# Patient Record
Sex: MALE | Race: WHITE | Employment: OTHER | URBAN - METROPOLITAN AREA
[De-identification: names, ages, dates, MRNs, and addresses within clinical notes are randomized per-mention and may not be internally consistent; named-entity substitution may affect disease eponyms.]

---

## 2017-01-31 ENCOUNTER — TRANSFERRED RECORDS (OUTPATIENT)
Dept: HEALTH INFORMATION MANAGEMENT | Facility: CLINIC | Age: 72
End: 2017-01-31

## 2017-02-17 ENCOUNTER — TRANSFERRED RECORDS (OUTPATIENT)
Dept: HEALTH INFORMATION MANAGEMENT | Facility: CLINIC | Age: 72
End: 2017-02-17

## 2017-03-02 ENCOUNTER — CARE COORDINATION (OUTPATIENT)
Dept: CARDIOLOGY | Facility: CLINIC | Age: 72
End: 2017-03-02

## 2017-03-02 ENCOUNTER — TRANSFERRED RECORDS (OUTPATIENT)
Dept: HEALTH INFORMATION MANAGEMENT | Facility: CLINIC | Age: 72
End: 2017-03-02

## 2017-03-02 NOTE — PROGRESS NOTES
Medical Records from [facility] called nurse triage line and stated that this pt is requesting his medical info sent here.  Instructed the [facility] to fax records (pertinent info from last couple years) here to our Cardiology department for pt's upcoming [place] appointment April 17th

## 2017-04-06 ASSESSMENT — ENCOUNTER SYMPTOMS
LIGHT-HEADEDNESS: 0
MYALGIAS: 1
DEPRESSION: 1
LOSS OF CONSCIOUSNESS: 0
TINGLING: 1
NERVOUS/ANXIOUS: 1
SEIZURES: 0
DISTURBANCES IN COORDINATION: 0
CLAUDICATION: 0
TACHYCARDIA: 0
NUMBNESS: 1
NECK PAIN: 0
SLEEP DISTURBANCES DUE TO BREATHING: 0
BACK PAIN: 1
HYPERTENSION: 0
TREMORS: 0
SYNCOPE: 0
STIFFNESS: 0
MUSCLE CRAMPS: 0
EXERCISE INTOLERANCE: 0
PARALYSIS: 0
DIZZINESS: 1
SPEECH CHANGE: 0
HYPOTENSION: 0
ARTHRALGIAS: 0
JOINT SWELLING: 0
MEMORY LOSS: 0
LEG PAIN: 1
INSOMNIA: 1
DECREASED CONCENTRATION: 0
PANIC: 0
ORTHOPNEA: 0
PALPITATIONS: 0
MUSCLE WEAKNESS: 1
LEG SWELLING: 0
WEAKNESS: 0

## 2017-04-17 ENCOUNTER — PRE VISIT (OUTPATIENT)
Dept: CARDIOLOGY | Facility: CLINIC | Age: 72
End: 2017-04-17

## 2017-04-17 ENCOUNTER — RADIANT APPOINTMENT (OUTPATIENT)
Dept: CARDIOLOGY | Facility: CLINIC | Age: 72
End: 2017-04-17

## 2017-04-17 ENCOUNTER — OFFICE VISIT (OUTPATIENT)
Dept: CARDIOLOGY | Facility: CLINIC | Age: 72
End: 2017-04-17
Attending: INTERNAL MEDICINE
Payer: COMMERCIAL

## 2017-04-17 VITALS
OXYGEN SATURATION: 100 % | DIASTOLIC BLOOD PRESSURE: 67 MMHG | SYSTOLIC BLOOD PRESSURE: 120 MMHG | HEIGHT: 65 IN | BODY MASS INDEX: 24.22 KG/M2 | HEART RATE: 63 BPM | WEIGHT: 145.4 LBS

## 2017-04-17 DIAGNOSIS — I50.22 CHRONIC SYSTOLIC HEART FAILURE (H): Primary | ICD-10-CM

## 2017-04-17 DIAGNOSIS — I50.22 CHRONIC SYSTOLIC HEART FAILURE (H): ICD-10-CM

## 2017-04-17 LAB
ALBUMIN SERPL-MCNC: 3.9 G/DL (ref 3.4–5)
ALP SERPL-CCNC: 95 U/L (ref 40–150)
ALT SERPL W P-5'-P-CCNC: 34 U/L (ref 0–70)
ANION GAP SERPL CALCULATED.3IONS-SCNC: 6 MMOL/L (ref 3–14)
AST SERPL W P-5'-P-CCNC: 48 U/L (ref 0–45)
BILIRUB SERPL-MCNC: 0.5 MG/DL (ref 0.2–1.3)
BUN SERPL-MCNC: 23 MG/DL (ref 7–30)
CALCIUM SERPL-MCNC: 9.3 MG/DL (ref 8.5–10.1)
CHLORIDE SERPL-SCNC: 104 MMOL/L (ref 94–109)
CO2 SERPL-SCNC: 30 MMOL/L (ref 20–32)
CREAT SERPL-MCNC: 0.93 MG/DL (ref 0.66–1.25)
ERYTHROCYTE [DISTWIDTH] IN BLOOD BY AUTOMATED COUNT: 15.5 % (ref 10–15)
GFR SERPL CREATININE-BSD FRML MDRD: 80 ML/MIN/1.7M2
GLUCOSE SERPL-MCNC: 327 MG/DL (ref 70–99)
HCT VFR BLD AUTO: 39.7 % (ref 40–53)
HGB BLD-MCNC: 12.7 G/DL (ref 13.3–17.7)
INR PPP: 2.26 (ref 0.86–1.14)
MCH RBC QN AUTO: 26.9 PG (ref 26.5–33)
MCHC RBC AUTO-ENTMCNC: 32 G/DL (ref 31.5–36.5)
MCV RBC AUTO: 84 FL (ref 78–100)
NT-PROBNP SERPL-MCNC: 984 PG/ML (ref 0–125)
PLATELET # BLD AUTO: 190 10E9/L (ref 150–450)
POTASSIUM SERPL-SCNC: 4.6 MMOL/L (ref 3.4–5.3)
PROT SERPL-MCNC: 7.6 G/DL (ref 6.8–8.8)
RBC # BLD AUTO: 4.72 10E12/L (ref 4.4–5.9)
SODIUM SERPL-SCNC: 140 MMOL/L (ref 133–144)
WBC # BLD AUTO: 5.4 10E9/L (ref 4–11)

## 2017-04-17 PROCEDURE — 99212 OFFICE O/P EST SF 10 MIN: CPT | Mod: ZF

## 2017-04-17 PROCEDURE — 36415 COLL VENOUS BLD VENIPUNCTURE: CPT | Performed by: INTERNAL MEDICINE

## 2017-04-17 PROCEDURE — 99205 OFFICE O/P NEW HI 60 MIN: CPT | Mod: 25 | Performed by: INTERNAL MEDICINE

## 2017-04-17 PROCEDURE — 83880 ASSAY OF NATRIURETIC PEPTIDE: CPT | Performed by: INTERNAL MEDICINE

## 2017-04-17 PROCEDURE — 85610 PROTHROMBIN TIME: CPT | Performed by: INTERNAL MEDICINE

## 2017-04-17 PROCEDURE — 80053 COMPREHEN METABOLIC PANEL: CPT | Performed by: INTERNAL MEDICINE

## 2017-04-17 PROCEDURE — 99212 OFFICE O/P EST SF 10 MIN: CPT

## 2017-04-17 PROCEDURE — 85027 COMPLETE CBC AUTOMATED: CPT | Performed by: INTERNAL MEDICINE

## 2017-04-17 RX ORDER — GABAPENTIN 300 MG/1
300 TABLET, FILM COATED ORAL
COMMUNITY
End: 2018-11-06

## 2017-04-17 RX ORDER — CLOPIDOGREL BISULFATE 75 MG/1
75 TABLET ORAL DAILY
COMMUNITY

## 2017-04-17 RX ORDER — PANTOPRAZOLE SODIUM 40 MG/1
TABLET, DELAYED RELEASE ORAL DAILY
COMMUNITY
End: 2018-05-07

## 2017-04-17 RX ORDER — ATORVASTATIN CALCIUM 80 MG/1
80 TABLET, FILM COATED ORAL DAILY
COMMUNITY

## 2017-04-17 RX ORDER — LOSARTAN POTASSIUM 25 MG/1
25 TABLET ORAL DAILY
COMMUNITY
End: 2018-05-07

## 2017-04-17 RX ORDER — NITROGLYCERIN 0.4 MG/1
0.4 TABLET SUBLINGUAL EVERY 5 MIN PRN
COMMUNITY

## 2017-04-17 RX ORDER — CARVEDILOL 6.25 MG/1
6.25 TABLET ORAL 2 TIMES DAILY WITH MEALS
Status: ON HOLD | COMMUNITY
End: 2018-11-07

## 2017-04-17 RX ORDER — SOTALOL HYDROCHLORIDE 120 MG/1
160 TABLET ORAL 2 TIMES DAILY
COMMUNITY
End: 2017-11-06

## 2017-04-17 RX ORDER — ASPIRIN 81 MG/1
81 TABLET, CHEWABLE ORAL DAILY
COMMUNITY
End: 2017-11-06 | Stop reason: ALTCHOICE

## 2017-04-17 RX ORDER — ISOSORBIDE MONONITRATE 60 MG/1
60 TABLET, EXTENDED RELEASE ORAL 2 TIMES DAILY
Status: ON HOLD | COMMUNITY
End: 2018-11-07

## 2017-04-17 RX ORDER — WARFARIN SODIUM 2.5 MG/1
TABLET ORAL
Status: ON HOLD | COMMUNITY
End: 2019-01-25

## 2017-04-17 ASSESSMENT — PAIN SCALES - GENERAL: PAINLEVEL: NO PAIN (0)

## 2017-04-17 NOTE — MR AVS SNAPSHOT
After Visit Summary   4/17/2017    Wesley Cooper    MRN: [id number]           Patient Information     Date Of Birth          1945        Visit Information        Provider Department      4/17/2017 1:00 PM Claudy Loco MD [facility]        Today's Diagnoses     Chronic systolic heart failure (H)    -  1      Care Instructions    You were seen at the [facility] clinic today.  You saw Dr. Loco  Here are your Instructions:    1.  We will get images of your cardiac testing from [place].  2.  Echo today at [facility].  3.  Labs today.  4.  F/U will be based on your test results, may do right heart cath & cardiopulmonary stress test next week.      Sil Vance RN  Nurse Care Coordinator  Office:  [phone] option #1, then #3 & ask for Sil (nurse line)  Fax:  [phone]  After Hours:  [phone]  Appointments:  [phone] option #1, then option #1                      Follow-ups after your visit        Your next 10 appointments already scheduled     Apr 17, 2017  2:45 PM CDT   Lab with  LAB    Health Lab ([facility])    [address] [place] [id number] [phone]            Apr 17, 2017  3:30 PM CDT   Ech Complete with [address] Echo ([facility])    [address] [place] [id number] [phone]           1.  Please bring or wear a comfortable two-piece outfit. 2.  You may eat, drink and take your normal medicines. 3.  For any questions that cannot be answered, please contact the ordering physician              Future tests that were ordered for you today     Open Future Orders        Priority Expected Expires Ordered    CBC with platelets Routine 4/17/2017 4/18/2017 4/17/2017    Comprehensive metabolic panel Routine 4/17/2017 4/18/2017 4/17/2017    N terminal pro BNP outpatient Routine 4/17/2017 4/18/2017 4/17/2017    INR  "Routine 4/17/2017 4/18/2017 4/17/2017    Echocardiogram Complete Routine  4/17/2018 4/17/2017            Who to contact     If you have questions or need follow up information about today's clinic visit or your schedule please contact [facility] directly at [phone].  Normal or non-critical lab and imaging results will be communicated to you by MyChart, letter or phone within 4 business days after the clinic has received the results. If you do not hear from us within 7 days, please contact the clinic through compropagohart or phone. If you have a critical or abnormal lab result, we will notify you by phone as soon as possible.  Submit refill requests through [employer] or call your pharmacy and they will forward the refill request to us. Please allow 3 business days for your refill to be completed.          Additional Information About Your Visit        MyChart Information     [employer] gives you secure access to your electronic health record. If you see a primary care provider, you can also send messages to your care team and make appointments. If you have questions, please call your primary care clinic.  If you do not have a primary care provider, please call [phone] and they will assist you.        Care EveryWhere ID     This is your Care EveryWhere ID. This could be used by other organizations to access your [facility] medical records  [id number]        Your Vitals Were     Pulse Height Pulse Oximetry BMI (Body Mass Index)          63 1.651 m (5' 5\") 100% 24.2 kg/m2         Blood Pressure from Last 3 Encounters:   04/17/17 120/67    Weight from Last 3 Encounters:   04/17/17 66 kg (145 lb 6.4 oz)               Primary Care Provider Office Phone # Fax #    Jarod De La Rosa [phone]       [address] [address]        Thank you!     Thank you for choosing [facility]  for your care. Our goal is always to provide you with excellent care. Hearing back from " our patients is one way we can continue to improve our services. Please take a few minutes to complete the written survey that you may receive in the mail after your visit with us. Thank you!             Your Updated Medication List - Protect others around you: Learn how to safely use, store and throw away your medicines at www.disposemymeds.org.          This list is accurate as of: 4/17/17  2:34 PM.  Always use your most recent med list.                   Brand Name Dispense Instructions for use    AMLODIPINE BESYLATE PO          aspirin 81 MG chewable tablet      Take 81 mg by mouth daily       atorvastatin 80 MG tablet    LIPITOR     Take 80 mg by mouth daily       carvedilol 6.25 MG tablet    COREG     Take 6.25 mg by mouth 2 times daily (with meals)       gabapentin 300 MG 24 hr tablet    GRALISE     Take 300 mg by mouth daily (with dinner)       isosorbide mononitrate 60 MG 24 hr tablet    IMDUR     Take 60 mg by mouth daily       losartan 25 MG tablet    COZAAR     Take 25 mg by mouth daily       nitroglycerin 0.4 MG sublingual tablet    NITROSTAT     Place 0.4 mg under the tongue every 5 minutes as needed for chest pain For chest pain place 1 tablet under the tongue every 5 minutes for 3 doses. If symptoms persist 5 minutes after 1st dose call 911.       pantoprazole 40 MG EC tablet    PROTONIX     Take by mouth daily       PLAVIX 75 MG tablet   Generic drug:  clopidogrel      Take 75 mg by mouth daily       sotalol HCl (AF) 120 MG Tabs      Take 160 mg by mouth 2 times daily       warfarin 2.5 MG tablet    COUMADIN     Take 2.5 mg by mouth daily

## 2017-04-17 NOTE — PATIENT INSTRUCTIONS
You were seen at the [facility] clinic today.  You saw Dr. Loco  Here are your Instructions:    1.  We will get images of your cardiac testing from [place].  2.  Echo today at 330.  3.  Labs today.  4.  F/U will be based on your test results, may do right heart cath & cardiopulmonary stress test next week.      Sil Vance RN  Nurse Care Coordinator  Office:  [phone] option #1, then #3 & ask for Sil (nurse line)  Fax:  [phone]  After Hours:  [phone]  Appointments:  [phone] option #1, then option #1

## 2017-04-17 NOTE — NURSING NOTE
POC discussed w/ pt & s.o., including getting images from [place].  Verbalizes understanding to plan.

## 2017-04-17 NOTE — LETTER
2017      RE: Wesley Cooper  [address] [place] [id number]       Dear Dr. De La Rosa,      We had the pleasure of seeing your patient Mr. Cooper in our Advanced Heart Failure Clinic. He is a 72-year-old gentleman with a complex past medical history which includes the followin.  CAD - status post CABG in  with a LIMA to LAD, a vein graft to RCA and a vein graft to circumflex. History of multiple interventions and PCI's post CABG. History of non-STEMI in 2016 where he received a balloon angioplasty to then in-stent restenosis of his mid-LAD graft.  The patient had a total occlusion of 2 of 3 grafts, with the only surviving graft the LIMA to LAD.     2. History of angina for which he was referred for EECP, external extracorporeal counterpulsation, of which he completed a 3-month course without real benefit. Didn't tolerate Ranolazine in the past.     3.  Ischemic cardiomyopathy due to above with an ejection fraction of 35% a couple of years ago. Status post CRT-D implantation due to above, the patient is biventricularly paced.     4. Type 2 diabetes mellitus.     5. Atrial flutter on chronic anticoagulation    He is currently being managed at [facility] and he self referred himself for a second opinion for or consideration of advanced therapy of his ischemic cardiomyopathy/heart failure. He was considered not a candidate for revascularization as he had no viability in his inferior and lateral wall. His LIMA graft was ok but he had distal LAD disease, which was reportedly to small for revascularization.     Mr. Cooper is relatively active.  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  He works quite a bit without any real significant angina that would make him stop.  However, he indicates that he develops some chest pain mostly while resting, the most recent of which was last night where he had to take 3 nitroglycerin sublingually, with  eventual subsiding of the symptoms.  He is not restricted by any dyspnea on exertion or anginal pain; however, he can shop and walk probably a couple of blocks.  However, he has got a left footdrop from a lower back problem which eventually he went on to have surgery on in [place], [place].  He denies orthopnea or paroxysmal nocturnal dyspnea.  His most recent electrocardiogram from [facility] was on 01/31, which showed biventricular and atrial pacemaker with a background of sinus rhythm.  There has been conflicting reports about the estimation of his left ventricular systolic function, and there was no real studies to be reviewed by us to determine what his current left ventricular ejection trend is about.  He takes his medications regularly.      He most recently had 2 appropriate ICD shocks in January of this year. He is interested, as I indicated, in advanced heart failure therapy.     Current Outpatient Prescriptions   Medication Sig     nitroglycerin (NITROSTAT) 0.4 MG sublingual tablet Place 0.4 mg under the tongue every 5 minutes as needed for chest pain For chest pain place 1 tablet under the tongue every 5 minutes for 3 doses. If symptoms persist 5 minutes after 1st dose call 911.     sotalol HCl, AF, 120 MG TABS Take 160 mg by mouth 2 times daily     clopidogrel (PLAVIX) 75 MG tablet Take 75 mg by mouth daily     isosorbide mononitrate (IMDUR) 60 MG 24 hr tablet Take 60 mg by mouth daily     carvedilol (COREG) 6.25 MG tablet Take 6.25 mg by mouth 2 times daily (with meals)     gabapentin (GRALISE) 300 MG 24 hr tablet Take 300 mg by mouth daily (with dinner)     losartan (COZAAR) 25 MG tablet Take 25 mg by mouth daily     warfarin (COUMADIN) 2.5 MG tablet Take 2.5 mg by mouth daily     aspirin 81 MG chewable tablet Take 81 mg by mouth daily     pantoprazole (PROTONIX) 40 MG EC tablet Take by mouth daily     atorvastatin (LIPITOR) 80 MG tablet Take 80 mg by mouth daily     AMLODIPINE BESYLATE PO      No  current facility-administered medications for this visit.      ALLERGIES:  None known.      OTHER/SURGICAL HISTORY:   1.  Status post CABG in .    2.  Low back surgery.   3.  Left footdrop.      SOCIAL HISTORY:  He does not smoke.  He lives with his wife.  He quit smoking in .  He used to smoke 1 pack per day for 40-45 years and occasional alcohol drinker.      FAMILY HISTORY:  Both parents  in their 90s.      REVIEW OF SYSTEMS:  An 11-point system was reviewed and if not mentioned in my HPI are negative.      PHYSICAL EXAMINATION:   GENERAL:  In no acute distress.   VITAL SIGNS:  Blood pressure 120/67, heart rate 63 beats per minute.  Height 1.65 meters, weight 66 kilograms.  BMI is 24.25.   HEENT:  Normocephalic, atraumatic.  No xanthelasma.  No oral lesions.   NECK:  No rigidity, thyromegaly or lymphadenopathy.   CARDIOVASCULAR:  Regular rate and rhythm.  No gallops, rubs or murmurs.  JVP is less than 7 cm water, no precordial heave or lift.   CHEST:  Clear to auscultation.   ABDOMEN:  Soft, nontender, nondistended, no organomegaly.   EXTREMITIES:  No lower extremity edema.   NEUROLOGIC:  Cranial nerves are grossly intact.  Some weakness with apparent left footdrop.   SKIN:  No rash, warm and dry.   PSYCHIATRIC:  Normal mood and affect.     CBC RESULTS:   Recent Labs   Lab Test  17   1512   WBC  5.4   RBC  4.72   HGB  12.7*   HCT  39.7*   MCV  84   MCH  26.9   MCHC  32.0   RDW  15.5*   PLT  190     Recent Labs   Lab Test  17   1512   NA  140   POTASSIUM  4.6   CHLORIDE  104   CO2  30   ANIONGAP  6   GLC  327*   BUN  23   CR  0.93   KEIRA  9.3       Liver Function Studies -   Recent Labs   Lab Test  17   1512   PROTTOTAL  7.6   ALBUMIN  3.9   BILITOTAL  0.5   ALKPHOS  95   AST  48*   ALT  34       NT-ProBNP  - 984    EKG: A sensed - Biv Paced     Echocardiogram (2017):     The LVEF is estimated at 45-50% (calculated, 49%.)  Inferior/posterior wall akinesis is present.  Global right  ventricular function is normal.  Mild to moderate mitral insufficiency is present.  Mild to moderate tricuspid insufficiency is present.  Mild pulmonary hypertension. Right ventricular systolic pressure is 31 mmHg above the right atrial pressure.  The inferior vena cava is dilated at 2.2 cm without respiratory variability, consistent with increased right atrial pressure. Estimated mean right atrial pressure is 3-8 mmHg.  No pericardial effusion is present.  Previous study not available for comparison.       IMPRESSION AND PLAN:      In summary, Mr. Wesley Cooper is a 72-year-old gentleman with a long history of coronary artery disease, status post CABG x3, multiple coronary interventions, ischemic cardiomyopathy, status post biventricular defibrillator, diabetes mellitus and atrial flutter on chronic warfarin therapy.  The patient has received most of his cardiac care at [facility] and is here self-referred for further management seeking advanced heart failure therapy if he is a candidate.      His echocardiogram shows only mild reduction in his LV systolic function.  Clinically, we would characterize him as New York Heart Association functional class II and AHA/ACC stage C. He is also compensated without any evidence of volume overload or end-organ dysfunction.  Thus, he does not have any indications for advanced heart failure therapy, which is mainly left ventricular assist devices as he is not a candidate for cardiac transplant because of his age.     He has had couple of hospitalizations over the last 6 months, 1 of which was due to appropriate 2 ICD shocks in January of this year.  From reviewing the documents that were sent from [place], it appeared that surgical revascularization was contemplated, and a PET scan was ordered, which we do not have the films of it, and it appeared that his targets are not suitable for surgical revascularization. His chest pain is not very typical for anginal and he does not appear  to be very limited.      As a first step, we need to review his angiogram films and PET scan to see whether he can have any revascularization, which does not appear very promising based on the report from [facility]. I am afraid that there are not many other options at this point except for maximal medical therapy, which he is already on. His blood sugars are elevated and this needs to be monitored and controlled.      Thank you very much for sharing the care of this patient with us at the [facility] Advanced [facility] Failure Clinic.  The patient was seen and discussed with Dr. Loco.      Dictated by Ankita Christensen DO    Heart Failure Fellow    (extension [phone])       I have personally seen and examined the patient, and then discussed with Dr. Montanez, and agree with the findings and plan in this note. I have reviewed today's vital signs, medications, labs, and imaging.     Sincerely,    Claudy Loco MD   [facility]  Section of Advanced Heart Failure   Cardiovascular Division  [facility]   [phone]          D: 2017 11:00   T: 2017 04:42   MT: [place]      Name:     FLOR STEVENSON   MRN:      [id number]        Account:      [id number]   :      1945           Service Date: 2017      Document: [id number]        Claudy Loco MD

## 2017-04-17 NOTE — LETTER
2017      RE: Wesley Cooper  [address] [facility] [address]       Dear Colleague,    Thank you for the opportunity to participate in the care of your patient, Wesley Cooper, at the [facility]. Please see a copy of my visit note below.    Dear Dr. De La Rosa,      We had the pleasure of seeing your patient Mr. Cooper in our Advanced Heart Failure Clinic. He is a 72-year-old gentleman with a complex past medical history which includes the followin.  CAD - status post CABG in  with a LIMA to LAD, a vein graft to RCA and a vein graft to circumflex. History of multiple interventions and PCI's post CABG. History of non-STEMI in 2016 where he received a balloon angioplasty to then in-stent restenosis of his mid-LAD graft.  The patient had a total occlusion of 2 of 3 grafts, with the only surviving graft the LIMA to LAD.     2. History of angina for which he was referred for EECP, external extracorporeal counterpulsation, of which he completed a 3-month course without real benefit. Didn't tolerate Ranolazine in the past.     3.  Ischemic cardiomyopathy due to above with an ejection fraction of 35% a couple of years ago. Status post CRT-D implantation due to above, the patient is biventricularly paced.     4. Type 2 diabetes mellitus.     5. Atrial flutter on chronic anticoagulation    He is currently being managed at [facility] and he self referred himself for a second opinion for or consideration of advanced therapy of his ischemic cardiomyopathy/heart failure. He was considered not a candidate for revascularization as he had no viability in his inferior and lateral wall. His LIMA graft was ok but he had distal LAD disease, which was reportedly to small for revascularization.     Mr. Cooper is relatively active.  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  He works quite a bit without any  real significant angina that would make him stop.  However, he indicates that he develops some chest pain mostly while resting, the most recent of which was last night where he had to take 3 nitroglycerin sublingually, with eventual subsiding of the symptoms.  He is not restricted by any dyspnea on exertion or anginal pain; however, he can shop and walk probably a couple of blocks.  However, he has got a left footdrop from a lower back problem which eventually he went on to have surgery on in [place], [place].  He denies orthopnea or paroxysmal nocturnal dyspnea.  His most recent electrocardiogram from [facility] was on 01/31, which showed biventricular and atrial pacemaker with a background of sinus rhythm.  There has been conflicting reports about the estimation of his left ventricular systolic function, and there was no real studies to be reviewed by us to determine what his current left ventricular ejection trend is about.  He takes his medications regularly.      He most recently had 2 appropriate ICD shocks in January of this year. He is interested, as I indicated, in advanced heart failure therapy.     Current Outpatient Prescriptions   Medication Sig     nitroglycerin (NITROSTAT) 0.4 MG sublingual tablet Place 0.4 mg under the tongue every 5 minutes as needed for chest pain For chest pain place 1 tablet under the tongue every 5 minutes for 3 doses. If symptoms persist 5 minutes after 1st dose call 911.     sotalol HCl, AF, 120 MG TABS Take 160 mg by mouth 2 times daily     clopidogrel (PLAVIX) 75 MG tablet Take 75 mg by mouth daily     isosorbide mononitrate (IMDUR) 60 MG 24 hr tablet Take 60 mg by mouth daily     carvedilol (COREG) 6.25 MG tablet Take 6.25 mg by mouth 2 times daily (with meals)     gabapentin (GRALISE) 300 MG 24 hr tablet Take 300 mg by mouth daily (with dinner)     losartan (COZAAR) 25 MG tablet Take 25 mg by mouth daily     warfarin (COUMADIN) 2.5 MG tablet Take 2.5 mg by mouth daily      aspirin 81 MG chewable tablet Take 81 mg by mouth daily     pantoprazole (PROTONIX) 40 MG EC tablet Take by mouth daily     atorvastatin (LIPITOR) 80 MG tablet Take 80 mg by mouth daily     AMLODIPINE BESYLATE PO      No current facility-administered medications for this visit.      ALLERGIES:  None known.      OTHER/SURGICAL HISTORY:   1.  Status post CABG in .    2.  Low back surgery.   3.  Left footdrop.      SOCIAL HISTORY:  He does not smoke.  He lives with his wife.  He quit smoking in .  He used to smoke 1 pack per day for 40-45 years and occasional alcohol drinker.      FAMILY HISTORY:  Both parents  in their 90s.      REVIEW OF SYSTEMS:  An 11-point system was reviewed and if not mentioned in my HPI are negative.      PHYSICAL EXAMINATION:   GENERAL:  In no acute distress.   VITAL SIGNS:  Blood pressure 120/67, heart rate 63 beats per minute.  Height 1.65 meters, weight 66 kilograms.  BMI is 24.25.   HEENT:  Normocephalic, atraumatic.  No xanthelasma.  No oral lesions.   NECK:  No rigidity, thyromegaly or lymphadenopathy.   CARDIOVASCULAR:  Regular rate and rhythm.  No gallops, rubs or murmurs.  JVP is less than 7 cm water, no precordial heave or lift.   CHEST:  Clear to auscultation.   ABDOMEN:  Soft, nontender, nondistended, no organomegaly.   EXTREMITIES:  No lower extremity edema.   NEUROLOGIC:  Cranial nerves are grossly intact.  Some weakness with apparent left footdrop.   SKIN:  No rash, warm and dry.   PSYCHIATRIC:  Normal mood and affect.     CBC RESULTS:   Recent Labs   Lab Test  17   1512   WBC  5.4   RBC  4.72   HGB  12.7*   HCT  39.7*   MCV  84   MCH  26.9   MCHC  32.0   RDW  15.5*   PLT  190     Recent Labs   Lab Test  17   1512   NA  140   POTASSIUM  4.6   CHLORIDE  104   CO2  30   ANIONGAP  6   GLC  327*   BUN  23   CR  0.93   KEIRA  9.3       Liver Function Studies -   Recent Labs   Lab Test  17   1512   PROTTOTAL  7.6   ALBUMIN  3.9   BILITOTAL  0.5    ALKPHOS  95   AST  48*   ALT  34       NT-ProBNP  - 984    EKG: A sensed - Biv Paced     Echocardiogram (04/2017):     The LVEF is estimated at 45-50% (calculated, 49%.)  Inferior/posterior wall akinesis is present.  Global right ventricular function is normal.  Mild to moderate mitral insufficiency is present.  Mild to moderate tricuspid insufficiency is present.  Mild pulmonary hypertension. Right ventricular systolic pressure is 31 mmHg above the right atrial pressure.  The inferior vena cava is dilated at 2.2 cm without respiratory variability, consistent with increased right atrial pressure. Estimated mean right atrial pressure is 3-8 mmHg.  No pericardial effusion is present.  Previous study not available for comparison.       IMPRESSION AND PLAN:      In summary, Mr. Wesley Cooper is a 72-year-old gentleman with a long history of coronary artery disease, status post CABG x3, multiple coronary interventions, ischemic cardiomyopathy, status post biventricular defibrillator, diabetes mellitus and atrial flutter on chronic warfarin therapy.  The patient has received most of his cardiac care at [facility] and is here self-referred for further management seeking advanced heart failure therapy if he is a candidate.      His echocardiogram shows only mild reduction in his LV systolic function.  Clinically, we would characterize him as New York Heart Association functional class II and AHA/ACC stage C. He is also compensated without any evidence of volume overload or end-organ dysfunction.  Thus, he does not have any indications for advanced heart failure therapy, which is mainly left ventricular assist devices as he is not a candidate for cardiac transplant because of his age.     He has had couple of hospitalizations over the last 6 months, 1 of which was due to appropriate 2 ICD shocks in January of this year.  From reviewing the documents that were sent from [place], it appeared that surgical revascularization was  contemplated, and a PET scan was ordered, which we do not have the films of it, and it appeared that his targets are not suitable for surgical revascularization. His chest pain is not very typical for anginal and he does not appear to be very limited.      As a first step, we need to review his angiogram films and PET scan to see whether he can have any revascularization, which does not appear very promising based on the report from [facility]. I am afraid that there are not many other options at this point except for maximal medical therapy, which he is already on. His blood sugars are elevated and this needs to be monitored and controlled.      Thank you very much for sharing the care of this patient with us at the [facility] Advanced Heart Failure Clinic.  The patient was seen and discussed with Dr. Loco.      Dictated by Ankita Christensen,     Heart Failure Fellow    (extension [phone])     I have personally seen and examined the patient, and then discussed with Dr. Montanez, and agree with the findings and plan in this note. I have reviewed today's vital signs, medications, labs, and imaging.     Sincerely,    Claudy Loco MD   Center for Pulmonary Hypertension  Section of Advanced Heart Failure   Cardiovascular Division  [facility]   [phone]    D: 2017 11:00   T: 2017 04:42   MT: [place]      Name:     FLOR STEVENSON   MRN:      [id number]        Account:      [id number]   :      1945           Service Date: 2017      Document: [id number]

## 2017-04-21 NOTE — PROGRESS NOTES
Dear Dr. De La Rosa,      We had the pleasure of seeing your patient Mr. Cooper in our Advanced Heart Failure Clinic. He is a 72-year-old gentleman with a complex past medical history which includes the followin.  CAD - status post CABG in  with a LIMA to LAD, a vein graft to RCA and a vein graft to circumflex. History of multiple interventions and PCI's post CABG. History of non-STEMI in 2016 where he received a balloon angioplasty to then in-stent restenosis of his mid-LAD graft.  The patient had a total occlusion of 2 of 3 grafts, with the only surviving graft the LIMA to LAD.     2. History of angina for which he was referred for EECP, external extracorporeal counterpulsation, of which he completed a 3-month course without real benefit. Didn't tolerate Ranolazine in the past.     3.  Ischemic cardiomyopathy due to above with an ejection fraction of 35% a couple of years ago. Status post CRT-D implantation due to above, the patient is biventricularly paced.     4. Type 2 diabetes mellitus.     5. Atrial flutter on chronic anticoagulation    He is currently being managed at [facility] and he self referred himself for a second opinion for or consideration of advanced therapy of his ischemic cardiomyopathy/heart failure. He was considered not a candidate for revascularization as he had no viability in his inferior and lateral wall. His LIMA graft was ok but he had distal LAD disease, which was reportedly to small for revascularization.     Mr. Cooper is relatively active.  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  He works quite a bit without any real significant angina that would make him stop.  However, he indicates that he develops some chest pain mostly while resting, the most recent of which was last night where he had to take 3 nitroglycerin sublingually, with eventual subsiding of the symptoms.  He is not restricted by any dyspnea on  exertion or anginal pain; however, he can shop and walk probably a couple of blocks.  However, he has got a left footdrop from a lower back problem which eventually he went on to have surgery on in [place], [place].  He denies orthopnea or paroxysmal nocturnal dyspnea.  His most recent electrocardiogram from [facility] was on 01/31, which showed biventricular and atrial pacemaker with a background of sinus rhythm.  There has been conflicting reports about the estimation of his left ventricular systolic function, and there was no real studies to be reviewed by us to determine what his current left ventricular ejection trend is about.  He takes his medications regularly.      He most recently had 2 appropriate ICD shocks in January of this year. He is interested, as I indicated, in advanced heart failure therapy.     Current Outpatient Prescriptions   Medication Sig     nitroglycerin (NITROSTAT) 0.4 MG sublingual tablet Place 0.4 mg under the tongue every 5 minutes as needed for chest pain For chest pain place 1 tablet under the tongue every 5 minutes for 3 doses. If symptoms persist 5 minutes after 1st dose call 911.     sotalol HCl, AF, 120 MG TABS Take 160 mg by mouth 2 times daily     clopidogrel (PLAVIX) 75 MG tablet Take 75 mg by mouth daily     isosorbide mononitrate (IMDUR) 60 MG 24 hr tablet Take 60 mg by mouth daily     carvedilol (COREG) 6.25 MG tablet Take 6.25 mg by mouth 2 times daily (with meals)     gabapentin (GRALISE) 300 MG 24 hr tablet Take 300 mg by mouth daily (with dinner)     losartan (COZAAR) 25 MG tablet Take 25 mg by mouth daily     warfarin (COUMADIN) 2.5 MG tablet Take 2.5 mg by mouth daily     aspirin 81 MG chewable tablet Take 81 mg by mouth daily     pantoprazole (PROTONIX) 40 MG EC tablet Take by mouth daily     atorvastatin (LIPITOR) 80 MG tablet Take 80 mg by mouth daily     AMLODIPINE BESYLATE PO      No current facility-administered medications for this visit.      ALLERGIES:   None known.      OTHER/SURGICAL HISTORY:   1.  Status post CABG in .    2.  Low back surgery.   3.  Left footdrop.      SOCIAL HISTORY:  He does not smoke.  He lives with his wife.  He quit smoking in .  He used to smoke 1 pack per day for 40-45 years and occasional alcohol drinker.      FAMILY HISTORY:  Both parents  in their 90s.      REVIEW OF SYSTEMS:  An 11-point system was reviewed and if not mentioned in my HPI are negative.      PHYSICAL EXAMINATION:   GENERAL:  In no acute distress.   VITAL SIGNS:  Blood pressure 120/67, heart rate 63 beats per minute.  Height 1.65 meters, weight 66 kilograms.  BMI is 24.25.   HEENT:  Normocephalic, atraumatic.  No xanthelasma.  No oral lesions.   NECK:  No rigidity, thyromegaly or lymphadenopathy.   CARDIOVASCULAR:  Regular rate and rhythm.  No gallops, rubs or murmurs.  JVP is less than 7 cm water, no precordial heave or lift.   CHEST:  Clear to auscultation.   ABDOMEN:  Soft, nontender, nondistended, no organomegaly.   EXTREMITIES:  No lower extremity edema.   NEUROLOGIC:  Cranial nerves are grossly intact.  Some weakness with apparent left footdrop.   SKIN:  No rash, warm and dry.   PSYCHIATRIC:  Normal mood and affect.     CBC RESULTS:   Recent Labs   Lab Test  17   1512   WBC  5.4   RBC  4.72   HGB  12.7*   HCT  39.7*   MCV  84   MCH  26.9   MCHC  32.0   RDW  15.5*   PLT  190     Recent Labs   Lab Test  17   1512   NA  140   POTASSIUM  4.6   CHLORIDE  104   CO2  30   ANIONGAP  6   GLC  327*   BUN  23   CR  0.93   KEIRA  9.3       Liver Function Studies -   Recent Labs   Lab Test  17   1512   PROTTOTAL  7.6   ALBUMIN  3.9   BILITOTAL  0.5   ALKPHOS  95   AST  48*   ALT  34       NT-ProBNP  - 984    EKG: A sensed - Biv Paced     Echocardiogram (2017):     The LVEF is estimated at 45-50% (calculated, 49%.)  Inferior/posterior wall akinesis is present.  Global right ventricular function is normal.  Mild to moderate mitral insufficiency is  present.  Mild to moderate tricuspid insufficiency is present.  Mild pulmonary hypertension. Right ventricular systolic pressure is 31 mmHg above the right atrial pressure.  The inferior vena cava is dilated at 2.2 cm without respiratory variability, consistent with increased right atrial pressure. Estimated mean right atrial pressure is 3-8 mmHg.  No pericardial effusion is present.  Previous study not available for comparison.       IMPRESSION AND PLAN:      In summary, Mr. Wesley Cooper is a 72-year-old gentleman with a long history of coronary artery disease, status post CABG x3, multiple coronary interventions, ischemic cardiomyopathy, status post biventricular defibrillator, diabetes mellitus and atrial flutter on chronic warfarin therapy.  The patient has received most of his cardiac care at [facility] and is here self-referred for further management seeking advanced heart failure therapy if he is a candidate.      His echocardiogram shows only mild reduction in his LV systolic function.  Clinically, we would characterize him as New York Heart Association functional class II and AHA/ACC stage C. He is also compensated without any evidence of volume overload or end-organ dysfunction.  Thus, he does not have any indications for advanced heart failure therapy, which is mainly left ventricular assist devices as he is not a candidate for cardiac transplant because of his age.     He has had couple of hospitalizations over the last 6 months, 1 of which was due to appropriate 2 ICD shocks in January of this year.  From reviewing the documents that were sent from [place], it appeared that surgical revascularization was contemplated, and a PET scan was ordered, which we do not have the films of it, and it appeared that his targets are not suitable for surgical revascularization. His chest pain is not very typical for anginal and he does not appear to be very limited.      As a first step, we need to review his angiogram  films and PET scan to see whether he can have any revascularization, which does not appear very promising based on the report from [facility]. I am afraid that there are not many other options at this point except for maximal medical therapy, which he is already on. His blood sugars are elevated and this needs to be monitored and controlled.      Thank you very much for sharing the care of this patient with us at the [facility] Advanced Heart Failure Clinic.  The patient was seen and discussed with Dr. Loco.      Dictated by Ankita Christensen DO    Heart Failure Fellow    (extension [phone])       I have personally seen and examined the patient, and then discussed with Dr. Montanez, and agree with the findings and plan in this note. I have reviewed today's vital signs, medications, labs, and imaging.     Sincerely,    Claudy Loco MD   Center for Pulmonary Hypertension  Section of Advanced Heart Failure   Cardiovascular Division  [facility]   [phone]          D: 2017 11:00   T: 2017 04:42   MT: [place]      Name:     FLOR STEVENSON   MRN:      [id number]        Account:      [id number]   :      1945           Service Date: 2017      Document: [id number]

## 2017-05-08 ENCOUNTER — CARE COORDINATION (OUTPATIENT)
Dept: CARDIOLOGY | Facility: CLINIC | Age: 72
End: 2017-05-08

## 2017-05-08 NOTE — PROGRESS NOTES
Patient saw Dr. Loco in clinic on 4/18/17.  At that time, per note, Dr. Loco was going to request and review images of PET scan and angiogram from [facility].  Written reports are in EPIC.  No images are noted.  I will have his clinic follow up and contact patient.  Patient requests a call back to discuss.

## 2017-05-08 NOTE — PROGRESS NOTES
Informed pt images received & entered into our system.  Dr. Loco is on hospital rotation last & this week & depending on the business of the hospital census it may be next week when I call him w/ the plan.  Pt verbalizes understanding to plan, msg forwarded to Dr. Loco.

## 2017-05-19 ENCOUNTER — TELEPHONE (OUTPATIENT)
Dept: CARDIOLOGY | Facility: CLINIC | Age: 72
End: 2017-05-19

## 2017-05-19 NOTE — TELEPHONE ENCOUNTER
LM that Dr. Loco is waiting to review w/ an interventionalist regarding if  could be intervened upon.  Will call pt next week after discussion.

## 2017-05-19 NOTE — TELEPHONE ENCOUNTER
Patient stated someone was suppose to call him this week to let him know what the doctor found in his medical records from [place].  He can be reached @ [phone]

## 2017-06-06 ENCOUNTER — CARE COORDINATION (OUTPATIENT)
Dept: CARDIOLOGY | Facility: CLINIC | Age: 72
End: 2017-06-06

## 2017-06-06 NOTE — PROGRESS NOTES
Dr. Loco discussed pt's angio films w/ Dr. Marquez to see if  could be opened.  Dr. Marquez agreed w/ Holly's findings, & doesn't think the  could safely be intervened upon.  Pt verbalizes understanding - states he is feeling ok & very active.  Instructed him to call if he starts feeling poorly, o/w will see him in November as scheduled.

## 2017-11-06 ENCOUNTER — PRE VISIT (OUTPATIENT)
Dept: CARDIOLOGY | Facility: CLINIC | Age: 72
End: 2017-11-06

## 2017-11-06 ENCOUNTER — OFFICE VISIT (OUTPATIENT)
Dept: CARDIOLOGY | Facility: CLINIC | Age: 72
End: 2017-11-06
Attending: INTERNAL MEDICINE
Payer: COMMERCIAL

## 2017-11-06 VITALS
WEIGHT: 143.7 LBS | SYSTOLIC BLOOD PRESSURE: 125 MMHG | HEART RATE: 74 BPM | BODY MASS INDEX: 23.94 KG/M2 | OXYGEN SATURATION: 96 % | HEIGHT: 65 IN | DIASTOLIC BLOOD PRESSURE: 79 MMHG

## 2017-11-06 DIAGNOSIS — I50.22 CHRONIC SYSTOLIC HEART FAILURE (H): ICD-10-CM

## 2017-11-06 DIAGNOSIS — I50.22 CHRONIC SYSTOLIC HEART FAILURE (H): Primary | ICD-10-CM

## 2017-11-06 LAB
ANION GAP SERPL CALCULATED.3IONS-SCNC: 7 MMOL/L (ref 3–14)
BUN SERPL-MCNC: 15 MG/DL (ref 7–30)
CALCIUM SERPL-MCNC: 8.7 MG/DL (ref 8.5–10.1)
CHLORIDE SERPL-SCNC: 102 MMOL/L (ref 94–109)
CO2 SERPL-SCNC: 26 MMOL/L (ref 20–32)
CREAT SERPL-MCNC: 0.79 MG/DL (ref 0.66–1.25)
ERYTHROCYTE [DISTWIDTH] IN BLOOD BY AUTOMATED COUNT: 14.5 % (ref 10–15)
GFR SERPL CREATININE-BSD FRML MDRD: >90 ML/MIN/1.7M2
GLUCOSE SERPL-MCNC: 322 MG/DL (ref 70–99)
HCT VFR BLD AUTO: 38.9 % (ref 40–53)
HGB BLD-MCNC: 12.7 G/DL (ref 13.3–17.7)
MCH RBC QN AUTO: 28.5 PG (ref 26.5–33)
MCHC RBC AUTO-ENTMCNC: 32.6 G/DL (ref 31.5–36.5)
MCV RBC AUTO: 87 FL (ref 78–100)
NT-PROBNP SERPL-MCNC: 468 PG/ML (ref 0–125)
PLATELET # BLD AUTO: 190 10E9/L (ref 150–450)
POTASSIUM SERPL-SCNC: 3.9 MMOL/L (ref 3.4–5.3)
RBC # BLD AUTO: 4.46 10E12/L (ref 4.4–5.9)
SODIUM SERPL-SCNC: 135 MMOL/L (ref 133–144)
WBC # BLD AUTO: 5.3 10E9/L (ref 4–11)

## 2017-11-06 PROCEDURE — 99214 OFFICE O/P EST MOD 30 MIN: CPT | Mod: GC | Performed by: INTERNAL MEDICINE

## 2017-11-06 PROCEDURE — 85027 COMPLETE CBC AUTOMATED: CPT | Performed by: INTERNAL MEDICINE

## 2017-11-06 PROCEDURE — 80048 BASIC METABOLIC PNL TOTAL CA: CPT | Performed by: INTERNAL MEDICINE

## 2017-11-06 PROCEDURE — 83880 ASSAY OF NATRIURETIC PEPTIDE: CPT | Performed by: INTERNAL MEDICINE

## 2017-11-06 PROCEDURE — 36415 COLL VENOUS BLD VENIPUNCTURE: CPT | Performed by: INTERNAL MEDICINE

## 2017-11-06 PROCEDURE — 99212 OFFICE O/P EST SF 10 MIN: CPT | Mod: ZF

## 2017-11-06 RX ORDER — SOTALOL HYDROCHLORIDE 160 MG/1
160 TABLET ORAL 2 TIMES DAILY
Qty: 180 TABLET | Refills: 3 | Status: SHIPPED | OUTPATIENT
Start: 2017-11-06 | End: 2020-06-15

## 2017-11-06 ASSESSMENT — PAIN SCALES - GENERAL: PAINLEVEL: NO PAIN (0)

## 2017-11-06 NOTE — MR AVS SNAPSHOT
After Visit Summary   11/6/2017    Flor Stevenson    MRN: [id number]           Patient Information     Date Of Birth          1945        Visit Information        Provider Department      11/6/2017 3:00 PM Claudy Loco MD [facility]        Today's Diagnoses     Chronic systolic heart failure (H)          Care Instructions    You were seen at the [facility] clinic today.  You saw Dr. Loco  Here are your Instructions:    1.  Stop the Aspirin.  2.  Call me or MyChart me tomorrow with the meds you are taking, with the exact dosages.  3.  See us back in 6 months.      Sil Vance RN  Nurse Care Coordinator  Office:  [phone] option #1, then #3 & ask for Sil (nurse line)  Fax:  [phone]  After Hours:  [phone]  Appointments:  [phone] option #1, then option #1    Results for FLOR STEVENSON (MRN [id number]) as of 11/6/2017 15:38   Ref. Range 11/6/2017 13:04   Sodium Latest Ref Range: 133 - 144 mmol/L 135   Potassium Latest Ref Range: 3.4 - 5.3 mmol/L 3.9   Chloride Latest Ref Range: 94 - 109 mmol/L 102   Carbon Dioxide Latest Ref Range: 20 - 32 mmol/L 26   Urea Nitrogen Latest Ref Range: 7 - 30 mg/dL 15   Creatinine Latest Ref Range: 0.66 - 1.25 mg/dL 0.79   GFR Estimate Latest Ref Range: >60 mL/min/1.7m2 >90   GFR Estimate If Black Latest Ref Range: >60 mL/min/1.7m2 >90   Calcium Latest Ref Range: 8.5 - 10.1 mg/dL 8.7   Anion Gap Latest Ref Range: 3 - 14 mmol/L 7   N-Terminal Pro Bnp Latest Ref Range: 0 - 125 pg/mL 468 (H)   Glucose Latest Ref Range: 70 - 99 mg/dL 322 (H)   WBC Latest Ref Range: 4.0 - 11.0 10e9/L 5.3   Hemoglobin Latest Ref Range: 13.3 - 17.7 g/dL 12.7 (L)   Hematocrit Latest Ref Range: 40.0 - 53.0 % 38.9 (L)   Platelet Count Latest Ref Range: 150 - 450 10e9/L 190   RBC Count Latest Ref Range: 4.4 - 5.9 10e12/L 4.46   MCV Latest Ref Range: 78 - 100 fl 87   MCH Latest Ref Range: 26.5 - 33.0 pg 28.5   MCHC  Latest Ref Range: 31.5 - 36.5 g/dL 32.6   RDW Latest Ref Range: 10.0 - 15.0 % 14.5     Results for FLOR STEVENSON (MRN [id number]) as of 11/6/2017 15:44   Ref. Range 11/6/2017 13:04   Sodium Latest Ref Range: 133 - 144 mmol/L 135   Potassium Latest Ref Range: 3.4 - 5.3 mmol/L 3.9   Chloride Latest Ref Range: 94 - 109 mmol/L 102   Carbon Dioxide Latest Ref Range: 20 - 32 mmol/L 26   Urea Nitrogen Latest Ref Range: 7 - 30 mg/dL 15   Creatinine Latest Ref Range: 0.66 - 1.25 mg/dL 0.79   GFR Estimate Latest Ref Range: >60 mL/min/1.7m2 >90   GFR Estimate If Black Latest Ref Range: >60 mL/min/1.7m2 >90   Calcium Latest Ref Range: 8.5 - 10.1 mg/dL 8.7   Anion Gap Latest Ref Range: 3 - 14 mmol/L 7   N-Terminal Pro Bnp Latest Ref Range: 0 - 125 pg/mL 468 (H)   Glucose Latest Ref Range: 70 - 99 mg/dL 322 (H)   WBC Latest Ref Range: 4.0 - 11.0 10e9/L 5.3   Hemoglobin Latest Ref Range: 13.3 - 17.7 g/dL 12.7 (L)   Hematocrit Latest Ref Range: 40.0 - 53.0 % 38.9 (L)   Platelet Count Latest Ref Range: 150 - 450 10e9/L 190   RBC Count Latest Ref Range: 4.4 - 5.9 10e12/L 4.46   MCV Latest Ref Range: 78 - 100 fl 87   MCH Latest Ref Range: 26.5 - 33.0 pg 28.5   MCHC Latest Ref Range: 31.5 - 36.5 g/dL 32.6   RDW Latest Ref Range: 10.0 - 15.0 % 14.5                     Follow-ups after your visit        Follow-up notes from your care team     Return in about 6 months (around 5/6/2018) for Dr. Loco, With labs before same day.      Your next 10 appointments already scheduled     May 07, 2018  2:30 PM CDT   Lab with  LAB   [facility] ([facility])    [address] [address] [phone]            May 07, 2018  3:00 PM CDT   (Arrive by 2:45 PM)   RETURN HEART FAILURE with Claudy Loco MD   [facility])    [address] [place] [id number] [phone]              Who to contact     If you  "have questions or need follow up information about today's clinic visit or your schedule please contact [facility] directly at [phone].  Normal or non-critical lab and imaging results will be communicated to you by [employer], letter or phone within 4 business days after the clinic has received the results. If you do not hear from us within 7 days, please contact the clinic through [employer] or phone. If you have a critical or abnormal lab result, we will notify you by phone as soon as possible.  Submit refill requests through [employer] or call your pharmacy and they will forward the refill request to us. Please allow 3 business days for your refill to be completed.          Additional Information About Your Visit        [employer] Information     [employer] gives you secure access to your electronic health record. If you see a primary care provider, you can also send messages to your care team and make appointments. If you have questions, please call your primary care clinic.  If you do not have a primary care provider, please call [phone] and they will assist you.        Care EveryWhere ID     This is your Care EveryWhere ID. This could be used by other organizations to access your [place] medical records  [id number]        Your Vitals Were     Pulse Height Pulse Oximetry BMI (Body Mass Index)          74 1.651 m (5' 5\") 96% 23.91 kg/m2         Blood Pressure from Last 3 Encounters:   11/06/17 125/79   04/17/17 120/67    Weight from Last 3 Encounters:   11/06/17 65.2 kg (143 lb 11.2 oz)   04/17/17 66 kg (145 lb 6.4 oz)              Today, you had the following     No orders found for display         Today's Medication Changes          These changes are accurate as of: 11/6/17  3:49 PM.  If you have any questions, ask your nurse or doctor.               These medicines have changed or have updated prescriptions.        Dose/Directions    sotalol HCl (AF) 160 MG Tabs   This may have changed:  medication " strength   Used for:  Chronic systolic heart failure (H)   Changed by:  Claudy Loco MD        Dose:  160 mg   Take 160 mg by mouth 2 times daily   Quantity:  180 tablet   Refills:  3         Stop taking these medicines if you haven't already. Please contact your care team if you have questions.     aspirin 81 MG chewable tablet   Stopped by:  Claudy Loco MD                Where to get your medicines      These medications were sent to [facility] - [address]  [address] [facility] [address]     Phone:  [phone]     sotalol HCl (AF) 160 MG Tabs                Primary Care Provider Office Phone # Fax #    Jarod De La Rosa [phone]       [facility] [address] [facility] [address]        Equal Access to Services     NEIL SCHWARTZ : Naun michaels Soedyta, waaxda luqadaha, qaybta kaalmada adekennedyyaessence, maryjane velasquez . So [facility] [phone].    ATENCIÓN: Si habla español, tiene a valdes disposición servicios gratuitos de asistencia lingüística. Llame al [phone].    We comply with applicable federal civil rights laws and [place] laws. We do not discriminate on the basis of race, color, national origin, age, disability, sex, sexual orientation, or gender identity.            Thank you!     Thank you for choosing [facility]  for your care. Our goal is always to provide you with excellent care. Hearing back from our patients is one way we can continue to improve our services. Please take a few minutes to complete the written survey that you may receive in the mail after your visit with us. Thank you!             Your Updated Medication List - Protect others around you: Learn how to safely use, store and throw away your medicines at www.disposemymeds.org.          This list is accurate as of: 11/6/17  3:49 PM.  Always use your most recent med list.                   Brand Name Dispense  Instructions for use Diagnosis    AMLODIPINE BESYLATE PO       Chronic systolic heart failure (H)       atorvastatin 80 MG tablet    LIPITOR     Take 80 mg by mouth daily    Chronic systolic heart failure (H)       carvedilol 6.25 MG tablet    COREG     Take 6.25 mg by mouth 2 times daily (with meals)    Chronic systolic heart failure (H)       gabapentin 300 MG 24 hr tablet    GRALISE     Take 300 mg by mouth daily (with dinner)    Chronic systolic heart failure (H)       isosorbide mononitrate 60 MG 24 hr tablet    IMDUR     Take 60 mg by mouth daily    Chronic systolic heart failure (H)       losartan 25 MG tablet    COZAAR     Take 25 mg by mouth daily    Chronic systolic heart failure (H)       nitroGLYcerin 0.4 MG sublingual tablet    NITROSTAT     Place 0.4 mg under the tongue every 5 minutes as needed for chest pain For chest pain place 1 tablet under the tongue every 5 minutes for 3 doses. If symptoms persist 5 minutes after 1st dose call 911.    Chronic systolic heart failure (H)       pantoprazole 40 MG EC tablet    PROTONIX     Take by mouth daily    Chronic systolic heart failure (H)       PLAVIX 75 MG tablet   Generic drug:  clopidogrel      Take 75 mg by mouth daily    Chronic systolic heart failure (H)       sotalol HCl (AF) 160 MG Tabs     180 tablet    Take 160 mg by mouth 2 times daily    Chronic systolic heart failure (H)       warfarin 2.5 MG tablet    COUMADIN     Take 2.5 mg by mouth daily    Chronic systolic heart failure (H)

## 2017-11-06 NOTE — LETTER
2017      RE: Wesley Cooper  [address]  [place] [address]       2017     Dear Dr. De La Rosa,    We had the pleasure of seeing your patient Mr. Cooper in our Advanced Heart Failure Clinic. He is a 72-year-old gentleman with a complex past medical history which includes the followin.  CAD - status post CABG in  with a LIMA to LAD, a vein graft to RCA and a vein graft to circumflex. History of multiple interventions and PCI's post CABG. History of non-STEMI in 2016 where he received a balloon angioplasty to then in-stent restenosis of his mid-LAD graft.  The patient had a total occlusion of 2 of 3 grafts, with the only surviving graft the LIMA to LAD.   2. History of angina for which he was referred for EECP, external extracorporeal counterpulsation, of which he completed a 3-month course without real benefit.   3.  Ischemic cardiomyopathy due to above with an ejection fraction of 35% a couple of years ago. Status post CRT-D implantation due to above, the patient is biventricularly paced.   4. Type 2 diabetes mellitus.   5. Atrial flutter on chronic anticoagulation    He comes today with his wife. He reports that since his last visit in April that he has been doing well. He states that he is more active than previous. He has been taking care of things around the house and is active for 10+ hours a day. His wife agrees that he has been fairly active. He denies any persistent episodes of chest pain or chest pressure. Additionally, he has not had the need for any hospitalization or emergency room visits. He does endorse the need to use his sublingual nitroglycerin tablets five to six times since his last visit in 2017. These episodes did not occur with exertion and were present at rest on two different occasions. He does not endorse PND, orthopnea, LE edema, angina. No lightheadedness, syncope, palpitations. No ICD shocks reported.    In the past, he was not considered a candidate  "for revascularization as he had no viability in his inferior and lateral wall at the [facility]. His area of ischemia in the anteroseptal region of scan was deemed to be a poor place for intervention surgically or percutaneously due to the small size vessel of the distal LAD lesion. After further medical therapy, he reports that he is continuing to do well at this time.        Current Outpatient Prescriptions   Medication Sig     sotalol HCl, AF, 160 MG TABS Take 160 mg by mouth 2 times daily     nitroglycerin (NITROSTAT) 0.4 MG sublingual tablet Place 0.4 mg under the tongue every 5 minutes as needed for chest pain For chest pain place 1 tablet under the tongue every 5 minutes for 3 doses. If symptoms persist 5 minutes after 1st dose call 911.     clopidogrel (PLAVIX) 75 MG tablet Take 75 mg by mouth daily     isosorbide mononitrate (IMDUR) 60 MG 24 hr tablet Take 60 mg by mouth daily     carvedilol (COREG) 6.25 MG tablet Take 6.25 mg by mouth 2 times daily (with meals)     gabapentin (GRALISE) 300 MG 24 hr tablet Take 300 mg by mouth daily (with dinner)     warfarin (COUMADIN) 2.5 MG tablet Take 2.5 mg by mouth daily     atorvastatin (LIPITOR) 80 MG tablet Take 80 mg by mouth daily     amLODIPine (NORVASC) 2.5 MG tablet Take 1 tablet (2.5 mg) by mouth daily     losartan (COZAAR) 25 MG tablet Take 25 mg by mouth daily     pantoprazole (PROTONIX) 40 MG EC tablet Take by mouth daily     AMLODIPINE BESYLATE PO      No current facility-administered medications for this visit.      REVIEW OF SYSTEMS:  An 11-point system was reviewed and if not mentioned in my HPI are negative.      PHYSICAL EXAMINATION:   /79 (BP Location: Left arm, Patient Position: Chair, Cuff Size: Adult Regular)  Pulse 74  Ht 1.651 m (5' 5\")  Wt 65.2 kg (143 lb 11.2 oz)  SpO2 96%  BMI 23.91 kg/m2     GENERAL: No acute distress.   HEENT:  Normocephalic, atraumatic.  No xanthelasma.  No oral lesions.   NECK:  No rigidity, thyromegaly or " lymphadenopathy.   CARDIOVASCULAR:  Regular rate and rhythm.  No gallops, rubs or murmurs.  JVP is less than 10 cm H2O, no precordial heave or lift.   CHEST:  Clear to auscultation. No wheezes.  ABDOMEN:  Soft, nontender, nondistended, no organomegaly.   EXTREMITIES:  No lower extremity edema.   NEUROLOGIC:  Cranial nerves are grossly intact.  Some weakness with apparent left footdrop.   SKIN:  No rash, warm and dry.   PSYCHIATRIC:  Normal mood and affect.     Lab Results   Component Value Date    WBC 5.3 11/06/2017    HGB 12.7 (L) 11/06/2017    HCT 38.9 (L) 11/06/2017     11/06/2017     11/06/2017    POTASSIUM 3.9 11/06/2017    CHLORIDE 102 11/06/2017    CO2 26 11/06/2017    BUN 15 11/06/2017    CR 0.79 11/06/2017     (H) 11/06/2017    NTBNP 468 (H) 11/06/2017    AST 48 (H) 04/17/2017    ALT 34 04/17/2017    ALKPHOS 95 04/17/2017    BILITOTAL 0.5 04/17/2017    INR 2.26 (H) 04/17/2017       EKG: A sensed - Biv Paced     Echocardiogram (04/2017):   The LVEF is estimated at 45-50% (calculated, 49%.)  Inferior/posterior wall akinesis is present.  Global right ventricular function is normal.  Mild to moderate mitral insufficiency is present.  Mild to moderate tricuspid insufficiency is present.  Mild pulmonary hypertension. Right ventricular systolic pressure is 31 mmHg above the right atrial pressure.  The inferior vena cava is dilated at 2.2 cm without respiratory variability, consistent with increased right atrial pressure. Estimated mean right atrial pressure is 3-8 mmHg.  No pericardial effusion is present.  Previous study not available for comparison.       IMPRESSION AND PLAN:    In summary, Mr. Wesley Cooper is a 72-year-old gentleman with a long history of coronary artery disease, status post CABG x3, multiple coronary interventions, ischemic cardiomyopathy, status post biventricular defibrillator, diabetes mellitus and atrial flutter on chronic warfarin therapy.  The patient has received  most of his cardiac care at [facility] and is returning for follow-up.    Mr. Cooper continues to do well. He is without angina or heart failure symptoms and is New York Heart Association functional class II and AHA/ACC stage C. He is euvolemic and compensated. He has no end-organ damage as well. At this time, there is no indication for advanced heart failure therapies.    He does not have an indication for revascularization as well. He is able to tolerate physical activity without symptoms and appears to [place] [employer] Society class I at best. He is doing well on his antianginals and these will be continued. The images were again reviewed today and with Mr. Cooper and his wife. He should stop his aspirin and continue on with his clopidogrel and warfarin.      Thank you very much for sharing the care of this patient with us at the [facility] Failure [facility].  The patient was seen and discussed with Dr. Loco.      Pranay Kirkland MD  Advanced Heart Failure/Heart Transplant Fellow  [facility] Division of Cardiology   [phone]    I have personally seen and examined the patient, and then discussed with Dr. Kirkland, and agree with the findings and plan in this note. I have reviewed today's vital signs, medications, labs, and imaging.     Sincerely,    Claudy Loco MD   Center for Pulmonary Hypertension  Section of Advanced Heart Failure   Cardiovascular Division  [facility]   [phone]      Claudy Loco MD

## 2017-11-06 NOTE — LETTER
2017      RE: Wesley Cooper  [address]  [facility] [address]       Dear Colleague,    Thank you for the opportunity to participate in the care of your patient, Wesley Cooper, at the [facility]. Please see a copy of my visit note below.    2017     Dear Dr. De La Rosa,    We had the pleasure of seeing your patient Mr. Cooper in our Advanced Heart Failure Clinic. He is a 72-year-old gentleman with a complex past medical history which includes the followin.  CAD - status post CABG in  with a LIMA to LAD, a vein graft to RCA and a vein graft to circumflex. History of multiple interventions and PCI's post CABG. History of non-STEMI in 2016 where he received a balloon angioplasty to then in-stent restenosis of his mid-LAD graft.  The patient had a total occlusion of 2 of 3 grafts, with the only surviving graft the LIMA to LAD.   2. History of angina for which he was referred for EECP, external extracorporeal counterpulsation, of which he completed a 3-month course without real benefit.   3.  Ischemic cardiomyopathy due to above with an ejection fraction of 35% a couple of years ago. Status post CRT-D implantation due to above, the patient is biventricularly paced.   4. Type 2 diabetes mellitus.   5. Atrial flutter on chronic anticoagulation    He comes today with his wife. He reports that since his last visit in April that he has been doing well. He states that he is more active than previous. He has been taking care of things around the house and is active for 10+ hours a day. His wife agrees that he has been fairly active. He denies any persistent episodes of chest pain or chest pressure. Additionally, he has not had the need for any hospitalization or emergency room visits. He does endorse the need to use his sublingual nitroglycerin tablets five to six times since his last visit in 2017. These episodes did not occur with  exertion and were present at rest on two different occasions. He does not endorse PND, orthopnea, LE edema, angina. No lightheadedness, syncope, palpitations. No ICD shocks reported.    In the past, he was not considered a candidate for revascularization as he had no viability in his inferior and lateral wall at the [facility]. His area of ischemia in the anteroseptal region of scan was deemed to be a poor place for intervention surgically or percutaneously due to the small size vessel of the distal LAD lesion. After further medical therapy, he reports that he is continuing to do well at this time.        Current Outpatient Prescriptions   Medication Sig     sotalol HCl, AF, 160 MG TABS Take 160 mg by mouth 2 times daily     nitroglycerin (NITROSTAT) 0.4 MG sublingual tablet Place 0.4 mg under the tongue every 5 minutes as needed for chest pain For chest pain place 1 tablet under the tongue every 5 minutes for 3 doses. If symptoms persist 5 minutes after 1st dose call 911.     clopidogrel (PLAVIX) 75 MG tablet Take 75 mg by mouth daily     isosorbide mononitrate (IMDUR) 60 MG 24 hr tablet Take 60 mg by mouth daily     carvedilol (COREG) 6.25 MG tablet Take 6.25 mg by mouth 2 times daily (with meals)     gabapentin (GRALISE) 300 MG 24 hr tablet Take 300 mg by mouth daily (with dinner)     warfarin (COUMADIN) 2.5 MG tablet Take 2.5 mg by mouth daily     atorvastatin (LIPITOR) 80 MG tablet Take 80 mg by mouth daily     amLODIPine (NORVASC) 2.5 MG tablet Take 1 tablet (2.5 mg) by mouth daily     losartan (COZAAR) 25 MG tablet Take 25 mg by mouth daily     pantoprazole (PROTONIX) 40 MG EC tablet Take by mouth daily     AMLODIPINE BESYLATE PO      No current facility-administered medications for this visit.      REVIEW OF SYSTEMS:  An 11-point system was reviewed and if not mentioned in my HPI are negative.      PHYSICAL EXAMINATION:   /79 (BP Location: Left arm, Patient Position: Chair, Cuff Size: Adult Regular)   "Pulse 74  Ht 1.651 m (5' 5\")  Wt 65.2 kg (143 lb 11.2 oz)  SpO2 96%  BMI 23.91 kg/m2     GENERAL: No acute distress.   HEENT:  Normocephalic, atraumatic.  No xanthelasma.  No oral lesions.   NECK:  No rigidity, thyromegaly or lymphadenopathy.   CARDIOVASCULAR:  Regular rate and rhythm.  No gallops, rubs or murmurs.  JVP is less than 10 cm H2O, no precordial heave or lift.   CHEST:  Clear to auscultation. No wheezes.  ABDOMEN:  Soft, nontender, nondistended, no organomegaly.   EXTREMITIES:  No lower extremity edema.   NEUROLOGIC:  Cranial nerves are grossly intact.  Some weakness with apparent left footdrop.   SKIN:  No rash, warm and dry.   PSYCHIATRIC:  Normal mood and affect.     Lab Results   Component Value Date    WBC 5.3 11/06/2017    HGB 12.7 (L) 11/06/2017    HCT 38.9 (L) 11/06/2017     11/06/2017     11/06/2017    POTASSIUM 3.9 11/06/2017    CHLORIDE 102 11/06/2017    CO2 26 11/06/2017    BUN 15 11/06/2017    CR 0.79 11/06/2017     (H) 11/06/2017    NTBNP 468 (H) 11/06/2017    AST 48 (H) 04/17/2017    ALT 34 04/17/2017    ALKPHOS 95 04/17/2017    BILITOTAL 0.5 04/17/2017    INR 2.26 (H) 04/17/2017       EKG: A sensed - Biv Paced     Echocardiogram (04/2017):   The LVEF is estimated at 45-50% (calculated, 49%.)  Inferior/posterior wall akinesis is present.  Global right ventricular function is normal.  Mild to moderate mitral insufficiency is present.  Mild to moderate tricuspid insufficiency is present.  Mild pulmonary hypertension. Right ventricular systolic pressure is 31 mmHg above the right atrial pressure.  The inferior vena cava is dilated at 2.2 cm without respiratory variability, consistent with increased right atrial pressure. Estimated mean right atrial pressure is 3-8 mmHg.  No pericardial effusion is present.  Previous study not available for comparison.       IMPRESSION AND PLAN:    In summary, Mr. Wesley Cooper is a 72-year-old gentleman with a long history of coronary " artery disease, status post CABG x3, multiple coronary interventions, ischemic cardiomyopathy, status post biventricular defibrillator, diabetes mellitus and atrial flutter on chronic warfarin therapy.  The patient has received most of his cardiac care at [facility] and is returning for follow-up.    Mr. Cooper continues to do well. He is without angina or heart failure symptoms and is New York Heart Association functional class II and AHA/ACC stage C. He is euvolemic and compensated. He has no end-organ damage as well. At this time, there is no indication for advanced heart failure therapies.    He does not have an indication for revascularization as well. He is able to tolerate physical activity without symptoms and appears to [place] Cardiovascular Society class I at best. He is doing well on his antianginals and these will be continued. The images were again reviewed today and with Mr. Cooper and his wife. He should stop his aspirin and continue on with his clopidogrel and warfarin.      Thank you very much for sharing the care of this patient with us at the [facility] Advanced Heart Failure Clinic.  The patient was seen and discussed with Dr. Loco.      Pranay Kirkland MD  Advanced Heart Failure/Heart Transplant Fellow  [facility] Division of Cardiology   [phone]    I have personally seen and examined the patient, and then discussed with Dr. Kirkland, and agree with the findings and plan in this note. I have reviewed today's vital signs, medications, labs, and imaging.     Sincerely,    Claudy Loco MD   Center for Pulmonary Hypertension  Section of Advanced Heart Failure   Cardiovascular Division  [facility]   [phone]

## 2017-11-06 NOTE — PATIENT INSTRUCTIONS
You were seen at the [facility] clinic today.  You saw Dr. Loco  Here are your Instructions:    1.  Stop the Aspirin.  2.  Call me or MyChart me tomorrow with the meds you are taking, with the exact dosages.  3.  See us back in 6 months.      Sil Vance RN  Nurse Care Coordinator  Office:  [phone] option #1, then #3 & ask for Sil (nurse line)  Fax:  [phone]  After Hours:  [phone]  Appointments:  [phone] option #1, then option #1    Results for FLOR STEVENSON (MRN [id number]) as of 11/6/2017 15:38   Ref. Range 11/6/2017 13:04   Sodium Latest Ref Range: 133 - 144 mmol/L 135   Potassium Latest Ref Range: 3.4 - 5.3 mmol/L 3.9   Chloride Latest Ref Range: 94 - 109 mmol/L 102   Carbon Dioxide Latest Ref Range: 20 - 32 mmol/L 26   Urea Nitrogen Latest Ref Range: 7 - 30 mg/dL 15   Creatinine Latest Ref Range: 0.66 - 1.25 mg/dL 0.79   GFR Estimate Latest Ref Range: >60 mL/min/1.7m2 >90   GFR Estimate If Black Latest Ref Range: >60 mL/min/1.7m2 >90   Calcium Latest Ref Range: 8.5 - 10.1 mg/dL 8.7   Anion Gap Latest Ref Range: 3 - 14 mmol/L 7   N-Terminal Pro Bnp Latest Ref Range: 0 - 125 pg/mL 468 (H)   Glucose Latest Ref Range: 70 - 99 mg/dL 322 (H)   WBC Latest Ref Range: 4.0 - 11.0 10e9/L 5.3   Hemoglobin Latest Ref Range: 13.3 - 17.7 g/dL 12.7 (L)   Hematocrit Latest Ref Range: 40.0 - 53.0 % 38.9 (L)   Platelet Count Latest Ref Range: 150 - 450 10e9/L 190   RBC Count Latest Ref Range: 4.4 - 5.9 10e12/L 4.46   MCV Latest Ref Range: 78 - 100 fl 87   MCH Latest Ref Range: 26.5 - 33.0 pg 28.5   MCHC Latest Ref Range: 31.5 - 36.5 g/dL 32.6   RDW Latest Ref Range: 10.0 - 15.0 % 14.5     Results for FLOR STEVENSON (MRN [id number]) as of 11/6/2017 15:44   Ref. Range 11/6/2017 13:04   Sodium Latest Ref Range: 133 - 144 mmol/L 135   Potassium Latest Ref Range: 3.4 - 5.3 mmol/L 3.9   Chloride Latest Ref Range: 94 - 109 mmol/L 102   Carbon Dioxide Latest Ref Range:  20 - 32 mmol/L 26   Urea Nitrogen Latest Ref Range: 7 - 30 mg/dL 15   Creatinine Latest Ref Range: 0.66 - 1.25 mg/dL 0.79   GFR Estimate Latest Ref Range: >60 mL/min/1.7m2 >90   GFR Estimate If Black Latest Ref Range: >60 mL/min/1.7m2 >90   Calcium Latest Ref Range: 8.5 - 10.1 mg/dL 8.7   Anion Gap Latest Ref Range: 3 - 14 mmol/L 7   N-Terminal Pro Bnp Latest Ref Range: 0 - 125 pg/mL 468 (H)   Glucose Latest Ref Range: 70 - 99 mg/dL 322 (H)   WBC Latest Ref Range: 4.0 - 11.0 10e9/L 5.3   Hemoglobin Latest Ref Range: 13.3 - 17.7 g/dL 12.7 (L)   Hematocrit Latest Ref Range: 40.0 - 53.0 % 38.9 (L)   Platelet Count Latest Ref Range: 150 - 450 10e9/L 190   RBC Count Latest Ref Range: 4.4 - 5.9 10e12/L 4.46   MCV Latest Ref Range: 78 - 100 fl 87   MCH Latest Ref Range: 26.5 - 33.0 pg 28.5   MCHC Latest Ref Range: 31.5 - 36.5 g/dL 32.6   RDW Latest Ref Range: 10.0 - 15.0 % 14.5

## 2017-11-06 NOTE — NURSING NOTE
Chief Complaint   Patient presents with     Follow Up For     6 month hf f/u     Vitals were taken and medications were reconciled.     Melissa Coburn MA    2:47 PM

## 2017-11-07 ENCOUNTER — TELEPHONE (OUTPATIENT)
Dept: LAB | Facility: CLINIC | Age: 72
End: 2017-11-07

## 2017-11-07 DIAGNOSIS — I50.20 HFREF (HEART FAILURE WITH REDUCED EJECTION FRACTION) (H): Primary | ICD-10-CM

## 2017-11-07 RX ORDER — AMLODIPINE BESYLATE 2.5 MG/1
5 TABLET ORAL DAILY
Qty: 30 TABLET | COMMUNITY
Start: 2017-11-07 | End: 2018-11-06

## 2017-11-07 NOTE — TELEPHONE ENCOUNTER
Patient's wife calling for Sil.  She reported patient's medication list was correct.  The only med she had to update was Amlodipine 5 mg  once daily.  Please call Yanira with any questions @ [phone].  01-

## 2017-11-08 NOTE — PROGRESS NOTES
2017     Dear Dr. De La Rosa,    We had the pleasure of seeing your patient Mr. Cooper in our Advanced Heart Failure Clinic. He is a 72-year-old gentleman with a complex past medical history which includes the followin.  CAD - status post CABG in  with a LIMA to LAD, a vein graft to RCA and a vein graft to circumflex. History of multiple interventions and PCI's post CABG. History of non-STEMI in 2016 where he received a balloon angioplasty to then in-stent restenosis of his mid-LAD graft.  The patient had a total occlusion of 2 of 3 grafts, with the only surviving graft the LIMA to LAD.   2. History of angina for which he was referred for EECP, external extracorporeal counterpulsation, of which he completed a 3-month course without real benefit.   3.  Ischemic cardiomyopathy due to above with an ejection fraction of 35% a couple of years ago. Status post CRT-D implantation due to above, the patient is biventricularly paced.   4. Type 2 diabetes mellitus.   5. Atrial flutter on chronic anticoagulation    He comes today with his wife. He reports that since his last visit in April that he has been doing well. He states that he is more active than previous. He has been taking care of things around the house and is active for 10+ hours a day. His wife agrees that he has been fairly active. He denies any persistent episodes of chest pain or chest pressure. Additionally, he has not had the need for any hospitalization or emergency room visits. He does endorse the need to use his sublingual nitroglycerin tablets five to six times since his last visit in 2017. These episodes did not occur with exertion and were present at rest on two different occasions. He does not endorse PND, orthopnea, LE edema, angina. No lightheadedness, syncope, palpitations. No ICD shocks reported.    In the past, he was not considered a candidate for revascularization as he had no viability in his inferior and lateral  "elvi at the [facility]. His area of ischemia in the anteroseptal region of scan was deemed to be a poor place for intervention surgically or percutaneously due to the small size vessel of the distal LAD lesion. After further medical therapy, he reports that he is continuing to do well at this time.        Current Outpatient Prescriptions   Medication Sig     sotalol HCl, AF, 160 MG TABS Take 160 mg by mouth 2 times daily     nitroglycerin (NITROSTAT) 0.4 MG sublingual tablet Place 0.4 mg under the tongue every 5 minutes as needed for chest pain For chest pain place 1 tablet under the tongue every 5 minutes for 3 doses. If symptoms persist 5 minutes after 1st dose call 911.     clopidogrel (PLAVIX) 75 MG tablet Take 75 mg by mouth daily     isosorbide mononitrate (IMDUR) 60 MG 24 hr tablet Take 60 mg by mouth daily     carvedilol (COREG) 6.25 MG tablet Take 6.25 mg by mouth 2 times daily (with meals)     gabapentin (GRALISE) 300 MG 24 hr tablet Take 300 mg by mouth daily (with dinner)     warfarin (COUMADIN) 2.5 MG tablet Take 2.5 mg by mouth daily     atorvastatin (LIPITOR) 80 MG tablet Take 80 mg by mouth daily     amLODIPine (NORVASC) 2.5 MG tablet Take 1 tablet (2.5 mg) by mouth daily     losartan (COZAAR) 25 MG tablet Take 25 mg by mouth daily     pantoprazole (PROTONIX) 40 MG EC tablet Take by mouth daily     AMLODIPINE BESYLATE PO      No current facility-administered medications for this visit.      REVIEW OF SYSTEMS:  An 11-point system was reviewed and if not mentioned in my HPI are negative.      PHYSICAL EXAMINATION:   /79 (BP Location: Left arm, Patient Position: Chair, Cuff Size: Adult Regular)  Pulse 74  Ht 1.651 m (5' 5\")  Wt 65.2 kg (143 lb 11.2 oz)  SpO2 96%  BMI 23.91 kg/m2     GENERAL: No acute distress.   HEENT:  Normocephalic, atraumatic.  No xanthelasma.  No oral lesions.   NECK:  No rigidity, thyromegaly or lymphadenopathy.   CARDIOVASCULAR:  Regular rate and rhythm.  No gallops, " rubs or murmurs.  JVP is less than 10 cm H2O, no precordial heave or lift.   CHEST:  Clear to auscultation. No wheezes.  ABDOMEN:  Soft, nontender, nondistended, no organomegaly.   EXTREMITIES:  No lower extremity edema.   NEUROLOGIC:  Cranial nerves are grossly intact.  Some weakness with apparent left footdrop.   SKIN:  No rash, warm and dry.   PSYCHIATRIC:  Normal mood and affect.     Lab Results   Component Value Date    WBC 5.3 11/06/2017    HGB 12.7 (L) 11/06/2017    HCT 38.9 (L) 11/06/2017     11/06/2017     11/06/2017    POTASSIUM 3.9 11/06/2017    CHLORIDE 102 11/06/2017    CO2 26 11/06/2017    BUN 15 11/06/2017    CR 0.79 11/06/2017     (H) 11/06/2017    NTBNP 468 (H) 11/06/2017    AST 48 (H) 04/17/2017    ALT 34 04/17/2017    ALKPHOS 95 04/17/2017    BILITOTAL 0.5 04/17/2017    INR 2.26 (H) 04/17/2017       EKG: A sensed - Biv Paced     Echocardiogram (04/2017):   The LVEF is estimated at 45-50% (calculated, 49%.)  Inferior/posterior wall akinesis is present.  Global right ventricular function is normal.  Mild to moderate mitral insufficiency is present.  Mild to moderate tricuspid insufficiency is present.  Mild pulmonary hypertension. Right ventricular systolic pressure is 31 mmHg above the right atrial pressure.  The inferior vena cava is dilated at 2.2 cm without respiratory variability, consistent with increased right atrial pressure. Estimated mean right atrial pressure is 3-8 mmHg.  No pericardial effusion is present.  Previous study not available for comparison.       IMPRESSION AND PLAN:    In summary, Mr. Wesley Cooper is a 72-year-old gentleman with a long history of coronary artery disease, status post CABG x3, multiple coronary interventions, ischemic cardiomyopathy, status post biventricular defibrillator, diabetes mellitus and atrial flutter on chronic warfarin therapy.  The patient has received most of his cardiac care at [facility] and is returning for  follow-up.    Mr. Cooper continues to do well. He is without angina or heart failure symptoms and is New York Heart Association functional class II and AHA/ACC stage C. He is euvolemic and compensated. He has no end-organ damage as well. At this time, there is no indication for advanced heart failure therapies.    He does not have an indication for revascularization as well. He is able to tolerate physical activity without symptoms and appears to [place] Cardiovascular Society class I at best. He is doing well on his antianginals and these will be continued. The images were again reviewed today and with Mr. Cooper and his wife. He should stop his aspirin and continue on with his clopidogrel and warfarin.      Thank you very much for sharing the care of this patient with us at the [facility] Advanced Heart Failure Clinic.  The patient was seen and discussed with Dr. Loco.      Pranay Kirkland MD  Advanced Heart Failure/Heart Transplant Fellow  [facility] Division of Cardiology   [phone]    I have personally seen and examined the patient, and then discussed with Dr. Kirkland, and agree with the findings and plan in this note. I have reviewed today's vital signs, medications, labs, and imaging.     Sincerely,    Claudy Loco MD   Center for Pulmonary Hypertension  Section of Advanced Heart Failure   Cardiovascular Division  [facility]   [phone]

## 2018-05-04 ENCOUNTER — PRE VISIT (OUTPATIENT)
Dept: CARDIOLOGY | Facility: CLINIC | Age: 73
End: 2018-05-04

## 2018-05-04 DIAGNOSIS — I25.5 ISCHEMIC CARDIOMYOPATHY: Primary | ICD-10-CM

## 2018-05-07 ENCOUNTER — OFFICE VISIT (OUTPATIENT)
Dept: CARDIOLOGY | Facility: CLINIC | Age: 73
End: 2018-05-07
Attending: INTERNAL MEDICINE
Payer: COMMERCIAL

## 2018-05-07 VITALS
HEIGHT: 63 IN | DIASTOLIC BLOOD PRESSURE: 85 MMHG | SYSTOLIC BLOOD PRESSURE: 129 MMHG | OXYGEN SATURATION: 100 % | HEART RATE: 69 BPM | WEIGHT: 147.4 LBS | BODY MASS INDEX: 26.12 KG/M2

## 2018-05-07 DIAGNOSIS — I25.5 ISCHEMIC CARDIOMYOPATHY: ICD-10-CM

## 2018-05-07 DIAGNOSIS — I50.22 CHRONIC SYSTOLIC HEART FAILURE (H): Primary | ICD-10-CM

## 2018-05-07 DIAGNOSIS — Z93.2 ILEOSTOMY STATUS (H): ICD-10-CM

## 2018-05-07 LAB
ANION GAP SERPL CALCULATED.3IONS-SCNC: 10 MMOL/L (ref 3–14)
BUN SERPL-MCNC: 23 MG/DL (ref 7–30)
CALCIUM SERPL-MCNC: 9.5 MG/DL (ref 8.5–10.1)
CHLORIDE SERPL-SCNC: 103 MMOL/L (ref 94–109)
CO2 SERPL-SCNC: 21 MMOL/L (ref 20–32)
CREAT SERPL-MCNC: 1.18 MG/DL (ref 0.66–1.25)
ERYTHROCYTE [DISTWIDTH] IN BLOOD BY AUTOMATED COUNT: 13 % (ref 10–15)
GFR SERPL CREATININE-BSD FRML MDRD: 60 ML/MIN/1.7M2
GLUCOSE SERPL-MCNC: 444 MG/DL (ref 70–99)
HCT VFR BLD AUTO: 45.1 % (ref 40–53)
HGB BLD-MCNC: 14.6 G/DL (ref 13.3–17.7)
MCH RBC QN AUTO: 28 PG (ref 26.5–33)
MCHC RBC AUTO-ENTMCNC: 32.4 G/DL (ref 31.5–36.5)
MCV RBC AUTO: 87 FL (ref 78–100)
NT-PROBNP SERPL-MCNC: 1051 PG/ML (ref 0–450)
PLATELET # BLD AUTO: 244 10E9/L (ref 150–450)
POTASSIUM SERPL-SCNC: 4.8 MMOL/L (ref 3.4–5.3)
RBC # BLD AUTO: 5.21 10E12/L (ref 4.4–5.9)
SODIUM SERPL-SCNC: 134 MMOL/L (ref 133–144)
WBC # BLD AUTO: 8.5 10E9/L (ref 4–11)

## 2018-05-07 PROCEDURE — 83880 ASSAY OF NATRIURETIC PEPTIDE: CPT | Performed by: INTERNAL MEDICINE

## 2018-05-07 PROCEDURE — 99215 OFFICE O/P EST HI 40 MIN: CPT | Mod: ZP | Performed by: INTERNAL MEDICINE

## 2018-05-07 PROCEDURE — G0463 HOSPITAL OUTPT CLINIC VISIT: HCPCS | Mod: ZF

## 2018-05-07 PROCEDURE — 85027 COMPLETE CBC AUTOMATED: CPT | Performed by: INTERNAL MEDICINE

## 2018-05-07 PROCEDURE — 36415 COLL VENOUS BLD VENIPUNCTURE: CPT | Performed by: INTERNAL MEDICINE

## 2018-05-07 PROCEDURE — 80048 BASIC METABOLIC PNL TOTAL CA: CPT | Performed by: INTERNAL MEDICINE

## 2018-05-07 RX ORDER — LOSARTAN POTASSIUM 25 MG/1
25 TABLET ORAL DAILY
Qty: 30 TABLET | Refills: 3 | Status: SHIPPED | OUTPATIENT
Start: 2018-05-07 | End: 2020-06-15

## 2018-05-07 RX ORDER — GLIPIZIDE 10 MG/1
10 TABLET ORAL
COMMUNITY

## 2018-05-07 ASSESSMENT — PAIN SCALES - GENERAL: PAINLEVEL: NO PAIN (0)

## 2018-05-07 NOTE — LETTER
2018      RE: Wesley Cooper  [address]   [place] [address]       Service Date: 2018      Jarod De La Rosa MD   [address] [address]      RE: Wesley Cooper   MRN: [id number]   : 1945      Dr. De La Rosa:      We had the pleasure of seeing Mr. Cooper in our Advanced Heart Failure Clinic.  He is a 72-year-old male whose problem list includes:     1.  Coronary artery disease, status post coronary artery bypass grafting surgery.   2.  Refractory angina with a patent LIMA to LAD, occlusion of the vein graft to RCA and left circumflex and distal LAD native vessel disease, in-stent restenosis of the mid-LAD.   3.  Ischemic cardiomyopathy with an estimated ejection fraction of 35%-40%, status post CRT-D.   4.  Type 2 diabetes.   5.  Atrial flutter.   6.  Chronic colostomy.      He returns for a 6-month visit.  He is doing overall well.  He has not had any worsening exertional chest pain or shortness of breath.  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  He is able to keep up with his work.  He has not had any lower extremity swelling or abdominal distention.  No lightheadedness, dizziness or syncope.  No palpitations.  No ICD shocks.  No hospitalizations or ER visits.      REVIEW OF SYSTEMS:  A detailed 10-point review of systems was obtained as described in the History of Present Illness.  All other systems reviewed and are negative.      MEDICATIONS:  Current Outpatient Prescriptions   Medication Sig     amLODIPine (NORVASC) 2.5 MG tablet Take 5 mg by mouth daily      atorvastatin (LIPITOR) 80 MG tablet Take 80 mg by mouth daily     carvedilol (COREG) 6.25 MG tablet Take 6.25 mg by mouth 2 times daily (with meals)     clopidogrel (PLAVIX) 75 MG tablet Take 75 mg by mouth daily     gabapentin (GRALISE) 300 MG 24 hr tablet Take 300 mg by mouth daily (with dinner)     glipiZIDE (GLUCOTROL) 5 MG tablet Take 5 mg by  mouth 2 times daily (before meals)     isosorbide mononitrate (IMDUR) 60 MG 24 hr tablet Take 60 mg by mouth daily     losartan (COZAAR) 25 MG tablet Take 1 tablet (25 mg) by mouth daily     metFORMIN (GLUCOPHAGE) 500 MG tablet Take 500 mg by mouth 2 times daily (with meals)     nitroglycerin (NITROSTAT) 0.4 MG sublingual tablet Place 0.4 mg under the tongue every 5 minutes as needed for chest pain For chest pain place 1 tablet under the tongue every 5 minutes for 3 doses. If symptoms persist 5 minutes after 1st dose call 911.     sotalol HCl, AF, 160 MG TABS Take 160 mg by mouth 2 times daily     warfarin (COUMADIN) 2.5 MG tablet Take 2.5 mg by mouth daily     No current facility-administered medications for this visit.        PHYSICAL EXAMINATION:  He was comfortable.  He was in no apparent distress.  His blood pressure was 129/85, pulse rate was 69, respiratory rate was 16, oxygen saturation 100%.  His weight was 147.  He was 5 feet 3 inches tall.  His BMI was 26.2.  He had no pallor, cyanosis or jaundice.  No JVD.  Cardiac auscultation revealed normal S1, S2 with no murmur, rub or gallop.  Auscultation of the lungs revealed equal air entry on both sides with no added sounds.  His abdomen was soft with normal bowel sounds, no tenderness, no rigidity, no guarding and no focal neurological deficit.      His NT-proBNP was elevated at 1051.  His CBC was unremarkable.  His chemistry was within normal limits.  His blood sugar was significantly elevated at 444.      ASSESSMENT AND PLAN:      Mr. Cooper is a very pleasant 72-year-old gentleman with a history of coronary artery disease, refractory angina, ischemic cardiomyopathy, diabetes and atrial flutter, who returns today for followup.     1.  Ischemic coronary artery disease with refractory angina.  Mr. Cooper is doing reasonably okay.  He has not had any worsening exertional angina.  He remains fairly asymptomatic.  I did not change any of his medication.  He will  continue on Plavix 75 mg daily, Coumadin, Imdur, Coreg and Norvasc.     2.  Ischemic cardiomyopathy, EF of 35%-40%, functional class II, AHA stage C.  No evidence of volume overload.  No evidence of end organ dysfunction.  He is on a low dose of Coreg.  He is not on an ACE and an ARB.  His ejection fraction is 35%-40%.  I recommended him to start on Cozaar 25 mg daily.  He does not require any diuretics at this time and point.     3.  Atrial flutter.  He is on sotalol.  His heart rate is in sinus rhythm.  He is on long-term anticoagulation with Coumadin.      His blood sugars are extremely elevated.  We will call him to have this addressed with his primary care physician.  He is only on an oral hypoglycemic agent.  He is not on insulin.  He was on insulin before.  This needs to be readdressed.      Mr. Stevenson would like his colostomy to be reversed.  He has been having this for the last 10 years.  He understands that this would be an intermediate risk surgery as he has a significant epicardial coronary artery disease, although he is asymptomatic at rest.  However, if they were to have a complication and becomes tachycardia, he is at risk of non-STEMI, given his diffuse LAD.  He understands the risk and still would like to meet with a surgeon.  We will refer him to Colorectal Surgery.      I recommended him to return in 6 months.  He will call us in the interim if he has any further worsening symptoms.      Sincerely,   Claudy Loco MD   Center for Pulmonary Hypertension  Heart Failure, Transplant, and Mechanical Circulatory Support Cardiology   Cardiovascular Division  [facility]   [phone]               D: 2018   T: 2018   MT: al      Name:     FLOR STEVENSON   MRN:      [id number]        Account:      [id number]   :      1945           Service Date: 2018      Document: [id number]        Claudy Loco MD

## 2018-05-07 NOTE — PROGRESS NOTES
Service Date: 2018      Jarod De La Rosa MD   [facility]   [address] [place] [address]      RE: Wesley Cooper   MRN: [id number]   : 1945      Dr. De La Rosa:      We had the pleasure of seeing Mr. Cooper in our Advanced Heart Failure Clinic.  He is a 72-year-old male whose problem list includes:     1.  Coronary artery disease, status post coronary artery bypass grafting surgery.   2.  Refractory angina with a patent LIMA to LAD, occlusion of the vein graft to RCA and left circumflex and distal LAD native vessel disease, in-stent restenosis of the mid-LAD.   3.  Ischemic cardiomyopathy with an estimated ejection fraction of 35%-40%, status post CRT-D.   4.  Type 2 diabetes.   5.  Atrial flutter.   6.  Chronic colostomy.      He returns for a 6-month visit.  He is doing overall well.  He has not had any worsening exertional chest pain or shortness of breath.  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  He is able to keep up with his work.  He has not had any lower extremity swelling or abdominal distention.  No lightheadedness, dizziness or syncope.  No palpitations.  No ICD shocks.  No hospitalizations or ER visits.      REVIEW OF SYSTEMS:  A detailed 10-point review of systems was obtained as described in the History of Present Illness.  All other systems reviewed and are negative.      MEDICATIONS:  Current Outpatient Prescriptions   Medication Sig     amLODIPine (NORVASC) 2.5 MG tablet Take 5 mg by mouth daily      atorvastatin (LIPITOR) 80 MG tablet Take 80 mg by mouth daily     carvedilol (COREG) 6.25 MG tablet Take 6.25 mg by mouth 2 times daily (with meals)     clopidogrel (PLAVIX) 75 MG tablet Take 75 mg by mouth daily     gabapentin (GRALISE) 300 MG 24 hr tablet Take 300 mg by mouth daily (with dinner)     glipiZIDE (GLUCOTROL) 5 MG tablet Take 5 mg by mouth 2 times daily (before meals)     isosorbide mononitrate (IMDUR) 60 MG  24 hr tablet Take 60 mg by mouth daily     losartan (COZAAR) 25 MG tablet Take 1 tablet (25 mg) by mouth daily     metFORMIN (GLUCOPHAGE) 500 MG tablet Take 500 mg by mouth 2 times daily (with meals)     nitroglycerin (NITROSTAT) 0.4 MG sublingual tablet Place 0.4 mg under the tongue every 5 minutes as needed for chest pain For chest pain place 1 tablet under the tongue every 5 minutes for 3 doses. If symptoms persist 5 minutes after 1st dose call 911.     sotalol HCl, AF, 160 MG TABS Take 160 mg by mouth 2 times daily     warfarin (COUMADIN) 2.5 MG tablet Take 2.5 mg by mouth daily     No current facility-administered medications for this visit.        PHYSICAL EXAMINATION:  He was comfortable.  He was in no apparent distress.  His blood pressure was 129/85, pulse rate was 69, respiratory rate was 16, oxygen saturation 100%.  His weight was 147.  He was 5 feet 3 inches tall.  His BMI was 26.2.  He had no pallor, cyanosis or jaundice.  No JVD.  Cardiac auscultation revealed normal S1, S2 with no murmur, rub or gallop.  Auscultation of the lungs revealed equal air entry on both sides with no added sounds.  His abdomen was soft with normal bowel sounds, no tenderness, no rigidity, no guarding and no focal neurological deficit.      His NT-proBNP was elevated at 1051.  His CBC was unremarkable.  His chemistry was within normal limits.  His blood sugar was significantly elevated at 444.      ASSESSMENT AND PLAN:      Mr. Cooper is a very pleasant 72-year-old gentleman with a history of coronary artery disease, refractory angina, ischemic cardiomyopathy, diabetes and atrial flutter, who returns today for followup.     1.  Ischemic coronary artery disease with refractory angina.  Mr. Cooper is doing reasonably okay.  He has not had any worsening exertional angina.  He remains fairly asymptomatic.  I did not change any of his medication.  He will continue on Plavix 75 mg daily, Coumadin, Imdur, Coreg and Norvasc.     2.   Ischemic cardiomyopathy, EF of 35%-40%, functional class II, AHA stage C.  No evidence of volume overload.  No evidence of end organ dysfunction.  He is on a low dose of Coreg.  He is not on an ACE and an ARB.  His ejection fraction is 35%-40%.  I recommended him to start on Cozaar 25 mg daily.  He does not require any diuretics at this time and point.     3.  Atrial flutter.  He is on sotalol.  His heart rate is in sinus rhythm.  He is on long-term anticoagulation with Coumadin.      His blood sugars are extremely elevated.  We will call him to have this addressed with his primary care physician.  He is only on an oral hypoglycemic agent.  He is not on insulin.  He was on insulin before.  This needs to be readdressed.      Mr. Stevenson would like his colostomy to be reversed.  He has been having this for the last 10 years.  He understands that this would be an intermediate risk surgery as he has a significant epicardial coronary artery disease, although he is asymptomatic at rest.  However, if they were to have a complication and becomes tachycardia, he is at risk of non-STEMI, given his diffuse LAD.  He understands the risk and still would like to meet with a surgeon.  We will refer him to Colorectal Surgery.      I recommended him to return in 6 months.  He will call us in the interim if he has any further worsening symptoms.      Sincerely,   Claudy Loco MD   [facility]  Heart Failure, Transplant, and Mechanical Circulatory Support Cardiology   Cardiovascular Division  [facility]   [phone]               D: 2018   T: 2018   MT: al      Name:     FLOR STEVENSON   MRN:      [id number]        Account:      [id number]   :      1945           Service Date: 2018      Document: [id number]

## 2018-05-07 NOTE — PATIENT INSTRUCTIONS
You were seen today in the Cardiovascular Clinic at the [facility].      Cardiology Providers you saw during your visit: Dr. Loco       Medication Changes:   Cozaar 25 mg daily       Patient Instructions:  -Labs to be done in 1 month at your local clinic. Please have them faxed to [phone] attention Dr. Loco.   -We will make a referral to a Colorectal surgeon          Follow up Appointment Information:  -6 month follow up with Echo and labs prior.      Results:    Results for FLOR STEVENSON (MRN [id number]) as of 5/7/2018 14:01   Ref. Range 5/7/2018 13:21   Sodium Latest Ref Range: 133 - 144 mmol/L 134   Potassium Latest Ref Range: 3.4 - 5.3 mmol/L 4.8   Chloride Latest Ref Range: 94 - 109 mmol/L 103   Carbon Dioxide Latest Ref Range: 20 - 32 mmol/L 21   Urea Nitrogen Latest Ref Range: 7 - 30 mg/dL 23   Creatinine Latest Ref Range: 0.66 - 1.25 mg/dL 1.18   GFR Estimate Latest Ref Range: >60 mL/min/1.7m2 60 (L)   GFR Estimate If Black Latest Ref Range: >60 mL/min/1.7m2 73   Calcium Latest Ref Range: 8.5 - 10.1 mg/dL 9.5   Anion Gap Latest Ref Range: 3 - 14 mmol/L 10   N-Terminal Pro Bnp Latest Ref Range: 0 - 450 pg/mL 1051 (H)   Glucose Latest Ref Range: 70 - 99 mg/dL 444 (H)   WBC Latest Ref Range: 4.0 - 11.0 10e9/L 8.5   Hemoglobin Latest Ref Range: 13.3 - 17.7 g/dL 14.6   Hematocrit Latest Ref Range: 40.0 - 53.0 % 45.1   Platelet Count Latest Ref Range: 150 - 450 10e9/L 244   RBC Count Latest Ref Range: 4.4 - 5.9 10e12/L 5.21   MCV Latest Ref Range: 78 - 100 fl 87   MCH Latest Ref Range: 26.5 - 33.0 pg 28.0   MCHC Latest Ref Range: 31.5 - 36.5 g/dL 32.4   RDW Latest Ref Range: 10.0 - 15.0 % 13.0          [address] [facility] on [address] [place] [address]       Thank you for allowing us to be a part of your care here at the [facility]     If you have questions or concerns please contact us at:     Lizzie Rodney RN BSN   Cardiology Care Coordinator -  "[facility]  Questions and [phone]  First press #1 for the University and then press #3 for \"Medical Advise\" to reach a Cardiology Nurse.                                                                                ** Please note that you will NOT receive a reminder call regarding your scheduled testing, reminder calls are for provider appointments only.  If you are scheduled for testing within the [facility] system you may receive a call regarding pre-registration for billing purposes only.**     "

## 2018-05-07 NOTE — LETTER
2018      RE: Wesley Cooper  [address]   [place] [address]       Service Date: 2018      Jarod De La Rosa MD   [address] [address]      RE: Wesley Cooper   MRN: [id number]   : 1945      Dr. De La Rosa:      We had the pleasure of seeing Mr. Cooper in our Advanced Heart Failure Clinic.  He is a 72-year-old male whose problem list includes:     1.  Coronary artery disease, status post coronary artery bypass grafting surgery.   2.  Refractory angina with a patent LIMA to LAD, occlusion of the vein graft to RCA and left circumflex and distal LAD native vessel disease, in-stent restenosis of the mid-LAD.   3.  Ischemic cardiomyopathy with an estimated ejection fraction of 35%-40%, status post CRT-D.   4.  Type 2 diabetes.   5.  Atrial flutter.   6.  Chronic colostomy.      He returns for a 6-month visit.  He is doing overall well.  He has not had any worsening exertional chest pain or shortness of breath.  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  He is able to keep up with his work.  He has not had any lower extremity swelling or abdominal distention.  No lightheadedness, dizziness or syncope.  No palpitations.  No ICD shocks.  No hospitalizations or ER visits.      REVIEW OF SYSTEMS:  A detailed 10-point review of systems was obtained as described in the History of Present Illness.  All other systems reviewed and are negative.      MEDICATIONS:  Current Outpatient Prescriptions   Medication Sig     amLODIPine (NORVASC) 2.5 MG tablet Take 5 mg by mouth daily      atorvastatin (LIPITOR) 80 MG tablet Take 80 mg by mouth daily     carvedilol (COREG) 6.25 MG tablet Take 6.25 mg by mouth 2 times daily (with meals)     clopidogrel (PLAVIX) 75 MG tablet Take 75 mg by mouth daily     gabapentin (GRALISE) 300 MG 24 hr tablet Take 300 mg by mouth daily (with dinner)     glipiZIDE (GLUCOTROL) 5 MG tablet Take 5 mg by  mouth 2 times daily (before meals)     isosorbide mononitrate (IMDUR) 60 MG 24 hr tablet Take 60 mg by mouth daily     losartan (COZAAR) 25 MG tablet Take 1 tablet (25 mg) by mouth daily     metFORMIN (GLUCOPHAGE) 500 MG tablet Take 500 mg by mouth 2 times daily (with meals)     nitroglycerin (NITROSTAT) 0.4 MG sublingual tablet Place 0.4 mg under the tongue every 5 minutes as needed for chest pain For chest pain place 1 tablet under the tongue every 5 minutes for 3 doses. If symptoms persist 5 minutes after 1st dose call 911.     sotalol HCl, AF, 160 MG TABS Take 160 mg by mouth 2 times daily     warfarin (COUMADIN) 2.5 MG tablet Take 2.5 mg by mouth daily     No current facility-administered medications for this visit.        PHYSICAL EXAMINATION:  He was comfortable.  He was in no apparent distress.  His blood pressure was 129/85, pulse rate was 69, respiratory rate was 16, oxygen saturation 100%.  His weight was 147.  He was 5 feet 3 inches tall.  His BMI was 26.2.  He had no pallor, cyanosis or jaundice.  No JVD.  Cardiac auscultation revealed normal S1, S2 with no murmur, rub or gallop.  Auscultation of the lungs revealed equal air entry on both sides with no added sounds.  His abdomen was soft with normal bowel sounds, no tenderness, no rigidity, no guarding and no focal neurological deficit.      His NT-proBNP was elevated at 1051.  His CBC was unremarkable.  His chemistry was within normal limits.  His blood sugar was significantly elevated at 444.      ASSESSMENT AND PLAN:      Mr. Cooper is a very pleasant 72-year-old gentleman with a history of coronary artery disease, refractory angina, ischemic cardiomyopathy, diabetes and atrial flutter, who returns today for followup.     1.  Ischemic coronary artery disease with refractory angina.  Mr. Cooper is doing reasonably okay.  He has not had any worsening exertional angina.  He remains fairly asymptomatic.  I did not change any of his medication.  He will  continue on Plavix 75 mg daily, Coumadin, Imdur, Coreg and Norvasc.     2.  Ischemic cardiomyopathy, EF of 35%-40%, functional class II, AHA stage C.  No evidence of volume overload.  No evidence of end organ dysfunction.  He is on a low dose of Coreg.  He is not on an ACE and an ARB.  His ejection fraction is 35%-40%.  I recommended him to start on Cozaar 25 mg daily.  He does not require any diuretics at this time and point.     3.  Atrial flutter.  He is on sotalol.  His heart rate is in sinus rhythm.  He is on long-term anticoagulation with Coumadin.      His blood sugars are extremely elevated.  We will call him to have this addressed with his primary care physician.  He is only on an oral hypoglycemic agent.  He is not on insulin.  He was on insulin before.  This needs to be readdressed.      Mr. Stevenson would like his colostomy to be reversed.  He has been having this for the last 10 years.  He understands that this would be an intermediate risk surgery as he has a significant epicardial coronary artery disease, although he is asymptomatic at rest.  However, if they were to have a complication and becomes tachycardia, he is at risk of non-STEMI, given his diffuse LAD.  He understands the risk and still would like to meet with a surgeon.  We will refer him to Colorectal Surgery.      I recommended him to return in 6 months.  He will call us in the interim if he has any further worsening symptoms.      Sincerely,   Claudy Loco MD   [facility]  Heart Failure, Transplant, and Mechanical Circulatory Support Cardiology   Cardiovascular Division  [facility]   [phone]               D: 2018   T: 2018   MT: al      Name:     FLOR STEVENSON   MRN:      [id number]        Account:      [id number]   :      1945           Service Date: 2018      Document: [id number]        Claudy Loco MD

## 2018-05-07 NOTE — LETTER
2018      RE: Wesley Cooper  [address]  [facility] [address]       Dear Colleague,    Thank you for the opportunity to participate in the care of your patient, Wesley Cooper, at the [facility]. Please see a copy of my visit note below.    Service Date: 2018      Jar[facility]   [facility]   [address] [place] [address]      RE: Wesley Cooper   MRN: [id number]   : 1945      Dr. De La Rosa:      We had the pleasure of seeing Mr. Cooper in our Advanced Heart Failure Clinic.  He is a 72-year-old male whose problem list includes:     1.  Coronary artery disease, status post coronary artery bypass grafting surgery.   2.  Refractory angina with a patent LIMA to LAD, occlusion of the vein graft to RCA and left circumflex and distal LAD native vessel disease, in-stent restenosis of the mid-LAD.   3.  Ischemic cardiomyopathy with an estimated ejection fraction of 35%-40%, status post CRT-D.   4.  Type 2 diabetes.   5.  Atrial flutter.   6.  Chronic colostomy.      He returns for a 6-month visit.  He is doing overall well.  He has not had any worsening exertional chest pain or shortness of breath.  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  He is able to keep up with his work.  He has not had any lower extremity swelling or abdominal distention.  No lightheadedness, dizziness or syncope.  No palpitations.  No ICD shocks.  No hospitalizations or ER visits.      REVIEW OF SYSTEMS:  A detailed 10-point review of systems was obtained as described in the History of Present Illness.  All other systems reviewed and are negative.      MEDICATIONS:  Current Outpatient Prescriptions   Medication Sig     amLODIPine (NORVASC) 2.5 MG tablet Take 5 mg by mouth daily      atorvastatin (LIPITOR) 80 MG tablet Take 80 mg by mouth daily     carvedilol (COREG) 6.25 MG tablet Take 6.25 mg by mouth 2  times daily (with meals)     clopidogrel (PLAVIX) 75 MG tablet Take 75 mg by mouth daily     gabapentin (GRALISE) 300 MG 24 hr tablet Take 300 mg by mouth daily (with dinner)     glipiZIDE (GLUCOTROL) 5 MG tablet Take 5 mg by mouth 2 times daily (before meals)     isosorbide mononitrate (IMDUR) 60 MG 24 hr tablet Take 60 mg by mouth daily     losartan (COZAAR) 25 MG tablet Take 1 tablet (25 mg) by mouth daily     metFORMIN (GLUCOPHAGE) 500 MG tablet Take 500 mg by mouth 2 times daily (with meals)     nitroglycerin (NITROSTAT) 0.4 MG sublingual tablet Place 0.4 mg under the tongue every 5 minutes as needed for chest pain For chest pain place 1 tablet under the tongue every 5 minutes for 3 doses. If symptoms persist 5 minutes after 1st dose call 911.     sotalol HCl, AF, 160 MG TABS Take 160 mg by mouth 2 times daily     warfarin (COUMADIN) 2.5 MG tablet Take 2.5 mg by mouth daily     No current facility-administered medications for this visit.      PHYSICAL EXAMINATION:  He was comfortable.  He was in no apparent distress.  His blood pressure was 129/85, pulse rate was 69, respiratory rate was 16, oxygen saturation 100%.  His weight was 147.  He was 5 feet 3 inches tall.  His BMI was 26.2.  He had no pallor, cyanosis or jaundice.  No JVD.  Cardiac auscultation revealed normal S1, S2 with no murmur, rub or gallop.  Auscultation of the lungs revealed equal air entry on both sides with no added sounds.  His abdomen was soft with normal bowel sounds, no tenderness, no rigidity, no guarding and no focal neurological deficit.      His NT-proBNP was elevated at 1051.  His CBC was unremarkable.  His chemistry was within normal limits.  His blood sugar was significantly elevated at 444.      ASSESSMENT AND PLAN:    Mr. Cooper is a very pleasant 72-year-old gentleman with a history of coronary artery disease, refractory angina, ischemic cardiomyopathy, diabetes and atrial flutter, who returns today for followup.     1.   Ischemic coronary artery disease with refractory angina.  Mr. Cooper is doing reasonably okay.  He has not had any worsening exertional angina.  He remains fairly asymptomatic.  I did not change any of his medication.  He will continue on Plavix 75 mg daily, Coumadin, Imdur, Coreg and Norvasc.     2.  Ischemic cardiomyopathy, EF of 35%-40%, functional class II, AHA stage C.  No evidence of volume overload.  No evidence of end organ dysfunction.  He is on a low dose of Coreg.  He is not on an ACE and an ARB.  His ejection fraction is 35%-40%.  I recommended him to start on Cozaar 25 mg daily.  He does not require any diuretics at this time and point.     3.  Atrial flutter.  He is on sotalol.  His heart rate is in sinus rhythm.  He is on long-term anticoagulation with Coumadin.      His blood sugars are extremely elevated.  We will call him to have this addressed with his primary care physician.  He is only on an oral hypoglycemic agent.  He is not on insulin.  He was on insulin before.  This needs to be readdressed.      Mr. Cooper would like his colostomy to be reversed.  He has been having this for the last 10 years.  He understands that this would be an intermediate risk surgery as he has a significant epicardial coronary artery disease, although he is asymptomatic at rest.  However, if they were to have a complication and becomes tachycardia, he is at risk of non-STEMI, given his diffuse LAD.  He understands the risk and still would like to meet with a surgeon.  We will refer him to Colorectal Surgery.      I recommended him to return in 6 months.  He will call us in the interim if he has any further worsening symptoms.      Sincerely,   Claudy Loco MD   [facility]  Heart Failure, Transplant, and Mechanical Circulatory Support Cardiology   Cardiovascular Division  [facility]   [phone]     D: 05/07/2018   T: 05/07/2018   MT: al      Name:      FLOR STEVENSON   MRN:      [id number]        Account:      [id number]   :      1945           Service Date: 2018      Document: [id number]

## 2018-05-07 NOTE — MR AVS SNAPSHOT
After Visit Summary   5/7/2018    Flor Stevenson    MRN: [id number]           Patient Information     Date Of Birth          1945        Visit Information        Provider Department      5/7/2018 3:00 PM Claudy Loco MD [facility]        Today's Diagnoses     Chronic systolic heart failure (H)    -  1    Ileostomy status (H)          Care Instructions    You were seen today in the Cardiovascular Clinic at the [facility].      Cardiology Providers you saw during your visit: Dr. Loco       Medication Changes:   Cozaar 25 mg daily       Patient Instructions:  -Labs to be done in 1 month at your local clinic. Please have them faxed to [phone] attention Dr. Loco.   -We will make a referral to a Colorectal surgeon          Follow up Appointment Information:  -6 month follow up with Echo and labs prior.      Results:    Results for FLOR STEVENSON (MRN [id number]) as of 5/7/2018 14:01   Ref. Range 5/7/2018 13:21   Sodium Latest Ref Range: 133 - 144 mmol/L 134   Potassium Latest Ref Range: 3.4 - 5.3 mmol/L 4.8   Chloride Latest Ref Range: 94 - 109 mmol/L 103   Carbon Dioxide Latest Ref Range: 20 - 32 mmol/L 21   Urea Nitrogen Latest Ref Range: 7 - 30 mg/dL 23   Creatinine Latest Ref Range: 0.66 - 1.25 mg/dL 1.18   GFR Estimate Latest Ref Range: >60 mL/min/1.7m2 60 (L)   GFR Estimate If Black Latest Ref Range: >60 mL/min/1.7m2 73   Calcium Latest Ref Range: 8.5 - 10.1 mg/dL 9.5   Anion Gap Latest Ref Range: 3 - 14 mmol/L 10   N-Terminal Pro Bnp Latest Ref Range: 0 - 450 pg/mL 1051 (H)   Glucose Latest Ref Range: 70 - 99 mg/dL 444 (H)   WBC Latest Ref Range: 4.0 - 11.0 10e9/L 8.5   Hemoglobin Latest Ref Range: 13.3 - 17.7 g/dL 14.6   Hematocrit Latest Ref Range: 40.0 - 53.0 % 45.1   Platelet Count Latest Ref Range: 150 - 450 10e9/L 244   RBC Count Latest Ref Range: 4.4 - 5.9 10e12/L 5.21   MCV Latest Ref Range: 78 - 100 fl 87   MCH Latest Ref Range: 26.5 - 33.0 pg  "28.0   [facility] Latest Ref Range: 31.5 - 36.5 g/dL 32.4   RDW Latest Ref Range: 10.0 - 15.0 % 13.0          [address] [facility] on [address]rd [place] [address]       Thank you for allowing us to be a part of your care here at the [facility]     If you have questions or concerns please contact us at:     Lizzie Rodney RN BSN   Cardiology Care Coordinator - C.O.RJakeEJake [facility]  Questions and [phone]  First press #1 for the [place] and then press #3 for \"Medical Advise\" to reach a Cardiology Nurse.                                                                                ** Please note that you will NOT receive a reminder call regarding your scheduled testing, reminder calls are for provider appointments only.  If you are scheduled for testing within the [employer] system you may receive a call regarding pre-registration for billing purposes only.**             Follow-ups after your visit        Additional Services     COLORECTAL SURGERY REFERRAL       Your provider has referred you to: [facility]: Colon and Rectal Surgery Clinic [facility] [phone]   [email or web address]    Referral Reason(s): Consult  Special Concerns: Coumadin and Diabetes  This referral is: Elective (week +)  It is OK to leave a message on patient's voicemail.    Please be aware that coverage of these services is subject to the terms and limitations of your health insurance plan.  Call member services at your health plan with any benefit or coverage questions.      Please bring the following with you to your appointment:    (1) Any X-Rays, CTs or MRIs which have been performed.  Contact the facility where they were done to arrange for  prior to your scheduled appointment.    (2) List of current medications  (3) This referral request   (4) Any documents/labs given to you for this referral            Follow-Up " with Advanced Heart Failure Cardiologist       6 month f/u with Claudy with echo and labs prior.                  Your next 10 appointments already scheduled     May 07, 2018  2:30 PM CDT   Lab with UC LAB   [facility] ([facility])    [address] [place] [address]  1st Floor  [facility] [address]   [phone]            May 07, 2018  3:00 PM CDT   (Arrive by 2:45 PM)   RETURN HEART FAILURE with Claudy Loco MD   [facility] ([facility])    [address] [place] [address]  [address]  [facility] [address]   [phone]            Nov 05, 2018  2:00 PM CST   Lab with UC LAB   [facility] ([facility])    [address] [place] [address]  1st Floor  [facility] [address]   [phone]            Nov 05, 2018  2:30 PM CST   Ech Complete with UCECHCR1   [facility] ([facility])    [address] [place] [address]  3rd Floor  [facility] [address]   [phone]           1. Please bring or wear a comfortable two-piece outfit. 2. You may eat, drink and take your normal medicines. 3. For any questions that cannot be answered, please contact the ordering physician            Nov 05, 2018  3:30 PM CST   (Arrive by 3:15 PM)   RETURN HEART FAILURE with Claudy Loco MD   [facility] ([facility])    [address] [place] [address]  [address]  [facility] [address]   [phone]              Future tests that were ordered for you today     Open Future Orders        Priority Expected Expires Ordered    Follow-Up with Advanced Heart Failure Cardiologist Routine 11/7/2018 8/12/2019 5/7/2018    Basic metabolic panel Routine 5/28/2018 5/31/2018 5/7/2018    Echocardiogram Complete Routine 11/7/2018 5/7/2019 5/7/2018            Who to contact     If you have questions or need follow up information about today's clinic visit or your schedule please contact [facility] directly at  "[phone].  Normal or non-critical lab and imaging results will be communicated to you by [employer], letter or phone within 4 business days after the clinic has received the results. If you do not hear from us within 7 days, please contact the clinic through [email or web address] or phone. If you have a critical or abnormal lab result, we will notify you by phone as soon as possible.  Submit refill requests through [email or web address] or call your pharmacy and they will forward the refill request to us. Please allow 3 business days for your refill to be completed.          Additional Information About Your Visit        [employer] Information     [email or web address] gives you secure access to your electronic health record. If you see a primary care provider, you can also send messages to your care team and make appointments. If you have questions, please call your primary care clinic.  If you do not have a primary care provider, please call [phone] and they will assist you.        Care EveryWhere ID     This is your Care EveryWhere ID. This could be used by other organizations to access your [facility] medical records  [id number]        Your Vitals Were     Pulse Height Pulse Oximetry BMI (Body Mass Index)          69 1.6 m (5' 3\") 100% 26.11 kg/m2         Blood Pressure from Last 3 Encounters:   05/07/18 129/85   11/06/17 125/79   04/17/17 120/67    Weight from Last 3 Encounters:   05/07/18 66.9 kg (147 lb 6.4 oz)   11/06/17 65.2 kg (143 lb 11.2 oz)   04/17/17 66 kg (145 lb 6.4 oz)              We Performed the Following     COLORECTAL SURGERY REFERRAL          Today's Medication Changes          These changes are accurate as of 5/7/18  2:22 PM.  If you have any questions, ask your nurse or doctor.               These medicines have changed or have updated prescriptions.        Dose/Directions    amLODIPine 2.5 MG tablet   Commonly known as:  NORVASC   This may have changed:  Another medication with the same name was removed. Continue taking this " medication, and follow the directions you see here.   Used for:  HFrEF (heart failure with reduced ejection fraction) (H)   Changed by:  Claudy Loco MD        Dose:  5 mg   Take 5 mg by mouth daily   Quantity:  30 tablet   Refills:  0         Stop taking these medicines if you haven't already. Please contact your care team if you have questions.     pantoprazole 40 MG EC tablet   Commonly known as:  PROTONIX   Stopped by:  Claudy Loco MD                Where to get your medicines      These medications were sent to [facility] [address]  [address] [facility] [address]     Phone:  [phone]     losartan 25 MG tablet                Primary Care Provider Office Phone # Fax #    Jarod De La Rosa [phone]       [facility] [address] [facility] [place] [address]        Equal Access to Services     NEIL SCHWARTZ : Hadii oliver michaels Soedyta, waaxda luqgardenia, qaybta kaalmada memo, maryjane velasquez . So [facility] [phone].    ATENCIÓN: Si habla español, tiene a valdes disposición servicios gratuitos de asistencia lingüística. Llame al [phone].    We comply with applicable federal civil rights laws and [place] laws. We do not discriminate on the basis of race, color, national origin, age, disability, sex, sexual orientation, or gender identity.            Thank you!     Thank you for choosing [facility]  for your care. Our goal is always to provide you with excellent care. Hearing back from our patients is one way we can continue to improve our services. Please take a few minutes to complete the written survey that you may receive in the mail after your visit with us. Thank you!             Your Updated Medication List - Protect others around you: Learn how to safely use, store and throw away your medicines at www.disposemymeds.org.          This list is accurate as of 5/7/18  2:22 PM.   Always use your most recent med list.                   Brand Name Dispense Instructions for use Diagnosis    amLODIPine 2.5 MG tablet    NORVASC    30 tablet    Take 5 mg by mouth daily    HFrEF (heart failure with reduced ejection fraction) (H)       atorvastatin 80 MG tablet    LIPITOR     Take 80 mg by mouth daily    Chronic systolic heart failure (H)       carvedilol 6.25 MG tablet    COREG     Take 6.25 mg by mouth 2 times daily (with meals)    Chronic systolic heart failure (H)       gabapentin 300 MG 24 hr tablet    GRALISE     Take 300 mg by mouth daily (with dinner)    Chronic systolic heart failure (H)       glipiZIDE 5 MG tablet    GLUCOTROL     Take 5 mg by mouth 2 times daily (before meals)        isosorbide mononitrate 60 MG 24 hr tablet    IMDUR     Take 60 mg by mouth daily    Chronic systolic heart failure (H)       losartan 25 MG tablet    COZAAR    30 tablet    Take 1 tablet (25 mg) by mouth daily    Chronic systolic heart failure (H)       metFORMIN 500 MG tablet    GLUCOPHAGE     Take 500 mg by mouth 2 times daily (with meals)        nitroGLYcerin 0.4 MG sublingual tablet    NITROSTAT     Place 0.4 mg under the tongue every 5 minutes as needed for chest pain For chest pain place 1 tablet under the tongue every 5 minutes for 3 doses. If symptoms persist 5 minutes after 1st dose call 911.    Chronic systolic heart failure (H)       PLAVIX 75 MG tablet   Generic drug:  clopidogrel      Take 75 mg by mouth daily    Chronic systolic heart failure (H)       sotalol HCl (AF) 160 MG Tabs     180 tablet    Take 160 mg by mouth 2 times daily    Chronic systolic heart failure (H)       warfarin 2.5 MG tablet    COUMADIN     Take 2.5 mg by mouth daily    Chronic systolic heart failure (H)

## 2018-05-07 NOTE — NURSING NOTE
Chief Complaint   Patient presents with     Follow Up For     Return HFpEF. 6 mon f/u. Max medical therapy as pt is not a revas candidate.      Vitals were taken and medications were reconciled.    Hannah Jerome,MARTÍNEZ  1:48 PM

## 2018-05-08 ENCOUNTER — CARE COORDINATION (OUTPATIENT)
Dept: CARDIOLOGY | Facility: CLINIC | Age: 73
End: 2018-05-08

## 2018-05-08 NOTE — PROGRESS NOTES
During clinic visit Mr. Cooper's blood sugar was 444. Dr. Loco recommends following up with PCP. Pt states he was out of oral metformin and glipizide for a few days. He since has refilled his prescriptions, but will continue to monitor and follow up with PCP as necessary.

## 2018-07-30 ENCOUNTER — OFFICE VISIT (OUTPATIENT)
Dept: SURGERY | Facility: CLINIC | Age: 73
End: 2018-07-30
Payer: COMMERCIAL

## 2018-07-30 VITALS
DIASTOLIC BLOOD PRESSURE: 67 MMHG | OXYGEN SATURATION: 98 % | BODY MASS INDEX: 25.72 KG/M2 | SYSTOLIC BLOOD PRESSURE: 116 MMHG | TEMPERATURE: 98.2 F | HEART RATE: 61 BPM | HEIGHT: 65 IN | WEIGHT: 154.4 LBS

## 2018-07-30 DIAGNOSIS — Z93.3 COLOSTOMY STATUS (H): Primary | ICD-10-CM

## 2018-07-30 ASSESSMENT — PAIN SCALES - GENERAL: PAINLEVEL: NO PAIN (0)

## 2018-07-30 NOTE — MR AVS SNAPSHOT
After Visit Summary   7/30/2018    Wesley Cooper    MRN: [id number]           Patient Information     Date Of Birth          1945        Visit Information        Provider Department      7/30/2018 4:00 PM Hieu Diamond MD [facility] Colon and Rectal Surgery        Today's Diagnoses     Colostomy status (H)    -  1       Follow-ups after your visit        Your next 10 appointments already scheduled     Nov 05, 2018  2:00 PM CST   Lab with  LAB   [facility] ([facility])    [address] [place] [address] [phone]            Nov 05, 2018  2:30 PM CST   Ech Complete with UCECHCR1   [facility] ([facility])    [address] [place] [address] [phone]           1.  Please bring or wear a comfortable two-piece outfit. 2.  You may eat, drink and take your normal medicines. 3.  For any questions that cannot be answered, please contact the ordering physician 4.  Please do not wear perfumes or scented lotions on the day of your exam.            Nov 05, 2018  3:30 PM CST   (Arrive by 3:15 PM)   RETURN HEART FAILURE with Claudy Loco MD   [facility] ([facility])    [address] [place] [address]  [address] [place] [address] [phone]              Who to contact     Please call your clinic at [phone] to:    Ask questions about your health    Make or cancel appointments    Discuss your medicines    Learn about your test results    Speak to your doctor            Additional Information About Your Visit        MyChart Information     [employer] gives you secure access to your electronic health record. If you see a primary care provider, you can also send messages to your care team and make appointments. If you have questions, please call your primary care clinic.  If you do not have a primary care provider, please call [phone] and  "they will assist you.      [email or web address] is an electronic gateway that provides easy, online access to your medical records. With [email or web address], you can request a clinic appointment, read your test results, renew a prescription or communicate with your care team.     To access your existing account, please contact your [facility] or call [phone] for assistance.        Care EveryWhere ID     This is your Care EveryWhere ID. This could be used by other organizations to access your [facility] medical records  [id number]        Your Vitals Were     Pulse Temperature Height Pulse Oximetry BMI (Body Mass Index)       61 98.2  F (36.8  C) (Oral) 5' 5\" 98% 25.69 kg/m2        Blood Pressure from Last 3 Encounters:   07/30/18 116/67   05/07/18 129/85   11/06/17 125/79    Weight from Last 3 Encounters:   07/30/18 154 lb 6.4 oz   05/07/18 147 lb 6.4 oz   11/06/17 143 lb 11.2 oz              Today, you had the following     No orders found for display       Primary Care Provider Office Phone # Fax #    Jarod De La Rosa [phone]       [facility] [address] [address]        Equal Access to Services     NEIL SCHWARTZ AH: Hadii oliver rivaso Soedyta, waaxda luqadaha, qaybta kaalmada adeegyada, maryjane olsen. So [facility] [phone].    ATENCIÓN: Si habla español, tiene a valdes disposición servicios gratuitos de asistencia lingüística. Llame al [phone].    We comply with applicable federal civil rights laws and [place] laws. We do not discriminate on the basis of race, color, national origin, age, disability, sex, sexual orientation, or gender identity.            Thank you!     Thank you for choosing [facility]  for your care. Our goal is always to provide you with excellent care. Hearing back from our patients is one way we can continue to improve our services. Please take a few minutes to complete the written survey " that you may receive in the mail after your visit with us. Thank you!             Your Updated Medication List - Protect others around you: Learn how to safely use, store and throw away your medicines at www.disposemymeds.org.          This list is accurate as of 7/30/18  4:50 PM.  Always use your most recent med list.                   Brand Name Dispense Instructions for use Diagnosis    amLODIPine 2.5 MG tablet    NORVASC    30 tablet    Take 5 mg by mouth daily    HFrEF (heart failure with reduced ejection fraction) (H)       atorvastatin 80 MG tablet    LIPITOR     Take 80 mg by mouth daily    Chronic systolic heart failure (H)       carvedilol 6.25 MG tablet    COREG     Take 6.25 mg by mouth 2 times daily (with meals)    Chronic systolic heart failure (H)       diphenhydrAMINE-acetaminophen  MG tablet    TYLENOL PM     Take 1 tablet by mouth nightly as needed for sleep        gabapentin 300 MG 24 hr tablet    GRALISE     Take 300 mg by mouth daily (with dinner)    Chronic systolic heart failure (H)       glipiZIDE 5 MG tablet    GLUCOTROL     Take 5 mg by mouth 2 times daily (before meals)        isosorbide mononitrate 60 MG 24 hr tablet    IMDUR     Take 60 mg by mouth daily    Chronic systolic heart failure (H)       losartan 25 MG tablet    COZAAR    30 tablet    Take 1 tablet (25 mg) by mouth daily    Chronic systolic heart failure (H)       metFORMIN 500 MG tablet    GLUCOPHAGE     Take 500 mg by mouth 2 times daily (with meals)        nitroGLYcerin 0.4 MG sublingual tablet    NITROSTAT     Place 0.4 mg under the tongue every 5 minutes as needed for chest pain For chest pain place 1 tablet under the tongue every 5 minutes for 3 doses. If symptoms persist 5 minutes after 1st dose call 911.    Chronic systolic heart failure (H)       PLAVIX 75 MG tablet   Generic drug:  clopidogrel      Take 75 mg by mouth daily    Chronic systolic heart failure (H)       sotalol HCl (AF) 160 MG Tabs     180 tablet     Take 160 mg by mouth 2 times daily    Chronic systolic heart failure (H)       warfarin 2.5 MG tablet    COUMADIN     Take 2.5 mg by mouth daily    Chronic systolic heart failure (H)

## 2018-07-30 NOTE — PROGRESS NOTES
Colon and Rectal Surgery Clinic Note    RE: Wesley Cooper.  : 1945.  GIA: 2018.    Reason for visit: Colostomy status.     HPI: 72 y/o M with h/o alpha-1-antitrypsin, hepatitis B and C, hemochromatosis, ischemic cardiomyopathy (EF 35%, s/p CABG and multiple stents), atrial flutter (on Plavix/Warfarin), DM2, and perforated sigmoid diverticulitis s/p Hartmanns procedure in  who comes in today to discuss colostomy reversal. Denies abdominal pain, high output, unintentional weight loss, or pouching issues. No previous colonoscopies. Denies FH of CRC. Son has CD.     Medical history:  No past medical history on file.    Surgical history:  No past surgical history on file.    Family history:  No family history on file.    Medications:  Current Outpatient Prescriptions   Medication Sig Dispense Refill     amLODIPine (NORVASC) 2.5 MG tablet Take 5 mg by mouth daily  30 tablet      atorvastatin (LIPITOR) 80 MG tablet Take 80 mg by mouth daily       carvedilol (COREG) 6.25 MG tablet Take 6.25 mg by mouth 2 times daily (with meals)       clopidogrel (PLAVIX) 75 MG tablet Take 75 mg by mouth daily       diphenhydrAMINE-acetaminophen (TYLENOL PM)  MG tablet Take 1 tablet by mouth nightly as needed for sleep       gabapentin (GRALISE) 300 MG 24 hr tablet Take 300 mg by mouth daily (with dinner)       glipiZIDE (GLUCOTROL) 5 MG tablet Take 5 mg by mouth 2 times daily (before meals)       isosorbide mononitrate (IMDUR) 60 MG 24 hr tablet Take 60 mg by mouth daily       losartan (COZAAR) 25 MG tablet Take 1 tablet (25 mg) by mouth daily 30 tablet 3     metFORMIN (GLUCOPHAGE) 500 MG tablet Take 500 mg by mouth 2 times daily (with meals)       nitroglycerin (NITROSTAT) 0.4 MG sublingual tablet Place 0.4 mg under the tongue every 5 minutes as needed for chest pain For chest pain place 1 tablet under the tongue every 5 minutes for 3 doses. If symptoms persist 5 minutes after 1st dose call 911.       sotalol HCl,  "AF, 160 MG TABS Take 160 mg by mouth 2 times daily 180 tablet 3     warfarin (COUMADIN) 2.5 MG tablet Take 2.5 mg by mouth daily         Allergies:  No Known Allergies    Social history:   Social History   Substance Use Topics     Smoking status: Former Smoker     Smokeless tobacco: Never Used      Comment: quit 2010     Alcohol use No     Marital status: .    Physical Examination:  /67 (BP Location: Left arm, Patient Position: Chair, Cuff Size: Adult Regular)  Pulse 61  Temp 98.2  F (36.8  C) (Oral)  Ht 5' 5\"  Wt 154 lb 6.4 oz  SpO2 98%  BMI 25.69 kg/m2  General: Well hydrated. No acute distress.    Investigations:  None.    Procedures:  None.    ASSESSMENT  74 y/o M with complex PM/SH and end colostomy after Marv's procedure for perforated diverticulitis in 2005. Discussed risks and benefits of stoma reversal with his current medical status. Would not recommend stoma reversal at this time given very high cardiac risk and time interval from stoma formation. He understands this well. All questions were answered to his satisfaction.     PLAN  1. Would recommend full colonoscopy.  2. RTC PRN.    Time spent: 60 minutes.  >50% spent in discussing, counseling and coordinating care.    Hieu Diamond M.D., M.Sc.     Division of Colon and Rectal Surgery  [facility]    Referring Provider:  Claudy Loco MD  [address] [place] [address] [address]  [place] [address]     Primary Care Provider:  Jarod De La Rosa    "

## 2018-07-30 NOTE — NURSING NOTE
"Chief Complaint   Patient presents with     Clinic Care Coordination - Initial     New patient consult, discuss illeostomy.       Vitals:    07/30/18 1632   BP: 116/67   BP Location: Left arm   Patient Position: Chair   Cuff Size: Adult Regular   Pulse: 61   Temp: 98.2  F (36.8  C)   TempSrc: Oral   SpO2: 98%   Weight: 154 lb 6.4 oz   Height: 5' 5\"       Body mass index is 25.69 kg/(m^2).    Rodolfo LEE, CHUCHON               "

## 2018-07-30 NOTE — LETTER
2018       RE: Wesley Cooper  [address]  [facility] [address]     Dear Colleague,    Thank you for referring your patient, Wesley Cooper, to the [facility] COLON AND RECTAL SURGERY at [facility]. Please see a copy of my visit note below.    Colon and Rectal Surgery Clinic Note    RE: eWsley Cooper.  : 1945.  GIA: 2018.    Reason for visit: Colostomy status.     HPI: 72 y/o M with h/o alpha-1-antitrypsin, hepatitis B and C, hemochromatosis, ischemic cardiomyopathy (EF 35%, s/p CABG and multiple stents), atrial flutter (on Plavix/Warfarin), DM2, and perforated sigmoid diverticulitis s/p Hartmanns procedure in  who comes in today to discuss colostomy reversal. Denies abdominal pain, high output, unintentional weight loss, or pouching issues. No previous colonoscopies. Denies FH of CRC. Son has CD.     Medical history:  No past medical history on file.    Surgical history:  No past surgical history on file.    Family history:  No family history on file.    Medications:  Current Outpatient Prescriptions   Medication Sig Dispense Refill     amLODIPine (NORVASC) 2.5 MG tablet Take 5 mg by mouth daily  30 tablet      atorvastatin (LIPITOR) 80 MG tablet Take 80 mg by mouth daily       carvedilol (COREG) 6.25 MG tablet Take 6.25 mg by mouth 2 times daily (with meals)       clopidogrel (PLAVIX) 75 MG tablet Take 75 mg by mouth daily       diphenhydrAMINE-acetaminophen (TYLENOL PM)  MG tablet Take 1 tablet by mouth nightly as needed for sleep       gabapentin (GRALISE) 300 MG 24 hr tablet Take 300 mg by mouth daily (with dinner)       glipiZIDE (GLUCOTROL) 5 MG tablet Take 5 mg by mouth 2 times daily (before meals)       isosorbide mononitrate (IMDUR) 60 MG 24 hr tablet Take 60 mg by mouth daily       losartan (COZAAR) 25 MG tablet Take 1 tablet (25 mg) by mouth daily 30 tablet 3     metFORMIN (GLUCOPHAGE) 500 MG tablet Take 500 mg by mouth 2 times daily (with  "meals)       nitroglycerin (NITROSTAT) 0.4 MG sublingual tablet Place 0.4 mg under the tongue every 5 minutes as needed for chest pain For chest pain place 1 tablet under the tongue every 5 minutes for 3 doses. If symptoms persist 5 minutes after 1st dose call 911.       sotalol HCl, AF, 160 MG TABS Take 160 mg by mouth 2 times daily 180 tablet 3     warfarin (COUMADIN) 2.5 MG tablet Take 2.5 mg by mouth daily         Allergies:  No Known Allergies    Social history:   Social History   Substance Use Topics     Smoking status: Former Smoker     Smokeless tobacco: Never Used      Comment: quit 2010     Alcohol use No     Marital status: .    Physical Examination:  /67 (BP Location: Left arm, Patient Position: Chair, Cuff Size: Adult Regular)  Pulse 61  Temp 98.2  F (36.8  C) (Oral)  Ht 5' 5\"  Wt 154 lb 6.4 oz  SpO2 98%  BMI 25.69 kg/m2  General: Well hydrated. No acute distress.    Investigations:  None.    Procedures:  None.    ASSESSMENT  74 y/o M with complex PM/SH and end colostomy after Marv's procedure for perforated diverticulitis in 2005. Discussed risks and benefits of stoma reversal with his current medical status. Would not recommend stoma reversal at this time given very high cardiac risk and time interval from stoma formation. He understands this well. All questions were answered to his satisfaction.     PLAN  1. Would recommend full colonoscopy.  2. RTC PRN.    Time spent: 60 minutes.  >50% spent in discussing, counseling and coordinating care.    Hieu Diamond M.D., M.Sc.     Division of Colon and Rectal Surgery  [facility]    Referring Provider:  Claudy Loco MD  [address] [place] [address] [address]  [place] [address]     Primary Care Provider:  Jarod De La Rosa    "

## 2018-11-01 DIAGNOSIS — I25.5 ISCHEMIC CARDIOMYOPATHY: Primary | ICD-10-CM

## 2018-11-01 DIAGNOSIS — I25.10 CAD (CORONARY ARTERY DISEASE): ICD-10-CM

## 2018-11-01 DIAGNOSIS — I50.22 CHRONIC SYSTOLIC HEART FAILURE (H): ICD-10-CM

## 2018-11-05 ENCOUNTER — OFFICE VISIT (OUTPATIENT)
Dept: CARDIOLOGY | Facility: CLINIC | Age: 73
DRG: 287 | End: 2018-11-05
Attending: INTERNAL MEDICINE
Payer: COMMERCIAL

## 2018-11-05 ENCOUNTER — PATIENT OUTREACH (OUTPATIENT)
Dept: CARE COORDINATION | Facility: CLINIC | Age: 73
End: 2018-11-05

## 2018-11-05 ENCOUNTER — RADIANT APPOINTMENT (OUTPATIENT)
Dept: CARDIOLOGY | Facility: CLINIC | Age: 73
End: 2018-11-05
Payer: COMMERCIAL

## 2018-11-05 VITALS
BODY MASS INDEX: 25.66 KG/M2 | HEIGHT: 65 IN | OXYGEN SATURATION: 98 % | SYSTOLIC BLOOD PRESSURE: 150 MMHG | WEIGHT: 154 LBS | DIASTOLIC BLOOD PRESSURE: 84 MMHG | HEART RATE: 72 BPM

## 2018-11-05 DIAGNOSIS — I50.22 CHRONIC SYSTOLIC HEART FAILURE (H): ICD-10-CM

## 2018-11-05 DIAGNOSIS — R07.9 ACUTE CHEST PAIN: Primary | ICD-10-CM

## 2018-11-05 DIAGNOSIS — I25.5 ISCHEMIC CARDIOMYOPATHY: ICD-10-CM

## 2018-11-05 DIAGNOSIS — I25.720 CORONARY ARTERY DISEASE INVOLVING AUTOLOGOUS ARTERY CORONARY BYPASS GRAFT WITH UNSTABLE ANGINA PECTORIS (H): ICD-10-CM

## 2018-11-05 LAB
ANION GAP SERPL CALCULATED.3IONS-SCNC: 8 MMOL/L (ref 3–14)
BUN SERPL-MCNC: 28 MG/DL (ref 7–30)
CALCIUM SERPL-MCNC: 8.9 MG/DL (ref 8.5–10.1)
CHLORIDE SERPL-SCNC: 108 MMOL/L (ref 94–109)
CO2 SERPL-SCNC: 22 MMOL/L (ref 20–32)
CREAT SERPL-MCNC: 1.19 MG/DL (ref 0.66–1.25)
ERYTHROCYTE [DISTWIDTH] IN BLOOD BY AUTOMATED COUNT: 14.7 % (ref 10–15)
GFR SERPL CREATININE-BSD FRML MDRD: 59 ML/MIN/1.7M2
GLUCOSE SERPL-MCNC: 245 MG/DL (ref 70–99)
HCT VFR BLD AUTO: 40.1 % (ref 40–53)
HGB BLD-MCNC: 12.5 G/DL (ref 13.3–17.7)
MCH RBC QN AUTO: 25 PG (ref 26.5–33)
MCHC RBC AUTO-ENTMCNC: 31.2 G/DL (ref 31.5–36.5)
MCV RBC AUTO: 80 FL (ref 78–100)
NT-PROBNP SERPL-MCNC: 801 PG/ML (ref 0–450)
PLATELET # BLD AUTO: 249 10E9/L (ref 150–450)
POTASSIUM SERPL-SCNC: 4.9 MMOL/L (ref 3.4–5.3)
RBC # BLD AUTO: 5.01 10E12/L (ref 4.4–5.9)
SODIUM SERPL-SCNC: 139 MMOL/L (ref 133–144)
TROPONIN I SERPL-MCNC: <0.015 UG/L (ref 0–0.04)
WBC # BLD AUTO: 6.7 10E9/L (ref 4–11)

## 2018-11-05 PROCEDURE — G0463 HOSPITAL OUTPT CLINIC VISIT: HCPCS

## 2018-11-05 PROCEDURE — 85027 COMPLETE CBC AUTOMATED: CPT | Performed by: INTERNAL MEDICINE

## 2018-11-05 PROCEDURE — 83880 ASSAY OF NATRIURETIC PEPTIDE: CPT | Performed by: INTERNAL MEDICINE

## 2018-11-05 PROCEDURE — 36415 COLL VENOUS BLD VENIPUNCTURE: CPT | Performed by: INTERNAL MEDICINE

## 2018-11-05 PROCEDURE — 84484 ASSAY OF TROPONIN QUANT: CPT | Performed by: INTERNAL MEDICINE

## 2018-11-05 PROCEDURE — 80048 BASIC METABOLIC PNL TOTAL CA: CPT | Performed by: INTERNAL MEDICINE

## 2018-11-05 PROCEDURE — 93010 ELECTROCARDIOGRAM REPORT: CPT | Mod: ZP | Performed by: INTERNAL MEDICINE

## 2018-11-05 PROCEDURE — 99214 OFFICE O/P EST MOD 30 MIN: CPT | Mod: GC | Performed by: INTERNAL MEDICINE

## 2018-11-05 RX ORDER — SOTALOL HYDROCHLORIDE 160 MG/1
TABLET ORAL
COMMUNITY
Start: 2018-10-17 | End: 2018-11-06

## 2018-11-05 RX ORDER — SODIUM CHLORIDE 9 MG/ML
INJECTION, SOLUTION INTRAVENOUS CONTINUOUS
Status: CANCELLED | OUTPATIENT
Start: 2018-11-05

## 2018-11-05 RX ORDER — GABAPENTIN 300 MG/1
300 CAPSULE ORAL 3 TIMES DAILY
COMMUNITY
Start: 2018-10-17

## 2018-11-05 RX ORDER — BLOOD-GLUCOSE METER
EACH MISCELLANEOUS
COMMUNITY
Start: 2018-10-19 | End: 2019-05-20

## 2018-11-05 RX ORDER — AMLODIPINE BESYLATE 5 MG/1
5 TABLET ORAL DAILY
Status: ON HOLD | COMMUNITY
Start: 2018-10-17 | End: 2018-11-07

## 2018-11-05 ASSESSMENT — PAIN SCALES - GENERAL: PAINLEVEL: MILD PAIN (3)

## 2018-11-05 NOTE — LETTER
11/5/2018      RE: Wesley Cooper  [address]  [place] [address]           HPI:   Wesley Cooper is 73 year old male who presents for follow up at the [facility]. Past medical history includes:    1.  Coronary artery disease, status post coronary artery bypass grafting surgery.   2.  Refractory angina with a patent LIMA to LAD, occlusion of the vein graft to RCA and left circumflex and distal LAD native vessel disease, in-stent restenosis of the mid-LAD.   3.  Ischemic cardiomyopathy with an estimated ejection fraction of 35%-40%, status post CRT-D.   4.  Type 2 diabetes.   5.  Atrial flutter.   6.  Chronic colostomy.       Patient notes that over the last 2 weeks he has had worsening dyspnea on exertion.  Now has dyspnea on exertion after walking half a block.  Prior to 2 weeks ago, he noted not having any dyspnea on exertion.  Two days ago, he had chest pain and chest pressure that lasted for 1 hour that occurred at rest.  He took one sublingual nitroglycerin and the pain went away after 10-15 minutes.  In total, he noted that his chest pressure lasted for an hour.  Also endorsed having chest pressure this morning.  He notes that his chest discomfort always occurs at rest.  The chest discomfort does not worsen with activity.  Notes that these episodes of chest discomfort are similar to the discomfort that he had prior to his CABG and multiple PCI's.  Prior to 2 days ago patient was not having chest discomfort.  He denies weight gain.  However, upon chart review he appears to have gained 10 pounds over the last year.  Denies orthopnea (chronically sleeps on 2 pillows), PND, and lower extremity edema.  He has not had any ICD shocks for over a year.  No recent emergency department visits or hospitalizations.          Social History   Substance Use Topics     Smoking status: Former Smoker     Smokeless tobacco: Never Used      Comment: quit 2010     Alcohol use No  "        Current Outpatient Prescriptions   Medication Sig     amLODIPine (NORVASC) 2.5 MG tablet Take 5 mg by mouth daily      atorvastatin (LIPITOR) 80 MG tablet Take 80 mg by mouth daily     carvedilol (COREG) 6.25 MG tablet Take 6.25 mg by mouth 2 times daily (with meals)     clopidogrel (PLAVIX) 75 MG tablet Take 75 mg by mouth daily     diphenhydrAMINE-acetaminophen (TYLENOL PM)  MG tablet Take 1 tablet by mouth nightly as needed for sleep     gabapentin (GRALISE) 300 MG 24 hr tablet Take 300 mg by mouth daily (with dinner)     glipiZIDE (GLUCOTROL) 5 MG tablet Take 5 mg by mouth 2 times daily (before meals)     isosorbide mononitrate (IMDUR) 60 MG 24 hr tablet Take 60 mg by mouth daily     losartan (COZAAR) 25 MG tablet Take 1 tablet (25 mg) by mouth daily     metFORMIN (GLUCOPHAGE) 500 MG tablet Take 500 mg by mouth 2 times daily (with meals)     nitroglycerin (NITROSTAT) 0.4 MG sublingual tablet Place 0.4 mg under the tongue every 5 minutes as needed for chest pain For chest pain place 1 tablet under the tongue every 5 minutes for 3 doses. If symptoms persist 5 minutes after 1st dose call 911.     sotalol HCl, AF, 160 MG TABS Take 160 mg by mouth 2 times daily     warfarin (COUMADIN) 2.5 MG tablet Take 2.5 mg by mouth daily     amLODIPine (NORVASC) 5 MG tablet      blood glucose monitoring (ACCU-CHEK TAIWO PLUS) meter device kit      gabapentin (NEURONTIN) 300 MG capsule      sotalol (BETAPACE) 160 MG tablet      No current facility-administered medications for this visit.          ROS:   10 point ROS negative other than what is discussed in above HPI.    EXAM:   /84 (BP Location: Right arm, Patient Position: Chair, Cuff Size: Adult Regular)  Pulse 72  Ht 1.651 m (5' 5\")  Wt 69.9 kg (154 lb)  SpO2 98%  BMI 25.63 kg/m2   GENERAL: Alert, oriented, NAD  HEENT:  NC/AT. PERRLA.  EOMI.  Sclerae white.  MMM, no oral lesions  NECK: No JVD   HEART: RRR, normal S1 and S2, no murmurs   LUNGS:  Clear to " auscultation bilaterally, no wheezes, rales, or rhonchi  ABDOMEN: Soft, nontender, nondistended, bowel sounds present, no hepatojugular reflux, no ascites. Colostomy present.  EXTREMITIES: No lower extremity edema.    Labs:    Recent Results (from the past 24 hour(s))   Basic metabolic panel    Collection Time: 11/05/18 12:44 PM   Result Value Ref Range    Sodium 139 133 - 144 mmol/L    Potassium 4.9 3.4 - 5.3 mmol/L    Chloride 108 94 - 109 mmol/L    Carbon Dioxide 22 20 - 32 mmol/L    Anion Gap 8 3 - 14 mmol/L    Glucose 245 (H) 70 - 99 mg/dL    Urea Nitrogen 28 7 - 30 mg/dL    Creatinine 1.19 0.66 - 1.25 mg/dL    GFR Estimate 59 (L) >60 mL/min/1.7m2    GFR Estimate If Black 72 >60 mL/min/1.7m2    Calcium 8.9 8.5 - 10.1 mg/dL   CBC with platelets    Collection Time: 11/05/18 12:44 PM   Result Value Ref Range    WBC 6.7 4.0 - 11.0 10e9/L    RBC Count 5.01 4.4 - 5.9 10e12/L    Hemoglobin 12.5 (L) 13.3 - 17.7 g/dL    Hematocrit 40.1 40.0 - 53.0 %    MCV 80 78 - 100 fl    MCH 25.0 (L) 26.5 - 33.0 pg    MCHC 31.2 (L) 31.5 - 36.5 g/dL    RDW 14.7 10.0 - 15.0 %    Platelet Count 249 150 - 450 10e9/L   N terminal pro BNP outpatient    Collection Time: 11/05/18 12:44 PM   Result Value Ref Range    N-Terminal Pro Bnp 801 (H) 0 - 450 pg/mL   Troponin I    Collection Time: 11/05/18 12:44 PM   Result Value Ref Range    Troponin I ES <0.015 0.000 - 0.045 ug/L   EKG 12-lead, tracing only (Same Day)    Collection Time: 11/05/18  4:41 PM   Result Value Ref Range    Interpretation ECG Click View Image link to view waveform and result          Electrocardiogram:  Paced rhythm      Echocardiogram 11/5/18:  Mild mitral stenosis is present.  LVEF 45% based on biplane 2D tracing.  End systolic volume is 61 ml.  Right ventricular function, chamber size, wall motion, and thickness are  normal.  The inferior vena cava is normal.  No pericardial effusion is present.  There has been no change.        Assessment and Plan:   Wesley Cooper is a  73 year old male with a history of coronary artery disease, refractory angina, ischemic cardiomyopathy, diabetes, and atrial flutter who presents for follow-up.    1.  Ischemic coronary artery disease with with refractory angina.  Patient presents today with new onset chest discomfort.  His chest discomfort appears atypical since it occurs at rest and does not worsen with activity.  However given his extensive coronary artery disease history, advised patient to proceed with a coronary angiogram.  EKG in the clinic showed a paced rhythm which can obscure ischemic changes and troponin was negative.  Echocardiogram today showed an EF of 45% and no significant changes compared to prior studies.  Since patient is currently on warfarin, advised patient to hold warfarin tonight and tomorrow in anticipation for a coronary angiogram in 2 days.  Also advised patient to hold his metformin.  He had a NSTEMI in 12/2016 where he received a balloon angioplasty to an in-stent restenosis of his mid LAD graft and was found to have total occlusion of 2 of 3 grafts, with only a patent RODRIGUEZ to to LAD.  We will continue Plavix 75 mg daily, Imdur 60 mg daily, carvedilol 6.25 mg twice daily, losartan 25 mg daily, atorvastatin 80 mg daily, and amlodipine 5 mg daily.  Will obtain coronary angiogram to see if there are any lesions amenable to PCI.  If there are no new lesions or there are other lesions are not amenable to PCI, will plan to medically optimize patient by discontinuing amlodipine and increasing his beta-blocker and Imdur doses.    2.  Ischemic cardiomyopathy, EF 45%.  NYHA functional class II, stage C.  We will continue carvedilol 6.25 mg twice daily and losartan 25 mg daily at this time.  He does not require diuretics at this time.  Will consider making adjustments to his beta-blocker and are pending his coronary angiogram results.    3.  Atrial flutter.  Will continue sotalol.  He is currently on Coumadin will hold Coumadin  tonight and tomorrow in anticipation for an coronary angiogram on 11/7.    4.  Chronic colostomy.  Patient has seen colorectal surgery who did not recommend reversal of the colostomy at this time due to his increased cardiac risk.    Patient seen and discussed with Dr. Loco.      Livia Arroyo MD, PhD  Cardiology Fellow  [phone]    I have personally seen and examined the patient, and then discussed with Dr. Arroyo, and agree with the findings and plan in this note. I have reviewed today's vital signs, medications, labs, and imaging.     Sincerely,    Claudy Loco MD   [facility]  Section of Advanced Heart Failure   Cardiovascular Division  [facility]   [phone]      CC  Patient Care Team:  Jarod De La Rosa as PCP - General (Family Practice)  Claudy Loco MD as MD (Cardiology)  Calli Isabel RN as Nurse Coordinator (Cardiology)  SELF, REFERRED      Claudy Loco MD

## 2018-11-05 NOTE — NURSING NOTE
Chief Complaint   Patient presents with     Follow Up For     73 year old male presents with chronic systolic heart failure, refractory angina, ICM for 6 month follow up with labs and echo prior     Medications reviewed and vitals performed.  Laina Chappell CMA

## 2018-11-05 NOTE — MR AVS SNAPSHOT
After Visit Summary   11/5/2018    Wesley Cooper    MRN: [id number]           Patient Information     Date Of Birth          1945        Visit Information        Provider Department      11/5/2018 3:30 PM Claudy Loco MD [facility]        Today's Diagnoses     Acute chest pain    -  1    Chronic systolic heart failure (H)        CAD (coronary artery disease)          Care Instructions    You were seen today in the Cardiovascular Clinic at the [facility].       Cardiology Providers you saw during your visit: Dr. Loco         Medication Changes:   1. No medication changes at this time.      Patient Instructions:  1. You are scheduled for an angiogram on Wednesday, November 7th.   Coronary Angiogram  Angiography is a special type of X-ray that lets your doctor view your coronary arteries to see if the blood vessels to your heart are narrowed or blocked.  The catheter can be placed into the groin, arm, or wrist.     Follow these instructions:     You are scheduled for a Coronary Angiogram on Wednesday, November 7th at the [facility], [address] [place] [address]. You are to check in at the [facility] Waiting Area. This will likely be at 1pm but I will call you tomorrow, Tuesday 11/6 to confirm.     When you arrive you will be escorted back to the pre procedure area called 2A. Here they will insert an IV, draw labs, and obtain a short medical history. Please bring an updated list of your current medications.    A physician will come and talk with you about the procedure and obtain consent.    A nurse from the Cardiac Catheterization Lab will then escort you to the procedure area. You will be receiving sedation during the procedure so you will need someone to drive you to and from the hospital.    After the procedure you will recover for a short period (2 - 6 hours) most likely on unit 6D. You will be discharged with  instructions for post procedure care. However, depending on what the angiogram shows you may have to have stents placed and this might require an overnight stay. We ask that you bring a small bag of necessities for your comfort if you would need to stay overnight. DO NOT BRING ANY VALUABLES!    Pre procedure instructions:    1. You will need a preop physical (H&P) done by your PCP within 30 days prior to the procedure. Please bring a hard copy of this with you to your procedure OR have your PCP fax to: [phone]. ((This has been completed at your visit today.))  2. Make arrangements to have a  as you will not be allowed to drive following the procedure. Someone should stay with you the night after your procedure.  3.  Do not eat any solid food or milk products for 8 hours prior to the exam. You may drink clear liquids until 2 hours prior to the exam. Clear liquids include the following: water, Jell-O, clear broth, apple juice or any non-carbonated drink that you can see through (no pop!).   4. Do not drink alcohol or smoke 24 hours prior to test.  5. Hold these meds:  Do not take your oral diabetic medication or short acting insulin the day of the procedure. Do not take metformin the day of and for 2 days following, contact your PCP regarding glucose control during this time.  Hold your warfarin (2 days prior: Monday 11/5 and Tuesday 11/6), pradaxa (2 days prior), Eliquis/Xarelto 1 day prior.   6. You may take your other morning medications as prescribed with a sip of water.   If you are not taking any aspirin currently, take a 325 mg aspirin the day before and the day of your procedure.    Follow up Appointment Information:  1. We will determine your follow up after your angiogram.       Results:    Results for FLOR STEVENSON (MRN [id number]) as of 11/5/2018 14:26   Ref. Range 11/5/2018 12:44   Sodium Latest Ref Range: 133 - 144 mmol/L 139   Potassium Latest Ref Range: 3.4 - 5.3 mmol/L 4.9   Chloride Latest  "Ref Range: 94 - 109 mmol/L 108   Carbon Dioxide Latest Ref Range: 20 - 32 mmol/L 22   Urea Nitrogen Latest Ref Range: 7 - 30 mg/dL 28   Creatinine Latest Ref Range: 0.66 - 1.25 mg/dL 1.19   GFR Estimate Latest Ref Range: >60 mL/min/1.7m2 59 (L)   GFR Estimate If Black Latest Ref Range: >60 mL/min/1.7m2 72   Calcium Latest Ref Range: 8.5 - 10.1 mg/dL 8.9   Anion Gap Latest Ref Range: 3 - 14 mmol/L 8   N-Terminal Pro Bnp Latest Ref Range: 0 - 450 pg/mL 801 (H)   Glucose Latest Ref Range: 70 - 99 mg/dL 245 (H)   WBC Latest Ref Range: 4.0 - 11.0 10e9/L 6.7   Hemoglobin Latest Ref Range: 13.3 - 17.7 g/dL 12.5 (L)   Hematocrit Latest Ref Range: 40.0 - 53.0 % 40.1   Platelet Count Latest Ref Range: 150 - 450 10e9/L 249   RBC Count Latest Ref Range: 4.4 - 5.9 10e12/L 5.01   MCV Latest Ref Range: 78 - 100 fl 80   MCH Latest Ref Range: 26.5 - 33.0 pg 25.0 (L)   MCHC Latest Ref Range: 31.5 - 36.5 g/dL 31.2 (L)   RDW Latest Ref Range: 10.0 - 15.0 % 14.7         [address] [facility] [address] on [address] [place] [address]        Thank you for allowing us to be a part of your care here at the [facility]      If you have questions or concerns please contact us at:      Calli Isabel RN BSN   Cardiology Care Coordinator  [facility]   Questions and [phone]   First press #1 for the [place] and then press #3 for \"Medical Advice\" to reach us Cardiology Nurses.                Follow-ups after your visit        Future tests that were ordered for you today     Open Future Orders        Priority Expected Expires Ordered    Outpatient Coronary Angiography Adult Order Routine 2018            Who to contact     If you have questions or need follow up information about today's clinic visit or your schedule please contact [facility] directly at [phone].  Normal or non-critical lab and imaging results will be communicated to you by MyChart, " "letter or phone within 4 business days after the clinic has received the results. If you do not hear from us within 7 days, please contact the clinic through [employer] or phone. If you have a critical or abnormal lab result, we will notify you by phone as soon as possible.  Submit refill requests through [employer] or call your pharmacy and they will forward the refill request to us. Please allow 3 business days for your refill to be completed.          Additional Information About Your Visit        [employer] Information     [employer] gives you secure access to your electronic health record. If you see a primary care provider, you can also send messages to your care team and make appointments. If you have questions, please call your primary care clinic.  If you do not have a primary care provider, please call [phone] and they will assist you.        Care EveryWhere ID     This is your Care EveryWhere ID. This could be used by other organizations to access your [place] medical records  [id number]        Your Vitals Were     Pulse Height Pulse Oximetry BMI (Body Mass Index)          72 1.651 m (5' 5\") 98% 25.63 kg/m2         Blood Pressure from Last 3 Encounters:   11/05/18 150/84   07/30/18 116/67   05/07/18 129/85    Weight from Last 3 Encounters:   11/05/18 69.9 kg (154 lb)   07/30/18 70 kg (154 lb 6.4 oz)   05/07/18 66.9 kg (147 lb 6.4 oz)              We Performed the Following     EKG 12-lead, tracing only (Same Day)     Follow-Up with Advanced Heart Failure Cardiologist        Primary Care Provider Office Phone # Fax #    Jarod De La Rosa [phone]       [facility] [address] [facility] [address]        Equal Access to Services     BRODY SCHWARTZ : Hadii aad emma michaels Soedyta, waaxda luqadaha, qaybta kaalmada adekennedyyada, maryjane olsen. So [facility] [phone].    ATENCIÓN: Si habla español, tiene a valdes disposición servicios gratuitos de asistencia lingüística. " Maria Del Carmen adams [phone].    We comply with applicable federal civil rights laws and [place] laws. We do not discriminate on the basis of race, color, national origin, age, disability, sex, sexual orientation, or gender identity.            Thank you!     Thank you for choosing [facility]  for your care. Our goal is always to provide you with excellent care. Hearing back from our patients is one way we can continue to improve our services. Please take a few minutes to complete the written survey that you may receive in the mail after your visit with us. Thank you!             Your Updated Medication List - Protect others around you: Learn how to safely use, store and throw away your medicines at www.disposemymeds.org.          This list is accurate as of 11/5/18  4:46 PM.  Always use your most recent med list.                   Brand Name Dispense Instructions for use Diagnosis    * amLODIPine 2.5 MG tablet    NORVASC    30 tablet    Take 5 mg by mouth daily    HFrEF (heart failure with reduced ejection fraction) (H)       * amLODIPine 5 MG tablet    NORVASC          atorvastatin 80 MG tablet    LIPITOR     Take 80 mg by mouth daily    Chronic systolic heart failure (H)       blood glucose monitoring meter device kit           carvedilol 6.25 MG tablet    COREG     Take 6.25 mg by mouth 2 times daily (with meals)    Chronic systolic heart failure (H)       diphenhydrAMINE-acetaminophen  MG tablet    TYLENOL PM     Take 1 tablet by mouth nightly as needed for sleep        gabapentin 300 MG 24 hr tablet    GRALISE     Take 300 mg by mouth daily (with dinner)    Chronic systolic heart failure (H)       gabapentin 300 MG capsule    NEURONTIN          glipiZIDE 5 MG tablet    GLUCOTROL     Take 5 mg by mouth 2 times daily (before meals)        isosorbide mononitrate 60 MG 24 hr tablet    IMDUR     Take 60 mg by mouth daily    Chronic systolic heart failure (H)       losartan 25 MG tablet    COZAAR    30  tablet    Take 1 tablet (25 mg) by mouth daily    Chronic systolic heart failure (H)       metFORMIN 500 MG tablet    GLUCOPHAGE     Take 500 mg by mouth 2 times daily (with meals)        nitroGLYcerin 0.4 MG sublingual tablet    NITROSTAT     Place 0.4 mg under the tongue every 5 minutes as needed for chest pain For chest pain place 1 tablet under the tongue every 5 minutes for 3 doses. If symptoms persist 5 minutes after 1st dose call 911.    Chronic systolic heart failure (H)       PLAVIX 75 MG tablet   Generic drug:  clopidogrel      Take 75 mg by mouth daily    Chronic systolic heart failure (H)       sotalol 160 MG tablet    BETAPACE          sotalol HCl (AF) 160 MG Tabs     180 tablet    Take 160 mg by mouth 2 times daily    Chronic systolic heart failure (H)       warfarin 2.5 MG tablet    COUMADIN     Take 2.5 mg by mouth daily    Chronic systolic heart failure (H)       * Notice:  This list has 2 medication(s) that are the same as other medications prescribed for you. Read the directions carefully, and ask your doctor or other care provider to review them with you.

## 2018-11-05 NOTE — LETTER
11/5/2018      RE: Wesley Cooper  [address]  [facility] [address]       Dear Colleague,    Thank you for the opportunity to participate in the care of your patient, Wesley Cooper, at the [facility]. Please see a copy of my visit note below.        HPI:   Wesley Cooper is 73 year old male who presents for follow up at the [facility]. Past medical history includes:    1.  Coronary artery disease, status post coronary artery bypass grafting surgery.   2.  Refractory angina with a patent LIMA to LAD, occlusion of the vein graft to RCA and left circumflex and distal LAD native vessel disease, in-stent restenosis of the mid-LAD.   3.  Ischemic cardiomyopathy with an estimated ejection fraction of 35%-40%, status post CRT-D.   4.  Type 2 diabetes.   5.  Atrial flutter.   6.  Chronic colostomy.       Patient notes that over the last 2 weeks he has had worsening dyspnea on exertion.  Now has dyspnea on exertion after walking half a block.  Prior to 2 weeks ago, he noted not having any dyspnea on exertion.  Two days ago, he had chest pain and chest pressure that lasted for 1 hour that occurred at rest.  He took one sublingual nitroglycerin and the pain went away after 10-15 minutes.  In total, he noted that his chest pressure lasted for an hour.  Also endorsed having chest pressure this morning.  He notes that his chest discomfort always occurs at rest.  The chest discomfort does not worsen with activity.  Notes that these episodes of chest discomfort are similar to the discomfort that he had prior to his CABG and multiple PCI's.  Prior to 2 days ago patient was not having chest discomfort.  He denies weight gain.  However, upon chart review he appears to have gained 10 pounds over the last year.  Denies orthopnea (chronically sleeps on 2 pillows), PND, and lower extremity edema.  He has not had any ICD shocks for over a  "year.  No recent emergency department visits or hospitalizations.          Social History   Substance Use Topics     Smoking status: Former Smoker     Smokeless tobacco: Never Used      Comment: quit 2010     Alcohol use No         Current Outpatient Prescriptions   Medication Sig     amLODIPine (NORVASC) 2.5 MG tablet Take 5 mg by mouth daily      atorvastatin (LIPITOR) 80 MG tablet Take 80 mg by mouth daily     carvedilol (COREG) 6.25 MG tablet Take 6.25 mg by mouth 2 times daily (with meals)     clopidogrel (PLAVIX) 75 MG tablet Take 75 mg by mouth daily     diphenhydrAMINE-acetaminophen (TYLENOL PM)  MG tablet Take 1 tablet by mouth nightly as needed for sleep     gabapentin (GRALISE) 300 MG 24 hr tablet Take 300 mg by mouth daily (with dinner)     glipiZIDE (GLUCOTROL) 5 MG tablet Take 5 mg by mouth 2 times daily (before meals)     isosorbide mononitrate (IMDUR) 60 MG 24 hr tablet Take 60 mg by mouth daily     losartan (COZAAR) 25 MG tablet Take 1 tablet (25 mg) by mouth daily     metFORMIN (GLUCOPHAGE) 500 MG tablet Take 500 mg by mouth 2 times daily (with meals)     nitroglycerin (NITROSTAT) 0.4 MG sublingual tablet Place 0.4 mg under the tongue every 5 minutes as needed for chest pain For chest pain place 1 tablet under the tongue every 5 minutes for 3 doses. If symptoms persist 5 minutes after 1st dose call 911.     sotalol HCl, AF, 160 MG TABS Take 160 mg by mouth 2 times daily     warfarin (COUMADIN) 2.5 MG tablet Take 2.5 mg by mouth daily     amLODIPine (NORVASC) 5 MG tablet      blood glucose monitoring (ACCU-CHEK TAIWO PLUS) meter device kit      gabapentin (NEURONTIN) 300 MG capsule      sotalol (BETAPACE) 160 MG tablet      No current facility-administered medications for this visit.          ROS:   10 point ROS negative other than what is discussed in above HPI.    EXAM:   /84 (BP Location: Right arm, Patient Position: Chair, Cuff Size: Adult Regular)  Pulse 72  Ht 1.651 m (5' 5\")  " Wt 69.9 kg (154 lb)  SpO2 98%  BMI 25.63 kg/m2   GENERAL: Alert, oriented, NAD  HEENT:  NC/AT. PERRLA.  EOMI.  Sclerae white.  MMM, no oral lesions  NECK: No JVD   HEART: RRR, normal S1 and S2, no murmurs   LUNGS:  Clear to auscultation bilaterally, no wheezes, rales, or rhonchi  ABDOMEN: Soft, nontender, nondistended, bowel sounds present, no hepatojugular reflux, no ascites. Colostomy present.  EXTREMITIES: No lower extremity edema.    Labs:    Recent Results (from the past 24 hour(s))   Basic metabolic panel    Collection Time: 11/05/18 12:44 PM   Result Value Ref Range    Sodium 139 133 - 144 mmol/L    Potassium 4.9 3.4 - 5.3 mmol/L    Chloride 108 94 - 109 mmol/L    Carbon Dioxide 22 20 - 32 mmol/L    Anion Gap 8 3 - 14 mmol/L    Glucose 245 (H) 70 - 99 mg/dL    Urea Nitrogen 28 7 - 30 mg/dL    Creatinine 1.19 0.66 - 1.25 mg/dL    GFR Estimate 59 (L) >60 mL/min/1.7m2    GFR Estimate If Black 72 >60 mL/min/1.7m2    Calcium 8.9 8.5 - 10.1 mg/dL   CBC with platelets    Collection Time: 11/05/18 12:44 PM   Result Value Ref Range    WBC 6.7 4.0 - 11.0 10e9/L    RBC Count 5.01 4.4 - 5.9 10e12/L    Hemoglobin 12.5 (L) 13.3 - 17.7 g/dL    Hematocrit 40.1 40.0 - 53.0 %    MCV 80 78 - 100 fl    MCH 25.0 (L) 26.5 - 33.0 pg    MCHC 31.2 (L) 31.5 - 36.5 g/dL    RDW 14.7 10.0 - 15.0 %    Platelet Count 249 150 - 450 10e9/L   N terminal pro BNP outpatient    Collection Time: 11/05/18 12:44 PM   Result Value Ref Range    N-Terminal Pro Bnp 801 (H) 0 - 450 pg/mL   Troponin I    Collection Time: 11/05/18 12:44 PM   Result Value Ref Range    Troponin I ES <0.015 0.000 - 0.045 ug/L   EKG 12-lead, tracing only (Same Day)    Collection Time: 11/05/18  4:41 PM   Result Value Ref Range    Interpretation ECG Click View Image link to view waveform and result          Electrocardiogram:  Paced rhythm      Echocardiogram 11/5/18:  Mild mitral stenosis is present.  LVEF 45% based on biplane 2D tracing.  End systolic volume is 61  ml.  Right ventricular function, chamber size, wall motion, and thickness are  normal.  The inferior vena cava is normal.  No pericardial effusion is present.  There has been no change.        Assessment and Plan:   Wesley Cooper is a 73 year old male with a history of coronary artery disease, refractory angina, ischemic cardiomyopathy, diabetes, and atrial flutter who presents for follow-up.    1.  Ischemic coronary artery disease with with refractory angina.  Patient presents today with new onset chest discomfort.  His chest discomfort appears atypical since it occurs at rest and does not worsen with activity.  However given his extensive coronary artery disease history, advised patient to proceed with a coronary angiogram.  EKG in the clinic showed a paced rhythm which can obscure ischemic changes and troponin was negative.  Echocardiogram today showed an EF of 45% and no significant changes compared to prior studies.  Since patient is currently on warfarin, advised patient to hold warfarin tonight and tomorrow in anticipation for a coronary angiogram in 2 days.  Also advised patient to hold his metformin.  He had a NSTEMI in 12/2016 where he received a balloon angioplasty to an in-stent restenosis of his mid LAD graft and was found to have total occlusion of 2 of 3 grafts, with only a patent RODRIGUEZ to to LAD.  We will continue Plavix 75 mg daily, Imdur 60 mg daily, carvedilol 6.25 mg twice daily, losartan 25 mg daily, atorvastatin 80 mg daily, and amlodipine 5 mg daily.  Will obtain coronary angiogram to see if there are any lesions amenable to PCI.  If there are no new lesions or there are other lesions are not amenable to PCI, will plan to medically optimize patient by discontinuing amlodipine and increasing his beta-blocker and Imdur doses.    2.  Ischemic cardiomyopathy, EF 45%.  NYHA functional class II, stage C.  We will continue carvedilol 6.25 mg twice daily and losartan 25 mg daily at this time.  He does  not require diuretics at this time.  Will consider making adjustments to his beta-blocker and are pending his coronary angiogram results.    3.  Atrial flutter.  Will continue sotalol.  He is currently on Coumadin will hold Coumadin tonight and tomorrow in anticipation for an coronary angiogram on 11/7.    4.  Chronic colostomy.  Patient has seen colorectal surgery who did not recommend reversal of the colostomy at this time due to his increased cardiac risk.    Patient seen and discussed with Dr. Loco.    Livia Arroyo MD, PhD  Cardiology Fellow  [phone]    I have personally seen and examined the patient, and then discussed with Dr. Arroyo, and agree with the findings and plan in this note. I have reviewed today's vital signs, medications, labs, and imaging.     Claudy Loco MD       Patient Care Team:  Jarod De La Rosa as PCP - General (Family Practice)  Claudy Loco MD as MD (Cardiology)  Calli Isabel RN as Nurse Coordinator (Cardiology)

## 2018-11-05 NOTE — PROGRESS NOTES
HPI:   Wesley Cooper is 73 year old male who presents for follow up at the [facility]. Past medical history includes:    1.  Coronary artery disease, status post coronary artery bypass grafting surgery.   2.  Refractory angina with a patent LIMA to LAD, occlusion of the vein graft to RCA and left circumflex and distal LAD native vessel disease, in-stent restenosis of the mid-LAD.   3.  Ischemic cardiomyopathy with an estimated ejection fraction of 35%-40%, status post CRT-D.   4.  Type 2 diabetes.   5.  Atrial flutter.   6.  Chronic colostomy.       Patient notes that over the last 2 weeks he has had worsening dyspnea on exertion.  Now has dyspnea on exertion after walking half a block.  Prior to 2 weeks ago, he noted not having any dyspnea on exertion.  Two days ago, he had chest pain and chest pressure that lasted for 1 hour that occurred at rest.  He took one sublingual nitroglycerin and the pain went away after 10-15 minutes.  In total, he noted that his chest pressure lasted for an hour.  Also endorsed having chest pressure this morning.  He notes that his chest discomfort always occurs at rest.  The chest discomfort does not worsen with activity.  Notes that these episodes of chest discomfort are similar to the discomfort that he had prior to his CABG and multiple PCI's.  Prior to 2 days ago patient was not having chest discomfort.  He denies weight gain.  However, upon chart review he appears to have gained 10 pounds over the last year.  Denies orthopnea (chronically sleeps on 2 pillows), PND, and lower extremity edema.  He has not had any ICD shocks for over a year.  No recent emergency department visits or hospitalizations.          Social History   Substance Use Topics     Smoking status: Former Smoker     Smokeless tobacco: Never Used      Comment: quit 2010     Alcohol use No         Current Outpatient Prescriptions   Medication Sig     amLODIPine (NORVASC)  "2.5 MG tablet Take 5 mg by mouth daily      atorvastatin (LIPITOR) 80 MG tablet Take 80 mg by mouth daily     carvedilol (COREG) 6.25 MG tablet Take 6.25 mg by mouth 2 times daily (with meals)     clopidogrel (PLAVIX) 75 MG tablet Take 75 mg by mouth daily     diphenhydrAMINE-acetaminophen (TYLENOL PM)  MG tablet Take 1 tablet by mouth nightly as needed for sleep     gabapentin (GRALISE) 300 MG 24 hr tablet Take 300 mg by mouth daily (with dinner)     glipiZIDE (GLUCOTROL) 5 MG tablet Take 5 mg by mouth 2 times daily (before meals)     isosorbide mononitrate (IMDUR) 60 MG 24 hr tablet Take 60 mg by mouth daily     losartan (COZAAR) 25 MG tablet Take 1 tablet (25 mg) by mouth daily     metFORMIN (GLUCOPHAGE) 500 MG tablet Take 500 mg by mouth 2 times daily (with meals)     nitroglycerin (NITROSTAT) 0.4 MG sublingual tablet Place 0.4 mg under the tongue every 5 minutes as needed for chest pain For chest pain place 1 tablet under the tongue every 5 minutes for 3 doses. If symptoms persist 5 minutes after 1st dose call 911.     sotalol HCl, AF, 160 MG TABS Take 160 mg by mouth 2 times daily     warfarin (COUMADIN) 2.5 MG tablet Take 2.5 mg by mouth daily     amLODIPine (NORVASC) 5 MG tablet      blood glucose monitoring (ACCU-CHEK TAIWO PLUS) meter device kit      gabapentin (NEURONTIN) 300 MG capsule      sotalol (BETAPACE) 160 MG tablet      No current facility-administered medications for this visit.          ROS:   10 point ROS negative other than what is discussed in above HPI.    EXAM:   /84 (BP Location: Right arm, Patient Position: Chair, Cuff Size: Adult Regular)  Pulse 72  Ht 1.651 m (5' 5\")  Wt 69.9 kg (154 lb)  SpO2 98%  BMI 25.63 kg/m2   GENERAL: Alert, oriented, NAD  HEENT:  NC/AT. PERRLA.  EOMI.  Sclerae white.  MMM, no oral lesions  NECK: No JVD   HEART: RRR, normal S1 and S2, no murmurs   LUNGS:  Clear to auscultation bilaterally, no wheezes, rales, or rhonchi  ABDOMEN: Soft, nontender, " nondistended, bowel sounds present, no hepatojugular reflux, no ascites. Colostomy present.  EXTREMITIES: No lower extremity edema.    Labs:    Recent Results (from the past 24 hour(s))   Basic metabolic panel    Collection Time: 11/05/18 12:44 PM   Result Value Ref Range    Sodium 139 133 - 144 mmol/L    Potassium 4.9 3.4 - 5.3 mmol/L    Chloride 108 94 - 109 mmol/L    Carbon Dioxide 22 20 - 32 mmol/L    Anion Gap 8 3 - 14 mmol/L    Glucose 245 (H) 70 - 99 mg/dL    Urea Nitrogen 28 7 - 30 mg/dL    Creatinine 1.19 0.66 - 1.25 mg/dL    GFR Estimate 59 (L) >60 mL/min/1.7m2    GFR Estimate If Black 72 >60 mL/min/1.7m2    Calcium 8.9 8.5 - 10.1 mg/dL   CBC with platelets    Collection Time: 11/05/18 12:44 PM   Result Value Ref Range    WBC 6.7 4.0 - 11.0 10e9/L    RBC Count 5.01 4.4 - 5.9 10e12/L    Hemoglobin 12.5 (L) 13.3 - 17.7 g/dL    Hematocrit 40.1 40.0 - 53.0 %    MCV 80 78 - 100 fl    MCH 25.0 (L) 26.5 - 33.0 pg    MCHC 31.2 (L) 31.5 - 36.5 g/dL    RDW 14.7 10.0 - 15.0 %    Platelet Count 249 150 - 450 10e9/L   N terminal pro BNP outpatient    Collection Time: 11/05/18 12:44 PM   Result Value Ref Range    N-Terminal Pro Bnp 801 (H) 0 - 450 pg/mL   Troponin I    Collection Time: 11/05/18 12:44 PM   Result Value Ref Range    Troponin I ES <0.015 0.000 - 0.045 ug/L   EKG 12-lead, tracing only (Same Day)    Collection Time: 11/05/18  4:41 PM   Result Value Ref Range    Interpretation ECG Click View Image link to view waveform and result          Electrocardiogram:  Paced rhythm      Echocardiogram 11/5/18:  Mild mitral stenosis is present.  LVEF 45% based on biplane 2D tracing.  End systolic volume is 61 ml.  Right ventricular function, chamber size, wall motion, and thickness are  normal.  The inferior vena cava is normal.  No pericardial effusion is present.  There has been no change.        Assessment and Plan:   Wesley Cooper is a 73 year old male with a history of coronary artery disease, refractory angina,  ischemic cardiomyopathy, diabetes, and atrial flutter who presents for follow-up.    1.  Ischemic coronary artery disease with with refractory angina.  Patient presents today with new onset chest discomfort.  His chest discomfort appears atypical since it occurs at rest and does not worsen with activity.  However given his extensive coronary artery disease history, advised patient to proceed with a coronary angiogram.  EKG in the clinic showed a paced rhythm which can obscure ischemic changes and troponin was negative.  Echocardiogram today showed an EF of 45% and no significant changes compared to prior studies.  Since patient is currently on warfarin, advised patient to hold warfarin tonight and tomorrow in anticipation for a coronary angiogram in 2 days.  Also advised patient to hold his metformin.  He had a NSTEMI in 12/2016 where he received a balloon angioplasty to an in-stent restenosis of his mid LAD graft and was found to have total occlusion of 2 of 3 grafts, with only a patent RODRIGUEZ to to LAD.  We will continue Plavix 75 mg daily, Imdur 60 mg daily, carvedilol 6.25 mg twice daily, losartan 25 mg daily, atorvastatin 80 mg daily, and amlodipine 5 mg daily.  Will obtain coronary angiogram to see if there are any lesions amenable to PCI.  If there are no new lesions or there are other lesions are not amenable to PCI, will plan to medically optimize patient by discontinuing amlodipine and increasing his beta-blocker and Imdur doses.    2.  Ischemic cardiomyopathy, EF 45%.  NYHA functional class II, stage C.  We will continue carvedilol 6.25 mg twice daily and losartan 25 mg daily at this time.  He does not require diuretics at this time.  Will consider making adjustments to his beta-blocker and are pending his coronary angiogram results.    3.  Atrial flutter.  Will continue sotalol.  He is currently on Coumadin will hold Coumadin tonight and tomorrow in anticipation for an coronary angiogram on 11/7.    4.   Chronic colostomy.  Patient has seen colorectal surgery who did not recommend reversal of the colostomy at this time due to his increased cardiac risk.    Patient seen and discussed with Dr. Loco.      Livia Arroyo MD, PhD  Cardiology Fellow  [phone]    I have personally seen and examined the patient, and then discussed with Dr. Arroyo, and agree with the findings and plan in this note. I have reviewed today's vital signs, medications, labs, and imaging.     Sincerely,    Claudy Loco MD   [facility]  Section of Advanced Heart Failure   Cardiovascular Division  [facility]   [phone]      CC  Patient Care Team:  Jarod De La Rosa as PCP - General (Family Practice)  Claudy Lcoo MD as MD (Cardiology)  Calli Isabel RN as Nurse Coordinator (Cardiology)  SELF, REFERRED

## 2018-11-05 NOTE — LETTER
11/5/2018      RE: Wesley Cooper  [address]  [place] [address]           HPI:   Wesley Cooper is 73 year old male who presents for follow up at the [facility]. Past medical history includes:    1.  Coronary artery disease, status post coronary artery bypass grafting surgery.   2.  Refractory angina with a patent LIMA to LAD, occlusion of the vein graft to RCA and left circumflex and distal LAD native vessel disease, in-stent restenosis of the mid-LAD.   3.  Ischemic cardiomyopathy with an estimated ejection fraction of 35%-40%, status post CRT-D.   4.  Type 2 diabetes.   5.  Atrial flutter.   6.  Chronic colostomy.       Patient notes that over the last 2 weeks he has had worsening dyspnea on exertion.  Now has dyspnea on exertion after walking half a block.  Prior to 2 weeks ago, he noted not having any dyspnea on exertion.  Two days ago, he had chest pain and chest pressure that lasted for 1 hour that occurred at rest.  He took one sublingual nitroglycerin and the pain went away after 10-15 minutes.  In total, he noted that his chest pressure lasted for an hour.  Also endorsed having chest pressure this morning.  He notes that his chest discomfort always occurs at rest.  The chest discomfort does not worsen with activity.  Notes that these episodes of chest discomfort are similar to the discomfort that he had prior to his CABG and multiple PCI's.  Prior to 2 days ago patient was not having chest discomfort.  He denies weight gain.  However, upon chart review he appears to have gained 10 pounds over the last year.  Denies orthopnea (chronically sleeps on 2 pillows), PND, and lower extremity edema.  He has not had any ICD shocks for over a year.  No recent emergency department visits or hospitalizations.          Social History   Substance Use Topics     Smoking status: Former Smoker     Smokeless tobacco: Never Used      Comment: quit 2010     Alcohol use No  "        Current Outpatient Prescriptions   Medication Sig     amLODIPine (NORVASC) 2.5 MG tablet Take 5 mg by mouth daily      atorvastatin (LIPITOR) 80 MG tablet Take 80 mg by mouth daily     carvedilol (COREG) 6.25 MG tablet Take 6.25 mg by mouth 2 times daily (with meals)     clopidogrel (PLAVIX) 75 MG tablet Take 75 mg by mouth daily     diphenhydrAMINE-acetaminophen (TYLENOL PM)  MG tablet Take 1 tablet by mouth nightly as needed for sleep     gabapentin (GRALISE) 300 MG 24 hr tablet Take 300 mg by mouth daily (with dinner)     glipiZIDE (GLUCOTROL) 5 MG tablet Take 5 mg by mouth 2 times daily (before meals)     isosorbide mononitrate (IMDUR) 60 MG 24 hr tablet Take 60 mg by mouth daily     losartan (COZAAR) 25 MG tablet Take 1 tablet (25 mg) by mouth daily     metFORMIN (GLUCOPHAGE) 500 MG tablet Take 500 mg by mouth 2 times daily (with meals)     nitroglycerin (NITROSTAT) 0.4 MG sublingual tablet Place 0.4 mg under the tongue every 5 minutes as needed for chest pain For chest pain place 1 tablet under the tongue every 5 minutes for 3 doses. If symptoms persist 5 minutes after 1st dose call 911.     sotalol HCl, AF, 160 MG TABS Take 160 mg by mouth 2 times daily     warfarin (COUMADIN) 2.5 MG tablet Take 2.5 mg by mouth daily     amLODIPine (NORVASC) 5 MG tablet      blood glucose monitoring (ACCU-CHEK TAIWO PLUS) meter device kit      gabapentin (NEURONTIN) 300 MG capsule      sotalol (BETAPACE) 160 MG tablet      No current facility-administered medications for this visit.          ROS:   10 point ROS negative other than what is discussed in above HPI.    EXAM:   /84 (BP Location: Right arm, Patient Position: Chair, Cuff Size: Adult Regular)  Pulse 72  Ht 1.651 m (5' 5\")  Wt 69.9 kg (154 lb)  SpO2 98%  BMI 25.63 kg/m2   GENERAL: Alert, oriented, NAD  HEENT:  NC/AT. PERRLA.  EOMI.  Sclerae white.  MMM, no oral lesions  NECK: No JVD   HEART: RRR, normal S1 and S2, no murmurs   LUNGS:  Clear to " auscultation bilaterally, no wheezes, rales, or rhonchi  ABDOMEN: Soft, nontender, nondistended, bowel sounds present, no hepatojugular reflux, no ascites. Colostomy present.  EXTREMITIES: No lower extremity edema.    Labs:    Recent Results (from the past 24 hour(s))   Basic metabolic panel    Collection Time: 11/05/18 12:44 PM   Result Value Ref Range    Sodium 139 133 - 144 mmol/L    Potassium 4.9 3.4 - 5.3 mmol/L    Chloride 108 94 - 109 mmol/L    Carbon Dioxide 22 20 - 32 mmol/L    Anion Gap 8 3 - 14 mmol/L    Glucose 245 (H) 70 - 99 mg/dL    Urea Nitrogen 28 7 - 30 mg/dL    Creatinine 1.19 0.66 - 1.25 mg/dL    GFR Estimate 59 (L) >60 mL/min/1.7m2    GFR Estimate If Black 72 >60 mL/min/1.7m2    Calcium 8.9 8.5 - 10.1 mg/dL   CBC with platelets    Collection Time: 11/05/18 12:44 PM   Result Value Ref Range    WBC 6.7 4.0 - 11.0 10e9/L    RBC Count 5.01 4.4 - 5.9 10e12/L    Hemoglobin 12.5 (L) 13.3 - 17.7 g/dL    Hematocrit 40.1 40.0 - 53.0 %    MCV 80 78 - 100 fl    MCH 25.0 (L) 26.5 - 33.0 pg    MCHC 31.2 (L) 31.5 - 36.5 g/dL    RDW 14.7 10.0 - 15.0 %    Platelet Count 249 150 - 450 10e9/L   N terminal pro BNP outpatient    Collection Time: 11/05/18 12:44 PM   Result Value Ref Range    N-Terminal Pro Bnp 801 (H) 0 - 450 pg/mL   Troponin I    Collection Time: 11/05/18 12:44 PM   Result Value Ref Range    Troponin I ES <0.015 0.000 - 0.045 ug/L   EKG 12-lead, tracing only (Same Day)    Collection Time: 11/05/18  4:41 PM   Result Value Ref Range    Interpretation ECG Click View Image link to view waveform and result          Electrocardiogram:  Paced rhythm      Echocardiogram 11/5/18:  Mild mitral stenosis is present.  LVEF 45% based on biplane 2D tracing.  End systolic volume is 61 ml.  Right ventricular function, chamber size, wall motion, and thickness are  normal.  The inferior vena cava is normal.  No pericardial effusion is present.  There has been no change.        Assessment and Plan:   Wesley Cooper is a  73 year old male with a history of coronary artery disease, refractory angina, ischemic cardiomyopathy, diabetes, and atrial flutter who presents for follow-up.    1.  Ischemic coronary artery disease with with refractory angina.  Patient presents today with new onset chest discomfort.  His chest discomfort appears atypical since it occurs at rest and does not worsen with activity.  However given his extensive coronary artery disease history, advised patient to proceed with a coronary angiogram.  EKG in the clinic showed a paced rhythm which can obscure ischemic changes and troponin was negative.  Echocardiogram today showed an EF of 45% and no significant changes compared to prior studies.  Since patient is currently on warfarin, advised patient to hold warfarin tonight and tomorrow in anticipation for a coronary angiogram in 2 days.  Also advised patient to hold his metformin.  He had a NSTEMI in 12/2016 where he received a balloon angioplasty to an in-stent restenosis of his mid LAD graft and was found to have total occlusion of 2 of 3 grafts, with only a patent RODRIGUEZ to to LAD.  We will continue Plavix 75 mg daily, Imdur 60 mg daily, carvedilol 6.25 mg twice daily, losartan 25 mg daily, atorvastatin 80 mg daily, and amlodipine 5 mg daily.  Will obtain coronary angiogram to see if there are any lesions amenable to PCI.  If there are no new lesions or there are other lesions are not amenable to PCI, will plan to medically optimize patient by discontinuing amlodipine and increasing his beta-blocker and Imdur doses.    2.  Ischemic cardiomyopathy, EF 45%.  NYHA functional class II, stage C.  We will continue carvedilol 6.25 mg twice daily and losartan 25 mg daily at this time.  He does not require diuretics at this time.  Will consider making adjustments to his beta-blocker and are pending his coronary angiogram results.    3.  Atrial flutter.  Will continue sotalol.  He is currently on Coumadin will hold Coumadin  tonight and tomorrow in anticipation for an coronary angiogram on 11/7.    4.  Chronic colostomy.  Patient has seen colorectal surgery who did not recommend reversal of the colostomy at this time due to his increased cardiac risk.    Patient seen and discussed with Dr. Loco.      Livia Arroyo MD, PhD  Cardiology Fellow  [phone]    I have personally seen and examined the patient, and then discussed with Dr. Arroyo, and agree with the findings and plan in this note. I have reviewed today's vital signs, medications, labs, and imaging.     Sincerely,    Claudy Loco MD   [facility]  Section of Advanced Heart Failure   Cardiovascular Division  [facility]   [phone]      CC  Patient Care Team:  Jarod De La Rosa as PCP - General (Family Practice)  Claudy Loco MD as MD (Cardiology)  Calli Isabel RN as Nurse Coordinator (Cardiology)  SELF, REFERRED      Claudy Loco MD

## 2018-11-05 NOTE — PATIENT INSTRUCTIONS
You were seen today in the Cardiovascular Clinic at the [facility].       Cardiology Providers you saw during your visit: Dr. Loco         Medication Changes:   1. No medication changes at this time.      Patient Instructions:  1. You are scheduled for an angiogram on Wednesday, November 7th.   Coronary Angiogram  Angiography is a special type of X-ray that lets your doctor view your coronary arteries to see if the blood vessels to your heart are narrowed or blocked.  The catheter can be placed into the groin, arm, or wrist.     Follow these instructions:     You are scheduled for a Coronary Angiogram on Wednesday, November 7th at the [facility], [address] [place] [address]. You are to check in at the Gold Waiting Area. This will likely be at 1pm but I will call you tomorrow, Tuesday 11/6 to confirm.     When you arrive you will be escorted back to the pre procedure area called 2A. Here they will insert an IV, draw labs, and obtain a short medical history. Please bring an updated list of your current medications.    A physician will come and talk with you about the procedure and obtain consent.    A nurse from the Cardiac Catheterization Lab will then escort you to the procedure area. You will be receiving sedation during the procedure so you will need someone to drive you to and from the hospital.    After the procedure you will recover for a short period (2 - 6 hours) most likely on unit 6D. You will be discharged with instructions for post procedure care. However, depending on what the angiogram shows you may have to have stents placed and this might require an overnight stay. We ask that you bring a small bag of necessities for your comfort if you would need to stay overnight. DO NOT BRING ANY VALUABLES!    Pre procedure instructions:    1. You will need a preop physical (H&P) done by your PCP within 30 days prior to the procedure. Please bring  a hard copy of this with you to your procedure OR have your PCP fax to: [phone]. ((This has been completed at your visit today.))  2. Make arrangements to have a  as you will not be allowed to drive following the procedure. Someone should stay with you the night after your procedure.  3.  Do not eat any solid food or milk products for 8 hours prior to the exam. You may drink clear liquids until 2 hours prior to the exam. Clear liquids include the following: water, Jell-O, clear broth, apple juice or any non-carbonated drink that you can see through (no pop!).   4. Do not drink alcohol or smoke 24 hours prior to test.  5. Hold these meds:  Do not take your oral diabetic medication or short acting insulin the day of the procedure. Do not take metformin the day of and for 2 days following, contact your PCP regarding glucose control during this time.  Hold your warfarin (2 days prior: Monday 11/5 and Tuesday 11/6), pradaxa (2 days prior), Eliquis/Xarelto 1 day prior.   6. You may take your other morning medications as prescribed with a sip of water.   If you are not taking any aspirin currently, take a 325 mg aspirin the day before and the day of your procedure.    Follow up Appointment Information:  1. We will determine your follow up after your angiogram.       Results:    Results for FLOR STEVENSON (MRN [id number]) as of 11/5/2018 14:26   Ref. Range 11/5/2018 12:44   Sodium Latest Ref Range: 133 - 144 mmol/L 139   Potassium Latest Ref Range: 3.4 - 5.3 mmol/L 4.9   Chloride Latest Ref Range: 94 - 109 mmol/L 108   Carbon Dioxide Latest Ref Range: 20 - 32 mmol/L 22   Urea Nitrogen Latest Ref Range: 7 - 30 mg/dL 28   Creatinine Latest Ref Range: 0.66 - 1.25 mg/dL 1.19   GFR Estimate Latest Ref Range: >60 mL/min/1.7m2 59 (L)   GFR Estimate If Black Latest Ref Range: >60 mL/min/1.7m2 72   Calcium Latest Ref Range: 8.5 - 10.1 mg/dL 8.9   Anion Gap Latest Ref Range: 3 - 14 mmol/L 8   N-Terminal Pro Bnp Latest Ref  "Range: 0 - 450 pg/mL 801 (H)   Glucose Latest Ref Range: 70 - 99 mg/dL 245 (H)   WBC Latest Ref Range: 4.0 - 11.0 10e9/L 6.7   Hemoglobin Latest Ref Range: 13.3 - 17.7 g/dL 12.5 (L)   Hematocrit Latest Ref Range: 40.0 - 53.0 % 40.1   Platelet Count Latest Ref Range: 150 - 450 10e9/L 249   RBC Count Latest Ref Range: 4.4 - 5.9 10e12/L 5.01   MCV Latest Ref Range: 78 - 100 fl 80   MCH Latest Ref Range: 26.5 - 33.0 pg 25.0 (L)   MCHC Latest Ref Range: 31.5 - 36.5 g/dL 31.2 (L)   RDW Latest Ref Range: 10.0 - 15.0 % 14.7         [address] [facility] on [address]rd [place] [address]        Thank you for allowing us to be a part of your care here at the [facility]      If you have questions or concerns please contact us at:      Calli Isabel RN BSN   Cardiology Care Coordinator  [facility]   Questions and [phone]   First press #1 for the [place] and then press #3 for \"Medical Advice\" to reach us Cardiology Nurses.        "

## 2018-11-06 ENCOUNTER — APPOINTMENT (OUTPATIENT)
Dept: CARDIOLOGY | Facility: CLINIC | Age: 73
DRG: 287 | End: 2018-11-06
Payer: COMMERCIAL

## 2018-11-06 ENCOUNTER — HOSPITAL ENCOUNTER (OUTPATIENT)
Facility: CLINIC | Age: 73
DRG: 287 | End: 2018-11-06
Attending: INTERNAL MEDICINE | Admitting: INTERNAL MEDICINE
Payer: COMMERCIAL

## 2018-11-06 ENCOUNTER — APPOINTMENT (OUTPATIENT)
Dept: GENERAL RADIOLOGY | Facility: CLINIC | Age: 73
DRG: 287 | End: 2018-11-06
Attending: EMERGENCY MEDICINE
Payer: COMMERCIAL

## 2018-11-06 ENCOUNTER — HOSPITAL ENCOUNTER (INPATIENT)
Facility: CLINIC | Age: 73
LOS: 1 days | Discharge: HOME | DRG: 287 | End: 2018-11-07
Attending: EMERGENCY MEDICINE | Admitting: INTERNAL MEDICINE
Payer: COMMERCIAL

## 2018-11-06 DIAGNOSIS — I20.0 UNSTABLE ANGINA PECTORIS (H): ICD-10-CM

## 2018-11-06 DIAGNOSIS — I50.22 CHRONIC SYSTOLIC HEART FAILURE (H): ICD-10-CM

## 2018-11-06 DIAGNOSIS — R07.9 ACUTE CHEST PAIN: ICD-10-CM

## 2018-11-06 DIAGNOSIS — I25.5 ISCHEMIC CARDIOMYOPATHY: Primary | ICD-10-CM

## 2018-11-06 DIAGNOSIS — I25.2 OLD MYOCARDIAL INFARCTION: ICD-10-CM

## 2018-11-06 DIAGNOSIS — Z95.1 S/P CABG (CORONARY ARTERY BYPASS GRAFT): ICD-10-CM

## 2018-11-06 DIAGNOSIS — I25.10 CAD (CORONARY ARTERY DISEASE): ICD-10-CM

## 2018-11-06 DIAGNOSIS — R06.02 SHORTNESS OF BREATH: ICD-10-CM

## 2018-11-06 LAB
ALBUMIN SERPL-MCNC: 3.8 G/DL (ref 3.4–5)
ALP SERPL-CCNC: 80 U/L (ref 40–150)
ALT SERPL W P-5'-P-CCNC: 19 U/L (ref 0–70)
ANION GAP SERPL CALCULATED.3IONS-SCNC: 12 MMOL/L (ref 3–14)
ANION GAP SERPL CALCULATED.3IONS-SCNC: 9 MMOL/L (ref 3–14)
APTT PPP: 33 SEC (ref 22–37)
AST SERPL W P-5'-P-CCNC: 19 U/L (ref 0–45)
BASOPHILS # BLD AUTO: 0 10E9/L (ref 0–0.2)
BASOPHILS NFR BLD AUTO: 0 %
BILIRUB SERPL-MCNC: 0.8 MG/DL (ref 0.2–1.3)
BUN SERPL-MCNC: 16 MG/DL (ref 7–30)
BUN SERPL-MCNC: 21 MG/DL (ref 7–30)
CALCIUM SERPL-MCNC: 8.6 MG/DL (ref 8.5–10.1)
CALCIUM SERPL-MCNC: 9 MG/DL (ref 8.5–10.1)
CHLORIDE SERPL-SCNC: 105 MMOL/L (ref 94–109)
CHLORIDE SERPL-SCNC: 106 MMOL/L (ref 94–109)
CO2 SERPL-SCNC: 21 MMOL/L (ref 20–32)
CO2 SERPL-SCNC: 22 MMOL/L (ref 20–32)
CREAT SERPL-MCNC: 0.83 MG/DL (ref 0.66–1.25)
CREAT SERPL-MCNC: 1.01 MG/DL (ref 0.66–1.25)
DIFFERENTIAL METHOD BLD: ABNORMAL
EOSINOPHIL # BLD AUTO: 0.1 10E9/L (ref 0–0.7)
EOSINOPHIL NFR BLD AUTO: 0.9 %
ERYTHROCYTE [DISTWIDTH] IN BLOOD BY AUTOMATED COUNT: 14.7 % (ref 10–15)
ERYTHROCYTE [DISTWIDTH] IN BLOOD BY AUTOMATED COUNT: 14.8 % (ref 10–15)
GFR SERPL CREATININE-BSD FRML MDRD: 72 ML/MIN/1.7M2
GFR SERPL CREATININE-BSD FRML MDRD: 90 ML/MIN/1.7M2
GLUCOSE BLDC GLUCOMTR-MCNC: 186 MG/DL (ref 70–99)
GLUCOSE BLDC GLUCOMTR-MCNC: 261 MG/DL (ref 70–99)
GLUCOSE SERPL-MCNC: 178 MG/DL (ref 70–99)
GLUCOSE SERPL-MCNC: 246 MG/DL (ref 70–99)
HCT VFR BLD AUTO: 42 % (ref 40–53)
HCT VFR BLD AUTO: 42.6 % (ref 40–53)
HGB BLD-MCNC: 13 G/DL (ref 13.3–17.7)
HGB BLD-MCNC: 13.2 G/DL (ref 13.3–17.7)
INR PPP: 1.64 (ref 0.86–1.14)
INR PPP: 1.68 (ref 0.86–1.14)
INTERPRETATION ECG - MUSE: NORMAL
LYMPHOCYTES # BLD AUTO: 1.1 10E9/L (ref 0.8–5.3)
LYMPHOCYTES NFR BLD AUTO: 16.7 %
MCH RBC QN AUTO: 24.8 PG (ref 26.5–33)
MCH RBC QN AUTO: 25 PG (ref 26.5–33)
MCHC RBC AUTO-ENTMCNC: 31 G/DL (ref 31.5–36.5)
MCHC RBC AUTO-ENTMCNC: 31 G/DL (ref 31.5–36.5)
MCV RBC AUTO: 80 FL (ref 78–100)
MCV RBC AUTO: 81 FL (ref 78–100)
MONOCYTES # BLD AUTO: 0.4 10E9/L (ref 0–1.3)
MONOCYTES NFR BLD AUTO: 6.1 %
NEUTROPHILS # BLD AUTO: 5 10E9/L (ref 1.6–8.3)
NEUTROPHILS NFR BLD AUTO: 76.3 %
NT-PROBNP SERPL-MCNC: 585 PG/ML (ref 0–1800)
PLATELET # BLD AUTO: 211 10E9/L (ref 150–450)
PLATELET # BLD AUTO: 236 10E9/L (ref 150–450)
PLATELET # BLD EST: ABNORMAL 10*3/UL
POTASSIUM SERPL-SCNC: 3.8 MMOL/L (ref 3.4–5.3)
POTASSIUM SERPL-SCNC: 4.2 MMOL/L (ref 3.4–5.3)
PROT SERPL-MCNC: 8.2 G/DL (ref 6.8–8.8)
RBC # BLD AUTO: 5.2 10E12/L (ref 4.4–5.9)
RBC # BLD AUTO: 5.32 10E12/L (ref 4.4–5.9)
RBC MORPH BLD: NORMAL
SODIUM SERPL-SCNC: 137 MMOL/L (ref 133–144)
SODIUM SERPL-SCNC: 138 MMOL/L (ref 133–144)
TROPONIN I SERPL-MCNC: <0.015 UG/L (ref 0–0.04)
WBC # BLD AUTO: 6.6 10E9/L (ref 4–11)
WBC # BLD AUTO: 7.8 10E9/L (ref 4–11)

## 2018-11-06 PROCEDURE — 93010 ELECTROCARDIOGRAM REPORT: CPT | Mod: 59 | Performed by: EMERGENCY MEDICINE

## 2018-11-06 PROCEDURE — 93455 CORONARY ART/GRFT ANGIO S&I: CPT

## 2018-11-06 PROCEDURE — 27210787 ZZH MANIFOLD CR2

## 2018-11-06 PROCEDURE — 71046 X-RAY EXAM CHEST 2 VIEWS: CPT

## 2018-11-06 PROCEDURE — 25000128 H RX IP 250 OP 636: Performed by: STUDENT IN AN ORGANIZED HEALTH CARE EDUCATION/TRAINING PROGRAM

## 2018-11-06 PROCEDURE — 27210946 ZZH KIT HC TOTES DISP CR8

## 2018-11-06 PROCEDURE — 99285 EMERGENCY DEPT VISIT HI MDM: CPT | Mod: 25 | Performed by: EMERGENCY MEDICINE

## 2018-11-06 PROCEDURE — 85610 PROTHROMBIN TIME: CPT | Performed by: INTERNAL MEDICINE

## 2018-11-06 PROCEDURE — 80053 COMPREHEN METABOLIC PANEL: CPT | Performed by: EMERGENCY MEDICINE

## 2018-11-06 PROCEDURE — 85610 PROTHROMBIN TIME: CPT | Performed by: EMERGENCY MEDICINE

## 2018-11-06 PROCEDURE — 84484 ASSAY OF TROPONIN QUANT: CPT | Performed by: EMERGENCY MEDICINE

## 2018-11-06 PROCEDURE — C1894 INTRO/SHEATH, NON-LASER: HCPCS

## 2018-11-06 PROCEDURE — B2181ZZ FLUOROSCOPY OF LEFT INTERNAL MAMMARY BYPASS GRAFT USING LOW OSMOLAR CONTRAST: ICD-10-PCS | Performed by: INTERNAL MEDICINE

## 2018-11-06 PROCEDURE — B2131ZZ FLUOROSCOPY OF MULTIPLE CORONARY ARTERY BYPASS GRAFTS USING LOW OSMOLAR CONTRAST: ICD-10-PCS | Performed by: INTERNAL MEDICINE

## 2018-11-06 PROCEDURE — 99221 1ST HOSP IP/OBS SF/LOW 40: CPT | Mod: 25 | Performed by: INTERNAL MEDICINE

## 2018-11-06 PROCEDURE — 25000125 ZZHC RX 250: Performed by: STUDENT IN AN ORGANIZED HEALTH CARE EDUCATION/TRAINING PROGRAM

## 2018-11-06 PROCEDURE — 21400006 ZZH R&B CCU INTERMEDIATE UMMC

## 2018-11-06 PROCEDURE — 25000132 ZZH RX MED GY IP 250 OP 250 PS 637: Performed by: STUDENT IN AN ORGANIZED HEALTH CARE EDUCATION/TRAINING PROGRAM

## 2018-11-06 PROCEDURE — 93005 ELECTROCARDIOGRAM TRACING: CPT | Performed by: EMERGENCY MEDICINE

## 2018-11-06 PROCEDURE — 83880 ASSAY OF NATRIURETIC PEPTIDE: CPT | Performed by: EMERGENCY MEDICINE

## 2018-11-06 PROCEDURE — 85730 THROMBOPLASTIN TIME PARTIAL: CPT | Performed by: EMERGENCY MEDICINE

## 2018-11-06 PROCEDURE — 85025 COMPLETE CBC W/AUTO DIFF WBC: CPT | Performed by: EMERGENCY MEDICINE

## 2018-11-06 PROCEDURE — 80048 BASIC METABOLIC PNL TOTAL CA: CPT | Performed by: INTERNAL MEDICINE

## 2018-11-06 PROCEDURE — 36415 COLL VENOUS BLD VENIPUNCTURE: CPT | Performed by: INTERNAL MEDICINE

## 2018-11-06 PROCEDURE — 27211089 ZZH KIT ACIST INJECTOR CR3

## 2018-11-06 PROCEDURE — C1769 GUIDE WIRE: HCPCS

## 2018-11-06 PROCEDURE — 82565 ASSAY OF CREATININE: CPT | Performed by: INTERNAL MEDICINE

## 2018-11-06 PROCEDURE — 99153 MOD SED SAME PHYS/QHP EA: CPT

## 2018-11-06 PROCEDURE — 25000131 ZZH RX MED GY IP 250 OP 636 PS 637: Performed by: STUDENT IN AN ORGANIZED HEALTH CARE EDUCATION/TRAINING PROGRAM

## 2018-11-06 PROCEDURE — 27210843 ZZH DEVICE COMPRESSION CR12

## 2018-11-06 PROCEDURE — 25000132 ZZH RX MED GY IP 250 OP 250 PS 637

## 2018-11-06 PROCEDURE — 00000146 ZZHCL STATISTIC GLUCOSE BY METER IP

## 2018-11-06 PROCEDURE — 25000132 ZZH RX MED GY IP 250 OP 250 PS 637: Performed by: INTERNAL MEDICINE

## 2018-11-06 PROCEDURE — 25000132 ZZH RX MED GY IP 250 OP 250 PS 637: Performed by: NURSE PRACTITIONER

## 2018-11-06 PROCEDURE — B2111ZZ FLUOROSCOPY OF MULTIPLE CORONARY ARTERIES USING LOW OSMOLAR CONTRAST: ICD-10-PCS | Performed by: INTERNAL MEDICINE

## 2018-11-06 PROCEDURE — 93454 CORONARY ARTERY ANGIO S&I: CPT

## 2018-11-06 PROCEDURE — 99152 MOD SED SAME PHYS/QHP 5/>YRS: CPT

## 2018-11-06 PROCEDURE — 85027 COMPLETE CBC AUTOMATED: CPT | Performed by: INTERNAL MEDICINE

## 2018-11-06 PROCEDURE — 93455 CORONARY ART/GRFT ANGIO S&I: CPT | Mod: 26 | Performed by: INTERNAL MEDICINE

## 2018-11-06 RX ORDER — SODIUM NITROPRUSSIDE 25 MG/ML
100-200 INJECTION INTRAVENOUS
Status: DISCONTINUED | OUTPATIENT
Start: 2018-11-06 | End: 2018-11-06 | Stop reason: HOSPADM

## 2018-11-06 RX ORDER — PROTAMINE SULFATE 10 MG/ML
1-5 INJECTION, SOLUTION INTRAVENOUS
Status: DISCONTINUED | OUTPATIENT
Start: 2018-11-06 | End: 2018-11-06 | Stop reason: HOSPADM

## 2018-11-06 RX ORDER — ARGATROBAN 1 MG/ML
150 INJECTION, SOLUTION INTRAVENOUS
Status: DISCONTINUED | OUTPATIENT
Start: 2018-11-06 | End: 2018-11-06 | Stop reason: HOSPADM

## 2018-11-06 RX ORDER — LORAZEPAM 2 MG/ML
.5-2 INJECTION INTRAMUSCULAR EVERY 4 HOURS PRN
Status: DISCONTINUED | OUTPATIENT
Start: 2018-11-06 | End: 2018-11-06 | Stop reason: HOSPADM

## 2018-11-06 RX ORDER — PHENYLEPHRINE HCL IN 0.9% NACL 1 MG/10 ML
20-100 SYRINGE (ML) INTRAVENOUS
Status: DISCONTINUED | OUTPATIENT
Start: 2018-11-06 | End: 2018-11-06 | Stop reason: HOSPADM

## 2018-11-06 RX ORDER — DEXTROSE MONOHYDRATE 25 G/50ML
12.5-5 INJECTION, SOLUTION INTRAVENOUS EVERY 30 MIN PRN
Status: DISCONTINUED | OUTPATIENT
Start: 2018-11-06 | End: 2018-11-06 | Stop reason: HOSPADM

## 2018-11-06 RX ORDER — ACETAMINOPHEN 325 MG/1
650 TABLET ORAL EVERY 4 HOURS PRN
Status: DISCONTINUED | OUTPATIENT
Start: 2018-11-06 | End: 2018-11-07 | Stop reason: HOSPADM

## 2018-11-06 RX ORDER — ATROPINE SULFATE 0.1 MG/ML
.5-1 INJECTION INTRAVENOUS
Status: DISCONTINUED | OUTPATIENT
Start: 2018-11-06 | End: 2018-11-06 | Stop reason: HOSPADM

## 2018-11-06 RX ORDER — NALOXONE HYDROCHLORIDE 0.4 MG/ML
.1-.4 INJECTION, SOLUTION INTRAMUSCULAR; INTRAVENOUS; SUBCUTANEOUS
Status: DISCONTINUED | OUTPATIENT
Start: 2018-11-06 | End: 2018-11-07 | Stop reason: HOSPADM

## 2018-11-06 RX ORDER — NITROGLYCERIN 0.4 MG/1
0.4 TABLET SUBLINGUAL EVERY 5 MIN PRN
Status: DISCONTINUED | OUTPATIENT
Start: 2018-11-06 | End: 2018-11-07 | Stop reason: HOSPADM

## 2018-11-06 RX ORDER — ARGATROBAN 1 MG/ML
350 INJECTION, SOLUTION INTRAVENOUS
Status: DISCONTINUED | OUTPATIENT
Start: 2018-11-06 | End: 2018-11-06 | Stop reason: HOSPADM

## 2018-11-06 RX ORDER — FUROSEMIDE 10 MG/ML
20-100 INJECTION INTRAMUSCULAR; INTRAVENOUS
Status: DISCONTINUED | OUTPATIENT
Start: 2018-11-06 | End: 2018-11-06 | Stop reason: HOSPADM

## 2018-11-06 RX ORDER — FENTANYL CITRATE 50 UG/ML
25-50 INJECTION, SOLUTION INTRAMUSCULAR; INTRAVENOUS
Status: DISCONTINUED | OUTPATIENT
Start: 2018-11-06 | End: 2018-11-06 | Stop reason: HOSPADM

## 2018-11-06 RX ORDER — CARVEDILOL 6.25 MG/1
6.25 TABLET ORAL 2 TIMES DAILY WITH MEALS
Status: DISCONTINUED | OUTPATIENT
Start: 2018-11-06 | End: 2018-11-06

## 2018-11-06 RX ORDER — IOPAMIDOL 755 MG/ML
100 INJECTION, SOLUTION INTRAVASCULAR ONCE
Status: COMPLETED | OUTPATIENT
Start: 2018-11-06 | End: 2018-11-06

## 2018-11-06 RX ORDER — WARFARIN SODIUM 5 MG/1
5 TABLET ORAL
Status: COMPLETED | OUTPATIENT
Start: 2018-11-06 | End: 2018-11-06

## 2018-11-06 RX ORDER — PRASUGREL 10 MG/1
10-60 TABLET, FILM COATED ORAL
Status: DISCONTINUED | OUTPATIENT
Start: 2018-11-06 | End: 2018-11-06 | Stop reason: HOSPADM

## 2018-11-06 RX ORDER — ASPIRIN 325 MG
325 TABLET ORAL
Status: DISCONTINUED | OUTPATIENT
Start: 2018-11-06 | End: 2018-11-06 | Stop reason: HOSPADM

## 2018-11-06 RX ORDER — ONDANSETRON 2 MG/ML
4 INJECTION INTRAMUSCULAR; INTRAVENOUS EVERY 4 HOURS PRN
Status: DISCONTINUED | OUTPATIENT
Start: 2018-11-06 | End: 2018-11-06 | Stop reason: HOSPADM

## 2018-11-06 RX ORDER — ATORVASTATIN CALCIUM 80 MG/1
80 TABLET, FILM COATED ORAL DAILY
Status: DISCONTINUED | OUTPATIENT
Start: 2018-11-06 | End: 2018-11-07 | Stop reason: HOSPADM

## 2018-11-06 RX ORDER — VERAPAMIL HYDROCHLORIDE 2.5 MG/ML
1-5 INJECTION, SOLUTION INTRAVENOUS
Status: DISCONTINUED | OUTPATIENT
Start: 2018-11-06 | End: 2018-11-06 | Stop reason: HOSPADM

## 2018-11-06 RX ORDER — EPTIFIBATIDE 2 MG/ML
180 INJECTION, SOLUTION INTRAVENOUS EVERY 10 MIN PRN
Status: DISCONTINUED | OUTPATIENT
Start: 2018-11-06 | End: 2018-11-06 | Stop reason: HOSPADM

## 2018-11-06 RX ORDER — NITROGLYCERIN 0.4 MG/1
0.4 TABLET SUBLINGUAL EVERY 5 MIN PRN
Status: DISCONTINUED | OUTPATIENT
Start: 2018-11-06 | End: 2018-11-06 | Stop reason: HOSPADM

## 2018-11-06 RX ORDER — NICARDIPINE HYDROCHLORIDE 2.5 MG/ML
100 INJECTION INTRAVENOUS
Status: DISCONTINUED | OUTPATIENT
Start: 2018-11-06 | End: 2018-11-06 | Stop reason: HOSPADM

## 2018-11-06 RX ORDER — ATROPINE SULFATE 0.1 MG/ML
0.5 INJECTION INTRAVENOUS EVERY 5 MIN PRN
Status: ACTIVE | OUTPATIENT
Start: 2018-11-06 | End: 2018-11-07

## 2018-11-06 RX ORDER — FENTANYL CITRATE 50 UG/ML
25-50 INJECTION, SOLUTION INTRAMUSCULAR; INTRAVENOUS
Status: ACTIVE | OUTPATIENT
Start: 2018-11-06 | End: 2018-11-07

## 2018-11-06 RX ORDER — PROTAMINE SULFATE 10 MG/ML
25-100 INJECTION, SOLUTION INTRAVENOUS EVERY 5 MIN PRN
Status: DISCONTINUED | OUTPATIENT
Start: 2018-11-06 | End: 2018-11-06 | Stop reason: HOSPADM

## 2018-11-06 RX ORDER — ASPIRIN 81 MG/1
324 TABLET, CHEWABLE ORAL ONCE
Status: DISCONTINUED | OUTPATIENT
Start: 2018-11-06 | End: 2018-11-07 | Stop reason: CLARIF

## 2018-11-06 RX ORDER — CARVEDILOL 12.5 MG/1
12.5 TABLET ORAL 2 TIMES DAILY WITH MEALS
Status: DISCONTINUED | OUTPATIENT
Start: 2018-11-06 | End: 2018-11-07 | Stop reason: HOSPADM

## 2018-11-06 RX ORDER — POTASSIUM CHLORIDE 7.45 MG/ML
10 INJECTION INTRAVENOUS
Status: DISCONTINUED | OUTPATIENT
Start: 2018-11-06 | End: 2018-11-06 | Stop reason: HOSPADM

## 2018-11-06 RX ORDER — CLOPIDOGREL BISULFATE 75 MG/1
75 TABLET ORAL
Status: DISCONTINUED | OUTPATIENT
Start: 2018-11-06 | End: 2018-11-06 | Stop reason: HOSPADM

## 2018-11-06 RX ORDER — NITROGLYCERIN 20 MG/100ML
.07-2 INJECTION INTRAVENOUS CONTINUOUS PRN
Status: DISCONTINUED | OUTPATIENT
Start: 2018-11-06 | End: 2018-11-06 | Stop reason: HOSPADM

## 2018-11-06 RX ORDER — POTASSIUM CHLORIDE 29.8 MG/ML
20 INJECTION INTRAVENOUS
Status: DISCONTINUED | OUTPATIENT
Start: 2018-11-06 | End: 2018-11-06 | Stop reason: HOSPADM

## 2018-11-06 RX ORDER — NITROGLYCERIN 5 MG/ML
100-500 VIAL (ML) INTRAVENOUS
Status: DISCONTINUED | OUTPATIENT
Start: 2018-11-06 | End: 2018-11-06 | Stop reason: HOSPADM

## 2018-11-06 RX ORDER — SOTALOL HYDROCHLORIDE 160 MG/1
160 TABLET ORAL 2 TIMES DAILY
Status: DISCONTINUED | OUTPATIENT
Start: 2018-11-06 | End: 2018-11-07 | Stop reason: HOSPADM

## 2018-11-06 RX ORDER — SODIUM CHLORIDE 9 MG/ML
INJECTION, SOLUTION INTRAVENOUS CONTINUOUS
Status: DISCONTINUED | OUTPATIENT
Start: 2018-11-06 | End: 2018-11-06 | Stop reason: CLARIF

## 2018-11-06 RX ORDER — NALOXONE HYDROCHLORIDE 0.4 MG/ML
.2-.4 INJECTION, SOLUTION INTRAMUSCULAR; INTRAVENOUS; SUBCUTANEOUS
Status: DISCONTINUED | OUTPATIENT
Start: 2018-11-06 | End: 2018-11-06

## 2018-11-06 RX ORDER — EPTIFIBATIDE 2 MG/ML
2 INJECTION, SOLUTION INTRAVENOUS CONTINUOUS PRN
Status: DISCONTINUED | OUTPATIENT
Start: 2018-11-06 | End: 2018-11-06 | Stop reason: HOSPADM

## 2018-11-06 RX ORDER — ALUMINA, MAGNESIA, AND SIMETHICONE 2400; 2400; 240 MG/30ML; MG/30ML; MG/30ML
30 SUSPENSION ORAL EVERY 4 HOURS PRN
Status: DISCONTINUED | OUTPATIENT
Start: 2018-11-06 | End: 2018-11-07 | Stop reason: HOSPADM

## 2018-11-06 RX ORDER — DIPHENHYDRAMINE HCL 25 MG
25 CAPSULE ORAL
Status: DISCONTINUED | OUTPATIENT
Start: 2018-11-06 | End: 2018-11-07 | Stop reason: HOSPADM

## 2018-11-06 RX ORDER — NITROGLYCERIN 5 MG/ML
100-200 VIAL (ML) INTRAVENOUS
Status: DISCONTINUED | OUTPATIENT
Start: 2018-11-06 | End: 2018-11-06 | Stop reason: HOSPADM

## 2018-11-06 RX ORDER — FLUMAZENIL 0.1 MG/ML
0.2 INJECTION, SOLUTION INTRAVENOUS
Status: ACTIVE | OUTPATIENT
Start: 2018-11-06 | End: 2018-11-07

## 2018-11-06 RX ORDER — ASPIRIN 81 MG/1
81-324 TABLET, CHEWABLE ORAL
Status: DISCONTINUED | OUTPATIENT
Start: 2018-11-06 | End: 2018-11-06 | Stop reason: HOSPADM

## 2018-11-06 RX ORDER — METHYLPREDNISOLONE SODIUM SUCCINATE 125 MG/2ML
125 INJECTION, POWDER, LYOPHILIZED, FOR SOLUTION INTRAMUSCULAR; INTRAVENOUS
Status: DISCONTINUED | OUTPATIENT
Start: 2018-11-06 | End: 2018-11-06 | Stop reason: HOSPADM

## 2018-11-06 RX ORDER — ISOSORBIDE MONONITRATE 60 MG/1
60 TABLET, EXTENDED RELEASE ORAL 2 TIMES DAILY
Status: DISCONTINUED | OUTPATIENT
Start: 2018-11-06 | End: 2018-11-07

## 2018-11-06 RX ORDER — ADENOSINE 3 MG/ML
12-12000 INJECTION, SOLUTION INTRAVENOUS
Status: DISCONTINUED | OUTPATIENT
Start: 2018-11-06 | End: 2018-11-06 | Stop reason: HOSPADM

## 2018-11-06 RX ORDER — ACETAMINOPHEN 650 MG/1
650 SUPPOSITORY RECTAL EVERY 4 HOURS PRN
Status: DISCONTINUED | OUTPATIENT
Start: 2018-11-06 | End: 2018-11-07 | Stop reason: HOSPADM

## 2018-11-06 RX ORDER — NICOTINE POLACRILEX 4 MG
15-30 LOZENGE BUCCAL
Status: DISCONTINUED | OUTPATIENT
Start: 2018-11-06 | End: 2018-11-07 | Stop reason: HOSPADM

## 2018-11-06 RX ORDER — FLUMAZENIL 0.1 MG/ML
0.2 INJECTION, SOLUTION INTRAVENOUS
Status: DISCONTINUED | OUTPATIENT
Start: 2018-11-06 | End: 2018-11-06 | Stop reason: HOSPADM

## 2018-11-06 RX ORDER — LIDOCAINE HYDROCHLORIDE 10 MG/ML
30 INJECTION, SOLUTION EPIDURAL; INFILTRATION; INTRACAUDAL; PERINEURAL
Status: DISCONTINUED | OUTPATIENT
Start: 2018-11-06 | End: 2018-11-06 | Stop reason: HOSPADM

## 2018-11-06 RX ORDER — CLOPIDOGREL BISULFATE 75 MG/1
300-600 TABLET ORAL
Status: DISCONTINUED | OUTPATIENT
Start: 2018-11-06 | End: 2018-11-06 | Stop reason: HOSPADM

## 2018-11-06 RX ORDER — NIFEDIPINE 10 MG/1
10 CAPSULE ORAL
Status: DISCONTINUED | OUTPATIENT
Start: 2018-11-06 | End: 2018-11-06 | Stop reason: HOSPADM

## 2018-11-06 RX ORDER — DIPHENHYDRAMINE HYDROCHLORIDE 50 MG/ML
25-50 INJECTION INTRAMUSCULAR; INTRAVENOUS
Status: DISCONTINUED | OUTPATIENT
Start: 2018-11-06 | End: 2018-11-06 | Stop reason: HOSPADM

## 2018-11-06 RX ORDER — DEXTROSE MONOHYDRATE 25 G/50ML
25-50 INJECTION, SOLUTION INTRAVENOUS
Status: DISCONTINUED | OUTPATIENT
Start: 2018-11-06 | End: 2018-11-07 | Stop reason: HOSPADM

## 2018-11-06 RX ORDER — EPINEPHRINE 1 MG/ML
0.3 INJECTION, SOLUTION, CONCENTRATE INTRAVENOUS
Status: DISCONTINUED | OUTPATIENT
Start: 2018-11-06 | End: 2018-11-06 | Stop reason: HOSPADM

## 2018-11-06 RX ORDER — LIDOCAINE 40 MG/G
CREAM TOPICAL
Status: DISCONTINUED | OUTPATIENT
Start: 2018-11-06 | End: 2018-11-07 | Stop reason: HOSPADM

## 2018-11-06 RX ORDER — NALOXONE HYDROCHLORIDE 0.4 MG/ML
0.4 INJECTION, SOLUTION INTRAMUSCULAR; INTRAVENOUS; SUBCUTANEOUS EVERY 5 MIN PRN
Status: DISCONTINUED | OUTPATIENT
Start: 2018-11-06 | End: 2018-11-06 | Stop reason: HOSPADM

## 2018-11-06 RX ORDER — ENALAPRILAT 1.25 MG/ML
1.25-2.5 INJECTION INTRAVENOUS
Status: DISCONTINUED | OUTPATIENT
Start: 2018-11-06 | End: 2018-11-06 | Stop reason: HOSPADM

## 2018-11-06 RX ORDER — HYDRALAZINE HYDROCHLORIDE 20 MG/ML
10-20 INJECTION INTRAMUSCULAR; INTRAVENOUS
Status: DISCONTINUED | OUTPATIENT
Start: 2018-11-06 | End: 2018-11-06 | Stop reason: HOSPADM

## 2018-11-06 RX ORDER — MAGNESIUM HYDROXIDE 1200 MG/15ML
1000 LIQUID ORAL CONTINUOUS PRN
Status: DISCONTINUED | OUTPATIENT
Start: 2018-11-06 | End: 2018-11-06 | Stop reason: HOSPADM

## 2018-11-06 RX ORDER — LIDOCAINE 40 MG/G
CREAM TOPICAL
Status: DISCONTINUED | OUTPATIENT
Start: 2018-11-06 | End: 2018-11-06

## 2018-11-06 RX ORDER — HEPARIN SODIUM 1000 [USP'U]/ML
1000-10000 INJECTION, SOLUTION INTRAVENOUS; SUBCUTANEOUS EVERY 5 MIN PRN
Status: DISCONTINUED | OUTPATIENT
Start: 2018-11-06 | End: 2018-11-06 | Stop reason: HOSPADM

## 2018-11-06 RX ORDER — METOPROLOL TARTRATE 1 MG/ML
5 INJECTION, SOLUTION INTRAVENOUS EVERY 5 MIN PRN
Status: DISCONTINUED | OUTPATIENT
Start: 2018-11-06 | End: 2018-11-06 | Stop reason: HOSPADM

## 2018-11-06 RX ORDER — DOPAMINE HYDROCHLORIDE 160 MG/100ML
2-20 INJECTION, SOLUTION INTRAVENOUS CONTINUOUS PRN
Status: DISCONTINUED | OUTPATIENT
Start: 2018-11-06 | End: 2018-11-06 | Stop reason: HOSPADM

## 2018-11-06 RX ORDER — LOSARTAN POTASSIUM 25 MG/1
25 TABLET ORAL DAILY
Status: DISCONTINUED | OUTPATIENT
Start: 2018-11-07 | End: 2018-11-07 | Stop reason: HOSPADM

## 2018-11-06 RX ORDER — CLOPIDOGREL BISULFATE 75 MG/1
75 TABLET ORAL DAILY
Status: DISCONTINUED | OUTPATIENT
Start: 2018-11-06 | End: 2018-11-07 | Stop reason: HOSPADM

## 2018-11-06 RX ORDER — DOBUTAMINE HYDROCHLORIDE 200 MG/100ML
2-20 INJECTION INTRAVENOUS CONTINUOUS PRN
Status: DISCONTINUED | OUTPATIENT
Start: 2018-11-06 | End: 2018-11-06 | Stop reason: HOSPADM

## 2018-11-06 RX ADMIN — MIDAZOLAM 1 MG: 1 INJECTION INTRAMUSCULAR; INTRAVENOUS at 14:39

## 2018-11-06 RX ADMIN — MIDAZOLAM 1 MG: 1 INJECTION INTRAMUSCULAR; INTRAVENOUS at 15:25

## 2018-11-06 RX ADMIN — FENTANYL CITRATE 50 MCG: 50 INJECTION, SOLUTION INTRAMUSCULAR; INTRAVENOUS at 15:32

## 2018-11-06 RX ADMIN — CARVEDILOL 12.5 MG: 12.5 TABLET, FILM COATED ORAL at 17:51

## 2018-11-06 RX ADMIN — IOPAMIDOL 100 ML: 755 INJECTION, SOLUTION INTRAVASCULAR at 15:45

## 2018-11-06 RX ADMIN — HEPARIN SODIUM 1500 UNITS: 1000 INJECTION, SOLUTION INTRAVENOUS; SUBCUTANEOUS at 14:39

## 2018-11-06 RX ADMIN — ASPIRIN 325 MG: 325 TABLET, DELAYED RELEASE ORAL at 12:13

## 2018-11-06 RX ADMIN — HEPARIN SODIUM 500 UNITS: 1000 INJECTION, SOLUTION INTRAVENOUS; SUBCUTANEOUS at 14:39

## 2018-11-06 RX ADMIN — CLOPIDOGREL BISULFATE 75 MG: 75 TABLET ORAL at 17:51

## 2018-11-06 RX ADMIN — ACETAMINOPHEN 650 MG: 325 TABLET, FILM COATED ORAL at 19:55

## 2018-11-06 RX ADMIN — SOTALOL HYDROCHLORIDE 160 MG: 160 TABLET ORAL at 20:54

## 2018-11-06 RX ADMIN — WARFARIN SODIUM 5 MG: 5 TABLET ORAL at 17:51

## 2018-11-06 RX ADMIN — ISOSORBIDE MONONITRATE 60 MG: 60 TABLET, EXTENDED RELEASE ORAL at 17:50

## 2018-11-06 RX ADMIN — DIPHENHYDRAMINE HYDROCHLORIDE 25 MG: 25 CAPSULE ORAL at 20:54

## 2018-11-06 RX ADMIN — HEPARIN SODIUM 5000 UNITS: 1000 INJECTION, SOLUTION INTRAVENOUS; SUBCUTANEOUS at 14:45

## 2018-11-06 RX ADMIN — INSULIN ASPART 1 UNITS: 100 INJECTION, SOLUTION INTRAVENOUS; SUBCUTANEOUS at 17:53

## 2018-11-06 RX ADMIN — FENTANYL CITRATE 50 MCG: 50 INJECTION, SOLUTION INTRAMUSCULAR; INTRAVENOUS at 15:00

## 2018-11-06 RX ADMIN — MIDAZOLAM 1 MG: 1 INJECTION INTRAMUSCULAR; INTRAVENOUS at 14:50

## 2018-11-06 RX ADMIN — ATORVASTATIN CALCIUM 80 MG: 80 TABLET, FILM COATED ORAL at 17:50

## 2018-11-06 RX ADMIN — FENTANYL CITRATE 50 MCG: 50 INJECTION, SOLUTION INTRAMUSCULAR; INTRAVENOUS at 14:39

## 2018-11-06 ASSESSMENT — ENCOUNTER SYMPTOMS
ARTHRALGIAS: 0
ABDOMINAL PAIN: 0
DIFFICULTY URINATING: 0
NECK STIFFNESS: 0
FEVER: 0
EYE REDNESS: 0
COLOR CHANGE: 0
SHORTNESS OF BREATH: 1
CHEST TIGHTNESS: 1
HEADACHES: 0
CONFUSION: 0

## 2018-11-06 ASSESSMENT — PAIN DESCRIPTION - DESCRIPTORS
DESCRIPTORS: ACHING
DESCRIPTORS: SORE

## 2018-11-06 ASSESSMENT — ACTIVITIES OF DAILY LIVING (ADL): ADLS_ACUITY_SCORE: 9

## 2018-11-06 NOTE — ED TRIAGE NOTES
Pt presents to ED for a complaint of chest pain for the last few days. PT is scheduled to have an angiogram tomorrow but his primary told him to come to the ED if symptoms persisted. Pt has been taking his nitroglycerin with some relief.

## 2018-11-06 NOTE — IP AVS SNAPSHOT
[address] [address]    Phone:  [phone]                                       After Visit Summary   11/6/2018    Wesley Cooper    MRN: [id number]           After Visit Summary Signature Page     I have received my discharge instructions, and my questions have been answered. I have discussed any challenges I see with this plan with the nurse or doctor.    ..........................................................................................................................................  Patient/Patient Representative Signature      ..........................................................................................................................................  Patient Representative Print Name and Relationship to Patient    ..................................................               ................................................  Date                                   Time    ..........................................................................................................................................  Reviewed by Signature/Title    ...................................................              ..............................................  Date                                               Time          22EPIC Rev 08/18

## 2018-11-06 NOTE — PHARMACY-ADMISSION MEDICATION HISTORY
Admission medication history interview status for the 11/6/2018 admission is complete. See Epic admission navigator for allergy information, pharmacy, prior to admission medications and immunization status.     Medication history interview sources:  patient, insurance dispense history, [facility]    Changes made to PTA medication list (reason)  Added: n/a  Deleted: duplicate sotalol, amlodipine, gabapentin  Changed: warfarin (see below)    Additional medication history information (including reliability of information, actions taken by pharmacist):  -patient did not know medications. He fills a pill box once weekly. Wife was going to bring in medicines tomorrow.  -meds confirmed by conversation with pharmacy and insurance dispense history  -patient states he hasn't used medicines since Sunday    Warfarin Information:  -Indication: Afib  -INR Goal: 2-3  -Managed by: [facility]  -Tablet size: 2.5 mg  -Schedule: 5 mg Monday, 2.5 mg 6x/wk  -Last dose: 11/4/2018  -Time of day: PM        Prior to Admission medications    Medication Sig Last Dose Taking? Auth Provider   amLODIPine (NORVASC) 5 MG tablet Take 5 mg by mouth daily  11/4/2018 Yes Reported, Patient   atorvastatin (LIPITOR) 80 MG tablet Take 80 mg by mouth daily 11/4/2018 Yes Reported, Patient   carvedilol (COREG) 6.25 MG tablet Take 6.25 mg by mouth 2 times daily (with meals) 11/4/2018 Yes Reported, Patient   clopidogrel (PLAVIX) 75 MG tablet Take 75 mg by mouth daily 11/4/2018 Yes Reported, Patient   gabapentin (NEURONTIN) 300 MG capsule Take 300 mg by mouth 3 times daily  11/4/2018 Yes Reported, Patient   glipiZIDE (GLUCOTROL) 5 MG tablet Take 5 mg by mouth 2 times daily (before meals) 11/4/2018 Yes Reported, Patient   isosorbide mononitrate (IMDUR) 60 MG 24 hr tablet Take 60 mg by mouth 2 times daily  11/4/2018 Yes Reported, Patient   losartan (COZAAR) 25 MG tablet Take 1 tablet (25 mg) by mouth daily 11/4/2018 Yes Claudy Loco MD   nitroglycerin  (NITROSTAT) 0.4 MG sublingual tablet Place 0.4 mg under the tongue every 5 minutes as needed for chest pain For chest pain place 1 tablet under the tongue every 5 minutes for 3 doses. If symptoms persist 5 minutes after 1st dose call 911. 11/6/2018 Yes Reported, Patient   sotalol HCl, AF, 160 MG TABS Take 160 mg by mouth 2 times daily 11/4/2018 Yes Claudy Loco MD   warfarin (COUMADIN) 2.5 MG tablet Take 5 mg by mouth on Mondays and 2.5 mg 6x/week or as directed based on INR 11/4/2018 Yes Reported, Patient   blood glucose monitoring (ACCU-CHEK TAIWO PLUS) meter device kit    Reported, Patient   diphenhydrAMINE-acetaminophen (TYLENOL PM)  MG tablet Take 1 tablet by mouth nightly as needed for sleep   Reported, Patient   metFORMIN (GLUCOPHAGE) 500 MG tablet Take 500 mg by mouth 2 times daily (with meals) 11/4/2018  Reported, Patient         Medication history completed by: Justin Matthews RPH on 11/6/2018 at 11:28 AM

## 2018-11-06 NOTE — PROCEDURES
PRELIMINARY CARDIAC CATH REPORT:     PROCEDURES PERFORMED:   1. Selective left and right coronary angiography.  2. Coronary bypass graft angiography    PHYSICIANS:  1. Attending Interventional Cardiology Staff: Eliseo Field MD  2. Interventional Cardiology Fellow: Jose Vanegas MD     INDICATION:  Wesley Cooper is a 73 year old male with known coronary artery disease status post CABG with known occlusion of all vein grafts and patent RODRIGUEZ-LAD who presents on an  inpatient basis for coronary angiography to evaluate stable angina.      DESCRIPTION:  1. Consent obtained with discussion of risks.  All questions were answered.  2. Sterile prep and procedure.  3. Location: left radial artery  4. Access: Local anesthetic with lidocaine.  A micropuncture (21 g) needle with ultrasound guidance was used to establish vascular access using a modified Seldinger technique.  5. Sheath: 6Fr slender  6. Catheters: 4Fr AMARJIT, 6Fr JL3.5, 6Fr AR mod.  7. Fluoroscopy time of 20.0 min.  8. Estimated blood loss of <5 mL.  9. See below for procedure details.    MEDICATIONS:  1. Contrast 100 mL IV.  2. Conscious sedation with fentanyl 150 mcg and midazolam 3 mg.  3. Heparin, nicardipine, and nitroglycerin intra-arterial for radial artery spasm prophylaxis.    SEDATION START TIME: 1438   SEDATION END TIME: 1534  TOTAL SEDATION TIME: 56 min   Heart rate, BP, respiration, oxygen saturation and patient responses were monitored throughout the procedure with the assistance of the RN under my supervision.    CORONARY ANGIOGRAM:   1. Both coronary arteries arise from their respective cusps.  2. Right dominant.     3. LM is a large caliber vessel and trifurcates into an LAD and LCx. Distal LM has an 80% stenosis.  4. LAD is a medium caliber vessel, supplies the entire apex (type 3), and gives rise to septal perforators, small caliber D1. Proximal LAD has 70-80% stenosis, mid LAD is 100% occluded after first septal followed by a long stent  and then a 70% stenosis after touchdown of the LIMA, distal LAD has sequential 80% and 50% stenoses, and D1 has 40% disease.  5. LCX is a medium caliber vessel and gives rise to small caliber OM1 and small caliber OM2 before continuing as a small AV-groove LCx.  OM1 appears to be 100% occluded at the ostium. Proximal LCx has an 80% stenosis.  6. Ramus intermedius is a small caliber vessel. Ostial ramus has a 70% stenosis.  7. RCA is a large caliber vessel and supplies a PDA and gives rise to PL branches . Proximal RCA is 100% occluded. Distal RCA into the PL has a patent stent. There are left-to-right collaterals from LAD septals, the LAD, and LCx supplying the PDA and PL.    CORONARY BYPASS GRAFT ANGIOGRAPHY:  1. LIMA graft is patent.  2. All vein grafts known to be occluded.    COMPLICATIONS:  1. None    SUMMARY:   1. Stable angina due to obstructive coronary artery disease.  2. Three vessel coronary artery disease with left main involvement status-post CABG.  3. Patent LIMA to LAD with known occlusion of all vein grafts.      -Severe disease of the LAD after the touchdown of the LIMA. This is a small caliber vessel and not easily amenable to PCI.      -Severe complex disease of the left main and proximal circumflex which is not bypassed. This is a small caliber vessel and appears unchanged from reports of prior angiograms in 2015 and 2016.      -Totally occluded proximal RCA with left-to-right collaterals supplying the PDA and PL.  4. Radial artery sheath removed and hemostasis achieved with a TR band.    PLAN:   1. Medical management of complex coronary artery disease with escalation of anti-anginals.  2. Aspirin 81 mg po daily lifelong.  3. Bedrest per protocol.  4. Return to the primary inpatient team for further evaluation and management.  5. Continued medical management and lifestyle modification for cardiovascular risk factor optimization.     The attending interventional cardiologist was present for the  entire procedure.    See CVIS report for final draft.      Jose Vanegas MD  Interventional Cardiology Fellow

## 2018-11-06 NOTE — IP AVS SNAPSHOT
MRN:[id number]                      After Visit Summary   11/6/2018    Wesley Cooper    MRN: [id number]           Thank you!     Thank you for choosing [facility] for your care. Our goal is always to provide you with excellent care. Hearing back from our patients is one way we can continue to improve our services. Please take a few minutes to complete the written survey that you may receive in the mail after you visit with us. Thank you!        Patient Information     Date Of Birth          1945        Designated Caregiver       Most Recent Value    Caregiver    Will someone help with your care after discharge? yes    Name of designated caregiver Travis Cooper    Phone number of caregiver [phone]    Caregiver address [facility]      About your hospital stay     You were admitted on:  November 6, 2018 You last received care in the:  [address] [facility]    You were discharged on:  November 7, 2018        Reason for your hospital stay       You were seen in the hospital for chest pain.  We did a coronary angiogram and found stable disease, no intervention was needed.  To help manage symptoms of chest pain we are going to increase your Coreg and change your dosing of Imdur.                  Who to Call     For medical emergencies, please call 911.  For non-urgent questions about your medical care, please call your primary care provider or clinic, [phone]          Attending Provider     Provider Specialty    William Lynch, DO Emergency Medicine    Lito Mcqueen MD Cardiology       Primary Care Provider Office Phone # Fax #    Jarod De La Rosa [phone]      After Care Instructions     Activity       Your activity upon discharge: activity as tolerated            Diet       Follow this diet upon discharge: Orders Placed This Encounter      Low Saturated Fat Na <2400 mg                  Follow-up Appointments     [facility] Follow-up and recommended labs and tests        Follow up with primary care provider, RAFAELA PHILLIPS, within 1-2 weeks, to evaluate medication change and for hospital follow- up. No follow up labs or test are needed.     Follow up with Cardiology Clinic,  within 1 month  to evaluate medication change and to evaluate treatment change. No follow up labs or test are needed.    Appointments on [place] and/or [facility] [place]with [facility] or [facility] provider or service). Call [phone] if you haven't heard regarding these appointments within 7 days of discharge.                  Further instructions from your care team       Anticoagulation Instructions:     Today, 11/7/18 your INR was 1.56.  Please take 5 mg today (11/7), take 2.5 mg tomorrow (Thursday 11/8), and get your INR rechecked Friday, 11/9.     Pending Results     No orders found for last 3 day(s).            Statement of Approval     Ordered          11/07/18 1046  I have reviewed and agree with all the recommendations and orders detailed in this document.  EFFECTIVE NOW     Approved and electronically signed by:  Mily Hoskins CNP             Admission Information     Date & Time Provider Department Dept. Phone    11/6/2018 Lito Mcqueen MD [address] [facility] [phone]      Your Vitals Were     Blood Pressure Temperature Respirations Weight Pulse Oximetry BMI (Body Mass Index)    130/72 (BP Location: Left arm) 97.7  F (36.5  C) (Oral) 18 66.9 kg (147 lb 6.4 oz) 97% 24.53 kg/m2      MyChart Information     [employer] gives you secure access to your electronic health record. If you see a primary care provider, you can also send messages to your care team and make appointments. If you have questions, please call your primary care clinic.  If you do not have a primary care provider, please call [phone] and they will assist you.        Care EveryWhere ID     This is your Care EveryWhere ID. This could be used by other organizations to access your [facility]  records  [id number]        Equal Access to Services     NEIL SCHWARTZ : Naun Lord, waphilippda marino, qaian hampton, maryjane olsen. So [facility] [phone].    ATENCIÓN: Si habla español, tiene a valdes disposición servicios gratuitos de asistencia lingüística. Llame al [phone].    We comply with applicable federal civil rights laws and [place] laws. We do not discriminate on the basis of race, color, national origin, age, disability, sex, sexual orientation, or gender identity.               Review of your medicines      CONTINUE these medicines which may have CHANGED, or have new prescriptions. If we are uncertain of the size of tablets/capsules you have at home, strength may be listed as something that might have changed.        Dose / Directions    carvedilol 12.5 MG tablet   Commonly known as:  COREG   This may have changed:    - medication strength  - how much to take   Used for:  Chronic systolic heart failure (H)        Dose:  12.5 mg   Take 1 tablet (12.5 mg) by mouth 2 times daily (with meals)   Quantity:  60 tablet   Refills:  1       isosorbide mononitrate 30 MG 24 hr tablet   Commonly known as:  IMDUR   This may have changed:    - medication strength  - how much to take  - when to take this   Used for:  Unstable angina pectoris (H), Ischemic cardiomyopathy        Dose:  90 mg   Start taking on:  11/8/2018   Take 3 tablets (90 mg) by mouth daily   Quantity:  180 tablet   Refills:  1         CONTINUE these medicines which have NOT CHANGED        Dose / Directions    atorvastatin 80 MG tablet   Commonly known as:  LIPITOR   Used for:  Chronic systolic heart failure (H)        Dose:  80 mg   Take 80 mg by mouth daily   Refills:  0       blood glucose monitoring meter device kit        Refills:  0       diphenhydrAMINE-acetaminophen  MG tablet   Commonly known as:  TYLENOL PM        Dose:  1 tablet   Take 1 tablet by mouth nightly as needed for  sleep   Refills:  0       gabapentin 300 MG capsule   Commonly known as:  NEURONTIN        Dose:  300 mg   Take 300 mg by mouth 3 times daily   Refills:  0       glipiZIDE 5 MG tablet   Commonly known as:  GLUCOTROL        Dose:  5 mg   Take 5 mg by mouth 2 times daily (before meals)   Refills:  0       losartan 25 MG tablet   Commonly known as:  COZAAR   Used for:  Chronic systolic heart failure (H)        Dose:  25 mg   Take 1 tablet (25 mg) by mouth daily   Quantity:  30 tablet   Refills:  3       metFORMIN 500 MG tablet   Commonly known as:  GLUCOPHAGE        Dose:  500 mg   Take 500 mg by mouth 2 times daily (with meals)   Refills:  0       nitroGLYcerin 0.4 MG sublingual tablet   Commonly known as:  NITROSTAT   Used for:  Chronic systolic heart failure (H)        Dose:  0.4 mg   Place 0.4 mg under the tongue every 5 minutes as needed for chest pain For chest pain place 1 tablet under the tongue every 5 minutes for 3 doses. If symptoms persist 5 minutes after 1st dose call 911.   Refills:  0       PLAVIX 75 MG tablet   Used for:  Chronic systolic heart failure (H)   Generic drug:  clopidogrel        Dose:  75 mg   Take 75 mg by mouth daily   Refills:  0       sotalol HCl (AF) 160 MG Tabs   Used for:  Chronic systolic heart failure (H)        Dose:  160 mg   Take 160 mg by mouth 2 times daily   Quantity:  180 tablet   Refills:  3       warfarin 2.5 MG tablet   Commonly known as:  COUMADIN   Used for:  Chronic systolic heart failure (H)        Take 5 mg by mouth on Mondays and 2.5 mg 6x/week or as directed based on INR   Refills:  0         STOP taking     amLODIPine 5 MG tablet   Commonly known as:  NORVASC                Where to get your medicines      These medications were sent to [facility] [address] [address]     Phone:  [phone]     carvedilol 12.5 MG tablet    isosorbide mononitrate 30 MG 24 hr tablet                Protect  others around you: Learn how to safely use, store and throw away your medicines at www.disposemymeds.org.             Medication List: This is a list of all your medications and when to take them. Check marks below indicate your daily home schedule. Keep this list as a reference.      Medications           Morning Afternoon Evening Bedtime As Needed    atorvastatin 80 MG tablet   Commonly known as:  LIPITOR   Take 80 mg by mouth daily   Last time this was given:  80 mg on 11/7/2018  8:01 AM                                blood glucose monitoring meter device kit                                carvedilol 12.5 MG tablet   Commonly known as:  COREG   Take 1 tablet (12.5 mg) by mouth 2 times daily (with meals)   Last time this was given:  12.5 mg on 11/7/2018  8:01 AM                                diphenhydrAMINE-acetaminophen  MG tablet   Commonly known as:  TYLENOL PM   Take 1 tablet by mouth nightly as needed for sleep                                gabapentin 300 MG capsule   Commonly known as:  NEURONTIN   Take 300 mg by mouth 3 times daily                                glipiZIDE 5 MG tablet   Commonly known as:  GLUCOTROL   Take 5 mg by mouth 2 times daily (before meals)                                isosorbide mononitrate 30 MG 24 hr tablet   Commonly known as:  IMDUR   Take 3 tablets (90 mg) by mouth daily   Start taking on:  11/8/2018   Last time this was given:  90 mg on 11/7/2018  8:01 AM                                losartan 25 MG tablet   Commonly known as:  COZAAR   Take 1 tablet (25 mg) by mouth daily   Last time this was given:  25 mg on 11/7/2018  8:01 AM                                metFORMIN 500 MG tablet   Commonly known as:  GLUCOPHAGE   Take 500 mg by mouth 2 times daily (with meals)                                nitroGLYcerin 0.4 MG sublingual tablet   Commonly known as:  NITROSTAT   Place 0.4 mg under the tongue every 5 minutes as needed for chest pain For chest pain place 1 tablet  under the tongue every 5 minutes for 3 doses. If symptoms persist 5 minutes after 1st dose call 911.                                PLAVIX 75 MG tablet   Take 75 mg by mouth daily   Last time this was given:  75 mg on 11/7/2018  8:01 AM   Generic drug:  clopidogrel                                sotalol HCl (AF) 160 MG Tabs   Take 160 mg by mouth 2 times daily   Last time this was given:  160 mg on 11/7/2018  8:02 AM                                warfarin 2.5 MG tablet   Commonly known as:  COUMADIN   Take 5 mg by mouth on Mondays and 2.5 mg 6x/week or as directed based on INR   Last time this was given:  5 mg on 11/6/2018  5:51 PM                                          More Information        Warfarin tablets  Brand Names: Coumadin, Jantoven  What is this medicine?  WARFARIN (WAR far in) is an anticoagulant. It is used to treat or prevent clots in the veins, arteries, lungs, or heart.  How should I use this medicine?  Take this medicine by mouth with a glass of water. Follow the directions on the prescription label. You can take this medicine with or without food. Take your medicine at the same time each day. Do not take it more often than directed. Do not stop taking except on your doctor's advice. Stopping this medicine may increase your risk of a blood clot. Be sure to refill your prescription before you run out of medicine.  If your doctor or healthcare professional calls to change your dose, write down the dose and any other instructions. Always read the dose and instructions back to him or her to make sure you understand them. Tell your doctor or healthcare professional what strength of tablets you have on hand. Ask how many tablets you should take to equal your new dose. Write the date on the new instructions and keep them near your medicine. If you are told to stop taking your medicine until your next blood test, call your doctor or healthcare professional if you do not hear anything within 24 hours of  the test to find out your new dose or when to restart your prior dose.  A special MedGuide will be given to you by the pharmacist with each prescription and refill. Be sure to read this information carefully each time.  Talk to your pediatrician regarding the use of this medicine in children. Special care may be needed.  What side effects may I notice from receiving this medicine?  Side effects that you should report to your doctor or health care professional as soon as possible:    allergic reactions like skin rash, itching or hives, swelling of the face, lips, or tongue    breathing problems    chest pain    dizziness    headache    heavy menstrual bleeding or vaginal bleeding    pain in the lower back or side    painful, blue or purple toes    painful skin ulcers that do not go away    signs and symptoms of bleeding such as bloody or black, tarry stools; red or dark-brown urine; spitting up blood or brown material that looks like coffee grounds; red spots on the skin; unusual bruising or bleeding from the eye, gums, or nose    stomach pain    unusually weak or tired  Side effects that usually do not require medical attention (report to your doctor or health care professional if they continue or are bothersome):    diarrhea    hair loss  What may interact with this medicine?  Do not take this medicine with any of the following medications:    agents that prevent or dissolve blood clots    aspirin or other salicylates    danshen    dextrothyroxine    mifepristone    Marilyn's Wort    red yeast rice  This medicine may also interact with the following medications:    acetaminophen    agents that lower cholesterol    alcohol    allopurinol    amiodarone    antibiotics or medicines for treating bacterial, fungal or viral infections    azathioprine    barbiturate medicines for inducing sleep or treating seizures    certain medicines for diabetes    certain medicines for heart rhythm problems    certain medicines for  hepatitis C virus infections like daclatasvir, dasabuvir; ombitasvir; paritaprevir; ritonavir, elbasvir; grazoprevir, ledipasvir; sofosbuvir, simeprevir, sofosbuvir, sofosbuvir; velpatasvir, sofosbuvir; velpatasvir; voxilaprevir    certain medicines for high blood pressure    chloral hydrate    cisapride    conivaptan    disulfiram    female hormones, including contraceptive or birth control pills    general anesthetics    herbal or dietary products like garlic, ginkgo, ginseng, green tea, or kava kava    influenza virus vaccine    male hormones    medicines for mental depression or psychosis    medicines for some types of cancer    medicines for stomach problems    methylphenidate    NSAIDs, medicines for pain and inflammation, like ibuprofen or naproxen    propoxyphene    quinidine, quinine    raloxifene    seizure or epilepsy medicine like carbamazepine, phenytoin, and valproic acid    steroids like cortisone and prednisone    tamoxifen    thyroid medicine    tramadol    vitamin c, vitamin e, and vitamin K    zafirlukast    zileuton  What if I miss a dose?  It is important not to miss a dose. If you miss a dose, call your healthcare provider. Take the dose as soon as possible on the same day. If it is almost time for your next dose, take only that dose. Do not take double or extra doses to make up for a missed dose.  Where should I keep my medicine?  Keep out of the reach of children.  Store at room temperature between 15 and 30 degrees C (59 and 86 degrees F). Protect from light. Throw away any unused medicine after the expiration date. Do not flush down the toilet.  What should I tell my health care provider before I take this medicine?  They need to know if you have any of these conditions:    alcoholism    anemia    bleeding disorders    cancer    diabetes    heart disease    high blood pressure    history of bleeding in the gastrointestinal tract    history of stroke or other brain injury or  disease    kidney or liver disease    protein C deficiency    protein S deficiency    psychosis or dementia    recent injury, recent or planned surgery or procedure    an unusual or allergic reaction to warfarin, other medicines, foods, dyes, or preservatives    pregnant or trying to get pregnant    breast-feeding  What should I watch for while using this medicine?  Visit your doctor or health care professional for regular checks on your progress. You will need to have a blood test called a PT/INR regularly. The PT/INR blood test is done to make sure you are getting the right dose of this medicine. It is important to not miss your appointment for the blood tests. When you first start taking this medicine, these tests are done often. Once the correct dose is determined and you take your medicine properly, these tests can be done less often.  Notify your doctor or health care professional and seek emergency treatment if you develop breathing problems; changes in vision; chest pain; severe, sudden headache; pain, swelling, warmth in the leg; trouble speaking; sudden numbness or weakness of the face, arm or leg. These can be signs that your condition has gotten worse.  While you are taking this medicine, carry an identification card with your name, the name and dose of medicine(s) being used, and the name and phone number of your doctor or health care professional or person to contact in an emergency.  Do not start taking or stop taking any medicines or over-the-counter medicines except on the advice of your doctor or health care professional.  You should discuss your diet with your doctor or health care professional. Do not make major changes in your diet. Vitamin K can affect how well this medicine works. Many foods contain vitamin K. It is important to eat a consistent amount of foods with vitamin K. Other foods with vitamin K that you should eat in consistent amounts are asparagus, basil, black eyed peas, broccoli,  brussel sprouts, cabbage, green onions, green tea, parsley, green leafy vegetables like beet greens, matilde greens, kale, spinach, turnip greens, or certain lettuces like green leaf or pb.  This medicine can cause birth defects or bleeding in an unborn child. Women of childbearing age should use effective birth control while taking this medicine. If a woman becomes pregnant while taking this medicine, she should discuss the potential risks and her options with her health care professional.  Avoid sports and activities that might cause injury while you are using this medicine. Severe falls or injuries can cause unseen bleeding. Be careful when using sharp tools or knives. Consider using an electric razor. Take special care brushing or flossing your teeth. Report any injuries, bruising, or red spots on the skin to your doctor or health care professional.  If you have an illness that causes vomiting, diarrhea, or fever for more than a few days, contact your doctor. Also check with your doctor if you are unable to eat for several days. These problems can change the effect of this medicine.  Even after you stop taking this medicine, it takes several days before your body recovers its normal ability to clot blood. Ask your doctor or health care professional how long you need to be careful. If you are going to have surgery or dental work, tell your doctor or health care professional that you have been taking this medicine.  NOTE:This sheet is a summary. It may not cover all possible information. If you have questions about this medicine, talk to your doctor, pharmacist, or health care provider. Copyright  2018 [employer]

## 2018-11-06 NOTE — ED NOTES
[facility], [place]   ED Nurse to Floor Handoff     Wesley Cooper is a 73 year old male who speaks English and lives with family members,  in a home  They arrived in the ED by car from home    ED Chief Complaint: Chest Pain and Shortness of Breath    ED Dx;   Final diagnoses:   Unstable angina pectoris (H)   Shortness of breath         Needed?: No    Allergies: No Known Allergies.  Past Medical Hx: History reviewed. No pertinent past medical history.   Baseline Mental status: WDL  Current Mental Status changes: at basesline    Infection present or suspected this encounter: no  Sepsis suspected: No  Isolation type: No active isolations     Activity level - Baseline/Home:  Independent  Activity Level - Current:   Independent    Bariatric equipment needed?: No    In the ED these meds were given:   Medications   aspirin EC tablet 325 mg (325 mg Oral Given 11/6/18 1213)       Drips running?  No    Home pump  No    Current LDAs       Labs results:   Labs Ordered and Resulted from Time of ED Arrival Up to the Time of Departure from the ED   CBC WITH PLATELETS DIFFERENTIAL - Abnormal; Notable for the following:        Result Value    Hemoglobin 13.2 (*)     MCH 24.8 (*)     MCHC 31.0 (*)     All other components within normal limits   INR - Abnormal; Notable for the following:     INR 1.68 (*)     All other components within normal limits   COMPREHENSIVE METABOLIC PANEL - Abnormal; Notable for the following:     Glucose 246 (*)     All other components within normal limits   PARTIAL THROMBOPLASTIN TIME   TROPONIN I   NT PROBNP INPATIENT   PERIPHERAL IV CATHETER   CARDIAC CONTINUOUS MONITORING       Imaging Studies:   Recent Results (from the past 24 hour(s))   XR Chest 2 Views    Narrative    PRELIMINARY REPORT - The following report is a preliminary  interpretation.      Impression    IMPRESSION:  No evidence of acute cardiopulmonary process.    Case discussed with senior radiology  resident.       Recent vital signs:   /76  Temp 97.6  F (36.4  C) (Oral)  Resp 19  SpO2 98%    Cardiac Rhythm: Other Atrial paced   Pt needs tele? Yes  Skin/wound Issues: None    Code Status: DNR / DNI, Full code during cath lab     Pain control: pt had none    Nausea control: pt had none    Abnormal labs/tests/findings requiring intervention:      Family present during ED course? Yes   Family Comments/Social Situation comments: Son at bedside     Tasks needing completion: None    Fernando Torres RN    [phone] [facility]

## 2018-11-06 NOTE — PHARMACY-ANTICOAGULATION SERVICE
Clinical Pharmacy - Warfarin Dosing Consult     Pharmacy has been consulted to manage this patient s warfarin therapy.  Indication: Atrial Fibrillation  Therapy Goal: INR 2-3  Provider/Team: [facility]  OP Anticoag Clinic: [facility], [place]  Warfarin Prior to Admission: Yes  Warfarin PTA Regimen: 5 mg on Mon and 2.5 mg ROW  Significant drug interactions: aspirin, clopidogrel    INR   Date Value Ref Range Status   11/06/2018 1.68 (H) 0.86 - 1.14 Final   04/17/2017 2.26 (H) 0.86 - 1.14 Final       Recommend warfarin 5 mg today.  Pharmacy will monitor Wesley Cooper daily and order warfarin doses to achieve specified goal.      Please contact pharmacy as soon as possible if the warfarin needs to be held for a procedure or if the warfarin goals change.

## 2018-11-06 NOTE — LETTER
Transition Communication Hand-off for Care Transitions to Next Level of Care Provider    Name: Wesley Cooper  : 1945  MRN #: [id number]  Primary Care Provider: RAFAELA PHILLIPS     Primary Clinic: [address] [address]     Reason for Hospitalization:  Shortness of breath [R06.02]  Unstable angina pectoris (H) [I20.0]  Admit Date/Time: 2018  8:06 AM  Discharge Date: 2018  Payor Source: Payor: [employer] / Plan: [employer] MEDICARE ADVANTAGE / Product Type: Medicare /     Readmission Assessment Measure (TALIA) Risk Score/category: Average    Reason for Communication Hand-off Referral: Continuity of care  Discharge Plan: Discharge to home with clinic follow up as recommended.     Discharge Needs Assessment:  Needs       Most Recent Value    Anticipated Changes Related to Illness none        Follow-up specialty is recommended: Yes    Follow-up plan:  No future appointments.    Key Recommendations:  Resumption of outpt anticoagulation monitoring, next INR due on 2018  Shama Hurt RN BSN  6C Unit Care Coordinator  Phone number: [phone]  Pager: [phone]

## 2018-11-06 NOTE — H&P
Cardiology History and Physical  Wesley Cooper MRN: [id number]  Age: 73 year old, : 1945  Primary care provider: Jarod De La Rosa            Assessment and Plan:     72 yo with past medical history significant for CAD s/p CABG x4 (known occlusion of SVG-RCA, previously patent LIMA-LAD), Ischemic cardiomyopathy with LVEF 45% s/p CRT-D, DMT2, Atrial flutter on warfarin, chronic colostomy who presents with unstable angina. He gives an unclear history, including accelerating intermittent chest pain at rest with worsening [place], however the pain improves with exertion. Biomarkers are normal today and ECG does not suggest ischemia. On exam he does not appear volume overloaded. Given his known CAD and risk of graft occlusion, favor angiogram today. Will likely require improvement on anti-anginal medication, including increasing beta blocker and Imdur.    # Unstable Angina, #CAD with hx of CABG x4, only LIMA-LAD has been patent, # ICM with LVEF 45%, # HTN  - Troponin negative, , ECG not suggestive of ischemia  - Will get coronary angiogram today  -- Last meal was last night  -- Discussed with patient, plan for Full code during cath  -- INR acceptable at 1.7  -  now  - Holding plavix currently, anticipate restart tomorrow  - Cont Carvedilol 6.25mg BID  -- likely increase to 12.5mg tonight  - Hold amlodipine, likely favor increase of BB or Imdur  - Hold Imdur today (hasn't taken since )  -- Likely increase to 180mg Daily or 90mg BID  - Cont losartan 25mg  - Cont Atorvastatin 80mg    # Aflutter  - Holding warfarin, restart tonight  - Cont home sotalol 160mg BID    DMT2  - Holding metformin, glipizide  - ISS    FEN: DM diet  PPX: LMWH after 24 hours post-cath    Code Status: DNR/DNI after procedure     Patient discussed with staff attending, Dr. Mcqueen.    Johnson Acharya MD  Cardiology Fellow  Pager: [phone]            Chief Complaint:     Chest Pain          History of Present Illness:      History is obtained from the patient    Mr. Wesley Cooper is a 72 yo male with a history of  CAD s/p CABG x4 (known occlusion of SVG-RCA, previously patent LIMA-LAD), Ischemic cardiomyopathy with LVEF 45% s/p CRT-D, DMT2, Atrial flutter on warfarin, chronic colostomy who presents with accelerating intermittent chest pressure. Mr. Cooper states for the past several days he has been having intermittent chest pressure. Pressure is substernal and on the left side of the chest, with radiation down L arm but not to back or jaw. Pressure occurs while at rest. He states he has become winded with activity, but feels the pressure actually improves with activity. Denies association with food. Relieved with nitroglycerin. He denies palpitations, LH/dizziness, N/V, orthopnea, PND. Denies cough or congestion. Was seen in clinic yesterday, however chest pain worsened this morning and he decided to come into the ED.    A 13 point ROS is negative except as mentioned above.          Past Medical History:     History reviewed. No pertinent past medical history.           Past Surgical History:      History reviewed. No pertinent surgical history.           Social History:     Social History     Social History     Marital status:      Spouse name: N/A     Number of children: N/A     Years of education: N/A     Occupational History     Not on file.     Social History Main Topics     Smoking status: Former Smoker     Smokeless tobacco: Never Used      Comment: quit 2010     Alcohol use No     Drug use: No     Sexual activity: Not on file     Other Topics Concern     Not on file     Social History Narrative              Family History:     No family history on file.  Family history reviewed and updated in EPIC          Allergies:     No Known Allergies           Medications:       (Not in a hospital admission)                 Physical Exam:     B/P: 143/76, T: 97.6, P: Data Unavailable, R: 19    Wt Readings from Last 4  Encounters:   11/05/18 69.9 kg (154 lb)   07/30/18 70 kg (154 lb 6.4 oz)   05/07/18 66.9 kg (147 lb 6.4 oz)   11/06/17 65.2 kg (143 lb 11.2 oz)       No intake or output data in the 24 hours ending 11/06/18 1232    Gen: No acute distress  HEENT: NC/AT, PERRL, EOM intact, MMM, OP without exudates  PULM/THORAX: Clear to auscultation bilaterally, no rales/rhonchi/wheezes  CV: RRR, normal S1 and S2, no murmurs or rubs. No JVD  ABD: Soft, NTND, bowel sounds present, no masses  EXT: WWP. No LE edema.  NEURO: CN II-XII grossly intact. A&Ox3          Data:     Labs Reviewed on Admission  Pertinent for:  Lab Results   Component Value Date    TROPI <0.015 11/06/2018    TROPI <0.015 11/05/2018               Most Recent Imaging:     ECG: Regular rhythm. BiV paced rhythm.    Echo: 11/5/18  Interpretation Summary  Mild mitral stenosis is present.  LVEF 45% based on biplane 2D tracing.  End systolic volume is 61 ml.  Right ventricular function, chamber size, wall motion, and thickness are  normal.  The inferior vena cava is normal.  No pericardial effusion is present.  There has been no change.

## 2018-11-06 NOTE — ED PROVIDER NOTES
History     Chief Complaint   Patient presents with     Chest Pain     Shortness of Breath     HPI  Wesley Cooper is a 73 year old male with a history of couple MIs with CABG in 1998 presents with worsening chest pain and shortness of breath.  Symptoms have been worsening for the past several days.  He notes shortness of breath and chest pain with exertion that is relieved by nitro.  This is similar to previous anginal chest pain that has had in the past.  Patient is down here from [place] to see his cardiologist and had an echo yesterday.  Patient is scheduled for an angiogram tomorrow.  He came to the emergency department because his symptoms have been worsening.  No other symptoms noted.    I have reviewed the Medications, Allergies, Past Medical and Surgical History, and Social History in the Epic system.    Review of Systems   Constitutional: Negative for fever.   HENT: Negative for congestion.    Eyes: Negative for redness.   Respiratory: Positive for chest tightness and shortness of breath.    Cardiovascular: Positive for chest pain. Negative for leg swelling.   Gastrointestinal: Negative for abdominal pain.   Genitourinary: Negative for difficulty urinating.   Musculoskeletal: Negative for arthralgias and neck stiffness.   Skin: Negative for color change.   Neurological: Negative for headaches.   Psychiatric/Behavioral: Negative for confusion.   All other systems reviewed and are negative.      Physical Exam   BP: (!) 164/99  Heart Rate: 80  Temp: 97.6  F (36.4  C)  Resp: 16  SpO2: 99 %      Physical Exam   Constitutional: He is oriented to person, place, and time. He appears well-developed and well-nourished. No distress.   HENT:   Head: Normocephalic and atraumatic.   Mouth/Throat: No oropharyngeal exudate.   Eyes: Pupils are equal, round, and reactive to light. Right eye exhibits no discharge. Left eye exhibits no discharge. No scleral icterus.   Neck: Normal range of motion. Neck supple.   Cardiovascular:  Normal rate, regular rhythm, normal heart sounds and intact distal pulses.  Exam reveals no gallop and no friction rub.    No murmur heard.  Pulmonary/Chest: Effort normal and breath sounds normal. No respiratory distress. He has no wheezes. He exhibits no tenderness.   Sternotomy scar.  Pacemaker in place.   Abdominal: Soft. Bowel sounds are normal. He exhibits no distension. There is no tenderness.   Ostomy in place with no issues.   Musculoskeletal: Normal range of motion. He exhibits no edema, tenderness or deformity.   Neurological: He is alert and oriented to person, place, and time. No cranial nerve deficit.   Skin: Skin is warm and dry. No rash noted. He is not diaphoretic. No erythema. No pallor.   Psychiatric: He has a normal mood and affect.   Nursing note and vitals reviewed.      ED Course     ED Course     Results for orders placed or performed during the hospital encounter of 11/06/18   XR Chest 2 Views    Narrative    PRELIMINARY REPORT - The following report is a preliminary  interpretation.      Impression    IMPRESSION:  No evidence of acute cardiopulmonary process.    Case discussed with senior radiology resident.   CBC with platelets differential   Result Value Ref Range    WBC 6.6 4.0 - 11.0 10e9/L    RBC Count 5.32 4.4 - 5.9 10e12/L    Hemoglobin 13.2 (L) 13.3 - 17.7 g/dL    Hematocrit 42.6 40.0 - 53.0 %    MCV 80 78 - 100 fl    MCH 24.8 (L) 26.5 - 33.0 pg    MCHC 31.0 (L) 31.5 - 36.5 g/dL    RDW 14.7 10.0 - 15.0 %    Platelet Count 236 150 - 450 10e9/L    Diff Method Manual Differential     % Neutrophils 76.3 %    % Lymphocytes 16.7 %    % Monocytes 6.1 %    % Eosinophils 0.9 %    % Basophils 0.0 %    Absolute Neutrophil 5.0 1.6 - 8.3 10e9/L    Absolute Lymphocytes 1.1 0.8 - 5.3 10e9/L    Absolute Monocytes 0.4 0.0 - 1.3 10e9/L    Absolute Eosinophils 0.1 0.0 - 0.7 10e9/L    Absolute Basophils 0.0 0.0 - 0.2 10e9/L    RBC Morphology Normal     Platelet Estimate Confirming automated cell count     INR   Result Value Ref Range    INR 1.68 (H) 0.86 - 1.14   Partial thromboplastin time   Result Value Ref Range    PTT 33 22 - 37 sec   Comprehensive metabolic panel   Result Value Ref Range    Sodium 137 133 - 144 mmol/L    Potassium 4.2 3.4 - 5.3 mmol/L    Chloride 106 94 - 109 mmol/L    Carbon Dioxide 22 20 - 32 mmol/L    Anion Gap 9 3 - 14 mmol/L    Glucose 246 (H) 70 - 99 mg/dL    Urea Nitrogen 21 7 - 30 mg/dL    Creatinine 1.01 0.66 - 1.25 mg/dL    GFR Estimate 72 >60 mL/min/1.7m2    GFR Estimate If Black 87 >60 mL/min/1.7m2    Calcium 9.0 8.5 - 10.1 mg/dL    Bilirubin Total 0.8 0.2 - 1.3 mg/dL    Albumin 3.8 3.4 - 5.0 g/dL    Protein Total 8.2 6.8 - 8.8 g/dL    Alkaline Phosphatase 80 40 - 150 U/L    ALT 19 0 - 70 U/L    AST 19 0 - 45 U/L   Troponin I   Result Value Ref Range    Troponin I ES <0.015 0.000 - 0.045 ug/L   Nt probnp inpatient (BNP)   Result Value Ref Range    N-Terminal Pro BNP Inpatient 585 0 - 1800 pg/mL   EKG 12-lead, tracing only   Result Value Ref Range    Interpretation ECG Click View Image link to view waveform and result      Procedures             EKG Interpretation:      Interpreted by William Lynch  Time reviewed: 0822  Symptoms at time of EKG: chest pain   Rhythm: paced  Rate: 77  Axis: Right Axis Deviation  Ectopy: none  Conduction: Biventricular paced rhythm.  ST Segments/ T Waves: No ST-T wave changes  Q Waves: nonspecific  Comparison to prior: Unchanged from 11/5/2018    Clinical Impression: no acute changes                Critical Care time:  none             Labs Ordered and Resulted from Time of ED Arrival Up to the Time of Departure from the ED   CBC WITH PLATELETS DIFFERENTIAL - Abnormal; Notable for the following:        Result Value    Hemoglobin 13.2 (*)     MCH 24.8 (*)     MCHC 31.0 (*)     All other components within normal limits   INR - Abnormal; Notable for the following:     INR 1.68 (*)     All other components within normal limits   COMPREHENSIVE METABOLIC  PANEL - Abnormal; Notable for the following:     Glucose 246 (*)     All other components within normal limits   PARTIAL THROMBOPLASTIN TIME   TROPONIN I   NT PROBNP INPATIENT   PERIPHERAL IV CATHETER   CARDIAC CONTINUOUS MONITORING       Consults  Cardiology: Called (CSI) (11/06/18 1004)    Assessments & Plan (with Medical Decision Making)   This is a 73-year-old male with an extensive cardiac history with worsening anginal symptoms.  Patient is scheduled for an angiogram tomorrow and had an echo done by his cardiologist yesterday.  Patient came in because his symptoms have been worsening.  Symptoms are relieved with nitro.  CBC CMP showed no acute abnormalities.  Troponin was not elevated proBNP was not elevated chest x-ray showed no acute abnormalities.  EKG was similar to one done yesterday.  Patient's Coumadin is currently being held in anticipation of his angiogram.  I discussed the case with cardiology who agrees to admit the patient.    I have reviewed the nursing notes.    I have reviewed the findings, diagnosis, plan and need for follow up with the patient.    New Prescriptions    No medications on file       Final diagnoses:   Unstable angina pectoris (H)   Shortness of breath       11/6/2018   [facility], [place], EMERGENCY DEPARTMENT     William Lynch,   11/06/18 5204

## 2018-11-07 VITALS
TEMPERATURE: 97.7 F | OXYGEN SATURATION: 97 % | DIASTOLIC BLOOD PRESSURE: 72 MMHG | WEIGHT: 147.4 LBS | RESPIRATION RATE: 18 BRPM | BODY MASS INDEX: 24.53 KG/M2 | SYSTOLIC BLOOD PRESSURE: 130 MMHG

## 2018-11-07 LAB
ANION GAP SERPL CALCULATED.3IONS-SCNC: 12 MMOL/L (ref 3–14)
BUN SERPL-MCNC: 25 MG/DL (ref 7–30)
CALCIUM SERPL-MCNC: 8.6 MG/DL (ref 8.5–10.1)
CHLORIDE SERPL-SCNC: 105 MMOL/L (ref 94–109)
CO2 SERPL-SCNC: 20 MMOL/L (ref 20–32)
CREAT SERPL-MCNC: 1.1 MG/DL (ref 0.66–1.25)
ERYTHROCYTE [DISTWIDTH] IN BLOOD BY AUTOMATED COUNT: 14.8 % (ref 10–15)
GFR SERPL CREATININE-BSD FRML MDRD: 65 ML/MIN/1.7M2
GLUCOSE BLDC GLUCOMTR-MCNC: 280 MG/DL (ref 70–99)
GLUCOSE SERPL-MCNC: 256 MG/DL (ref 70–99)
HCT VFR BLD AUTO: 39 % (ref 40–53)
HGB BLD-MCNC: 11.9 G/DL (ref 13.3–17.7)
INR PPP: 1.56 (ref 0.86–1.14)
INTERPRETATION ECG - MUSE: NORMAL
MCH RBC QN AUTO: 24.4 PG (ref 26.5–33)
MCHC RBC AUTO-ENTMCNC: 30.5 G/DL (ref 31.5–36.5)
MCV RBC AUTO: 80 FL (ref 78–100)
PLATELET # BLD AUTO: 227 10E9/L (ref 150–450)
POTASSIUM SERPL-SCNC: 4.1 MMOL/L (ref 3.4–5.3)
RBC # BLD AUTO: 4.87 10E12/L (ref 4.4–5.9)
SODIUM SERPL-SCNC: 137 MMOL/L (ref 133–144)
WBC # BLD AUTO: 6.1 10E9/L (ref 4–11)

## 2018-11-07 PROCEDURE — 00000146 ZZHCL STATISTIC GLUCOSE BY METER IP

## 2018-11-07 PROCEDURE — 90662 IIV NO PRSV INCREASED AG IM: CPT | Performed by: INTERNAL MEDICINE

## 2018-11-07 PROCEDURE — 25000125 ZZHC RX 250: Performed by: STUDENT IN AN ORGANIZED HEALTH CARE EDUCATION/TRAINING PROGRAM

## 2018-11-07 PROCEDURE — 36415 COLL VENOUS BLD VENIPUNCTURE: CPT | Performed by: STUDENT IN AN ORGANIZED HEALTH CARE EDUCATION/TRAINING PROGRAM

## 2018-11-07 PROCEDURE — 25000132 ZZH RX MED GY IP 250 OP 250 PS 637: Performed by: NURSE PRACTITIONER

## 2018-11-07 PROCEDURE — 80048 BASIC METABOLIC PNL TOTAL CA: CPT | Performed by: STUDENT IN AN ORGANIZED HEALTH CARE EDUCATION/TRAINING PROGRAM

## 2018-11-07 PROCEDURE — 99238 HOSP IP/OBS DSCHRG MGMT 30/<: CPT | Performed by: NURSE PRACTITIONER

## 2018-11-07 PROCEDURE — 85027 COMPLETE CBC AUTOMATED: CPT | Performed by: STUDENT IN AN ORGANIZED HEALTH CARE EDUCATION/TRAINING PROGRAM

## 2018-11-07 PROCEDURE — 25000128 H RX IP 250 OP 636: Performed by: INTERNAL MEDICINE

## 2018-11-07 PROCEDURE — 25000132 ZZH RX MED GY IP 250 OP 250 PS 637: Performed by: STUDENT IN AN ORGANIZED HEALTH CARE EDUCATION/TRAINING PROGRAM

## 2018-11-07 PROCEDURE — 85610 PROTHROMBIN TIME: CPT | Performed by: STUDENT IN AN ORGANIZED HEALTH CARE EDUCATION/TRAINING PROGRAM

## 2018-11-07 RX ORDER — CARVEDILOL 12.5 MG/1
12.5 TABLET ORAL 2 TIMES DAILY WITH MEALS
Qty: 60 TABLET | Refills: 1 | Status: SHIPPED | OUTPATIENT
Start: 2018-11-07 | End: 2020-04-20

## 2018-11-07 RX ORDER — ISOSORBIDE MONONITRATE 30 MG/1
90 TABLET, EXTENDED RELEASE ORAL DAILY
Qty: 180 TABLET | Refills: 1 | Status: SHIPPED | OUTPATIENT
Start: 2018-11-08 | End: 2018-12-17

## 2018-11-07 RX ADMIN — INFLUENZA A VIRUS A/MICHIGAN/45/2015 X-275 (H1N1) ANTIGEN (FORMALDEHYDE INACTIVATED), INFLUENZA A VIRUS A/SINGAPORE/INFIMH-16-0019/2016 IVR-186 (H3N2) ANTIGEN (FORMALDEHYDE INACTIVATED), AND INFLUENZA B VIRUS B/MARYLAND/15/2016 BX-69A (A B/COLORADO/6/2017-LIKE VIRUS) ANTIGEN (FORMALDEHYDE INACTIVATED) 0.5 ML: 60; 60; 60 INJECTION, SUSPENSION INTRAMUSCULAR at 10:46

## 2018-11-07 RX ADMIN — CARVEDILOL 12.5 MG: 12.5 TABLET, FILM COATED ORAL at 08:01

## 2018-11-07 RX ADMIN — INSULIN ASPART 3 UNITS: 100 INJECTION, SOLUTION INTRAVENOUS; SUBCUTANEOUS at 08:05

## 2018-11-07 RX ADMIN — LOSARTAN POTASSIUM 25 MG: 25 TABLET ORAL at 08:01

## 2018-11-07 RX ADMIN — CLOPIDOGREL BISULFATE 75 MG: 75 TABLET ORAL at 08:01

## 2018-11-07 RX ADMIN — ISOSORBIDE MONONITRATE 90 MG: 60 TABLET, EXTENDED RELEASE ORAL at 08:01

## 2018-11-07 RX ADMIN — SOTALOL HYDROCHLORIDE 160 MG: 160 TABLET ORAL at 08:02

## 2018-11-07 RX ADMIN — ATORVASTATIN CALCIUM 80 MG: 80 TABLET, FILM COATED ORAL at 08:01

## 2018-11-07 ASSESSMENT — ACTIVITIES OF DAILY LIVING (ADL)
ADLS_ACUITY_SCORE: 9

## 2018-11-07 NOTE — PLAN OF CARE
Problem: Patient Care Overview  Goal: Plan of Care/Patient Progress Review  RN  1. Pt will be hemodynamically stable.   Outcome: No Change  Pt s/p angiogram 11/6. No intervention. TR band off at 2330. Left wrist C/D/I, Radial pulse +2, CMS intact. VSS. Pt A&Ox4. Pt up with SBA and tolerating activity well. Pt plans for possible discharge today.

## 2018-11-07 NOTE — DISCHARGE INSTRUCTIONS
Anticoagulation Instructions:     Today, 11/7/18 your INR was 1.56.  Please take 5 mg today (11/7), take 2.5 mg tomorrow (Thursday 11/8), and get your INR rechecked Friday, 11/9.

## 2018-11-07 NOTE — PLAN OF CARE
Problem: Patient Care Overview  Goal: Plan of Care/Patient Progress Review  RN  1. Pt will be hemodynamically stable.  Admitted from cath lab, s/p angiogram for angina/sob. Angiogram was planned for tomorrow, but pt came to the ER with cp and sob. No intervention. Plan for medical management. Troponin -. INR 1.68. Hypertensive. Discussed with CSI fellow. Instructed to give evening imdur dose early.  After 2 ml of air removed from TR band, site started oozing. 2 ml air replaced in TR band and CSI fellow notified. Instructed to leave TR band on for 30 minutes before trying to remove air. At 1900, no oozing from site, but possible hematoma noted above and below the TR band. CSI fellow here to assess. Instructed to leave 15 ml in TR band until 1930 and then attempt to remove air again. Presently there is 10cc left in TR band. No further oozing noted. Son at bedside, updated by CSI fellow.   Pt oriented to bedside controls and unit routines. Instructed to call for assistance to get OOB. C/o L wrist pain. Medicated with tylenol. Partially effective.  Continue with current poc. Anticipate discharge to home tomorrow.

## 2018-11-07 NOTE — PROVIDER NOTIFICATION
Pt hypertensive. Oozing from radial puncture site, as well as questionable hematoma. CSI fellow, Johnson, here to assess pt. Imdur dose given early per MD instruction. TR band reinflated. Will attempt to remove air from TR band at 1930 per MD instruction.

## 2018-11-07 NOTE — DISCHARGE SUMMARY
[address] [address]  p: [phone]    Discharge Summary: Cardiology Service    Wesley Cooper MRN# [id number]   YOB: 1945 Age: 73 year old       Admission Date: 11/6/2018  Discharge Date: 11/07/18      Discharge Diagnoses:  1. Unstable Angina  2. CAD s/p 4V CABG (only LIMA-LAD graft patent)  3. Ischemic Cardiomyopathy (EF 45%) s/p CRT-D  4. Chronic Atrial Flutter  5. Type 2 Diabetes    Pertinent Procedures:  1. Coronary Angiogram    Consults:  n/a    Imaging with results:  Echocardiogram 11/5/18:  Interpretation Summary  Mild mitral stenosis is present.  LVEF 45% based on biplane 2D tracing.  End systolic volume is 61 ml.  Right ventricular function, chamber size, wall motion, and thickness are  normal.  The inferior vena cava is normal.  No pericardial effusion is present.  There has been no change.    Coronary Angiogram 11/7/18:      Other imaging studies:  EKG 12Lead 11/6/18: AV paced rhythm HR 77      Brief HPI:  74 yo with past medical history significant for CAD s/p CABG x4 (known occlusion of SVG-RCA, previously patent LIMA-LAD), Ischemic cardiomyopathy with LVEF 45% s/p CRT-D, HTN, DMT2, Atrial flutter on warfarin, chronic colostomy who presents with unstable angina. Coronary angiogram with stable CAD, patent LIMA-LAD.     Hospital Course by Diagnosis:  # Unstable Angina  #CAD s/p CABG x4, only LIMA-LAD has been patent,   # ICM with LVEF 45% s/p CRT-D  - Troponin negative, , ECG not suggestive of ischemia  - Coronary angiogram with stable disease, no intervention necessary  - Continue PTA Plavix  - Increase Coreg to 12.5 mg BID  - Hold amlodipine, likely favor increase of BB or Imdur  - Change Imdur from 60 mg BID to 90 mg daily; SBP  during hospital stay, can up-titrate Imdur as needed on outpatient basis  - Continue PTA Losartan 25mg  - Continue Atorvastatin 80mg     #Hypertension  Blood pressure currently  stable, after increase Coreg and resuming patient's PTA medications, SBP 's.     - Stop Amlodipine  - Continue Coreg, Imdur, Losartan as listed above  - Continue to follow up as outpatient for HTN management    # Aflutter  INR 1.56 day of discharge, patient had been holding Coumadin since Saturday 11/3 in preparation for coronary angiogram  - Continue Warfarin; per pharmacy recs give 5 mg tonight, 2.5mg tomorrow, recheck INR Friday for dosing  - Cont home sotalol 160mg BID     #Type 2 Diabetes  Patient currently taking Metformin and Glipizide at home  - Continue to hold Metformin and Glipizide for 48 hours after coronary angiogram  - Continue to follow up as outpatient for DM managment     Condition on discharge  Temp:  [97.7  F (36.5  C)-98.5  F (36.9  C)] 97.7  F (36.5  C)  Heart Rate:  [66-88] 70  Resp:  [12-20] 18  BP: ()/(43-98) 130/72  SpO2:  [94 %-99 %] 97 %  General: Alert, interactive, NAD  Eyes: sclera anicteric, EOMI  Neck: JVP not appreciated, carotid 2+ bilaterally  Cardiovascular: regular rate and irregular rhythm, normal S1 and S2, no murmurs, gallops, or rubs  Resp: clear to auscultation bilaterally, no rales, wheezes, or rhonchi  GI: Soft, nontender, nondistended. +BS.  No HSM or masses, no rebound or guarding.  Extremities: no edema, no cyanosis or clubbing, dorsalis pedis and posterior tibialis pulses 2+ bilaterally  Skin: Warm and dry, no jaundice or rash, radial access site CDI, soft, non-tender, no bruising, no signs hematoma  Neuro: CN 2-12 intact, moves all extremities equally  Psych: Alert & oriented x 3      Medication Changes:  - Increase Coreg 12.5 mg BID  - Discontinue Amlodipine  - Change Imdur from 60 mg BID to 90 mg daily  - Coumadin: Patient to take 5 mg today (11/7), 2.5 mg tomorrow (11/8), and get INR rechecked Friday 11/9    Discharge medications:   Current Discharge Medication List      CONTINUE these medications which have CHANGED    Details   carvedilol (COREG) 12.5  MG tablet Take 1 tablet (12.5 mg) by mouth 2 times daily (with meals)  Qty: 60 tablet, Refills: 1    Associated Diagnoses: Chronic systolic heart failure (H)      isosorbide mononitrate (IMDUR) 30 MG 24 hr tablet Take 3 tablets (90 mg) by mouth daily  Qty: 180 tablet, Refills: 1    Associated Diagnoses: Unstable angina pectoris (H); Ischemic cardiomyopathy         CONTINUE these medications which have NOT CHANGED    Details   atorvastatin (LIPITOR) 80 MG tablet Take 80 mg by mouth daily    Associated Diagnoses: Chronic systolic heart failure (H)      clopidogrel (PLAVIX) 75 MG tablet Take 75 mg by mouth daily    Associated Diagnoses: Chronic systolic heart failure (H)      gabapentin (NEURONTIN) 300 MG capsule Take 300 mg by mouth 3 times daily       glipiZIDE (GLUCOTROL) 5 MG tablet Take 5 mg by mouth 2 times daily (before meals)      losartan (COZAAR) 25 MG tablet Take 1 tablet (25 mg) by mouth daily  Qty: 30 tablet, Refills: 3    Associated Diagnoses: Chronic systolic heart failure (H)      nitroglycerin (NITROSTAT) 0.4 MG sublingual tablet Place 0.4 mg under the tongue every 5 minutes as needed for chest pain For chest pain place 1 tablet under the tongue every 5 minutes for 3 doses. If symptoms persist 5 minutes after 1st dose call 911.    Associated Diagnoses: Chronic systolic heart failure (H)      sotalol HCl, AF, 160 MG TABS Take 160 mg by mouth 2 times daily  Qty: 180 tablet, Refills: 3    Associated Diagnoses: Chronic systolic heart failure (H)      warfarin (COUMADIN) 2.5 MG tablet Take 5 mg by mouth on Mondays and 2.5 mg 6x/week or as directed based on INR    Associated Diagnoses: Chronic systolic heart failure (H)      blood glucose monitoring (ACCU-CHEK TAIWO PLUS) meter device kit       diphenhydrAMINE-acetaminophen (TYLENOL PM)  MG tablet Take 1 tablet by mouth nightly as needed for sleep      metFORMIN (GLUCOPHAGE) 500 MG tablet Take 500 mg by mouth 2 times daily (with meals)         STOP  taking these medications       amLODIPine (NORVASC) 5 MG tablet Comments:   Reason for Stopping:               Labs or imaging requiring follow-up after discharge:  N/A      Follow-up:  - Follow up with Cardiology JOSE in 1 month for follow up  - Follow up with PCP in 1-2 weeks for medication change evaluation and hospitalization follow up     Code status:  Full    Doretha MAYA Hoskins, CNP  [facility] Cardiology  [phone]    I have seen and examined the patient and agree with the finding and plan.       Bill Marquez MD  Cardiology-[facility]  [phone]

## 2018-11-07 NOTE — PROGRESS NOTES
DISCHARGE   Discharged to: Home  Via: Automobile  Accompanied by: Wife  Discharge Instructions: diet, activity, medications, follow up appointments, when to call the MD, and what to watchout for (i.e. s/s of infection, increasing SOB, palpitations, chest pain,)  Prescriptions: To be filled by [facility] pharmacy per pt's request; medication list reviewed & sent with pt  Follow Up Appointments: arranged; information given  Belongings: All sent with pt  IV: out x1  Telemetry: off  Pt exhibits understanding of above discharge instructions; all questions answered.  Discharge Paperwork: faxed  Patient received flu shot in left deltoid. No other questions.    Patient s/p cath lab angiogram 11/6. No interventions. L wrist CDI, CMS intact, pulses +2. Denies pain. Vitals stable, Paced rhythm on tele. Blood glucose checks, 256 this am, adequate appetite. Colostomy bag, patient manages own. Obtained weight. Voiding in toilet, reminded patient to collect in urinal to measure. Continue to monitor and notify CSI with changes/concerns.

## 2018-11-13 ENCOUNTER — TELEPHONE (OUTPATIENT)
Dept: CARDIOLOGY | Facility: CLINIC | Age: 73
End: 2018-11-13

## 2018-12-11 DIAGNOSIS — I50.22 CHRONIC SYSTOLIC CONGESTIVE HEART FAILURE (H): ICD-10-CM

## 2018-12-11 DIAGNOSIS — I25.5 ISCHEMIC CARDIOMYOPATHY: Primary | ICD-10-CM

## 2018-12-17 ENCOUNTER — OFFICE VISIT (OUTPATIENT)
Dept: CARDIOLOGY | Facility: CLINIC | Age: 73
End: 2018-12-17
Attending: INTERNAL MEDICINE
Payer: COMMERCIAL

## 2018-12-17 VITALS
BODY MASS INDEX: 26.63 KG/M2 | DIASTOLIC BLOOD PRESSURE: 80 MMHG | HEART RATE: 66 BPM | OXYGEN SATURATION: 98 % | SYSTOLIC BLOOD PRESSURE: 139 MMHG | HEIGHT: 64 IN | WEIGHT: 156 LBS

## 2018-12-17 DIAGNOSIS — I20.0 UNSTABLE ANGINA PECTORIS (H): ICD-10-CM

## 2018-12-17 DIAGNOSIS — I25.5 ISCHEMIC CARDIOMYOPATHY: ICD-10-CM

## 2018-12-17 DIAGNOSIS — I50.22 CHRONIC SYSTOLIC CONGESTIVE HEART FAILURE (H): ICD-10-CM

## 2018-12-17 DIAGNOSIS — I25.720 CORONARY ARTERY DISEASE INVOLVING AUTOLOGOUS ARTERY CORONARY BYPASS GRAFT WITH UNSTABLE ANGINA PECTORIS (H): Primary | ICD-10-CM

## 2018-12-17 LAB
ANION GAP SERPL CALCULATED.3IONS-SCNC: 8 MMOL/L (ref 3–14)
BUN SERPL-MCNC: 18 MG/DL (ref 7–30)
CALCIUM SERPL-MCNC: 8.7 MG/DL (ref 8.5–10.1)
CHLORIDE SERPL-SCNC: 102 MMOL/L (ref 94–109)
CO2 SERPL-SCNC: 24 MMOL/L (ref 20–32)
CREAT SERPL-MCNC: 1.07 MG/DL (ref 0.66–1.25)
ERYTHROCYTE [DISTWIDTH] IN BLOOD BY AUTOMATED COUNT: 15.3 % (ref 10–15)
GFR SERPL CREATININE-BSD FRML MDRD: 67 ML/MIN/1.7M2
GLUCOSE SERPL-MCNC: 395 MG/DL (ref 70–99)
HCT VFR BLD AUTO: 43.8 % (ref 40–53)
HGB BLD-MCNC: 13.2 G/DL (ref 13.3–17.7)
MCH RBC QN AUTO: 23.8 PG (ref 26.5–33)
MCHC RBC AUTO-ENTMCNC: 30.1 G/DL (ref 31.5–36.5)
MCV RBC AUTO: 79 FL (ref 78–100)
NT-PROBNP SERPL-MCNC: 717 PG/ML (ref 0–450)
PLATELET # BLD AUTO: 218 10E9/L (ref 150–450)
POTASSIUM SERPL-SCNC: 5.1 MMOL/L (ref 3.4–5.3)
RBC # BLD AUTO: 5.55 10E12/L (ref 4.4–5.9)
SODIUM SERPL-SCNC: 134 MMOL/L (ref 133–144)
WBC # BLD AUTO: 8.4 10E9/L (ref 4–11)

## 2018-12-17 PROCEDURE — G0463 HOSPITAL OUTPT CLINIC VISIT: HCPCS | Mod: ZF

## 2018-12-17 PROCEDURE — 99215 OFFICE O/P EST HI 40 MIN: CPT | Mod: ZP | Performed by: INTERNAL MEDICINE

## 2018-12-17 PROCEDURE — 36415 COLL VENOUS BLD VENIPUNCTURE: CPT | Performed by: INTERNAL MEDICINE

## 2018-12-17 PROCEDURE — 80048 BASIC METABOLIC PNL TOTAL CA: CPT | Performed by: INTERNAL MEDICINE

## 2018-12-17 PROCEDURE — 83880 ASSAY OF NATRIURETIC PEPTIDE: CPT | Performed by: INTERNAL MEDICINE

## 2018-12-17 PROCEDURE — 85027 COMPLETE CBC AUTOMATED: CPT | Performed by: INTERNAL MEDICINE

## 2018-12-17 RX ORDER — ISOSORBIDE MONONITRATE 30 MG/1
TABLET, EXTENDED RELEASE ORAL
Qty: 180 TABLET | Refills: 1 | Status: SHIPPED | OUTPATIENT
Start: 2018-12-17 | End: 2019-05-20

## 2018-12-17 RX ORDER — SODIUM CHLORIDE 9 MG/ML
INJECTION, SOLUTION INTRAVENOUS CONTINUOUS
Status: CANCELLED | OUTPATIENT
Start: 2018-12-17

## 2018-12-17 RX ORDER — PIOGLITAZONEHYDROCHLORIDE 30 MG/1
30 TABLET ORAL DAILY
COMMUNITY
End: 2020-09-14

## 2018-12-17 ASSESSMENT — MIFFLIN-ST. JEOR: SCORE: 1353.61

## 2018-12-17 ASSESSMENT — PAIN SCALES - GENERAL: PAINLEVEL: NO PAIN (0)

## 2018-12-17 NOTE — PATIENT INSTRUCTIONS
You were seen today in the Cardiovascular Clinic at the [facility].       Cardiology Providers you saw during your visit: Dr. Loco      Medication Changes:   1. Increase your imdur to a total of 120mg daily. You will take 1 30mg tablet in the morning, 1 30mg tablet in the afternoon and 2 30mg tablets in the evening.  2. Please call me after you get home to review your medications with you.      Patient Instructions:  1. I will contact you with the date and time of your angiogram. I will look to schedule this in 2-3 weeks. This will be completed with Dr. Marquez.     Coronary Angiogram  Angiography is a special type of X-ray that lets your doctor view your coronary arteries to see if the blood vessels to your heart are narrowed or blocked.  The catheter can be placed into the groin, arm, or wrist.       Follow these instructions:     You are scheduled for a Coronary Angiogram at the [facility], [address] [place] [address]. You are to check in at the Gold Waiting Area.    When you arrive you will be escorted back to the pre procedure area called 2A. Here they will insert an IV, draw labs, and obtain a short medical history. Please bring an updated list of your current medications.    A physician will come and talk with you about the procedure and obtain consent.    A nurse from the Cardiac Catheterization Lab will then escort you to the procedure area. You will be receiving sedation during the procedure so you will need someone to drive you to and from the hospital.    After the procedure you will recover for a short period (2 - 6 hours) most likely on unit 6D. You will be discharged with instructions for post procedure care. However, depending on what the angiogram shows you may have to have stents placed and this might require an overnight stay. We ask that you bring a small bag of necessities for your comfort if you would need to stay  overnight. DO NOT BRING ANY VALUABLES!    Pre procedure instructions:  1. You will need a preop physical (H&P) done by your PCP within 30 days prior to the procedure. Please bring a hard copy of this with you to your procedure OR have your PCP fax to: [phone].  2. Make arrangements to have a  as you will not be allowed to drive following the procedure. Someone should stay with you the night after your procedure.  3.  Do not eat any solid food or milk products for 8 hours prior to the exam. You may drink clear liquids until 2 hours prior to the exam. Clear liquids include the following: water, Jell-O, clear broth, apple juice or any non-carbonated drink that you can see through (no pop!).   4. Do not drink alcohol or smoke 24 hours prior to test.  5. Hold these meds:  Do not take your oral diabetic medication or short acting insulin the day of the procedure. Do not take metformin the day of and for 2 days following, contact your PCP regarding glucose control during this time.  Hold your warfarin (3 days prior).  6. You may take your other morning medications as prescribed with a sip of water.   If you currently take a baby aspirin (81mg), take a full aspirin (325mg) the day before and the day of your procedre.  Follow up Appointment Information:  1. Return to see Dr. De Guzman in 3 months.      Results:    Results for NICOLE STEVENSONIN (MRN [id number]) as of 12/17/2018 12:17   Ref. Range 12/17/2018 11:45   Sodium Latest Ref Range: 133 - 144 mmol/L 134   Potassium Latest Ref Range: 3.4 - 5.3 mmol/L 5.1   Chloride Latest Ref Range: 94 - 109 mmol/L 102   Carbon Dioxide Latest Ref Range: 20 - 32 mmol/L 24   Urea Nitrogen Latest Ref Range: 7 - 30 mg/dL 18   Creatinine Latest Ref Range: 0.66 - 1.25 mg/dL 1.07   GFR Estimate Latest Ref Range: >60 mL/min/1.7m2 67   GFR Estimate If Black Latest Ref Range: >60 mL/min/1.7m2 81   Calcium Latest Ref Range: 8.5 - 10.1 mg/dL 8.7   Anion Gap Latest Ref Range: 3 - 14 mmol/L 8  "  N-Terminal Pro Bnp Latest Ref Range: 0 - 450 pg/mL 717 (H)   Glucose Latest Ref Range: 70 - 99 mg/dL 395 (H)   WBC Latest Ref Range: 4.0 - 11.0 10e9/L 8.4   Hemoglobin Latest Ref Range: 13.3 - 17.7 g/dL 13.2 (L)   Hematocrit Latest Ref Range: 40.0 - 53.0 % 43.8   Platelet Count Latest Ref Range: 150 - 450 10e9/L 218   RBC Count Latest Ref Range: 4.4 - 5.9 10e12/L 5.55   MCV Latest Ref Range: 78 - 100 fl 79   MCH Latest Ref Range: 26.5 - 33.0 pg 23.8 (L)   MCHC Latest Ref Range: 31.5 - 36.5 g/dL 30.1 (L)   RDW Latest Ref Range: 10.0 - 15.0 % 15.3 (H)         [address] [facility] on [address]   [place] [id number]        Thank you for allowing us to be a part of your care here at the [facility]      If you have questions or concerns please contact us at:      Calli Isabel RN BSN   Cardiology Care Coordinator  [facility]   Questions and [phone]   First press #1 for the University and then press #3 for \"Medical Advice\" to reach us Cardiology Nurses.        "

## 2018-12-17 NOTE — NURSING NOTE
Chief Complaint   Patient presents with     Follow Up     Reason for visit: 73 year old male presents with chronic systolic heart failure, refractory angina, ICM for hospital follow up with labs prior

## 2018-12-17 NOTE — LETTER
12/17/2018      RE: Wesley Cooper  [address]  [facility] [address]       Dear Colleague,    Thank you for the opportunity to participate in the care of your patient, Wesley Cooper, at the [facility]. Please see a copy of my visit note below.        Dear Dr. De La Rosa,    We had the pleasure of seeing Mr. Wesley Cooper in our advance heart failure clinic at [facility]. As you know, Mr. Wesley Cooper is 73 year old male with past medical history significant for:    1.  Coronary artery disease, status post coronary artery bypass grafting surgery.   2.  Refractory angina with a patent LIMA to LAD, occlusion of the vein graft to RCA and left circumflex and distal LAD native vessel disease, in-stent restenosis of the mid-LAD.   3.  Ischemic cardiomyopathy with an estimated ejection fraction of 40-45%, status post CRT-D.   4.  Type 2 diabetes.   5.  Atrial flutter.   6.  Chronic colostomy.     We saw him for follow-up in early November 2018.  At that time he was having worsening exertional chest pain.  He underwent an elective coronary angiogram which showed mild progression of his LAD disease distal to the touchdown of his LIMA.  This was not a good target for revascularization as it was a very small vessel.  His RODRIGUEZ to LAD was patent.  He had significant native left circumflex which was giving collaterals to the right coronary.  His vein graft to right coronary and circumflex were occluded.  At that time, a decision was made to treat medically as he did not have good targets.  His Imdur dose and Coreg dose were was increased and his Amlodipine was discontinued.     He returns today for follow-up.  He continues to have chest pain on a regular basis.  He still works 6 days a week as a .  He helps his son and building a house.  He does not have any chest pain during his work.  He usually gets pain in the evening after  having his dinner while watching TV.  He describes this as a heaviness in his chest.  It resolves with nitroglycerin.  He has not had any nausea, vomiting or sweating associated with it.  He has no shortness of breath at rest or exertion.  He has not had exertional presyncope or syncope.  He has not had lower extremity swelling or abdominal distention.    Current Medications:    Current Outpatient Medications   Medication Sig     blood glucose monitoring (ACCU-CHEK TAIWO PLUS) meter device kit      isosorbide mononitrate (IMDUR) 30 MG 24 hr tablet Take 1 30mg tablet in the morning. Take 1 30mg tablet in the afternoon. Take 2 30mg tablets in the evening. Total daily dose of 120mg.     atorvastatin (LIPITOR) 80 MG tablet Take 80 mg by mouth daily     carvedilol (COREG) 12.5 MG tablet Take 1 tablet (12.5 mg) by mouth 2 times daily (with meals) (Patient not taking: Reported on 12/17/2018)     clopidogrel (PLAVIX) 75 MG tablet Take 75 mg by mouth daily     diphenhydrAMINE-acetaminophen (TYLENOL PM)  MG tablet Take 1 tablet by mouth nightly as needed for sleep     gabapentin (NEURONTIN) 300 MG capsule Take 300 mg by mouth 3 times daily      glipiZIDE (GLUCOTROL) 5 MG tablet Take 5 mg by mouth 2 times daily (before meals)     losartan (COZAAR) 25 MG tablet Take 1 tablet (25 mg) by mouth daily     nitroglycerin (NITROSTAT) 0.4 MG sublingual tablet Place 0.4 mg under the tongue every 5 minutes as needed for chest pain For chest pain place 1 tablet under the tongue every 5 minutes for 3 doses. If symptoms persist 5 minutes after 1st dose call 911.     pioglitazone (ACTOS) 30 MG tablet Take 30 mg by mouth daily     sotalol HCl, AF, 160 MG TABS Take 160 mg by mouth 2 times daily     warfarin (COUMADIN) 2.5 MG tablet Take 5 mg by mouth on Mondays and 2.5 mg 6x/week or as directed based on INR     No current facility-administered medications for this visit.          ROS:   10 point ROS negative other than what is discussed  "in above HPI.    EXAM:   /80 (BP Location: Right arm, Patient Position: Chair, Cuff Size: Adult Regular)   Pulse 66   Ht 1.626 m (5' 4\")   Wt 70.8 kg (156 lb)   SpO2 98%   BMI 26.78 kg/m      He was in no apparent distress.  He was comfortable.  He had no pallor cyanosis or jaundice.  His neck exam revealed no jugular venous distention.  His carotids were 2+.  His cardiac auscultation revealed normal S1-S2 with no murmur rub or gallop.  Auscultation of his lungs reveal equal air entry on both sides with no added sounds.  His abdomen was soft with normal bowels and no tenderness noted no guarding.  He had no focal neurological deficit.  His extremities showed no edema.      Labs:  CBC RESULTS:   Recent Labs   Lab Test 12/17/18  1145   WBC 8.4   RBC 5.55   HGB 13.2*   HCT 43.8   MCV 79   MCH 23.8*   MCHC 30.1*   RDW 15.3*        Recent Labs   Lab Test 12/17/18  1145 11/07/18  0640    137   POTASSIUM 5.1 4.1   CHLORIDE 102 105   CO2 24 20   ANIONGAP 8 12   * 256*   BUN 18 25   CR 1.07 1.10   KEIRA 8.7 8.6     NTproBNP - 717    Electrocardiogram:  Paced rhythm      Echocardiogram 11/5/18:  Mild mitral stenosis is present.  LVEF 45% based on biplane 2D tracing.  End systolic volume is 61 ml.  Right ventricular function, chamber size, wall motion, and thickness are  normal.  The inferior vena cava is normal.  No pericardial effusion is present.  There has been no change.    Coronary angiogram:    Left main - 95% disease  LAD - 95% disease in the proximal portion and 70-80% disease in the distal LAD distal to the LIMA touchdown  LCX - 95% proximal disease  RCA-    LIMA to LAD - patent  SVG to LCX - 100% occluded  SVG to PDA - 100% occluded    Assessment and Plan:   Mr. Wesley Cooper is a 73 year old male with a history of coronary artery disease, refractory angina, ischemic cardiomyopathy, diabetes, and atrial flutter who presents for follow-up.    1.  Ischemic coronary artery disease with " with refractory angina.     He continues to have limiting angina on a regular basis despite being on good medical therapy.  He is currently on  Imdur 90 mg daily, Coreg 12.5 mg daily, and Cozaar 25 mg daily.  He is on Plavix and atorvastatin.  I have recommended him to increase his Imdur due to 120 mg daily.    I reviewed his angiogram pictures personally with Dr. Bill De La Rosa, the director of our interventional cardiology program.  His proximal left circumflex may be amenable for revascularization.  This is providing collaterals to the right coronary artery territory which is completely occluded.  His cardiac PET scan from [facility] last year showed viability in this area.  Furthermore, he is having angina which would argue for viable myocardium, albeit it may not be in the circumflex territory necessarily.    This would be a high risk intervention due to the nature of his coronary artery disease.  This carries a 0.5-1% risk of myocardial infarction, cardiogenic shock, stroke, renal failure and death during the procedure.  I discussed the risk and benefits of the procedure and he is willing to undergo this..  We will coordinate with Dr. De La Rosa and schedule this as soon as possible.     In the interim I hope her symptoms are controlled on the higher dose of Imdur.  I have recommended to him to call us if he has any further worsening symptoms or go to the emergency room.    2.  Ischemic cardiomyopathy, EF 45%.  NYHA functional class II, stage C.  We will continue carvedilol 12.25 mg twice daily and losartan 25 mg daily at this time.  He does not require diuretics at this time.     3.  Atrial flutter.  Will continue sotalol.  He is currently on Coumadin will hold Coumadin tonight and tomorrow in anticipation for an coronary angiogram on 11/7.    4.  Chronic colostomy.  Patient has seen colorectal surgery who did not recommend reversal of the colostomy at this time due to his increased cardiac  risk.    He will return to see us in 8-12 weeks.  Hopefully he will have his percutaneous coronary intervention done before that.    It was a pleasure meeting Mr. Wesley Cooper in our heart failure clinic at the [facility].  We thank you for involving us in his care.  Please do not hesitate to call us in the interim of any further questions.    Claudy Loco MD       CC  Patient Care Team:  Jarod De La Rosa as PCP - General (Family Practice)  Claudy Loco MD as MD (Cardiology)  Calli Isabel RN as Nurse Coordinator (Cardiology)  SELF, REFERRED

## 2018-12-17 NOTE — LETTER
12/17/2018      RE: Wesley Cooper  [address]  [facility] [address]           Dear Dr. De La Rosa,    We had the pleasure of seeing Mr. Wesley Cooper in our advance heart failure clinic at [facility]. As you know, Mr. Wesley Cooper is 73 year old male with past medical history significant for:    1.  Coronary artery disease, status post coronary artery bypass grafting surgery.   2.  Refractory angina with a patent LIMA to LAD, occlusion of the vein graft to RCA and left circumflex and distal LAD native vessel disease, in-stent restenosis of the mid-LAD.   3.  Ischemic cardiomyopathy with an estimated ejection fraction of 40-45%, status post CRT-D.   4.  Type 2 diabetes.   5.  Atrial flutter.   6.  Chronic colostomy.     We saw him for follow-up in early November 2018.  At that time he was having worsening exertional chest pain.  He underwent an elective coronary angiogram which showed mild progression of his LAD disease distal to the touchdown of his LIMA.  This was not a good target for revascularization as it was a very small vessel.  His RODRIGUEZ to LAD was patent.  He had significant native left circumflex which was giving collaterals to the right coronary.  His vein graft to right coronary and circumflex were occluded.  At that time, a decision was made to treat medically as he did not have good targets.  His Imdur dose and Coreg dose were was increased and his Amlodipine was discontinued.     He returns today for follow-up.  He continues to have chest pain on a regular basis.  He still works 6 days a week as a .  He helps his son and building a house.  He does not have any chest pain during his work.  He usually gets pain in the evening after having his dinner while watching TV.  He describes this as a heaviness in his chest.  It resolves with nitroglycerin.  He has not had any nausea, vomiting or sweating associated with it.  He has no shortness of breath at rest or  "exertion.  He has not had exertional presyncope or syncope.  He has not had lower extremity swelling or abdominal distention.    Current Medications:    Current Outpatient Medications   Medication Sig     blood glucose monitoring (ACCU-CHEK TAIWO PLUS) meter device kit      isosorbide mononitrate (IMDUR) 30 MG 24 hr tablet Take 1 30mg tablet in the morning. Take 1 30mg tablet in the afternoon. Take 2 30mg tablets in the evening. Total daily dose of 120mg.     atorvastatin (LIPITOR) 80 MG tablet Take 80 mg by mouth daily     carvedilol (COREG) 12.5 MG tablet Take 1 tablet (12.5 mg) by mouth 2 times daily (with meals) (Patient not taking: Reported on 12/17/2018)     clopidogrel (PLAVIX) 75 MG tablet Take 75 mg by mouth daily     diphenhydrAMINE-acetaminophen (TYLENOL PM)  MG tablet Take 1 tablet by mouth nightly as needed for sleep     gabapentin (NEURONTIN) 300 MG capsule Take 300 mg by mouth 3 times daily      glipiZIDE (GLUCOTROL) 5 MG tablet Take 5 mg by mouth 2 times daily (before meals)     losartan (COZAAR) 25 MG tablet Take 1 tablet (25 mg) by mouth daily     nitroglycerin (NITROSTAT) 0.4 MG sublingual tablet Place 0.4 mg under the tongue every 5 minutes as needed for chest pain For chest pain place 1 tablet under the tongue every 5 minutes for 3 doses. If symptoms persist 5 minutes after 1st dose call 911.     pioglitazone (ACTOS) 30 MG tablet Take 30 mg by mouth daily     sotalol HCl, AF, 160 MG TABS Take 160 mg by mouth 2 times daily     warfarin (COUMADIN) 2.5 MG tablet Take 5 mg by mouth on Mondays and 2.5 mg 6x/week or as directed based on INR     No current facility-administered medications for this visit.          ROS:   10 point ROS negative other than what is discussed in above HPI.    EXAM:   /80 (BP Location: Right arm, Patient Position: Chair, Cuff Size: Adult Regular)   Pulse 66   Ht 1.626 m (5' 4\")   Wt 70.8 kg (156 lb)   SpO2 98%   BMI 26.78 kg/m      He was in no apparent " distress.  He was comfortable.  He had no pallor cyanosis or jaundice.  His neck exam revealed no jugular venous distention.  His carotids were 2+.  His cardiac auscultation revealed normal S1-S2 with no murmur rub or gallop.  Auscultation of his lungs reveal equal air entry on both sides with no added sounds.  His abdomen was soft with normal bowels and no tenderness noted no guarding.  He had no focal neurological deficit.  His extremities showed no edema.      Labs:  CBC RESULTS:   Recent Labs   Lab Test 12/17/18  1145   WBC 8.4   RBC 5.55   HGB 13.2*   HCT 43.8   MCV 79   MCH 23.8*   MCHC 30.1*   RDW 15.3*        Recent Labs   Lab Test 12/17/18  1145 11/07/18  0640    137   POTASSIUM 5.1 4.1   CHLORIDE 102 105   CO2 24 20   ANIONGAP 8 12   * 256*   BUN 18 25   CR 1.07 1.10   KEIRA 8.7 8.6     NTproBNP - 717    Electrocardiogram:  Paced rhythm      Echocardiogram 11/5/18:  Mild mitral stenosis is present.  LVEF 45% based on biplane 2D tracing.  End systolic volume is 61 ml.  Right ventricular function, chamber size, wall motion, and thickness are  normal.  The inferior vena cava is normal.  No pericardial effusion is present.  There has been no change.    Coronary angiogram:    Left main - 95% disease  LAD - 95% disease in the proximal portion and 70-80% disease in the distal LAD distal to the LIMA touchdown  LCX - 95% proximal disease  RCA-    LIMA to LAD - patent  SVG to LCX - 100% occluded  SVG to PDA - 100% occluded    Assessment and Plan:   Mr. Wesley Cooper is a 73 year old male with a history of coronary artery disease, refractory angina, ischemic cardiomyopathy, diabetes, and atrial flutter who presents for follow-up.    1.  Ischemic coronary artery disease with with refractory angina.     He continues to have limiting angina on a regular basis despite being on good medical therapy.  He is currently on  Imdur 90 mg daily, Coreg 12.5 mg daily, and Cozaar 25 mg daily.  He is on Plavix  and atorvastatin.  I have recommended him to increase his Imdur due to 120 mg daily.    I reviewed his angiogram pictures personally with Dr. Bill De La Rosa, the director of our interventional cardiology program.  His proximal left circumflex may be amenable for revascularization.  This is providing collaterals to the right coronary artery territory which is completely occluded.  His cardiac PET scan from [facility] last year showed viability in this area.  Furthermore, he is having angina which would argue for viable myocardium, albeit it may not be in the circumflex territory necessarily.    This would be a high risk intervention due to the nature of his coronary artery disease.  This carries a 0.5-1% risk of myocardial infarction, cardiogenic shock, stroke, renal failure and death during the procedure.  I discussed the risk and benefits of the procedure and he is willing to undergo this..  We will coordinate with Dr. De La Rosa and schedule this as soon as possible.     In the interim I hope her symptoms are controlled on the higher dose of Imdur.  I have recommended to him to call us if he has any further worsening symptoms or go to the emergency room.    2.  Ischemic cardiomyopathy, EF 45%.  NYHA functional class II, stage C.  We will continue carvedilol 12.25 mg twice daily and losartan 25 mg daily at this time.  He does not require diuretics at this time.     3.  Atrial flutter.  Will continue sotalol.  He is currently on Coumadin will hold Coumadin tonight and tomorrow in anticipation for an coronary angiogram on 11/7.    4.  Chronic colostomy.  Patient has seen colorectal surgery who did not recommend reversal of the colostomy at this time due to his increased cardiac risk.    He will return to see us in 8-12 weeks.  Hopefully he will have his percutaneous coronary intervention done before that.    It was a pleasure meeting Mr. Wesley Cooper in our heart failure clinic at the [facility].  We thank you for involving us in his care.  Please do not hesitate to call us in the interim of any further questions.    Sincerely,    Claudy Loco MD   [facility]  Section of Advanced Heart Failure   Cardiovascular Division  [facility]   [phone]        Patient Care Team:  Jarod De La Rosa as PCP - General (Family Practice)  Claudy Loco MD as MD (Cardiology)  Calli Isabel RN as Nurse Coordinator (Cardiology)  SELF, REFERRED      Claudy Loco MD

## 2018-12-18 ENCOUNTER — TELEPHONE (OUTPATIENT)
Dept: CARDIOLOGY | Facility: CLINIC | Age: 73
End: 2018-12-18

## 2018-12-18 NOTE — TELEPHONE ENCOUNTER
Returned call to review patients medication list. Ok per Erin to speak with Bianca regarding medications.  Bianca went through each pill bottle they have and read me the medication name and dosage/instructions. Compared these to our medication list on file. Bianca reports patient was told to stop metformin so I have removed this from our list. Bianca also reports they don't have a bottle for losartan/cozaar which he should be taking. She will look for the bottle and contact their pharmacy for a refill if needed. She reports no additional questions at this time.

## 2018-12-18 NOTE — TELEPHONE ENCOUNTER
[facility] Call Center    Phone Message    May a detailed message be left on voicemail: yes    Reason for Call: Other: Pt's wife Bianca requesting call back from Dr. Loco's nurse to give an update on pt's med list     Action Taken: Message routed to:  [facility] ([facility] cardio

## 2018-12-22 NOTE — PROGRESS NOTES
Dear Dr. De La Rosa,    We had the pleasure of seeing Mr. Wesley Cooper in our advance heart failure clinic at [facility]. As you know, Mr. Wesley Cooper is 73 year old male with past medical history significant for:    1.  Coronary artery disease, status post coronary artery bypass grafting surgery.   2.  Refractory angina with a patent LIMA to LAD, occlusion of the vein graft to RCA and left circumflex and distal LAD native vessel disease, in-stent restenosis of the mid-LAD.   3.  Ischemic cardiomyopathy with an estimated ejection fraction of 40-45%, status post CRT-D.   4.  Type 2 diabetes.   5.  Atrial flutter.   6.  Chronic colostomy.     We saw him for follow-up in early November 2018.  At that time he was having worsening exertional chest pain.  He underwent an elective coronary angiogram which showed mild progression of his LAD disease distal to the touchdown of his LIMA.  This was not a good target for revascularization as it was a very small vessel.  His RODRIGUEZ to LAD was patent.  He had significant native left circumflex which was giving collaterals to the right coronary.  His vein graft to right coronary and circumflex were occluded.  At that time, a decision was made to treat medically as he did not have good targets.  His Imdur dose and Coreg dose were was increased and his Amlodipine was discontinued.     He returns today for follow-up.  He continues to have chest pain on a regular basis.  He still works 6 days a week as a .  He helps his son and building a house.  He does not have any chest pain during his work.  He usually gets pain in the evening after having his dinner while watching TV.  He describes this as a heaviness in his chest.  It resolves with nitroglycerin.  He has not had any nausea, vomiting or sweating associated with it.  He has no shortness of breath at rest or exertion.  He has not had exertional presyncope or syncope.  He has not had  "lower extremity swelling or abdominal distention.    Current Medications:    Current Outpatient Medications   Medication Sig     blood glucose monitoring (ACCU-CHEK TAIWO PLUS) meter device kit      isosorbide mononitrate (IMDUR) 30 MG 24 hr tablet Take 1 30mg tablet in the morning. Take 1 30mg tablet in the afternoon. Take 2 30mg tablets in the evening. Total daily dose of 120mg.     atorvastatin (LIPITOR) 80 MG tablet Take 80 mg by mouth daily     carvedilol (COREG) 12.5 MG tablet Take 1 tablet (12.5 mg) by mouth 2 times daily (with meals) (Patient not taking: Reported on 12/17/2018)     clopidogrel (PLAVIX) 75 MG tablet Take 75 mg by mouth daily     diphenhydrAMINE-acetaminophen (TYLENOL PM)  MG tablet Take 1 tablet by mouth nightly as needed for sleep     gabapentin (NEURONTIN) 300 MG capsule Take 300 mg by mouth 3 times daily      glipiZIDE (GLUCOTROL) 5 MG tablet Take 5 mg by mouth 2 times daily (before meals)     losartan (COZAAR) 25 MG tablet Take 1 tablet (25 mg) by mouth daily     nitroglycerin (NITROSTAT) 0.4 MG sublingual tablet Place 0.4 mg under the tongue every 5 minutes as needed for chest pain For chest pain place 1 tablet under the tongue every 5 minutes for 3 doses. If symptoms persist 5 minutes after 1st dose call 911.     pioglitazone (ACTOS) 30 MG tablet Take 30 mg by mouth daily     sotalol HCl, AF, 160 MG TABS Take 160 mg by mouth 2 times daily     warfarin (COUMADIN) 2.5 MG tablet Take 5 mg by mouth on Mondays and 2.5 mg 6x/week or as directed based on INR     No current facility-administered medications for this visit.          ROS:   10 point ROS negative other than what is discussed in above HPI.    EXAM:   /80 (BP Location: Right arm, Patient Position: Chair, Cuff Size: Adult Regular)   Pulse 66   Ht 1.626 m (5' 4\")   Wt 70.8 kg (156 lb)   SpO2 98%   BMI 26.78 kg/m     He was in no apparent distress.  He was comfortable.  He had no pallor cyanosis or jaundice.  His neck " exam revealed no jugular venous distention.  His carotids were 2+.  His cardiac auscultation revealed normal S1-S2 with no murmur rub or gallop.  Auscultation of his lungs reveal equal air entry on both sides with no added sounds.  His abdomen was soft with normal bowels and no tenderness noted no guarding.  He had no focal neurological deficit.  His extremities showed no edema.      Labs:  CBC RESULTS:   Recent Labs   Lab Test 12/17/18  1145   WBC 8.4   RBC 5.55   HGB 13.2*   HCT 43.8   MCV 79   MCH 23.8*   MCHC 30.1*   RDW 15.3*        Recent Labs   Lab Test 12/17/18  1145 11/07/18  0640    137   POTASSIUM 5.1 4.1   CHLORIDE 102 105   CO2 24 20   ANIONGAP 8 12   * 256*   BUN 18 25   CR 1.07 1.10   KEIRA 8.7 8.6     NTproBNP - 717    Electrocardiogram:  Paced rhythm      Echocardiogram 11/5/18:  Mild mitral stenosis is present.  LVEF 45% based on biplane 2D tracing.  End systolic volume is 61 ml.  Right ventricular function, chamber size, wall motion, and thickness are  normal.  The inferior vena cava is normal.  No pericardial effusion is present.  There has been no change.    Coronary angiogram:    Left main - 95% disease  LAD - 95% disease in the proximal portion and 70-80% disease in the distal LAD distal to the LIMA touchdown  LCX - 95% proximal disease  RCA-    LIMA to LAD - patent  SVG to LCX - 100% occluded  SVG to PDA - 100% occluded    Assessment and Plan:   Mr. Wesley Cooper is a 73 year old male with a history of coronary artery disease, refractory angina, ischemic cardiomyopathy, diabetes, and atrial flutter who presents for follow-up.    1.  Ischemic coronary artery disease with with refractory angina.     He continues to have limiting angina on a regular basis despite being on good medical therapy.  He is currently on  Imdur 90 mg daily, Coreg 12.5 mg daily, and Cozaar 25 mg daily.  He is on Plavix and atorvastatin.  I have recommended him to increase his Imdur due to 120 mg  daily.    I reviewed his angiogram pictures personally with Dr. Bill De La Rosa, the director of our interventional cardiology program.  His proximal left circumflex may be amenable for revascularization.  This is providing collaterals to the right coronary artery territory which is completely occluded.  His cardiac PET scan from [facility] last year showed viability in this area.  Furthermore, he is having angina which would argue for viable myocardium, albeit it may not be in the circumflex territory necessarily.    This would be a high risk intervention due to the nature of his coronary artery disease.  This carries a 0.5-1% risk of myocardial infarction, cardiogenic shock, stroke, renal failure and death during the procedure.  I discussed the risk and benefits of the procedure and he is willing to undergo this..  We will coordinate with Dr. De La Rosa and schedule this as soon as possible.     In the interim I hope her symptoms are controlled on the higher dose of Imdur.  I have recommended to him to call us if he has any further worsening symptoms or go to the emergency room.    2.  Ischemic cardiomyopathy, EF 45%.  NYHA functional class II, stage C.  We will continue carvedilol 12.25 mg twice daily and losartan 25 mg daily at this time.  He does not require diuretics at this time.     3.  Atrial flutter.  Will continue sotalol.  He is currently on Coumadin will hold Coumadin tonight and tomorrow in anticipation for an coronary angiogram on 11/7.    4.  Chronic colostomy.  Patient has seen colorectal surgery who did not recommend reversal of the colostomy at this time due to his increased cardiac risk.    He will return to see us in 8-12 weeks.  Hopefully he will have his percutaneous coronary intervention done before that.    It was a pleasure meeting Mr. Wesley Cooper in our heart failure clinic at the [facility].  We thank you for involving us in his care.  Please do not  hesitate to call us in the interim of any further questions.    Sincerely,    Claudy Loco MD   [facility]  Section of Advanced Heart Failure   Cardiovascular Division  [facility]   [phone]      CC  Patient Care Team:  Jarod De La Rosa as PCP - General (Family Practice)  Claudy Loco MD as MD (Cardiology)  Calli Isabel RN as Nurse Coordinator (Cardiology)  SELF, REFERRED

## 2019-01-14 ENCOUNTER — TRANSFERRED RECORDS (OUTPATIENT)
Dept: HEALTH INFORMATION MANAGEMENT | Facility: CLINIC | Age: 74
End: 2019-01-14

## 2019-01-24 ENCOUNTER — HOSPITAL ENCOUNTER (OUTPATIENT)
Facility: CLINIC | Age: 74
Setting detail: OBSERVATION
Discharge: HOME | End: 2019-01-25
Attending: INTERNAL MEDICINE | Admitting: INTERNAL MEDICINE
Payer: COMMERCIAL

## 2019-01-24 ENCOUNTER — APPOINTMENT (OUTPATIENT)
Dept: MEDSURG UNIT | Facility: CLINIC | Age: 74
End: 2019-01-24
Attending: INTERNAL MEDICINE
Payer: COMMERCIAL

## 2019-01-24 ENCOUNTER — APPOINTMENT (OUTPATIENT)
Dept: LAB | Facility: CLINIC | Age: 74
End: 2019-01-24
Attending: INTERNAL MEDICINE
Payer: COMMERCIAL

## 2019-01-24 DIAGNOSIS — I20.0 UNSTABLE ANGINA PECTORIS (H): ICD-10-CM

## 2019-01-24 DIAGNOSIS — I25.5 ISCHEMIC CARDIOMYOPATHY: ICD-10-CM

## 2019-01-24 DIAGNOSIS — I50.22 CHRONIC SYSTOLIC HEART FAILURE (H): ICD-10-CM

## 2019-01-24 DIAGNOSIS — Z86.79 HISTORY OF ATRIAL FLUTTER: ICD-10-CM

## 2019-01-24 DIAGNOSIS — I25.10 CORONARY ARTERY DISEASE DUE TO LIPID RICH PLAQUE: Primary | ICD-10-CM

## 2019-01-24 DIAGNOSIS — I25.83 CORONARY ARTERY DISEASE DUE TO LIPID RICH PLAQUE: Primary | ICD-10-CM

## 2019-01-24 DIAGNOSIS — I25.720 CORONARY ARTERY DISEASE INVOLVING AUTOLOGOUS ARTERY CORONARY BYPASS GRAFT WITH UNSTABLE ANGINA PECTORIS (H): ICD-10-CM

## 2019-01-24 PROBLEM — Z98.61 CAD S/P PERCUTANEOUS CORONARY ANGIOPLASTY: Status: ACTIVE | Noted: 2019-01-24

## 2019-01-24 PROBLEM — Z98.890 STATUS POST CORONARY ANGIOGRAM: Status: ACTIVE | Noted: 2019-01-24

## 2019-01-24 LAB
ANION GAP SERPL CALCULATED.3IONS-SCNC: 7 MMOL/L (ref 3–14)
BUN SERPL-MCNC: 20 MG/DL (ref 7–30)
CALCIUM SERPL-MCNC: 8.7 MG/DL (ref 8.5–10.1)
CHLORIDE SERPL-SCNC: 106 MMOL/L (ref 94–109)
CO2 SERPL-SCNC: 26 MMOL/L (ref 20–32)
CREAT SERPL-MCNC: 1.01 MG/DL (ref 0.66–1.25)
ERYTHROCYTE [DISTWIDTH] IN BLOOD BY AUTOMATED COUNT: 18.7 % (ref 10–15)
GFR SERPL CREATININE-BSD FRML MDRD: 72 ML/MIN/{1.73_M2}
GLUCOSE SERPL-MCNC: 215 MG/DL (ref 70–99)
HCT VFR BLD AUTO: 40.9 % (ref 40–53)
HGB BLD-MCNC: 12.2 G/DL (ref 13.3–17.7)
INR PPP: 1.49 (ref 0.86–1.14)
KCT BLD-ACNC: 188 SEC (ref 75–150)
KCT BLD-ACNC: 204 SEC (ref 75–150)
KCT BLD-ACNC: 241 SEC (ref 75–150)
KCT BLD-ACNC: 266 SEC (ref 75–150)
KCT BLD-ACNC: 282 SEC (ref 75–150)
KCT BLD-ACNC: 290 SEC (ref 75–150)
KCT BLD-ACNC: 310 SEC (ref 75–150)
MCH RBC QN AUTO: 24.3 PG (ref 26.5–33)
MCHC RBC AUTO-ENTMCNC: 29.8 G/DL (ref 31.5–36.5)
MCV RBC AUTO: 82 FL (ref 78–100)
PLATELET # BLD AUTO: 211 10E9/L (ref 150–450)
POTASSIUM SERPL-SCNC: 4.4 MMOL/L (ref 3.4–5.3)
RBC # BLD AUTO: 5.02 10E12/L (ref 4.4–5.9)
SODIUM SERPL-SCNC: 139 MMOL/L (ref 133–144)
WBC # BLD AUTO: 5 10E9/L (ref 4–11)

## 2019-01-24 PROCEDURE — 99152 MOD SED SAME PHYS/QHP 5/>YRS: CPT | Performed by: INTERNAL MEDICINE

## 2019-01-24 PROCEDURE — 25000132 ZZH RX MED GY IP 250 OP 250 PS 637: Performed by: INTERNAL MEDICINE

## 2019-01-24 PROCEDURE — 92924 PRQ TRLUML C ATHRC 1 ART&/BR: CPT | Performed by: INTERNAL MEDICINE

## 2019-01-24 PROCEDURE — 93454 CORONARY ARTERY ANGIO S&I: CPT | Mod: 26 | Performed by: INTERNAL MEDICINE

## 2019-01-24 PROCEDURE — 93010 ELECTROCARDIOGRAM REPORT: CPT | Performed by: INTERNAL MEDICINE

## 2019-01-24 PROCEDURE — 40000172 ZZH STATISTIC PROCEDURE PREP ONLY

## 2019-01-24 PROCEDURE — 92924 PRQ TRLUML C ATHRC 1 ART&/BR: CPT | Mod: LM | Performed by: INTERNAL MEDICINE

## 2019-01-24 PROCEDURE — C1769 GUIDE WIRE: HCPCS | Performed by: INTERNAL MEDICINE

## 2019-01-24 PROCEDURE — 99153 MOD SED SAME PHYS/QHP EA: CPT | Performed by: INTERNAL MEDICINE

## 2019-01-24 PROCEDURE — 85347 COAGULATION TIME ACTIVATED: CPT | Mod: 91

## 2019-01-24 PROCEDURE — G0378 HOSPITAL OBSERVATION PER HR: HCPCS

## 2019-01-24 PROCEDURE — 85027 COMPLETE CBC AUTOMATED: CPT | Performed by: INTERNAL MEDICINE

## 2019-01-24 PROCEDURE — C1725 CATH, TRANSLUMIN NON-LASER: HCPCS | Performed by: INTERNAL MEDICINE

## 2019-01-24 PROCEDURE — 80048 BASIC METABOLIC PNL TOTAL CA: CPT | Performed by: INTERNAL MEDICINE

## 2019-01-24 PROCEDURE — 25000128 H RX IP 250 OP 636: Performed by: NURSE PRACTITIONER

## 2019-01-24 PROCEDURE — C1894 INTRO/SHEATH, NON-LASER: HCPCS | Performed by: INTERNAL MEDICINE

## 2019-01-24 PROCEDURE — 92924 PRQ TRLUML C ATHRC 1 ART&/BR: CPT | Mod: LC

## 2019-01-24 PROCEDURE — 99214 OFFICE O/P EST MOD 30 MIN: CPT | Performed by: PHYSICIAN ASSISTANT

## 2019-01-24 PROCEDURE — 40000556 ZZH STATISTIC PERIPHERAL IV START W US GUIDANCE

## 2019-01-24 PROCEDURE — 92920 PRQ TRLUML C ANGIOP 1ART&/BR: CPT | Mod: LC | Performed by: INTERNAL MEDICINE

## 2019-01-24 PROCEDURE — 99152 MOD SED SAME PHYS/QHP 5/>YRS: CPT | Mod: 59 | Performed by: INTERNAL MEDICINE

## 2019-01-24 PROCEDURE — 85610 PROTHROMBIN TIME: CPT | Performed by: INTERNAL MEDICINE

## 2019-01-24 PROCEDURE — 36415 COLL VENOUS BLD VENIPUNCTURE: CPT | Performed by: INTERNAL MEDICINE

## 2019-01-24 PROCEDURE — C1887 CATHETER, GUIDING: HCPCS | Performed by: INTERNAL MEDICINE

## 2019-01-24 PROCEDURE — 27210794 ZZH OR GENERAL SUPPLY STERILE: Performed by: INTERNAL MEDICINE

## 2019-01-24 PROCEDURE — 93005 ELECTROCARDIOGRAM TRACING: CPT

## 2019-01-24 PROCEDURE — C1757 CATH, THROMBECTOMY/EMBOLECT: HCPCS | Performed by: INTERNAL MEDICINE

## 2019-01-24 PROCEDURE — 40000065 ZZH STATISTIC EKG NON-CHARGEABLE

## 2019-01-24 PROCEDURE — 25000128 H RX IP 250 OP 636: Performed by: INTERNAL MEDICINE

## 2019-01-24 RX ORDER — GABAPENTIN 300 MG/1
300 CAPSULE ORAL 3 TIMES DAILY
Status: DISCONTINUED | OUTPATIENT
Start: 2019-01-24 | End: 2019-01-25 | Stop reason: HOSPADM

## 2019-01-24 RX ORDER — ISOSORBIDE MONONITRATE 30 MG/1
30 TABLET, EXTENDED RELEASE ORAL
Status: DISCONTINUED | OUTPATIENT
Start: 2019-01-25 | End: 2019-01-25 | Stop reason: HOSPADM

## 2019-01-24 RX ORDER — ENALAPRILAT 1.25 MG/ML
1.25-2.5 INJECTION INTRAVENOUS
Status: DISCONTINUED | OUTPATIENT
Start: 2019-01-24 | End: 2019-01-24 | Stop reason: HOSPADM

## 2019-01-24 RX ORDER — NITROGLYCERIN 20 MG/100ML
.07-2 INJECTION INTRAVENOUS CONTINUOUS PRN
Status: DISCONTINUED | OUTPATIENT
Start: 2019-01-24 | End: 2019-01-24 | Stop reason: HOSPADM

## 2019-01-24 RX ORDER — EPTIFIBATIDE 2 MG/ML
1 INJECTION, SOLUTION INTRAVENOUS CONTINUOUS PRN
Status: DISCONTINUED | OUTPATIENT
Start: 2019-01-24 | End: 2019-01-24 | Stop reason: HOSPADM

## 2019-01-24 RX ORDER — DOPAMINE HYDROCHLORIDE 160 MG/100ML
2-20 INJECTION, SOLUTION INTRAVENOUS CONTINUOUS PRN
Status: DISCONTINUED | OUTPATIENT
Start: 2019-01-24 | End: 2019-01-24 | Stop reason: HOSPADM

## 2019-01-24 RX ORDER — EPINEPHRINE 1 MG/ML
0.3 INJECTION, SOLUTION, CONCENTRATE INTRAVENOUS
Status: DISCONTINUED | OUTPATIENT
Start: 2019-01-24 | End: 2019-01-24 | Stop reason: HOSPADM

## 2019-01-24 RX ORDER — CLOPIDOGREL BISULFATE 75 MG/1
75 TABLET ORAL DAILY
Status: DISCONTINUED | OUTPATIENT
Start: 2019-01-25 | End: 2019-01-25 | Stop reason: HOSPADM

## 2019-01-24 RX ORDER — SODIUM CHLORIDE 9 MG/ML
INJECTION, SOLUTION INTRAVENOUS CONTINUOUS
Status: DISCONTINUED | OUTPATIENT
Start: 2019-01-24 | End: 2019-01-24 | Stop reason: HOSPADM

## 2019-01-24 RX ORDER — FENTANYL CITRATE 50 UG/ML
25-50 INJECTION, SOLUTION INTRAMUSCULAR; INTRAVENOUS
Status: DISCONTINUED | OUTPATIENT
Start: 2019-01-24 | End: 2019-01-24 | Stop reason: HOSPADM

## 2019-01-24 RX ORDER — NALOXONE HYDROCHLORIDE 0.4 MG/ML
.1-.4 INJECTION, SOLUTION INTRAMUSCULAR; INTRAVENOUS; SUBCUTANEOUS
Status: DISCONTINUED | OUTPATIENT
Start: 2019-01-24 | End: 2019-01-25 | Stop reason: HOSPADM

## 2019-01-24 RX ORDER — POTASSIUM CHLORIDE 29.8 MG/ML
20 INJECTION INTRAVENOUS
Status: DISCONTINUED | OUTPATIENT
Start: 2019-01-24 | End: 2019-01-24 | Stop reason: HOSPADM

## 2019-01-24 RX ORDER — ONDANSETRON 2 MG/ML
4 INJECTION INTRAMUSCULAR; INTRAVENOUS EVERY 4 HOURS PRN
Status: DISCONTINUED | OUTPATIENT
Start: 2019-01-24 | End: 2019-01-24 | Stop reason: HOSPADM

## 2019-01-24 RX ORDER — SODIUM NITROPRUSSIDE 25 MG/ML
100-200 INJECTION INTRAVENOUS
Status: DISCONTINUED | OUTPATIENT
Start: 2019-01-24 | End: 2019-01-24 | Stop reason: HOSPADM

## 2019-01-24 RX ORDER — ACETAMINOPHEN 325 MG/1
650 TABLET ORAL EVERY 4 HOURS PRN
Status: DISCONTINUED | OUTPATIENT
Start: 2019-01-24 | End: 2019-01-25 | Stop reason: HOSPADM

## 2019-01-24 RX ORDER — PRASUGREL 10 MG/1
10-60 TABLET, FILM COATED ORAL
Status: DISCONTINUED | OUTPATIENT
Start: 2019-01-24 | End: 2019-01-24 | Stop reason: HOSPADM

## 2019-01-24 RX ORDER — LORAZEPAM 2 MG/ML
.5-2 INJECTION INTRAMUSCULAR EVERY 4 HOURS PRN
Status: DISCONTINUED | OUTPATIENT
Start: 2019-01-24 | End: 2019-01-24 | Stop reason: HOSPADM

## 2019-01-24 RX ORDER — NALOXONE HYDROCHLORIDE 0.4 MG/ML
0.4 INJECTION, SOLUTION INTRAMUSCULAR; INTRAVENOUS; SUBCUTANEOUS EVERY 5 MIN PRN
Status: DISCONTINUED | OUTPATIENT
Start: 2019-01-24 | End: 2019-01-24 | Stop reason: HOSPADM

## 2019-01-24 RX ORDER — PROTAMINE SULFATE 10 MG/ML
25-100 INJECTION, SOLUTION INTRAVENOUS EVERY 5 MIN PRN
Status: DISCONTINUED | OUTPATIENT
Start: 2019-01-24 | End: 2019-01-24 | Stop reason: HOSPADM

## 2019-01-24 RX ORDER — CLOPIDOGREL BISULFATE 75 MG/1
300-600 TABLET ORAL
Status: DISCONTINUED | OUTPATIENT
Start: 2019-01-24 | End: 2019-01-24 | Stop reason: HOSPADM

## 2019-01-24 RX ORDER — PHENYLEPHRINE HCL IN 0.9% NACL 1 MG/10 ML
20-100 SYRINGE (ML) INTRAVENOUS
Status: DISCONTINUED | OUTPATIENT
Start: 2019-01-24 | End: 2019-01-24 | Stop reason: HOSPADM

## 2019-01-24 RX ORDER — PIOGLITAZONEHYDROCHLORIDE 30 MG/1
30 TABLET ORAL DAILY
Status: DISCONTINUED | OUTPATIENT
Start: 2019-01-25 | End: 2019-01-25 | Stop reason: HOSPADM

## 2019-01-24 RX ORDER — ISOSORBIDE MONONITRATE 60 MG/1
60 TABLET, EXTENDED RELEASE ORAL EVERY EVENING
Status: DISCONTINUED | OUTPATIENT
Start: 2019-01-24 | End: 2019-01-25 | Stop reason: HOSPADM

## 2019-01-24 RX ORDER — CARVEDILOL 12.5 MG/1
12.5 TABLET ORAL 2 TIMES DAILY WITH MEALS
Status: DISCONTINUED | OUTPATIENT
Start: 2019-01-24 | End: 2019-01-25 | Stop reason: HOSPADM

## 2019-01-24 RX ORDER — TIROFIBAN HYDROCHLORIDE 50 UG/ML
0.15 INJECTION INTRAVENOUS CONTINUOUS PRN
Status: DISCONTINUED | OUTPATIENT
Start: 2019-01-24 | End: 2019-01-24 | Stop reason: HOSPADM

## 2019-01-24 RX ORDER — NITROGLYCERIN 5 MG/ML
100-200 VIAL (ML) INTRAVENOUS
Status: DISCONTINUED | OUTPATIENT
Start: 2019-01-24 | End: 2019-01-24 | Stop reason: HOSPADM

## 2019-01-24 RX ORDER — POTASSIUM CHLORIDE 7.45 MG/ML
10 INJECTION INTRAVENOUS
Status: DISCONTINUED | OUTPATIENT
Start: 2019-01-24 | End: 2019-01-24 | Stop reason: HOSPADM

## 2019-01-24 RX ORDER — ONDANSETRON 2 MG/ML
4 INJECTION INTRAMUSCULAR; INTRAVENOUS EVERY 6 HOURS PRN
Status: DISCONTINUED | OUTPATIENT
Start: 2019-01-24 | End: 2019-01-25 | Stop reason: HOSPADM

## 2019-01-24 RX ORDER — FUROSEMIDE 10 MG/ML
20-100 INJECTION INTRAMUSCULAR; INTRAVENOUS
Status: DISCONTINUED | OUTPATIENT
Start: 2019-01-24 | End: 2019-01-24 | Stop reason: HOSPADM

## 2019-01-24 RX ORDER — ARGATROBAN 1 MG/ML
150 INJECTION, SOLUTION INTRAVENOUS
Status: DISCONTINUED | OUTPATIENT
Start: 2019-01-24 | End: 2019-01-24 | Stop reason: HOSPADM

## 2019-01-24 RX ORDER — EPTIFIBATIDE 2 MG/ML
180 INJECTION, SOLUTION INTRAVENOUS EVERY 10 MIN PRN
Status: DISCONTINUED | OUTPATIENT
Start: 2019-01-24 | End: 2019-01-24 | Stop reason: HOSPADM

## 2019-01-24 RX ORDER — METHYLPREDNISOLONE SODIUM SUCCINATE 125 MG/2ML
125 INJECTION, POWDER, LYOPHILIZED, FOR SOLUTION INTRAMUSCULAR; INTRAVENOUS
Status: DISCONTINUED | OUTPATIENT
Start: 2019-01-24 | End: 2019-01-24 | Stop reason: HOSPADM

## 2019-01-24 RX ORDER — PIOGLITAZONEHYDROCHLORIDE 30 MG/1
30 TABLET ORAL DAILY
Status: DISCONTINUED | OUTPATIENT
Start: 2019-01-25 | End: 2019-01-24

## 2019-01-24 RX ORDER — METOPROLOL TARTRATE 1 MG/ML
5 INJECTION, SOLUTION INTRAVENOUS EVERY 5 MIN PRN
Status: DISCONTINUED | OUTPATIENT
Start: 2019-01-24 | End: 2019-01-24 | Stop reason: HOSPADM

## 2019-01-24 RX ORDER — DIPHENHYDRAMINE HYDROCHLORIDE 50 MG/ML
25-50 INJECTION INTRAMUSCULAR; INTRAVENOUS
Status: DISCONTINUED | OUTPATIENT
Start: 2019-01-24 | End: 2019-01-24 | Stop reason: HOSPADM

## 2019-01-24 RX ORDER — NITROGLYCERIN 0.4 MG/1
0.4 TABLET SUBLINGUAL EVERY 5 MIN PRN
Status: DISCONTINUED | OUTPATIENT
Start: 2019-01-24 | End: 2019-01-24 | Stop reason: HOSPADM

## 2019-01-24 RX ORDER — HYDRALAZINE HYDROCHLORIDE 20 MG/ML
10-20 INJECTION INTRAMUSCULAR; INTRAVENOUS
Status: DISCONTINUED | OUTPATIENT
Start: 2019-01-24 | End: 2019-01-24 | Stop reason: HOSPADM

## 2019-01-24 RX ORDER — LIDOCAINE HYDROCHLORIDE 10 MG/ML
30 INJECTION, SOLUTION EPIDURAL; INFILTRATION; INTRACAUDAL; PERINEURAL
Status: DISCONTINUED | OUTPATIENT
Start: 2019-01-24 | End: 2019-01-24 | Stop reason: HOSPADM

## 2019-01-24 RX ORDER — DEXTROSE MONOHYDRATE 25 G/50ML
25-50 INJECTION, SOLUTION INTRAVENOUS
Status: DISCONTINUED | OUTPATIENT
Start: 2019-01-24 | End: 2019-01-25 | Stop reason: HOSPADM

## 2019-01-24 RX ORDER — ASPIRIN 325 MG
325 TABLET ORAL
Status: DISCONTINUED | OUTPATIENT
Start: 2019-01-24 | End: 2019-01-24 | Stop reason: HOSPADM

## 2019-01-24 RX ORDER — DOBUTAMINE HYDROCHLORIDE 200 MG/100ML
2-20 INJECTION INTRAVENOUS CONTINUOUS PRN
Status: DISCONTINUED | OUTPATIENT
Start: 2019-01-24 | End: 2019-01-24 | Stop reason: HOSPADM

## 2019-01-24 RX ORDER — FENTANYL CITRATE 50 UG/ML
25-50 INJECTION, SOLUTION INTRAMUSCULAR; INTRAVENOUS
Status: DISPENSED | OUTPATIENT
Start: 2019-01-24 | End: 2019-01-25

## 2019-01-24 RX ORDER — HYDROCODONE BITARTRATE AND ACETAMINOPHEN 5; 325 MG/1; MG/1
1-2 TABLET ORAL EVERY 4 HOURS PRN
Status: DISCONTINUED | OUTPATIENT
Start: 2019-01-24 | End: 2019-01-25 | Stop reason: HOSPADM

## 2019-01-24 RX ORDER — FLUMAZENIL 0.1 MG/ML
0.2 INJECTION, SOLUTION INTRAVENOUS
Status: DISCONTINUED | OUTPATIENT
Start: 2019-01-24 | End: 2019-01-24 | Stop reason: HOSPADM

## 2019-01-24 RX ORDER — NICOTINE POLACRILEX 4 MG
15-30 LOZENGE BUCCAL
Status: DISCONTINUED | OUTPATIENT
Start: 2019-01-24 | End: 2019-01-25 | Stop reason: HOSPADM

## 2019-01-24 RX ORDER — CLOPIDOGREL BISULFATE 75 MG/1
75 TABLET ORAL
Status: DISCONTINUED | OUTPATIENT
Start: 2019-01-24 | End: 2019-01-24 | Stop reason: HOSPADM

## 2019-01-24 RX ORDER — GLIPIZIDE 5 MG/1
5 TABLET ORAL
Status: DISCONTINUED | OUTPATIENT
Start: 2019-01-24 | End: 2019-01-25 | Stop reason: HOSPADM

## 2019-01-24 RX ORDER — PROTAMINE SULFATE 10 MG/ML
1-5 INJECTION, SOLUTION INTRAVENOUS
Status: DISCONTINUED | OUTPATIENT
Start: 2019-01-24 | End: 2019-01-24 | Stop reason: HOSPADM

## 2019-01-24 RX ORDER — WARFARIN SODIUM 5 MG/1
5 TABLET ORAL
Status: COMPLETED | OUTPATIENT
Start: 2019-01-24 | End: 2019-01-24

## 2019-01-24 RX ORDER — ATROPINE SULFATE 0.1 MG/ML
0.5 INJECTION INTRAVENOUS EVERY 5 MIN PRN
Status: DISPENSED | OUTPATIENT
Start: 2019-01-24 | End: 2019-01-25

## 2019-01-24 RX ORDER — NITROGLYCERIN 5 MG/ML
100-500 VIAL (ML) INTRAVENOUS
Status: DISCONTINUED | OUTPATIENT
Start: 2019-01-24 | End: 2019-01-24 | Stop reason: HOSPADM

## 2019-01-24 RX ORDER — NALOXONE HYDROCHLORIDE 0.4 MG/ML
.2-.4 INJECTION, SOLUTION INTRAMUSCULAR; INTRAVENOUS; SUBCUTANEOUS
Status: ACTIVE | OUTPATIENT
Start: 2019-01-24 | End: 2019-01-25

## 2019-01-24 RX ORDER — NIFEDIPINE 10 MG/1
10 CAPSULE ORAL
Status: DISCONTINUED | OUTPATIENT
Start: 2019-01-24 | End: 2019-01-24 | Stop reason: HOSPADM

## 2019-01-24 RX ORDER — ONDANSETRON 4 MG/1
4 TABLET, ORALLY DISINTEGRATING ORAL EVERY 6 HOURS PRN
Status: DISCONTINUED | OUTPATIENT
Start: 2019-01-24 | End: 2019-01-25 | Stop reason: HOSPADM

## 2019-01-24 RX ORDER — NICARDIPINE HYDROCHLORIDE 2.5 MG/ML
100-300 INJECTION INTRAVENOUS
Status: DISCONTINUED | OUTPATIENT
Start: 2019-01-24 | End: 2019-01-24 | Stop reason: HOSPADM

## 2019-01-24 RX ORDER — SODIUM CHLORIDE 9 MG/ML
INJECTION, SOLUTION INTRAVENOUS CONTINUOUS
Status: ACTIVE | OUTPATIENT
Start: 2019-01-24 | End: 2019-01-24

## 2019-01-24 RX ORDER — ADENOSINE 3 MG/ML
12-12000 INJECTION, SOLUTION INTRAVENOUS
Status: DISCONTINUED | OUTPATIENT
Start: 2019-01-24 | End: 2019-01-24 | Stop reason: HOSPADM

## 2019-01-24 RX ORDER — FLUMAZENIL 0.1 MG/ML
0.2 INJECTION, SOLUTION INTRAVENOUS
Status: ACTIVE | OUTPATIENT
Start: 2019-01-24 | End: 2019-01-25

## 2019-01-24 RX ORDER — HEPARIN SODIUM 1000 [USP'U]/ML
1000-10000 INJECTION, SOLUTION INTRAVENOUS; SUBCUTANEOUS EVERY 5 MIN PRN
Status: DISCONTINUED | OUTPATIENT
Start: 2019-01-24 | End: 2019-01-24 | Stop reason: HOSPADM

## 2019-01-24 RX ORDER — ATORVASTATIN CALCIUM 40 MG/1
80 TABLET, FILM COATED ORAL EVERY EVENING
Status: DISCONTINUED | OUTPATIENT
Start: 2019-01-24 | End: 2019-01-25 | Stop reason: HOSPADM

## 2019-01-24 RX ORDER — LOSARTAN POTASSIUM 25 MG/1
25 TABLET ORAL DAILY
Status: DISCONTINUED | OUTPATIENT
Start: 2019-01-25 | End: 2019-01-25 | Stop reason: HOSPADM

## 2019-01-24 RX ORDER — ASPIRIN 81 MG/1
81-324 TABLET, CHEWABLE ORAL
Status: DISCONTINUED | OUTPATIENT
Start: 2019-01-24 | End: 2019-01-24 | Stop reason: HOSPADM

## 2019-01-24 RX ORDER — VERAPAMIL HYDROCHLORIDE 2.5 MG/ML
1-5 INJECTION, SOLUTION INTRAVENOUS
Status: DISCONTINUED | OUTPATIENT
Start: 2019-01-24 | End: 2019-01-24 | Stop reason: HOSPADM

## 2019-01-24 RX ORDER — ATROPINE SULFATE 0.1 MG/ML
.5-1 INJECTION INTRAVENOUS
Status: DISCONTINUED | OUTPATIENT
Start: 2019-01-24 | End: 2019-01-24 | Stop reason: HOSPADM

## 2019-01-24 RX ORDER — ARGATROBAN 1 MG/ML
350 INJECTION, SOLUTION INTRAVENOUS
Status: DISCONTINUED | OUTPATIENT
Start: 2019-01-24 | End: 2019-01-24 | Stop reason: HOSPADM

## 2019-01-24 RX ORDER — ISOSORBIDE MONONITRATE 30 MG/1
30 TABLET, EXTENDED RELEASE ORAL EVERY MORNING
Status: DISCONTINUED | OUTPATIENT
Start: 2019-01-25 | End: 2019-01-25 | Stop reason: HOSPADM

## 2019-01-24 RX ORDER — DEXTROSE MONOHYDRATE 25 G/50ML
12.5-5 INJECTION, SOLUTION INTRAVENOUS EVERY 30 MIN PRN
Status: DISCONTINUED | OUTPATIENT
Start: 2019-01-24 | End: 2019-01-24 | Stop reason: HOSPADM

## 2019-01-24 RX ORDER — SOTALOL HYDROCHLORIDE 80 MG/1
160 TABLET ORAL 2 TIMES DAILY
Status: DISCONTINUED | OUTPATIENT
Start: 2019-01-24 | End: 2019-01-25 | Stop reason: HOSPADM

## 2019-01-24 RX ORDER — NITROGLYCERIN 0.4 MG/1
0.4 TABLET SUBLINGUAL EVERY 5 MIN PRN
Status: DISCONTINUED | OUTPATIENT
Start: 2019-01-24 | End: 2019-01-25 | Stop reason: HOSPADM

## 2019-01-24 RX ADMIN — SODIUM CHLORIDE: 9 INJECTION, SOLUTION INTRAVENOUS at 18:36

## 2019-01-24 RX ADMIN — MIDAZOLAM 0.5 MG: 1 INJECTION INTRAMUSCULAR; INTRAVENOUS at 14:09

## 2019-01-24 RX ADMIN — WARFARIN SODIUM 5 MG: 5 TABLET ORAL at 19:24

## 2019-01-24 RX ADMIN — GLIPIZIDE 5 MG: 5 TABLET ORAL at 19:29

## 2019-01-24 RX ADMIN — MIDAZOLAM 0.5 MG: 1 INJECTION INTRAMUSCULAR; INTRAVENOUS at 13:43

## 2019-01-24 RX ADMIN — GABAPENTIN 300 MG: 300 CAPSULE ORAL at 19:24

## 2019-01-24 RX ADMIN — MIDAZOLAM 0.5 MG: 1 INJECTION INTRAMUSCULAR; INTRAVENOUS at 14:49

## 2019-01-24 RX ADMIN — FENTANYL CITRATE 50 MCG: 50 INJECTION, SOLUTION INTRAMUSCULAR; INTRAVENOUS at 13:14

## 2019-01-24 RX ADMIN — ASPIRIN 325 MG: 325 TABLET, DELAYED RELEASE ORAL at 11:27

## 2019-01-24 RX ADMIN — MIDAZOLAM 0.5 MG: 1 INJECTION INTRAMUSCULAR; INTRAVENOUS at 13:22

## 2019-01-24 RX ADMIN — ATORVASTATIN CALCIUM 80 MG: 40 TABLET, FILM COATED ORAL at 19:23

## 2019-01-24 RX ADMIN — HEPARIN SODIUM 3000 UNITS: 1000 INJECTION, SOLUTION INTRAVENOUS; SUBCUTANEOUS at 13:36

## 2019-01-24 RX ADMIN — MIDAZOLAM 0.5 MG: 1 INJECTION INTRAMUSCULAR; INTRAVENOUS at 13:02

## 2019-01-24 RX ADMIN — HEPARIN SODIUM 3000 UNITS: 1000 INJECTION, SOLUTION INTRAVENOUS; SUBCUTANEOUS at 14:45

## 2019-01-24 RX ADMIN — FENTANYL CITRATE 50 MCG: 50 INJECTION, SOLUTION INTRAMUSCULAR; INTRAVENOUS at 12:41

## 2019-01-24 RX ADMIN — SOTALOL HYDROCHLORIDE 160 MG: 80 TABLET ORAL at 19:23

## 2019-01-24 RX ADMIN — HEPARIN SODIUM 7000 UNITS: 1000 INJECTION, SOLUTION INTRAVENOUS; SUBCUTANEOUS at 12:45

## 2019-01-24 RX ADMIN — CARVEDILOL 12.5 MG: 12.5 TABLET, FILM COATED ORAL at 19:24

## 2019-01-24 RX ADMIN — FENTANYL CITRATE 50 MCG: 50 INJECTION, SOLUTION INTRAMUSCULAR; INTRAVENOUS at 15:11

## 2019-01-24 RX ADMIN — FENTANYL CITRATE 50 MCG: 50 INJECTION INTRAMUSCULAR; INTRAVENOUS at 18:53

## 2019-01-24 RX ADMIN — MIDAZOLAM 0.5 MG: 1 INJECTION INTRAMUSCULAR; INTRAVENOUS at 12:43

## 2019-01-24 RX ADMIN — MIDAZOLAM 1 MG: 1 INJECTION INTRAMUSCULAR; INTRAVENOUS at 12:41

## 2019-01-24 RX ADMIN — ISOSORBIDE MONONITRATE 60 MG: 30 TABLET, EXTENDED RELEASE ORAL at 19:23

## 2019-01-24 RX ADMIN — FENTANYL CITRATE 50 MCG: 50 INJECTION, SOLUTION INTRAMUSCULAR; INTRAVENOUS at 14:35

## 2019-01-24 ASSESSMENT — COLUMBIA-SUICIDE SEVERITY RATING SCALE - C-SSRS
2. HAVE YOU ACTUALLY HAD ANY THOUGHTS OF KILLING YOURSELF IN THE PAST MONTH?: NO
1. IN THE PAST MONTH, HAVE YOU WISHED YOU WERE DEAD OR WISHED YOU COULD GO TO SLEEP AND NOT WAKE UP?: NO

## 2019-01-24 NOTE — Clinical Note
Atherectomy performed in the left main. Rotational atherectomy was performed. Pass rate = 150 RPM. Pass duration = 32 seconds.

## 2019-01-24 NOTE — Clinical Note
Dimitri Neves   5/22/2017 1:30 PM   Anticoagulation Therapy Visit    Description:  77 year old male   Provider:  SANDRA ANTI COAG   Department:  Be Nurse           INR as of 5/22/2017     Today's INR 2.2      Anticoagulation Summary as of 5/22/2017     INR goal 2.0-3.0   Today's INR 2.2   Full instructions 5 mg on Sun, Tue, Thu; 2.5 mg all other days   Next INR check 6/7/2017    Indications   Long-term (current) use of anticoagulants [Z79.01] [Z79.01]  PE (pulmonary thromboembolism) (H) [I26.99]         Your next Anticoagulation Clinic appointment(s)     Jun 07, 2017  1:30 PM CDT   Anticoagulation Visit with SANDRA ANTI JOSE LUIS   [facility] [place] ([facility] [place])    [address]  [place] [id number] [phone]              Contact Numbers     [facility]  Please call [phone] with any problems or questions regarding your therapy, or to cancel or reschedule your appointment.          May 2017 Details    Sun Mon Tue Wed Thu Fri Sat      1               2               3               4               5               6                 7               8               9               10               11               12               13                 14               15               16               17               18               19               20                 21               22      2.5 mg   See details      23      5 mg         24      2.5 mg         25      5 mg         26      2.5 mg         27      2.5 mg           28      5 mg         29      2.5 mg         30      5 mg         31      2.5 mg             Date Details   05/22 This INR check               How to take your warfarin dose     To take:  2.5 mg Take 0.5 of a 5 mg tablet.    To take:  5 mg Take 1 of the 5 mg tablets.           June 2017 Details    Sun Mon Tue Wed Thu Fri Sat         1      5 mg         2      2.5 mg         3      2.5 mg           4      5 mg         5      2.5 mg         6      5 mg          Guidewire inserted into the right femoral artery.  7            8               9               10                 11               12               13               14               15               16               17                 18               19               20               21               22               23               24                 25               26               27               28               29               30                 Date Details   No additional details    Date of next INR:  6/7/2017         How to take your warfarin dose     To take:  2.5 mg Take 0.5 of a 5 mg tablet.    To take:  5 mg Take 1 of the 5 mg tablets.

## 2019-01-24 NOTE — Clinical Note
Atherectomy performed in the left main. Rotational atherectomy was performed. Pass rate = 180 RPM. Pass duration = 20 seconds.

## 2019-01-24 NOTE — Clinical Note
The first balloon was inserted into the left main and left main.  Max pressure = 6 ela. Total duration = 8 seconds.

## 2019-01-24 NOTE — Clinical Note
The first balloon was inserted into the left main and left main.  Max pressure = 20 ela. Total duration = 20 seconds.

## 2019-01-24 NOTE — Clinical Note
Atherectomy performed in the left main. Rotational atherectomy was performed. Pass rate = 150 RPM. Pass duration = 22 seconds.

## 2019-01-24 NOTE — DISCHARGE INSTRUCTIONS
Going Home after Coronary Angiogram       Name: Wesley Cooper  Medical Record Number:  [id number]  Today's Date: January 24, 2019        For 24 hours:         Have an adult stay with you for 24 hours.         Relax and take it easy.         Drink plenty of fluids.         You may eat your normal diet, unless your doctor tells you otherwise.         Do NOT make any important or legal decisions.         Do NOT drive or operate machines at home or at work.         Do NOT drink alcohol.      Do NOT smoke.     Medicines:         If you have begun Plavix (clopidogrel), do not stop taking it until you talk to your heart doctor (cardiologist).         If you are on metformin (Glucophage), do not restart it until you have blood tests (within 2 to 3 days after discharge). When your doctor tells you it is safe, you may restart the metformin.         If you have stopped any other medicines, check with your nurse or provider about when to restart them.    Care of groin site:         Remove the Band-Aid after 24 hours. If there is minor oozing, apply another Band-aid and remove it after 12 hours.          Do NOT take a bath, or use a hot tub or pool for at least 3 days. You may shower.          It is normal to have a small bruise or lump at the site.         Do not scrub the site.         Do not use lotion or powder near the puncture site for 3 days.         For the first 2 days: Do not stoop or squat. When you cough, sneeze or move your bowels, hold your hand over the puncture site and press gently.         Do not lift more than 10 pounds for at least 3 to 5 days.         For 2 days, do NOT have sex or do any heavy exercise.     If you start bleeding from the site in your groin:  Lie down flat and press firmly on the site.  Call your physician immediately, or, come to the emergency room.      Call 911 right away if you have bleeding that is heavy or does not stop.     Call your doctor if:         You have a large or growing hard  lump around the site.         The site is red, swollen, hot or tender.         Blood or fluid is draining from the site.         You have chills or a fever greater than 101 F (38 C).         Your leg or arm turns bluish, feels numb or cool.         You have hives, a rash or unusual itching.     ADDITIONAL INSTRUCTIONS: Follow up with your primary care provider in one week and with your Cardiologist in 4-6 weeks. Plan to return in about 6 weeks for coronary angiogram to attempt to open the blockage again. If you have any questions, please contact the Cardiology Clinic at [phone].    [facility] [place]:   [phone] (7 days a week)      Cardiology Fellow on call (24 hours per day) at [facility]:   *CONTACT THIS NUMBER IF YOU HAVE ANY NON-EMERGENCY ISSUES OVERNIGHT TONIGHT*  [phone] (ask for Cardiology Fellow on call)      Number where we can reach you:  ____________    Thank you for allowing us to care for you at the Cardiac Catheterization and Electrophysiology Lab at the [facility].  During your recent procedure, you were exposed to a total dose of radiation that is above the normal level, as determined by [place] s Radiation Safety Committee.  There is a low likelihood that you will experience complications from this exposure, but it is important that you are aware of the possible side effects so that you can pursue follow up as necessary.  Signs and symptoms to watch for include warmth, redness, pain or ulceration of the skin, specifically under the armpits, chest or back.  The skin may look and feel as though it is sunburnt.  If you experience any of these symptoms, even up to a month following your procedure, please call our office immediately at [phone] to schedule an appointment.  We would like you to follow up with the Interventional Cardiologist who did the procedure within 3-4 weeks for a skin assessment.  If you  do not experience any skin changes, no further radiation exposure follow up is needed.  We, at the [facility], are committed to your safety and well-being.  Should you have any questions, please do not hesitate to contact a triage nurse at [phone].    Sincerely,  Your Heart Care Team

## 2019-01-24 NOTE — PROCEDURES
PRELIMINARY CARDIAC CATH REPORT:     PROCEDURES PERFORMED:   Coronary Angiography  Percutaneous Coronary Intervention  Rotational Atherectomy    PHYSICIANS:  Attending Physician: Bill Marquez MD  Interventional Cardiology Fellow: Gennaro Tolentino    INDICATION:  Wesley Cooper is a 73 year old gentleman with a history of CABG with a patent LIMA to LAD but the rest of his SVG grafts have closed. He is a diabetic with atrial flutter on coumadin and EF 45% presenting for coronary angiography with rotational atherectomy to his left main and left circumflex artery.    DESCRIPTION:  1. Consent obtained with discussion of risks.  All questions were answered.  2. Sterile prep and procedure.  3. Location with Sheaths:   Rt Femoral Arterial  7 Fr 45 cm [Britetip]  4. Access: Local anesthetic with lidocaine.  A standard 18 guage needle with ultrasound guidance was used to establish vascular access using a modified Seldinger technique.  5. Diagnostic Catheters:   N/A  6. Guiding Catheters:  7 Fr  XB LF 3.0 GC  6. Estimated blood loss: < 25 ml    MEDICATIONS:  The procedure was performed under conscious sedation for 157 minutes from 1241 to 1518.  The patient was assessed immediately before the first sedation medication was administered.  Midazolam 4 mg and Fentanyl 200 mcg were administered.  Heart rate, BP, respiration, oxygen saturation and patient responses were monitored throughout the procedure with the assistance of the RN under my supervision.  >> IV UFH was administered to achieve anticoagulation.  >> Antiplatelet Therapy:  mg and already on Plavix.    Procedures:    CORONARY ANGIOGRAM:   1. Both coronary arteries arise from their respective cusps.  2. Dominance: Right  3. The LM has 70% distal disease, heavily calcified.  4. LAD: The LAD is occluded at the proximal segment. It is filled by a LIMA which was not injected today.  5. LCX gives rise to OM1 and OM2 vessels OM 1 is occluded at its ostium. OM2 has  mild 30% disease.  The proximal LCx has a severe calcific stenosis that is diffuse and extends into the mid LCx ~80-90%, the distal LCx has mild 30-40% disease.  6. RCA gives rise to PL branches and supplies PDA. The RCA is occluded proximally and its distal segments are filled in by left to right collaterals from the LCx.      PERCUTANEOUS CORONARY INTERVENTION:   Lesion #1: Left main, proximal and mid LCx    A 7 Uzbek XB 3.0 guide was positioned in the ostium of the LM. A rota wire was initially advanced into the lesion and rotational atherectomy was initially performed with a 1.5 britni as described below to the distal left main however it did not engage the ostium of the LCx well and was switched out for a 1.25mm britni which was able to engage the proximal LCx, but would not pass the mid LCx calcified lesion despite multiple runs. The rota wire was then exchanged for a Jhon blue and an Allstar wire. A  balloon was then used to advance into the lesion but without success. Then a Turnpike LP was used and was able to engage the lesion but could not pass beyond it. Finally a Finecross was attempted but also without success. A Jhon blue was then replaced with a 6 Uzbek GuideLiner and a 1.2x12 Push balloon which was able to engage and dilate the lesion, followed by another 1.2x12, then a 1.5x15mm was used, and finally a 2.0x6mm NC was used to pre dilate which burst. A 3.0 or 2.5 balloon could not be advanced further and due to the contrast and fluoroscopy time, the decision was made to stop and return in 6 weeks for repeat rotational atherectomy. Final angiography showed no evidence of perforation and residual stenosis of 60% and RAJAN 3 flow.  No complications.    ROTATIONAL ATHERECTOMY:  1. Rotational atherectomy of the left main lesion was completed with a 1.5 britni at 160,000 rpm for 3 runs. The britni was then exchanged for a 1.2mm and atherectomy was completed at 160,000 rpm for 8 runs.     Sheath  Removal:  The right common femoral artery sheath will be removed after the patient has been transferred to the unit.    Contrast: Isovue, 275 ml     Fluoroscopy Time: 66.2 min    COMPLICATIONS:  1. None    SUMMARY:   Three vessel CAD involving the left main with a patent RODRIGUEZ to LAD.   Successful rotational atherectomy of the left main and proximal LCx, unsuccessful atherectomy of the mid LCx.  POBA of the proximal and mid LCx  Due to the contrast and radiation limit, decision was made to return in 6 weeks for staged rotablation of the mid LCx and PCI    PLAN:   >> Plavix 75 mg qd and warfarin, resume warfarin tonight  >> Bedrest per protocol.  >> Continued medical management and lifestyle modification for cardiovascular risk factor optimization.   >>. Admit for observation overnight  >> return in six weeks for staged PCI of the mid LCx    The attending interventional cardiologist was present and supervised all critical aspects the procedure.    Findings discussed with Dr Loco.    See CVIS report for final draft.    Gennaro Tolentino   Cardiology Fellow    Bill Marquez   Cardiology Staff    I have seen and examined the patient and agree with the finding and plan.       Bill Marquez MD  Cardiology-[facility]  [phone]

## 2019-01-24 NOTE — Clinical Note
The first balloon was inserted into the left main and left main.  Max pressure = 20 ela. Total duration = 12 seconds.     Balloon ruptured.

## 2019-01-24 NOTE — Clinical Note
The first balloon was inserted into the left main and left main.  Max pressure = 20 ela. Total duration = 20 seconds.     2X.

## 2019-01-24 NOTE — PHARMACY-ANTICOAGULATION SERVICE
Clinical Pharmacy - Warfarin Dosing Consult     Pharmacy has been consulted to manage this patient s warfarin therapy.  Indication: Atrial Fibrillation  Therapy Goal: INR 2-3  Warfarin Prior to Admission: Yes  Warfarin PTA Regimen: 5mg Mondays and 2.5mg ROW; last dose 1/19  Significant drug interactions: clopidogrel     INR   Date Value Ref Range Status   01/24/2019 1.49 (H) 0.86 - 1.14 Final   11/07/2018 1.56 (H) 0.86 - 1.14 Final       Recommend warfarin 5 mg today.  Pharmacy will monitor Wesley Cooper daily and order warfarin doses to achieve specified goal.      Please contact pharmacy as soon as possible if the warfarin needs to be held for a procedure or if the warfarin goals change.      Halie Ramsay, PharmD  Pager: [phone]

## 2019-01-24 NOTE — Clinical Note
Atherectomy performed in the left main. Rotational atherectomy was performed. Pass rate = 150 RPM. Pass duration = 15 seconds.

## 2019-01-24 NOTE — Clinical Note
Atherectomy performed in the ostium left main. Rotational atherectomy was performed. Pass rate = 150 RPM. Pass duration = 33 seconds. Second roto wire inserted and asion wire removed

## 2019-01-24 NOTE — Clinical Note
Atherectomy performed in the left main. Rotational atherectomy was performed. Pass rate = 187 RPM. Pass duration = 33 seconds.

## 2019-01-24 NOTE — Clinical Note
The first balloon was inserted into the left main.  Max pressure = 8 ela. Total duration = 8 seconds.

## 2019-01-24 NOTE — Clinical Note
Atherectomy performed in the ostium left main. Rotational atherectomy was performed. Pass rate = 150 RPM. Pass duration = 30 seconds.

## 2019-01-24 NOTE — Clinical Note
Atherectomy performed in the left main. Rotational atherectomy was performed. Pass rate = 150 RPM. Pass duration = 30 seconds.

## 2019-01-24 NOTE — Clinical Note
Atherectomy performed in the ostium left main. Rotational atherectomy was performed. Pass rate = 150 RPM. Pass duration = 31 seconds.

## 2019-01-24 NOTE — H&P
CARDIOLOGY-[facility] HISTORY AND PHYSICAL NOTE  Wesley Cooper MRN:[id number] YOB: 1945  Date of Admission:1/24/2019    ASSESSMENT AND PLAN:   # Unstable Angina  # CAD s/p CABG x4  # ICM with LVEF 45% s/p CRT-D  See dedicated coronary angiogram report dated 1/24/19. Patient with known CAD and refractory angina despite titration of anti-anginal medications as an outpatient. Last coronary angiogram before today's was in Nov 2018 when patient had a patent LIMA graft and known occluded vein grafts as well as native disease; it was felt there were no good targets for revascularization. His case was again reviewed by Dr. Loco as well as Dr. Marquez, and given PET findings of viability in distribution of his native LCX which collateralizes to the RCA vein graft which is completely occluded, plan was made for repeat coronary angiogram and planned PCI. This was attempted today with roto-ablation and POBA of the pLXC with some success however no stent was placed as patient under fluoro for 60 minutes and suffered a small dissection without further compication. Plan for staged procedure and return to the cath lab in 6 weeks. Dr. Loco aware and agrees with plan.  -Bedrest per protocol   -Groin management per protocol; sheath to be removed on floor.   -Continue PTA Plavix  - IP pharmacy to dose warfarin; subtherapeutic INR studies recently. Recheck INR in 3-4 days.   - Continue GDMT for ICM:  Coreg to 12.5 mg BID, Losartan 25 mg daily, Atorvastatin 80 mg   - Continue anti-anginal Imdur 120 mg daily     #Hypertension  Blood pressure currently stable  - Continue Coreg, Imdur, Losartan as listed above  - Continue to follow up as outpatient for HTN management     # Aflutter  INR 1.56 day of discharge, patient had been holding Coumadin since Saturday 11/3 in preparation for coronary angiogram  - Pharmacy to dose warfarin  - INR recheck in 3-4 days   - Cont home sotalol 160mg BID     #Type 2 Diabetes  Patient  currently taking Metformin and Glipizide at home  - Continue to hold Metformin for 48 hours after coronary angiogram  - Resume glipizide tomorrow   - Continue to follow up as outpatient for DM management    #GI   -colostomy in place, without fluctuance, erythema.   -Monitor     CODE: Full  FENA:  Advance diet as tolerated  DVT ppx: Warfarin  Disposition: Home in 24-48 hours, admit on observation status     HISTORY OF PRESENT ILLNESS:  Wesley Cooper is a 73 year old male with history of coronary artery disease status post CABG, chronic refractory angina, ICM with LV EF 40-45% status post CRT-D, DM II, Aflutter, and chronic colostomy. He underwent an elective coronary angiogram in November 2018 which showed mild progression of his LAD disease distal to the touchdown of his LIMA which was not a good target for revascularization as it was a very small vessel.  His RODRIGUEZ to LAD was patent, he had significant native left circumflex which was giving collaterals to the right coronary, and his vein graft to right coronary and circumflex were occluded. He has had a cardiac PET at [place] that showed viability of his tissue in LCx distribution.     He was seen in clinic and despite titration of anti-anginal medications (imdur 120 mg, Coreg 12.5 mg BID, Cozaar 25 mg), patient was having refractory chest pain with exertion relieved with nitroglycerin SL. Given viability on PET, he was subsequently sent for repeat coronary angiogram and intervention.      Patient admitted on observation given his higher risk for MI, shock, stroke, renal failure, and other complication. At time of interview he is resting comfortably in bed with stable vital signs. He denies chest pain, palpitation, light headedness, syncope. He denies abdominal pain, nausea, vomiting. He denies shortness of breath.       PAST MEDICAL HISTORY:  Reviewed with patient on 01/24/2019     History reviewed. No pertinent past medical history.    Past Surgical History:    Procedure Laterality Date     CARDIAC SURGERY      multiple stents and pacer/defibrillator     GI SURGERY  2009     ORTHOPEDIC SURGERY      back surgery     VASCULAR SURGERY  1999    CABG X4        MEDICATIONS:  PTA Meds  Prior to Admission medications    Medication Sig Last Dose Taking? Auth Provider   atorvastatin (LIPITOR) 80 MG tablet Take 80 mg by mouth daily 1/24/2019 at 0800 Yes Reported, Patient   carvedilol (COREG) 12.5 MG tablet Take 1 tablet (12.5 mg) by mouth 2 times daily (with meals) 1/24/2019 at 0800 Yes Mily Hoskins CNP   clopidogrel (PLAVIX) 75 MG tablet Take 75 mg by mouth daily 1/24/2019 at 0800 Yes Reported, Patient   diphenhydrAMINE-acetaminophen (TYLENOL PM)  MG tablet Take 1 tablet by mouth nightly as needed for sleep 1/23/2019 at Unknown time Yes Reported, Patient   gabapentin (NEURONTIN) 300 MG capsule Take 300 mg by mouth 3 times daily  1/24/2019 at 0800 Yes Reported, Patient   glipiZIDE (GLUCOTROL) 5 MG tablet Take 5 mg by mouth 2 times daily (before meals) 1/23/2019 at Unknown time Yes Reported, Patient   isosorbide mononitrate (IMDUR) 30 MG 24 hr tablet Take 1 30mg tablet in the morning. Take 1 30mg tablet in the afternoon. Take 2 30mg tablets in the evening. Total daily dose of 120mg. 1/24/2019 at 0800 Yes Claudy Loco MD   losartan (COZAAR) 25 MG tablet Take 1 tablet (25 mg) by mouth daily 1/24/2019 at 0800 Yes Claudy Loco MD   pioglitazone (ACTOS) 30 MG tablet Take 30 mg by mouth daily 1/23/2019 at Unknown time Yes Reported, Patient   sotalol HCl, AF, 160 MG TABS Take 160 mg by mouth 2 times daily 1/24/2019 at 0800 Yes Claudy Loco MD   warfarin (COUMADIN) 2.5 MG tablet Take 5 mg by mouth on Mondays and 2.5 mg 6x/week or as directed based on INR Past Week at Unknown time Yes Reported, Patient   blood glucose monitoring (ACCU-CHEK TAIWO PLUS) meter device kit    Reported, Patient   nitroglycerin (NITROSTAT) 0.4 MG sublingual tablet Place  0.4 mg under the tongue every 5 minutes as needed for chest pain For chest pain place 1 tablet under the tongue every 5 minutes for 3 doses. If symptoms persist 5 minutes after 1st dose call 911.   Reported, Patient      Current Meds    aspirin  325 mg Oral Daily     [START ON 1/25/2019] isosorbide mononitrate  30 mg Oral QAM     [START ON 1/25/2019] isosorbide mononitrate  30 mg Oral Daily with lunch     isosorbide mononitrate  60 mg Oral QPM     sodium chloride (PF)  3 mL Intracatheter Q8H     Infusion Meds    abciximab (REOPRO) infusion ADULT       abciximab (REOPRO) infusion ADULT       adenosine (ADENOSCAN) 90mg in sodium chloride 0.9% 90 mL - for Cath LAB (FFR)       argatroban       bivalirudin (ANGIOMAX) infusion ADULT       DOBUTamine       DOPamine       epoprostenol (FLOLAN) ([facility] Cath Lab Only) intravenous infusion       eptifibatide       HEParin       nitroGLYcerin       nitroPRUsside (NIPRIDE) IV infusion ADULT/PEDS GREATER than or EQUAL to 45 kg std conc       - MEDICATION INSTRUCTIONS -       sodium chloride       tirofiban       tirofiban       Warfarin Therapy Reminder         ALLERGIES:    No Known Allergies    REVIEW OF SYSTEMS:  A 10 point review of systems was negative except as noted above.    SOCIAL HISTORY:   Social History     Socioeconomic History     Marital status:      Spouse name: Not on file     Number of children: Not on file     Years of education: Not on file     Highest education level: Not on file   Social Needs     Financial resource strain: Not on file     Food insecurity - worry: Not on file     Food insecurity - inability: Not on file     Transportation needs - medical: Not on file     Transportation needs - non-medical: Not on file   Occupational History     Not on file   Tobacco Use     Smoking status: Former Smoker     Smokeless tobacco: Never Used     Tobacco comment: quit 2010   Substance and Sexual Activity     Alcohol use: No     Drug use: No     Sexual  activity: Not on file   Other Topics Concern     Parent/sibling w/ CABG, MI or angioplasty before 65F 55M? Yes   Social History Narrative     Not on file         FAMILY MEDICAL HISTORY:   History reviewed. No pertinent family history.      PHYSICAL EXAM:   Temp  Av.5  F (36.4  C)  Min: 97.5  F (36.4  C)  Max: 97.5  F (36.4  C)      Pulse  Av  Min: 63  Max: 63 SpO2  Av %  Min: 98 %  Max: 98 %       /79   Pulse 66   Temp 97.5  F (36.4  C) (Oral)   SpO2 98%        GENERAL APPEARANCE: Alert and NAD. Bedrest.  Head: NC/AT  EYES: PERRL, pupils equal  HENT: Mouth without ulcers or lesions  NECK: Supple, no goiter  Pulmonary: anterior lung exam unremarkable with equal breath sounds bilaterally, no clubbing or cyanosis  CV: Regular, normal rate, no rub. JVP flat. RF access site without hematoma;sheath still in place, trace oozing, no fluctuance.  GI: Soft, nontender, normal bowel sounds, no HSM   MS: No evidence of inflammation in joints, no muscle tenderness  SKIN: No rash, warm, dry  NEURO: Mentation intact and speech normal.     LABS:   CMP  Recent Labs   Lab 19  1025      POTASSIUM 4.4   CHLORIDE 106   CO2 26   ANIONGAP 7   *   BUN 20   CR 1.01   GFRESTIMATED 72   GFRESTBLACK 83   KEIRA 8.7     CBC  Recent Labs   Lab 19  1025   HGB 12.2*   WBC 5.0   RBC 5.02   HCT 40.9   MCV 82   MCH 24.3*   MCHC 29.8*   RDW 18.7*        INR  Recent Labs   Lab 19  1025   INR 1.49*     ABGNo lab results found in last 7 days.     IMAGING:  Echocardiogram 18:  Mild mitral stenosis is present.  LVEF 45% based on biplane 2D tracing.  End systolic volume is 61 ml.  Right ventricular function, chamber size, wall motion, and thickness are  normal.  The inferior vena cava is normal.  No pericardial effusion is present.  There has been no change.     Coronary Angiogram 18:    CORONARY ANGIOGRAM:   1. Both coronary arteries arise from their respective cusps.  2. Right dominant.      3. LM is a large caliber vessel and trifurcates into an LAD and LCx. Distal LM has an 80% stenosis.  4. LAD is a medium caliber vessel, supplies the entire apex (type 3), and gives rise to septal perforators, small caliber D1. Proximal LAD has 70-80% stenosis, mid LAD is 100% occluded after first septal followed by a long stent and then a 70% stenosis after touchdown of the LIMA, distal LAD has sequential 80% and 50% stenoses, and D1 has 40% disease.  5. LCX is a medium caliber vessel and gives rise to small caliber OM1 and small caliber OM2 before continuing as a small AV-groove LCx.  OM1 appears to be 100% occluded at the ostium. Proximal LCx has an 80% stenosis.  6. Ramus intermedius is a small caliber vessel. Ostial ramus has a 70% stenosis.  7. RCA is a large caliber vessel and supplies a PDA and gives rise to PL branches . Proximal RCA is 100% occluded. Distal RCA into the PL has a patent stent. There are left-to-right collaterals from LAD septals, the LAD, and LCx supplying the PDA and PL.     CORONARY BYPASS GRAFT ANGIOGRAPHY:  1. LIMA graft is patent.  2. All vein grafts known to be occluded.     Left main - 95% disease  LAD - 95% disease in the proximal portion and 70-80% disease in the distal LAD distal to the LIMA touchdown  LCX - 95% proximal disease  RCA-    LIMA to LAD - patent  SVG to LCX - 100% occluded  SVG to PDA - 100% occluded        CAMERON Cox  CSI  Pager: [phone]

## 2019-01-24 NOTE — Clinical Note
Atherectomy performed in the left main. Rotational atherectomy was performed. Pass rate = 150 RPM. Pass duration = 35 seconds.

## 2019-01-25 VITALS
OXYGEN SATURATION: 97 % | HEART RATE: 74 BPM | SYSTOLIC BLOOD PRESSURE: 134 MMHG | TEMPERATURE: 98.4 F | RESPIRATION RATE: 16 BRPM | DIASTOLIC BLOOD PRESSURE: 83 MMHG

## 2019-01-25 LAB
ANION GAP SERPL CALCULATED.3IONS-SCNC: 7 MMOL/L (ref 3–14)
BUN SERPL-MCNC: 18 MG/DL (ref 7–30)
CALCIUM SERPL-MCNC: 8.2 MG/DL (ref 8.5–10.1)
CHLORIDE SERPL-SCNC: 107 MMOL/L (ref 94–109)
CO2 SERPL-SCNC: 27 MMOL/L (ref 20–32)
CREAT SERPL-MCNC: 1.03 MG/DL (ref 0.66–1.25)
ERYTHROCYTE [DISTWIDTH] IN BLOOD BY AUTOMATED COUNT: 18.8 % (ref 10–15)
GFR SERPL CREATININE-BSD FRML MDRD: 70 ML/MIN/{1.73_M2}
GLUCOSE SERPL-MCNC: 220 MG/DL (ref 70–99)
HCT VFR BLD AUTO: 33.7 % (ref 40–53)
HGB BLD-MCNC: 9.8 G/DL (ref 13.3–17.7)
INR PPP: 1.52 (ref 0.86–1.14)
INTERPRETATION ECG - MUSE: NORMAL
INTERPRETATION ECG - MUSE: NORMAL
MCH RBC QN AUTO: 23.8 PG (ref 26.5–33)
MCHC RBC AUTO-ENTMCNC: 29.1 G/DL (ref 31.5–36.5)
MCV RBC AUTO: 82 FL (ref 78–100)
PLATELET # BLD AUTO: 193 10E9/L (ref 150–450)
POTASSIUM SERPL-SCNC: 4.2 MMOL/L (ref 3.4–5.3)
RBC # BLD AUTO: 4.12 10E12/L (ref 4.4–5.9)
SODIUM SERPL-SCNC: 141 MMOL/L (ref 133–144)
WBC # BLD AUTO: 5.4 10E9/L (ref 4–11)

## 2019-01-25 PROCEDURE — 36415 COLL VENOUS BLD VENIPUNCTURE: CPT | Performed by: INTERNAL MEDICINE

## 2019-01-25 PROCEDURE — 93010 ELECTROCARDIOGRAM REPORT: CPT | Performed by: INTERNAL MEDICINE

## 2019-01-25 PROCEDURE — 25000132 ZZH RX MED GY IP 250 OP 250 PS 637: Performed by: INTERNAL MEDICINE

## 2019-01-25 PROCEDURE — 85610 PROTHROMBIN TIME: CPT | Performed by: INTERNAL MEDICINE

## 2019-01-25 PROCEDURE — 80048 BASIC METABOLIC PNL TOTAL CA: CPT | Performed by: INTERNAL MEDICINE

## 2019-01-25 PROCEDURE — 25000132 ZZH RX MED GY IP 250 OP 250 PS 637: Performed by: PHYSICIAN ASSISTANT

## 2019-01-25 PROCEDURE — 93005 ELECTROCARDIOGRAM TRACING: CPT

## 2019-01-25 PROCEDURE — 40000065 ZZH STATISTIC EKG NON-CHARGEABLE

## 2019-01-25 PROCEDURE — G0378 HOSPITAL OBSERVATION PER HR: HCPCS

## 2019-01-25 PROCEDURE — 85027 COMPLETE CBC AUTOMATED: CPT | Performed by: INTERNAL MEDICINE

## 2019-01-25 RX ORDER — WARFARIN SODIUM 2.5 MG/1
5 TABLET ORAL DAILY
Qty: 60 TABLET | Refills: 0 | Status: ON HOLD | OUTPATIENT
Start: 2019-01-25 | End: 2020-01-01

## 2019-01-25 RX ADMIN — GABAPENTIN 300 MG: 300 CAPSULE ORAL at 10:01

## 2019-01-25 RX ADMIN — ISOSORBIDE MONONITRATE 30 MG: 30 TABLET, EXTENDED RELEASE ORAL at 10:00

## 2019-01-25 RX ADMIN — PIOGLITAZONE 30 MG: 30 TABLET ORAL at 10:00

## 2019-01-25 RX ADMIN — SOTALOL HYDROCHLORIDE 160 MG: 80 TABLET ORAL at 10:00

## 2019-01-25 RX ADMIN — LOSARTAN POTASSIUM 25 MG: 25 TABLET ORAL at 10:00

## 2019-01-25 RX ADMIN — CARVEDILOL 12.5 MG: 12.5 TABLET, FILM COATED ORAL at 10:00

## 2019-01-25 RX ADMIN — CLOPIDOGREL BISULFATE 75 MG: 75 TABLET ORAL at 10:00

## 2019-01-25 RX ADMIN — GLIPIZIDE 5 MG: 5 TABLET ORAL at 10:00

## 2019-01-25 NOTE — PROGRESS NOTES
Manual pressure held for 30 minutes to RIGHT groin site.  Sheath, size 7FRA,  pulled by SHANELL GUERRERO.  Site CDI, no hematoma.  Stasis achieved at 1900. Off bedrest @ 0000 on 1/25/2019.

## 2019-01-25 NOTE — PLAN OF CARE
Outpatient/Observation goals to be met before discharge home:      Cath insertion site stabilized: Yes, pt came off of bedrest at 0000.    Ambulated to the bathroom and resting comfortably. Has EKG in the AM.

## 2019-01-25 NOTE — PLAN OF CARE
Outpatient/Observation goals to be met before discharge home:     Cath insertion site stabilized: Yes, pt came off of bedrest at 0000.

## 2019-01-25 NOTE — PROGRESS NOTES
AOX4.VSS. Right groin site CDI. Free of hematoma. Tolerating regular diet. Voiding. Ambulating. Pt denies any pain.

## 2019-01-25 NOTE — DISCHARGE SUMMARY
[address] [address]  p: [phone]    Discharge Summary: Cardiology Service    Wesley Cooper MRN# [id number]   YOB: 1945 Age: 73 year old       Admission Date: 1/24/2019  Discharge Date: 01/25/19      Discharge Diagnoses:  1. CAD s/p CABG, patent LIMA-LAD with known vein graft occlusion, native LM/LCx disease status post angioplasty  2. Unstable angina  3. ICM, LV EF 45%  4. HTN  5. Aflutter  6. DMII      Pertinent Procedures:  Coronary angiogram     Consults:  NA     Imaging with results:  Coronary Angiogram 1/24/19    SUMMARY:   Three vessel CAD involving the left main with a patent RODRIGUEZ to LAD.   Successful rotational atherectomy of the left main and proximal LCx, unsuccessful atherectomy of the mid LCx.  POBA of the proximal and mid LCx  Due to the contrast and radiation limit, decision was made to return in 6 weeks for staged rotablation of the mid LCx and PCI     PLAN:   >> Plavix 75 mg qd and warfarin, resume warfarin tonight  >> Bedrest per protocol.  >> Continued medical management and lifestyle modification for cardiovascular risk factor optimization.   >>. Admit for observation overnight  >> return in six weeks for staged PCI of the mid LCx      Brief HPI:  Wesley Cooper is a 73 year old male with history of coronary artery disease status post CABG, chronic refractory angina, ICM with LV EF 40-45% status post CRT-D, DM II, Aflutter, and chronic colostomy. He underwent an elective coronary angiogram in November 2018 which showed mild progression of his LAD disease distal to the touchdown of his LIMA which was not a good target for revascularization as it was a very small vessel.  His RODRIGUEZ to LAD was patent, he had significant native left circumflex which was giving collaterals to the right coronary, and his vein graft to right coronary and circumflex were occluded. He has had a cardiac PET at [place] that showed viability of  his tissue in LCx distribution.      He was seen in clinic and despite titration of anti-anginal medications (imdur 120 mg, Coreg 12.5 mg BID, Cozaar 25 mg), patient was having refractory chest pain with exertion relieved with nitroglycerin SL. Given viability on PET, he was subsequently sent for repeat coronary angiogram and intervention which was performed 1/25 with successful roto-ablation and balloon angioplasty of the LM and prox LCX however mid-LCX left due to prolonged fluoroscopy time.       Patient admitted on observation given his higher risk for MI, shock, stroke, renal failure, and other complication. At time of interview he is resting comfortably in bed with stable vital signs. He denies chest pain, palpitation, light headedness, syncope. He denies abdominal pain, nausea, vomiting. He denies shortness of breath.      Hospital Course by Diagnosis:  # Unstable Angina  # CAD s/p CABG x4  # ICM with LVEF 45% s/p CRT-D  See dedicated coronary angiogram report dated 1/24/19. Patient with known CAD and refractory angina despite titration of anti-anginal medications as an outpatient. Last coronary angiogram before today's was in Nov 2018 when patient had a patent LIMA graft and known occluded vein grafts as well as native disease; it was felt there were no good targets for revascularization. His case was again reviewed by Dr. Loco as well as Dr. Marquez, and given PET findings of viability in distribution of his native LCX which collateralizes to the RCA vein graft which is completely occluded, plan was made for repeat coronary angiogram and planned PCI. This was attempted today with roto-ablation and POBA of the LM and pLXC with some success however no stent was placed as patient under fluoro for 60 minutes and suffered a small dissection without further compication. The mid LCX has stenosis that was not intervened upon for the same reason. Plan for staged procedure and return to the cath lab in 6  weeks. Dr. Loco aware and agrees with plan.  -Continue PTA Plavix and warfarin  - IP pharmacy to dose warfarin; subtherapeutic INR studies recently. Recheck INR in 3-4 days.   - Continue GDMT for ICM:  Coreg to 12.5 mg BID, Losartan 25 mg daily, Atorvastatin 80 mg   - Continue anti-anginal Imdur 120 mg daily     #Hypertension  Blood pressure currently stable  - Continue Coreg, Imdur, Losartan as listed above  - Continue to follow up as outpatient for HTN management     # Aflutter  INR 1.56 day of discharge, patient had been holding Coumadin since Saturday 11/3 in preparation for coronary angiogram  - Pharmacy to dose warfarin  - INR recheck in 3-4 days   - Cont home sotalol 160mg BID     #Type 2 Diabetes  Patient currently taking Metformin and Glipizide at home  - Continue PTA diabetes medications  - Continue to follow up as outpatient for DM management     #GI   -colostomy in place, without fluctuance, erythema.   -Monitor      CODE: Full  FENA:  Advance diet as tolerated  DVT ppx: Warfarin  Disposition: Home in 24-48 hours, admit on observation status       Condition on discharge  Temp:  [97.5  F (36.4  C)-98.5  F (36.9  C)] 98.4  F (36.9  C)  Pulse:  [62-77] 74  Heart Rate:  [60-78] 72  Resp:  [16] 16  BP: ()/(55-98) 155/83  SpO2:  [95 %-98 %] 98 %  General: Alert, interactive, NAD  Eyes: sclera anicteric, EOMI  Neck: JVP flat, carotid 2+ bilaterally  Cardiovascular: regular rate and rhythm, normal S1 and S2, no murmurs, gallops, or rubs. Groin access site warm/dry without hematoma or bruit.  Resp: clear to auscultation bilaterally, no rales, wheezes, or rhonchi  GI: Soft, nontender, nondistended. +BS.  No HSM or masses, no rebound or guarding.  Extremities: No edema, no cyanosis or clubbing, dorsalis pedis and posterior tibialis pulses 2+ bilaterally  Skin: Warm and dry, no jaundice or rash.  Neuro: CN 2-12 intact, moves all extremities equally  Psych: Alert & oriented x 3      Medication  Changes:  NA    Discharge medications:   Current Discharge Medication List      CONTINUE these medications which have NOT CHANGED    Details   atorvastatin (LIPITOR) 80 MG tablet Take 80 mg by mouth daily    Associated Diagnoses: Chronic systolic heart failure (H)      carvedilol (COREG) 12.5 MG tablet Take 1 tablet (12.5 mg) by mouth 2 times daily (with meals)  Qty: 60 tablet, Refills: 1    Associated Diagnoses: Chronic systolic heart failure (H)      clopidogrel (PLAVIX) 75 MG tablet Take 75 mg by mouth daily    Associated Diagnoses: Chronic systolic heart failure (H)      diphenhydrAMINE-acetaminophen (TYLENOL PM)  MG tablet Take 1 tablet by mouth nightly as needed for sleep      gabapentin (NEURONTIN) 300 MG capsule Take 300 mg by mouth 3 times daily       glipiZIDE (GLUCOTROL) 5 MG tablet Take 5 mg by mouth 2 times daily (before meals)      isosorbide mononitrate (IMDUR) 30 MG 24 hr tablet Take 1 30mg tablet in the morning. Take 1 30mg tablet in the afternoon. Take 2 30mg tablets in the evening. Total daily dose of 120mg.  Qty: 180 tablet, Refills: 1    Associated Diagnoses: Unstable angina pectoris (H); Ischemic cardiomyopathy      losartan (COZAAR) 25 MG tablet Take 1 tablet (25 mg) by mouth daily  Qty: 30 tablet, Refills: 3    Associated Diagnoses: Chronic systolic heart failure (H)      pioglitazone (ACTOS) 30 MG tablet Take 30 mg by mouth daily      sotalol HCl, AF, 160 MG TABS Take 160 mg by mouth 2 times daily  Qty: 180 tablet, Refills: 3    Associated Diagnoses: Chronic systolic heart failure (H)      warfarin (COUMADIN) 2.5 MG tablet Take 5 mg by mouth on Mondays and 2.5 mg 6x/week or as directed based on INR    Associated Diagnoses: Chronic systolic heart failure (H)      blood glucose monitoring (ACCU-CHEK TAIWO PLUS) meter device kit       nitroglycerin (NITROSTAT) 0.4 MG sublingual tablet Place 0.4 mg under the tongue every 5 minutes as needed for chest pain For chest pain place 1 tablet  under the tongue every 5 minutes for 3 doses. If symptoms persist 5 minutes after 1st dose call 911.    Associated Diagnoses: Chronic systolic heart failure (H)             Labs or imaging requiring follow-up after discharge:  CORONARY ANGIOGRAM:   1. Both coronary arteries arise from their respective cusps.  2. Dominance: Right  3. The LM has 70% distal disease, heavily calcified.  4. LAD: The LAD is occluded at the proximal segment. It is filled by a LIMA which was not injected today.  5. LCX gives rise to OM1 and OM2 vessels OM 1 is occluded at its ostium. OM2 has mild 30% disease.  The proximal LCx has a severe calcific stenosis that is diffuse and extends into the mid LCx ~80-90%, the distal LCx has mild 30-40% disease.  6. RCA gives rise to PL branches and supplies PDA. The RCA is occluded proximally and its distal segments are filled in by left to right collaterals from the LCx.        PERCUTANEOUS CORONARY INTERVENTION:   Lesion #1: Left main, proximal and mid LCx     A 7 Somali XB 3.0 guide was positioned in the ostium of the LM. A rota wire was initially advanced into the lesion and rotational atherectomy was initially performed with a 1.5 britni as described below to the distal left main however it did not engage the ostium of the LCx well and was switched out for a 1.25mm britni which was able to engage the proximal LCx, but would not pass the mid LCx calcified lesion despite multiple runs. The rota wire was then exchanged for a Jhon blue and an Allstar wire. A  balloon was then used to advance into the lesion but without success. Then a Turnpike LP was used and was able to engage the lesion but could not pass beyond it. Finally a Finecross was attempted but also without success. A Jhon blue was then replaced with a 6 Somali GuideLiner and a 1.2x12 Push balloon which was able to engage and dilate the lesion, followed by another 1.2x12, then a 1.5x15mm was used, and finally a 2.0x6mm NC was used to  pre dilate which burst. A 3.0 or 2.5 balloon could not be advanced further and due to the contrast and fluoroscopy time, the decision was made to stop and return in 6 weeks for repeat rotational atherectomy. Final angiography showed no evidence of perforation and residual stenosis of 60% and RAJAN 3 flow.  No complications.     ROTATIONAL ATHERECTOMY:  1. Rotational atherectomy of the left main lesion was completed with a 1.5 britni at 160,000 rpm for 3 runs. The britni was then exchanged for a 1.2mm and atherectomy was completed at 160,000 rpm for 8 runs.         Fluoroscopy Time: 66.2 min    SUMMARY:   Three vessel CAD involving the left main with a patent RODRIGUEZ to LAD.   Successful rotational atherectomy of the left main and proximal LCx, unsuccessful atherectomy of the mid LCx.  POBA of the proximal and mid LCx  Due to the contrast and radiation limit, decision was made to return in 6 weeks for staged rotablation of the mid LCx and PCI     PLAN:   >> Plavix 75 mg qd and warfarin, resume warfarin tonight  >> Bedrest per protocol.  >> Continued medical management and lifestyle modification for cardiovascular risk factor optimization.   >>. Admit for observation overnight  >> return in six weeks for staged PCI of the mid LCx          Follow-up:  Follow up with PCP in 5-7 days for hospital follow up  Follow up with cards as arranged    Code status:  Full    Pt was seen by, and discharge plan discussed with Dr. Sami CHRISTINE   Cardiology  Pager [phone]    Patient Care Team:  Jarod De La Rosa as PCP - General (Family Practice)  Claudy Loco MD as MD (Cardiology)  Calli Isabel RN as Nurse Coordinator (Cardiology)

## 2019-01-26 LAB — INTERPRETATION ECG - MUSE: NORMAL

## 2019-01-29 ENCOUNTER — CARE COORDINATION (OUTPATIENT)
Dept: CARDIOLOGY | Facility: CLINIC | Age: 74
End: 2019-01-29

## 2019-01-29 NOTE — PROGRESS NOTES
Called Wesley to check in after his attempted PCI last week. No answer. Left voicemail and asked Wesley to check in with any questions or concerns. Will attempt call later. Will work on getting patient rescheduled for second procedure in about 6 weeks.

## 2019-01-31 ENCOUNTER — CARE COORDINATION (OUTPATIENT)
Dept: CARDIOLOGY | Facility: CLINIC | Age: 74
End: 2019-01-31

## 2019-01-31 DIAGNOSIS — I20.0 UNSTABLE ANGINA (H): Primary | ICD-10-CM

## 2019-01-31 DIAGNOSIS — I25.5 ISCHEMIC CARDIOMYOPATHY: ICD-10-CM

## 2019-01-31 RX ORDER — SODIUM CHLORIDE 9 MG/ML
INJECTION, SOLUTION INTRAVENOUS CONTINUOUS
Status: CANCELLED | OUTPATIENT
Start: 2019-01-31

## 2019-01-31 NOTE — PROGRESS NOTES
Second call to Wesley to check in after his procedure last week. LEft voicemail checking in. Will work to schedule follow up procedure for first or second week of March. Asked Wesley to call in with any questions or concerns. Will return call to him when date is finalized for repeat procedure.

## 2019-02-04 ENCOUNTER — TELEPHONE (OUTPATIENT)
Dept: CARDIOLOGY | Facility: CLINIC | Age: 74
End: 2019-02-04

## 2019-02-04 NOTE — TELEPHONE ENCOUNTER
Called Wesley to discuss the date and time for his planned PCI. This has been scheduled for March 12 with check in at 10am. Will mail out a letter today with detailed instructions. Asked Wesley to call or MyChart us with any questions or concerns.

## 2019-03-11 ENCOUNTER — TELEPHONE (OUTPATIENT)
Dept: CARDIOLOGY | Facility: CLINIC | Age: 74
End: 2019-03-11

## 2019-03-11 PROBLEM — I25.10 CAD S/P PERCUTANEOUS CORONARY ANGIOPLASTY: Status: RESOLVED | Noted: 2019-01-24 | Resolved: 2019-03-11

## 2019-03-11 PROBLEM — I25.10 CAD (CORONARY ARTERY DISEASE): Chronic | Status: ACTIVE | Noted: 2018-11-01

## 2019-03-11 PROBLEM — I34.0 MODERATE MITRAL REGURGITATION: Chronic | Status: ACTIVE | Noted: 2019-03-11

## 2019-03-11 PROBLEM — Z98.890 STATUS POST CORONARY ANGIOGRAM: Status: RESOLVED | Noted: 2019-01-24 | Resolved: 2019-03-11

## 2019-03-11 PROBLEM — K21.9 GASTROESOPHAGEAL REFLUX DISEASE WITHOUT ESOPHAGITIS: Status: ACTIVE | Noted: 2017-12-18

## 2019-03-11 PROBLEM — I10 ESSENTIAL HYPERTENSION: Chronic | Status: ACTIVE | Noted: 2019-03-11

## 2019-03-11 PROBLEM — K57.90 DIVERTICULAR DISEASE: Status: ACTIVE | Noted: 2019-03-11

## 2019-03-11 PROBLEM — I20.0 UNSTABLE ANGINA PECTORIS (H): Status: RESOLVED | Noted: 2018-12-17 | Resolved: 2019-03-11

## 2019-03-11 PROBLEM — I44.2 ATRIOVENTRICULAR BLOCK, COMPLETE (H): Status: ACTIVE | Noted: 2019-03-11

## 2019-03-11 PROBLEM — I50.22 CHRONIC SYSTOLIC HEART FAILURE (H): Chronic | Status: ACTIVE | Noted: 2018-11-01

## 2019-03-11 PROBLEM — I65.29 CAROTID ARTERY OBSTRUCTION: Status: ACTIVE | Noted: 2019-03-11

## 2019-03-11 PROBLEM — I10 ESSENTIAL HYPERTENSION: Status: ACTIVE | Noted: 2019-03-11

## 2019-03-11 PROBLEM — I34.0 MODERATE MITRAL REGURGITATION: Status: ACTIVE | Noted: 2019-03-11

## 2019-03-11 PROBLEM — Z98.61 CAD S/P PERCUTANEOUS CORONARY ANGIOPLASTY: Status: RESOLVED | Noted: 2019-01-24 | Resolved: 2019-03-11

## 2019-03-11 PROBLEM — K21.9 GASTROESOPHAGEAL REFLUX DISEASE WITHOUT ESOPHAGITIS: Chronic | Status: ACTIVE | Noted: 2017-12-18

## 2019-03-11 NOTE — TELEPHONE ENCOUNTER
Dr. Marquez reviewed cath images from 1/19 to make a plan for tomorrow's procedure. Per MD, he would like to have both CSI and laser device available so would like to reschedule the procedure. Writer spoke with patient who was already on his way to the  from [place] and had made hotel reservations. Patient prefers to keep the procedure scheduled as planned and come back a third time (if necessary) for laser treatment of the calcified lesion. Dr. Marquez is agreeable to this plan and will use CSI tomorrow for the procedure. Patient confirmed he has been holding coumadin as instructed by the outpatient team.

## 2019-03-12 ENCOUNTER — APPOINTMENT (OUTPATIENT)
Dept: MEDSURG UNIT | Facility: CLINIC | Age: 74
End: 2019-03-12
Payer: COMMERCIAL

## 2019-03-12 ENCOUNTER — APPOINTMENT (OUTPATIENT)
Dept: LAB | Facility: CLINIC | Age: 74
End: 2019-03-12
Attending: INTERNAL MEDICINE
Payer: COMMERCIAL

## 2019-03-12 ENCOUNTER — HOSPITAL ENCOUNTER (OUTPATIENT)
Facility: CLINIC | Age: 74
Discharge: HOME | End: 2019-03-12
Attending: INTERNAL MEDICINE | Admitting: INTERNAL MEDICINE
Payer: COMMERCIAL

## 2019-03-12 VITALS
RESPIRATION RATE: 18 BRPM | DIASTOLIC BLOOD PRESSURE: 98 MMHG | HEART RATE: 71 BPM | SYSTOLIC BLOOD PRESSURE: 144 MMHG | OXYGEN SATURATION: 98 % | TEMPERATURE: 97.6 F

## 2019-03-12 DIAGNOSIS — I20.0 UNSTABLE ANGINA (H): ICD-10-CM

## 2019-03-12 DIAGNOSIS — I25.5 ISCHEMIC CARDIOMYOPATHY: ICD-10-CM

## 2019-03-12 DIAGNOSIS — Z98.61 POSTSURGICAL PERCUTANEOUS TRANSLUMINAL CORONARY ANGIOPLASTY STATUS: Primary | ICD-10-CM

## 2019-03-12 PROBLEM — Z98.890 STATUS POST CORONARY ANGIOGRAM: Status: ACTIVE | Noted: 2019-03-12

## 2019-03-12 LAB
ABO + RH BLD: NORMAL
ANION GAP SERPL CALCULATED.3IONS-SCNC: 9 MMOL/L (ref 3–14)
BUN SERPL-MCNC: 21 MG/DL (ref 7–30)
CALCIUM SERPL-MCNC: 8.6 MG/DL (ref 8.5–10.1)
CHLORIDE SERPL-SCNC: 109 MMOL/L (ref 94–109)
CO2 SERPL-SCNC: 24 MMOL/L (ref 20–32)
CREAT SERPL-MCNC: 0.98 MG/DL (ref 0.66–1.25)
ERYTHROCYTE [DISTWIDTH] IN BLOOD BY AUTOMATED COUNT: 17.4 % (ref 10–15)
GFR SERPL CREATININE-BSD FRML MDRD: 75 ML/MIN/{1.73_M2}
GLUCOSE BLDC GLUCOMTR-MCNC: 113 MG/DL (ref 70–99)
GLUCOSE BLDC GLUCOMTR-MCNC: 127 MG/DL (ref 70–99)
GLUCOSE BLDC GLUCOMTR-MCNC: 147 MG/DL (ref 70–99)
GLUCOSE BLDC GLUCOMTR-MCNC: 70 MG/DL (ref 70–99)
GLUCOSE BLDC GLUCOMTR-MCNC: 73 MG/DL (ref 70–99)
GLUCOSE SERPL-MCNC: 178 MG/DL (ref 70–99)
HCT VFR BLD AUTO: 34.4 % (ref 40–53)
HGB BLD-MCNC: 9.6 G/DL (ref 13.3–17.7)
INR PPP: 1.97 (ref 0.86–1.14)
KCT BLD-ACNC: 274 SEC (ref 75–150)
KCT BLD-ACNC: 420 SEC (ref 75–150)
MCH RBC QN AUTO: 22.4 PG (ref 26.5–33)
MCHC RBC AUTO-ENTMCNC: 27.9 G/DL (ref 31.5–36.5)
MCV RBC AUTO: 80 FL (ref 78–100)
PLATELET # BLD AUTO: 175 10E9/L (ref 150–450)
POTASSIUM SERPL-SCNC: 3.8 MMOL/L (ref 3.4–5.3)
RBC # BLD AUTO: 4.29 10E12/L (ref 4.4–5.9)
SODIUM SERPL-SCNC: 142 MMOL/L (ref 133–144)
SPECIMEN EXP DATE BLD: NORMAL
SPECIMEN EXP DATE BLD: NORMAL
WBC # BLD AUTO: 4.1 10E9/L (ref 4–11)

## 2019-03-12 PROCEDURE — 85027 COMPLETE CBC AUTOMATED: CPT | Performed by: INTERNAL MEDICINE

## 2019-03-12 PROCEDURE — 99153 MOD SED SAME PHYS/QHP EA: CPT | Performed by: INTERNAL MEDICINE

## 2019-03-12 PROCEDURE — C1769 GUIDE WIRE: HCPCS | Performed by: INTERNAL MEDICINE

## 2019-03-12 PROCEDURE — 85347 COAGULATION TIME ACTIVATED: CPT

## 2019-03-12 PROCEDURE — P9059 PLASMA, FRZ BETWEEN 8-24HOUR: HCPCS | Performed by: INTERNAL MEDICINE

## 2019-03-12 PROCEDURE — C1725 CATH, TRANSLUMIN NON-LASER: HCPCS | Performed by: INTERNAL MEDICINE

## 2019-03-12 PROCEDURE — 40000065 ZZH STATISTIC EKG NON-CHARGEABLE

## 2019-03-12 PROCEDURE — C1894 INTRO/SHEATH, NON-LASER: HCPCS | Performed by: INTERNAL MEDICINE

## 2019-03-12 PROCEDURE — 27210794 ZZH OR GENERAL SUPPLY STERILE: Performed by: INTERNAL MEDICINE

## 2019-03-12 PROCEDURE — C1874 STENT, COATED/COV W/DEL SYS: HCPCS | Performed by: INTERNAL MEDICINE

## 2019-03-12 PROCEDURE — 25000132 ZZH RX MED GY IP 250 OP 250 PS 637: Performed by: INTERNAL MEDICINE

## 2019-03-12 PROCEDURE — 82962 GLUCOSE BLOOD TEST: CPT | Mod: 91

## 2019-03-12 PROCEDURE — 92933 PRQ TRLML C ATHRC ST ANGIOP1: CPT | Mod: LC | Performed by: INTERNAL MEDICINE

## 2019-03-12 PROCEDURE — 92928 PRQ TCAT PLMT NTRAC ST 1 LES: CPT | Mod: 59 | Performed by: INTERNAL MEDICINE

## 2019-03-12 PROCEDURE — 86901 BLOOD TYPING SEROLOGIC RH(D): CPT | Mod: 91 | Performed by: INTERNAL MEDICINE

## 2019-03-12 PROCEDURE — 36415 COLL VENOUS BLD VENIPUNCTURE: CPT | Performed by: INTERNAL MEDICINE

## 2019-03-12 PROCEDURE — 25000125 ZZHC RX 250: Performed by: INTERNAL MEDICINE

## 2019-03-12 PROCEDURE — 40000172 ZZH STATISTIC PROCEDURE PREP ONLY

## 2019-03-12 PROCEDURE — 85610 PROTHROMBIN TIME: CPT | Performed by: INTERNAL MEDICINE

## 2019-03-12 PROCEDURE — 99152 MOD SED SAME PHYS/QHP 5/>YRS: CPT | Performed by: INTERNAL MEDICINE

## 2019-03-12 PROCEDURE — C9600 PERC DRUG-EL COR STENT SING: HCPCS | Mod: LM | Performed by: INTERNAL MEDICINE

## 2019-03-12 PROCEDURE — 36430 TRANSFUSION BLD/BLD COMPNT: CPT

## 2019-03-12 PROCEDURE — 25000128 H RX IP 250 OP 636: Performed by: INTERNAL MEDICINE

## 2019-03-12 PROCEDURE — 25800030 ZZH RX IP 258 OP 636: Performed by: INTERNAL MEDICINE

## 2019-03-12 PROCEDURE — 86900 BLOOD TYPING SEROLOGIC ABO: CPT | Performed by: INTERNAL MEDICINE

## 2019-03-12 PROCEDURE — C1760 CLOSURE DEV, VASC: HCPCS | Performed by: INTERNAL MEDICINE

## 2019-03-12 PROCEDURE — C9602 PERC D-E COR STENT ATHER S: HCPCS | Mod: LC | Performed by: INTERNAL MEDICINE

## 2019-03-12 PROCEDURE — C1887 CATHETER, GUIDING: HCPCS | Performed by: INTERNAL MEDICINE

## 2019-03-12 PROCEDURE — 93005 ELECTROCARDIOGRAM TRACING: CPT

## 2019-03-12 PROCEDURE — 40000344 ZZHCL STATISTIC THAWING COMPONENT: Performed by: INTERNAL MEDICINE

## 2019-03-12 PROCEDURE — 93010 ELECTROCARDIOGRAM REPORT: CPT | Performed by: INTERNAL MEDICINE

## 2019-03-12 PROCEDURE — 80048 BASIC METABOLIC PNL TOTAL CA: CPT | Performed by: INTERNAL MEDICINE

## 2019-03-12 PROCEDURE — 25800025 ZZH RX 258: Performed by: INTERNAL MEDICINE

## 2019-03-12 DEVICE — STENT SYNERGY DRUG ELUTING 3.50X20MM  H7493926020350: Type: IMPLANTABLE DEVICE | Status: FUNCTIONAL

## 2019-03-12 DEVICE — STENT SYNERGY DRUG ELUTING 2.50X32MM  H7493926032250: Type: IMPLANTABLE DEVICE | Status: FUNCTIONAL

## 2019-03-12 RX ORDER — DOBUTAMINE HYDROCHLORIDE 200 MG/100ML
5-40 INJECTION INTRAVENOUS CONTINUOUS PRN
Status: DISCONTINUED | OUTPATIENT
Start: 2019-03-12 | End: 2019-03-12 | Stop reason: HOSPADM

## 2019-03-12 RX ORDER — EPTIFIBATIDE 2 MG/ML
2 INJECTION, SOLUTION INTRAVENOUS CONTINUOUS PRN
Status: DISCONTINUED | OUTPATIENT
Start: 2019-03-12 | End: 2019-03-12 | Stop reason: HOSPADM

## 2019-03-12 RX ORDER — LIDOCAINE 40 MG/G
CREAM TOPICAL
Status: DISCONTINUED | OUTPATIENT
Start: 2019-03-12 | End: 2019-03-12 | Stop reason: HOSPADM

## 2019-03-12 RX ORDER — DEXTROSE MONOHYDRATE 25 G/50ML
INJECTION, SOLUTION INTRAVENOUS
Status: DISCONTINUED | OUTPATIENT
Start: 2019-03-12 | End: 2019-03-12 | Stop reason: HOSPADM

## 2019-03-12 RX ORDER — EPTIFIBATIDE 2 MG/ML
180 INJECTION, SOLUTION INTRAVENOUS EVERY 10 MIN PRN
Status: DISCONTINUED | OUTPATIENT
Start: 2019-03-12 | End: 2019-03-12 | Stop reason: HOSPADM

## 2019-03-12 RX ORDER — SODIUM CHLORIDE 9 MG/ML
INJECTION, SOLUTION INTRAVENOUS CONTINUOUS
Status: DISCONTINUED | OUTPATIENT
Start: 2019-03-12 | End: 2019-03-12 | Stop reason: HOSPADM

## 2019-03-12 RX ORDER — NALOXONE HYDROCHLORIDE 0.4 MG/ML
.1-.4 INJECTION, SOLUTION INTRAMUSCULAR; INTRAVENOUS; SUBCUTANEOUS
Status: DISCONTINUED | OUTPATIENT
Start: 2019-03-12 | End: 2019-03-12 | Stop reason: HOSPADM

## 2019-03-12 RX ORDER — NITROGLYCERIN 5 MG/ML
VIAL (ML) INTRAVENOUS
Status: DISCONTINUED | OUTPATIENT
Start: 2019-03-12 | End: 2019-03-12 | Stop reason: HOSPADM

## 2019-03-12 RX ORDER — CLOPIDOGREL BISULFATE 75 MG/1
TABLET ORAL
Status: DISCONTINUED | OUTPATIENT
Start: 2019-03-12 | End: 2019-03-12 | Stop reason: HOSPADM

## 2019-03-12 RX ORDER — TIROFIBAN HYDROCHLORIDE 50 UG/ML
0.15 INJECTION INTRAVENOUS CONTINUOUS PRN
Status: DISCONTINUED | OUTPATIENT
Start: 2019-03-12 | End: 2019-03-12 | Stop reason: HOSPADM

## 2019-03-12 RX ORDER — DOBUTAMINE HYDROCHLORIDE 200 MG/100ML
2-20 INJECTION INTRAVENOUS CONTINUOUS PRN
Status: DISCONTINUED | OUTPATIENT
Start: 2019-03-12 | End: 2019-03-12 | Stop reason: HOSPADM

## 2019-03-12 RX ORDER — FLUMAZENIL 0.1 MG/ML
0.2 INJECTION, SOLUTION INTRAVENOUS
Status: DISCONTINUED | OUTPATIENT
Start: 2019-03-12 | End: 2019-03-12 | Stop reason: HOSPADM

## 2019-03-12 RX ORDER — ARGATROBAN 1 MG/ML
150 INJECTION, SOLUTION INTRAVENOUS
Status: DISCONTINUED | OUTPATIENT
Start: 2019-03-12 | End: 2019-03-12 | Stop reason: HOSPADM

## 2019-03-12 RX ORDER — ARGATROBAN 1 MG/ML
350 INJECTION, SOLUTION INTRAVENOUS
Status: DISCONTINUED | OUTPATIENT
Start: 2019-03-12 | End: 2019-03-12 | Stop reason: HOSPADM

## 2019-03-12 RX ORDER — NOREPINEPHRINE BITARTRATE/D5W 16MG/250ML
.03-.4 PLASTIC BAG, INJECTION (ML) INTRAVENOUS CONTINUOUS PRN
Status: DISCONTINUED | OUTPATIENT
Start: 2019-03-12 | End: 2019-03-12 | Stop reason: HOSPADM

## 2019-03-12 RX ORDER — NITROGLYCERIN 0.4 MG/1
0.4 TABLET SUBLINGUAL EVERY 5 MIN PRN
Status: DISCONTINUED | OUTPATIENT
Start: 2019-03-12 | End: 2019-03-12 | Stop reason: HOSPADM

## 2019-03-12 RX ORDER — FENTANYL CITRATE 50 UG/ML
25-50 INJECTION, SOLUTION INTRAMUSCULAR; INTRAVENOUS
Status: DISCONTINUED | OUTPATIENT
Start: 2019-03-12 | End: 2019-03-12 | Stop reason: HOSPADM

## 2019-03-12 RX ORDER — ATROPINE SULFATE 0.1 MG/ML
0.5 INJECTION INTRAVENOUS EVERY 5 MIN PRN
Status: DISCONTINUED | OUTPATIENT
Start: 2019-03-12 | End: 2019-03-12 | Stop reason: HOSPADM

## 2019-03-12 RX ORDER — IOPAMIDOL 755 MG/ML
INJECTION, SOLUTION INTRAVASCULAR
Status: DISCONTINUED | OUTPATIENT
Start: 2019-03-12 | End: 2019-03-12 | Stop reason: HOSPADM

## 2019-03-12 RX ORDER — FENTANYL CITRATE 50 UG/ML
INJECTION, SOLUTION INTRAMUSCULAR; INTRAVENOUS
Status: DISCONTINUED | OUTPATIENT
Start: 2019-03-12 | End: 2019-03-12 | Stop reason: HOSPADM

## 2019-03-12 RX ORDER — HEPARIN SODIUM 1000 [USP'U]/ML
INJECTION, SOLUTION INTRAVENOUS; SUBCUTANEOUS
Status: DISCONTINUED | OUTPATIENT
Start: 2019-03-12 | End: 2019-03-12 | Stop reason: HOSPADM

## 2019-03-12 RX ORDER — ONDANSETRON 4 MG/1
4 TABLET, ORALLY DISINTEGRATING ORAL EVERY 6 HOURS PRN
Status: DISCONTINUED | OUTPATIENT
Start: 2019-03-12 | End: 2019-03-12 | Stop reason: HOSPADM

## 2019-03-12 RX ORDER — NITROGLYCERIN 20 MG/100ML
.07-2 INJECTION INTRAVENOUS CONTINUOUS PRN
Status: DISCONTINUED | OUTPATIENT
Start: 2019-03-12 | End: 2019-03-12 | Stop reason: HOSPADM

## 2019-03-12 RX ORDER — ONDANSETRON 2 MG/ML
4 INJECTION INTRAMUSCULAR; INTRAVENOUS EVERY 6 HOURS PRN
Status: DISCONTINUED | OUTPATIENT
Start: 2019-03-12 | End: 2019-03-12 | Stop reason: HOSPADM

## 2019-03-12 RX ORDER — ASPIRIN 81 MG/1
81 TABLET ORAL DAILY
Status: CANCELLED | OUTPATIENT
Start: 2019-03-12

## 2019-03-12 RX ORDER — DOPAMINE HYDROCHLORIDE 160 MG/100ML
2-20 INJECTION, SOLUTION INTRAVENOUS CONTINUOUS PRN
Status: DISCONTINUED | OUTPATIENT
Start: 2019-03-12 | End: 2019-03-12 | Stop reason: HOSPADM

## 2019-03-12 RX ORDER — NALOXONE HYDROCHLORIDE 0.4 MG/ML
.2-.4 INJECTION, SOLUTION INTRAMUSCULAR; INTRAVENOUS; SUBCUTANEOUS
Status: DISCONTINUED | OUTPATIENT
Start: 2019-03-12 | End: 2019-03-12 | Stop reason: HOSPADM

## 2019-03-12 RX ADMIN — SODIUM CHLORIDE: 9 INJECTION, SOLUTION INTRAVENOUS at 10:53

## 2019-03-12 RX ADMIN — ASPIRIN 325 MG ORAL TABLET 325 MG: 325 PILL ORAL at 10:55

## 2019-03-12 NOTE — Clinical Note
Single balloon inflation. The first balloon was inserted into the circumflex and proximal circumflex.  Max pressure = 16 ela. Total duration = 4 seconds.

## 2019-03-12 NOTE — Clinical Note
The first balloon was inserted into the left main.  Max pressure = 16 ela. Total duration = 4 seconds.     Max pressure = 16 ela. Total duration = 4 seconds.    Balloon reinflated a second time: Max pressure = 16 eal. Total duration = 4 seconds.  Balloon reinflated a third time: Max pressure = 16 ela. Total duration = 4 seconds.  Balloon reinflated a fourth time: Max pressure = 16 ela. Total duration = 8 seconds.

## 2019-03-12 NOTE — PROGRESS NOTES
Pt admitted to  for a PCI.  PIV placed and labs in epic.  Pt needs consent and updated h and p.  Pt is diabetic and controlled with oral meds.  Pt does not recognize when glucose is low.  Glucose is epic.  Pt wife at bedside.      1530: Pt glucose 70.  Pt asymptomatic.  Charge RN Ava notified in heart cath.  Ava stated they will give dextrose during heart cath.  First unit FFP infusing now.  Report given to braulio REECE from cath lab.  Wife at beside.

## 2019-03-12 NOTE — PROGRESS NOTES
[facility] Cardiac Catherization Lab  Date: 03/12/2019    History of Present Illness:  Mr. Wesley Cooper is a 74 year old male with PMH significant for known disease of the LM/LCx. He underwent attempted, but uncessful PCI (using rotoblade) 01/2019.  As such, he returns today for reattempt of PCI.  The procedural details of the coronary angiogram were discussed in detail with the patient.  Risks and benefits were discussed; discussion included possible allergy to contrast dye in addition to:    Benefit: greater understanding of their coronary anatomy.  Risks:  - Radiation exposure (X-ray)    (100.0% of patients)  - Bleeding (femoral, retroperitoneal)   (up to 7.0% of patients)  - Renal injury/CARLITO     (~3.0% of patients)  - Transient/Permanent arrhythmia  (~3.0% of patients)  - Dissection (coronaries, great vessels)  (1-3.0% of patients)  - Infection      (<1.0% of patients)  - Emergent open heart surgery  (<0.1% of patients)  - CVA (ischemic)    (~0.07% of patients)  - MI       (~0.1% of patients)  - Death     (<0.1% of patients)    Physical Exam:  Gen: alert and oriented x4, in no acute distress  CV: regular rate/rhythm  Pulm: faint inspiratory rales, bilateral lung bases. Otherwise CTAB, no rhonchi  LE: trace pretibial pitting edema    Assessment: 74 year old male with known coronary disease, here for CSI. Patient expressed understanding and agrees to move forward with the procedure at this point in time.  All questions were answered.    Plan: proceed with coronary angiogram. Staff message sent to the patient's primary cardiologist in regards to possible addition of eric Levin PA-C

## 2019-03-12 NOTE — PROCEDURES
PRELIMINARY CARDIAC CATH REPORT:     PROCEDURES PERFORMED:   Coronary Angiography  Percutaneous Coronary Intervention  Orbital Atherectomy    PHYSICIANS:  Attending Physician: Eliseo Field MD  Interventional Cardiology Fellow: Gennaro Tolentino    INDICATION:  Wesley Cooper is a 74 year old gentleman with a history of CABG with a patent LIMA to LAD but the rest of his SVG grafts have closed. He is a diabetic with atrial flutter on coumadin and EF 45% who had a recent attempt to intervene on his LCx with Rotablator which was only partially successful, who is now presenting for coronary angiography with orbital atherectomy to his left main and left circumflex artery.    DESCRIPTION:  1. Consent obtained with discussion of risks.  All questions were answered.  2. Sterile prep and procedure.  3. Location with Sheaths:   Rt Femoral Arterial  8 Fr 45 cm [Britetip]  4. Access: Local anesthetic with lidocaine.  A micropuncture 21 guage needle with ultrasound guidance was used to establish vascular access using a modified Seldinger technique.  5. Diagnostic Catheters:   N/A  6. Guiding Catheters:  8 Fr  XB LF 3.0 GC  6. Estimated blood loss: < 25 ml    MEDICATIONS:  The procedure was performed under conscious sedation with Fentanyl and Versed.  Heart rate, BP, respiration, oxygen saturation and patient responses were monitored throughout the procedure with the assistance of the RN under my supervision.  >> IV UFH 8000 U was administered to achieve anticoagulation.  >> Antiplatelet Therapy: ASA and Plavix    Procedures:    CORONARY ANGIOGRAM:   1. Both coronary arteries arise from their respective cusps.  2. Dominance: Right  3. The LM has 50% distal disease, heavily calcified.  4. LAD: The LAD is occluded at the proximal segment. It is filled by a LIMA which was not injected today.  5. LCX gives rise to OM1 and OM2 vessels OM 1 is occluded at its ostium. OM2 has mild 30% disease.  The proximal LCx has a severe calcific  stenosis that is diffuse and extends into the mid LCx ~60%, the distal LCx has mild 30-40% disease.  6. RCA gives rise to PL branches and supplies PDA and is known to be occluded. It is supplied by collaterals from left to right and was not injected on this study.    PERCUTANEOUS CORONARY INTERVENTION:   Lesion #1: Left main, proximal and mid LCx    An 8 Nepali XB 3.0 guide was positioned in the LM ostium and the LCx was primarily wired with a Viper wire. DiamondEdgeware 360 CSI orbital atherectomy device was brought out onto the field and a 1.25 britni was advanced into the proximal LCx lesion under GlideAssist. Orbital atherectomy was performed for 6 runs with a 1.25 britni at 80 kRPM. Viper wire was then exchanged for a  50 wire using a Finecross microcatheter and a 2.0x30mm Emerge was used to pre dilate the LCx lesions, but burst when dilating the proximal LCx. A 2.5x32mm Synergy was then deployed in the mid to proximal LCx and its ostium flared, however the PTCA balloon burst as well. Finally a 3.5x20mm Synergy was deployed in an overlapping manner with the first stent to the ostium of the LM. It and the overlapping stents were post dilated with a 3.5x12mm NC Emerge and the LM including its ostium were post dilated with a 4.0x12mm NC Emerge. Final angiography showed no evidence of perforation or dissection with residual stenosis of 0% and RAJAN 3 flow.  No complications.    ORBITAL ATHERECTOMY:  1. Orbital atherectomy of the LM, proximal and mid LCx lesion was completed with the 1.25mm Diamondback orbital atherectomy system at 80 kRPM. The britni was advance using the control knob at speeds of 1-5 mm per second.  There was  no significant dissection or immediate complication noted.    Sheath Removal:  The sheath in the right common femoral artery was removed using the  8 Fr Angioseal.      Contrast: Isovue, 120 ml     Fluoroscopy Time: 19.9 min    COMPLICATIONS:  1. None    SUMMARY:   SUMMARY:   Three vessel CAD  involving the left main with a patent RODRIGUEZ to LAD.   Successful orbital atherectomy of the left main, proximal and mid LCx  Successful PCI with two drug eluting stents to the LM and LCx (3.5x16 and 2.5x32mm Synergy respectively)    PLAN:   >> ASA 81 mg qd and Plavix 75 mg qd with Warfarin until INR is therapeutic, once INR is above 2.0, stop Aspirin and continue Plavix and Warfarin for 12 months post PCI, then can continue with Warfarin and aspirin indefinitely.  >> Bedrest per protocol.  >> Continued medical management and lifestyle modification for cardiovascular risk factor optimization.   >>. Discharge today per protocol    The attending interventional cardiologist was present and supervised all critical aspects the procedure.    Findings discussed with primary team.    See CVIS report for final draft.    Gennaro Tolentino   Cardiology Fellow    Eliseo Field  Cardiology Staff

## 2019-03-12 NOTE — Clinical Note
The first balloon was inserted into the left main and left main.  Max pressure = 12 ela. Total duration = 5 seconds.     Max pressure = 16 ela. Total duration = 5 seconds.    Balloon reinflated a second time: Max pressure = 16 ela. Total duration = 5 seconds.  Balloon reinflated a third time: Max pressure = 20 ela. Total duration = 5 seconds.

## 2019-03-12 NOTE — Clinical Note
The first balloon was inserted into the circumflex and proximal circumflex.  Max pressure = 16 ela. Total duration = 4 seconds.

## 2019-03-12 NOTE — DISCHARGE INSTRUCTIONS
Going Home after Coronary Angioplasty or Stent Placement     FOR 24 HOURS:         Have an adult stay with you for 24 hours.         Relax and take it easy.         Drink plenty of fluids.         Do NOT make any important or legal decisions.         Do NOT drive or operate machines at home or at work.         Do NOT drink alcohol.     PROCEDURE SITE:  Care of groin site:         Remove the Band-Aid after 24 hours. If there is minor oozing, apply another Band-aid and remove it after 12 hours.          Do NOT take a bath, or use a hot tub or pool for at least 3 days. You may shower.          It is normal to have a small bruise or lump at the site.         Do not scrub the site.         Do not use lotion or powder near the puncture site for 3 days.         For the first 2 days: Do not stoop or squat. When you cough, sneeze or move your bowels, hold your hand over the puncture site and press gently.         Do not lift more than 10 pounds for at least 3 to 5 days.         For 2 days, do NOT have sex or do any heavy exercise.     If you start bleeding from the site in your groin:  Lie down flat and press firmly on the site.  Call your physician immediately, or, come to the emergency room.    Call 911 right away if you have bleeding that is heavy or does not stop.     CARDIAC REHAB:  After the initial healing process of the procedure site, we recommend cardiac rehabilitation for all heart attack and stent patients. Cardiac rehabilitation will help you:  - Rebuild stamina, strength and balance.  - Learn how to participate in activities safely, as well as help you regain confidence to do so.  - Return to activities of daily living and leisure.  You should receive a call from them within the next week, but if you do not or you would like to call you can contact their central scheduling at [phone]    DIET:  We recommend a diet low in saturated fat, trans fat and cholesterol. In addition it will be helpful to be  cautious of sodium intake, sugar and carbohydrates. Try to increase the amount of lean meats you eat like fish and chicken, but avoid frying; and reduce the amount of red meat you eat. Eat more fresh fruits and vegetables and try to avoid canned and processed food. Please reference the handouts you received for more specific information.    OTHER INFORMATION:  1. Consider having your family members learn CPR if they do not know it already.  2. If you are a smoker, quitting smoking will be one of the most important things you can do for yourself. There are nicotine replacement options or medications they might be able to be prescribed. Please discuss this with your doctors. Consider calling the [employer]uit[employer] at [phone] ([phone]) as they can offer ongoing support after discharge.    CALL YOUR DOCTOR IF:  -You have a large or growing lump/bump around the procedure site  -The site is red, swollen, hot, tender or has drainage  -You have hives, a rash or unusual itching  -You have increasing or worsening shortness of breath or chest pain    FOLLOW UP:  We prefer you to follow up with your primary care provider within one week. You will be arranged to see the cardiologist (heart doctor) in clinic in about one month.     Should you need to contact us:  Cardiology clinic for scheduling or triage nurse questions/concerns:  [phone]      Anticoagulation Plan:  Continue to take Plavix, Warfarin, and Aspirin 81mg daily. Once INR is 2, STOP taking the aspirin.

## 2019-03-13 LAB
BLD PROD TYP BPU: NORMAL
BLD PROD TYP BPU: NORMAL
BLD UNIT ID BPU: 0
BLD UNIT ID BPU: 0
BLOOD PRODUCT CODE: NORMAL
BLOOD PRODUCT CODE: NORMAL
BPU ID: NORMAL
BPU ID: NORMAL
INTERPRETATION ECG - MUSE: NORMAL
INTERPRETATION ECG - MUSE: NORMAL
TRANSFUSION STATUS PATIENT QL: NORMAL

## 2019-03-13 NOTE — PROGRESS NOTES
Patient ready for discharge. He ate, voided, and ambulated. Right groin site remains soft and flat to palpation, no bleeding or hematoma. PIV removed. This nurse went through all discharge instructions with patient and wife, both verbalized understanding. Patient left floor with wife and all belongings via wheelchair.

## 2019-03-18 ENCOUNTER — TELEPHONE (OUTPATIENT)
Dept: CARDIOLOGY | Facility: CLINIC | Age: 74
End: 2019-03-18

## 2019-03-18 DIAGNOSIS — Z98.61 CAD S/P PERCUTANEOUS CORONARY ANGIOPLASTY: Primary | ICD-10-CM

## 2019-03-18 DIAGNOSIS — I25.10 CAD S/P PERCUTANEOUS CORONARY ANGIOPLASTY: Primary | ICD-10-CM

## 2019-03-18 NOTE — TELEPHONE ENCOUNTER
"Received call from patient today. He said he has been feeling short of breath \"even taking just a few steps\" and that he is unable to lay flat in his bed at night d/t shortness of breath. He has been sleeping sitting up. The SOB started when he got home following planned PCI last week on 3/12 and has been getting worse since then. He doesn't have any ankle/foot swelling but says his pants don't fit around his waist and that he thought he might be carrying some fluid there. No chest pain or other symptoms. He is not currently on a diuretic although he said that had been discussed at one point.   Will discuss with pt's primary Cardiologist, Dr. Loco, and contact pt with MD's recommendations.    ADDENDUM: Spoke with Dr. Loco. He is concerned about pt's symptoms and would like him to be seen ASAP for EKG and echo, preferably at [facility] but could be done at local ED as well. Writer spoke with pt; he is able to come to [facility] ([facility]) for EKG and echo on Wednesday morning at 0900. He said he feels \"scared\" about his symptoms, mainly because he is worried about getting another shock from his ICD. Writer then encouraged him to go to his local ED for care but he said he prefers to come to [facility]. Writer instructed pt to call 911/present to local ED if symptoms worsen tonight or tomorrow. Also instructed him not to drive to [facility] if symptoms are worsening but rather to be seen at local ED. Pt verbalized understanding. Writer notified Dr. Loco via text page about pt's preference to come to [facility] on Wednesday. Writer will check in with pt tomorrow as well.     "

## 2019-03-19 ENCOUNTER — TELEPHONE (OUTPATIENT)
Dept: CARDIOLOGY | Facility: CLINIC | Age: 74
End: 2019-03-19

## 2019-03-19 NOTE — TELEPHONE ENCOUNTER
"Patient called writer this morning. He is not feeling well and plans to go to his local ED today although not until ~1:30 pm when his wife can time him. Writer encouraged pt to call 911 and go to the ED right away. Pt said he would but will call and check in with pt shortly. At pt's request, left voice message on his cell phone with Patient [facility]acement phone number and Cath lab fax number. Dr. Loco was called and given an update.     ADDENDUM: Contacted pt at 1015 to check in. He went to work. Writer encouraged him again to go to the ED to which he responded, \"I'm a workaholic.\" He is planning to go to the ED at 130 pm.   "

## 2019-03-21 ENCOUNTER — TELEPHONE (OUTPATIENT)
Dept: CARDIOLOGY | Facility: CLINIC | Age: 74
End: 2019-03-21

## 2019-03-21 NOTE — TELEPHONE ENCOUNTER
Received notification that patient is at local hospital. Requested follow up with dr villagomez next week. Appointment scheduled for Thursday March 28th at 830am with labs prior. Called Wesley to let him know date and time. Wesley and Bianca are in agreement with appointment. He asks if he will have to stay in the hospital until then. I let him know that that was in the hands of his care team at the hospital, but that likely he would be able to go home within a few days. He tells me he is already feeling much better.  He states understanding and reports that he will call with any questions or concerns in the interim.

## 2019-03-22 DIAGNOSIS — I50.22 CHRONIC SYSTOLIC HEART FAILURE (H): Primary | Chronic | ICD-10-CM

## 2019-03-22 DIAGNOSIS — I25.5 ISCHEMIC CARDIOMYOPATHY: ICD-10-CM

## 2019-03-28 ENCOUNTER — OFFICE VISIT (OUTPATIENT)
Dept: CARDIOLOGY | Facility: CLINIC | Age: 74
End: 2019-03-28
Attending: INTERNAL MEDICINE
Payer: COMMERCIAL

## 2019-03-28 VITALS
WEIGHT: 164.4 LBS | DIASTOLIC BLOOD PRESSURE: 74 MMHG | SYSTOLIC BLOOD PRESSURE: 132 MMHG | BODY MASS INDEX: 27.39 KG/M2 | HEART RATE: 77 BPM | OXYGEN SATURATION: 96 % | HEIGHT: 65 IN

## 2019-03-28 DIAGNOSIS — I25.10 CORONARY ARTERY DISEASE DUE TO LIPID RICH PLAQUE: ICD-10-CM

## 2019-03-28 DIAGNOSIS — Z98.61 CAD S/P PERCUTANEOUS CORONARY ANGIOPLASTY: ICD-10-CM

## 2019-03-28 DIAGNOSIS — I25.83 CORONARY ARTERY DISEASE DUE TO LIPID RICH PLAQUE: ICD-10-CM

## 2019-03-28 DIAGNOSIS — I25.10 CAD S/P PERCUTANEOUS CORONARY ANGIOPLASTY: ICD-10-CM

## 2019-03-28 DIAGNOSIS — I47.20 VT (VENTRICULAR TACHYCARDIA) (H): ICD-10-CM

## 2019-03-28 DIAGNOSIS — G47.00 INSOMNIA, UNSPECIFIED TYPE: Primary | ICD-10-CM

## 2019-03-28 DIAGNOSIS — I25.5 ISCHEMIC CARDIOMYOPATHY: ICD-10-CM

## 2019-03-28 DIAGNOSIS — Z86.79 HISTORY OF ATRIAL FLUTTER: ICD-10-CM

## 2019-03-28 DIAGNOSIS — I50.22 CHRONIC SYSTOLIC HEART FAILURE (H): Chronic | ICD-10-CM

## 2019-03-28 LAB
ANION GAP SERPL CALCULATED.3IONS-SCNC: 5 MMOL/L (ref 3–14)
BUN SERPL-MCNC: 27 MG/DL (ref 7–30)
CALCIUM SERPL-MCNC: 9.2 MG/DL (ref 8.5–10.1)
CHLORIDE SERPL-SCNC: 105 MMOL/L (ref 94–109)
CO2 SERPL-SCNC: 26 MMOL/L (ref 20–32)
CREAT SERPL-MCNC: 1.23 MG/DL (ref 0.66–1.25)
ERYTHROCYTE [DISTWIDTH] IN BLOOD BY AUTOMATED COUNT: 17.2 % (ref 10–15)
GFR SERPL CREATININE-BSD FRML MDRD: 57 ML/MIN/{1.73_M2}
GLUCOSE SERPL-MCNC: 205 MG/DL (ref 70–99)
HCT VFR BLD AUTO: 40 % (ref 40–53)
HGB BLD-MCNC: 10.7 G/DL (ref 13.3–17.7)
INR PPP: 2.58 (ref 0.86–1.14)
MCH RBC QN AUTO: 21.4 PG (ref 26.5–33)
MCHC RBC AUTO-ENTMCNC: 26.8 G/DL (ref 31.5–36.5)
MCV RBC AUTO: 80 FL (ref 78–100)
NT-PROBNP SERPL-MCNC: 1141 PG/ML (ref 0–450)
PLATELET # BLD AUTO: 254 10E9/L (ref 150–450)
POTASSIUM SERPL-SCNC: 4.8 MMOL/L (ref 3.4–5.3)
RBC # BLD AUTO: 5.01 10E12/L (ref 4.4–5.9)
SODIUM SERPL-SCNC: 137 MMOL/L (ref 133–144)
TROPONIN I SERPL-MCNC: 0.02 UG/L (ref 0–0.04)
WBC # BLD AUTO: 3.8 10E9/L (ref 4–11)

## 2019-03-28 PROCEDURE — G0463 HOSPITAL OUTPT CLINIC VISIT: HCPCS | Mod: ZF

## 2019-03-28 PROCEDURE — 99215 OFFICE O/P EST HI 40 MIN: CPT | Mod: ZP | Performed by: INTERNAL MEDICINE

## 2019-03-28 PROCEDURE — 85610 PROTHROMBIN TIME: CPT | Performed by: INTERNAL MEDICINE

## 2019-03-28 PROCEDURE — 85027 COMPLETE CBC AUTOMATED: CPT | Performed by: INTERNAL MEDICINE

## 2019-03-28 PROCEDURE — 84484 ASSAY OF TROPONIN QUANT: CPT | Performed by: INTERNAL MEDICINE

## 2019-03-28 PROCEDURE — 93284 PRGRMG EVAL IMPLANTABLE DFB: CPT

## 2019-03-28 PROCEDURE — 93284 PRGRMG EVAL IMPLANTABLE DFB: CPT | Mod: 26 | Performed by: INTERNAL MEDICINE

## 2019-03-28 PROCEDURE — 36415 COLL VENOUS BLD VENIPUNCTURE: CPT | Performed by: INTERNAL MEDICINE

## 2019-03-28 PROCEDURE — 80048 BASIC METABOLIC PNL TOTAL CA: CPT | Performed by: INTERNAL MEDICINE

## 2019-03-28 PROCEDURE — 83880 ASSAY OF NATRIURETIC PEPTIDE: CPT | Performed by: INTERNAL MEDICINE

## 2019-03-28 RX ORDER — TEMAZEPAM 7.5 MG/1
7.5 CAPSULE ORAL
Qty: 15 CAPSULE | Refills: 0 | Status: SHIPPED | OUTPATIENT
Start: 2019-03-28 | End: 2019-05-20

## 2019-03-28 RX ORDER — FUROSEMIDE 40 MG
40 TABLET ORAL DAILY
Status: ON HOLD | COMMUNITY
End: 2020-01-01

## 2019-03-28 ASSESSMENT — PAIN SCALES - GENERAL: PAINLEVEL: NO PAIN (0)

## 2019-03-28 ASSESSMENT — MIFFLIN-ST. JEOR: SCORE: 1402.59

## 2019-03-28 NOTE — PATIENT INSTRUCTIONS
You were seen today in the Cardiovascular Clinic at the [facility].       Cardiology Providers you saw during your visit: Dr. Loco      Medication Changes:   1. Stop mexilitine.  2. Continue lasix 40mg daily.      Patient Instructions:  1. We will complete a device check today.   2. We added on a troponin to your labs today. We will follow up with you on the results of this and let you know the plan pending this result.     Follow up Appointment Information:  1. Return to clinic in 8 weeks.       Results:    Results for FLOR STEVENSON (MRN [id number]) as of 3/28/2019 08:40   Ref. Range 3/28/2019 07:44   Sodium Latest Ref Range: 133 - 144 mmol/L 137   Potassium Latest Ref Range: 3.4 - 5.3 mmol/L 4.8   Chloride Latest Ref Range: 94 - 109 mmol/L 105   Carbon Dioxide Latest Ref Range: 20 - 32 mmol/L 26   Urea Nitrogen Latest Ref Range: 7 - 30 mg/dL 27   Creatinine Latest Ref Range: 0.66 - 1.25 mg/dL 1.23   GFR Estimate Latest Ref Range: >60 mL/min/1.73_m2 57 (L)   GFR Estimate If Black Latest Ref Range: >60 mL/min/1.73_m2 66   Calcium Latest Ref Range: 8.5 - 10.1 mg/dL 9.2   Anion Gap Latest Ref Range: 3 - 14 mmol/L 5   N-Terminal Pro Bnp Latest Ref Range: 0 - 450 pg/mL 1,141 (H)   Glucose Latest Ref Range: 70 - 99 mg/dL 205 (H)   WBC Latest Ref Range: 4.0 - 11.0 10e9/L 3.8 (L)   Hemoglobin Latest Ref Range: 13.3 - 17.7 g/dL 10.7 (L)   Hematocrit Latest Ref Range: 40.0 - 53.0 % 40.0   Platelet Count Latest Ref Range: 150 - 450 10e9/L 254   RBC Count Latest Ref Range: 4.4 - 5.9 10e12/L 5.01   MCV Latest Ref Range: 78 - 100 fl 80   MCH Latest Ref Range: 26.5 - 33.0 pg 21.4 (L)   MCHC Latest Ref Range: 31.5 - 36.5 g/dL 26.8 (L)   RDW Latest Ref Range: 10.0 - 15.0 % 17.2 (H)   INR Latest Ref Range: 0.86 - 1.14  2.58 (H)         [address] [place] [address]        Thank you for allowing us to be a part of your care here at the [facility]      If you  "have questions or concerns please contact us at:      Calli Isabel RN BSN   Cardiology Care Coordinator  [facility]   Questions and [phone]   First press #1 for the [employer] and then press #3 for \"Medical Advice\" to reach us Cardiology Nurses.        "

## 2019-03-28 NOTE — NURSING NOTE
Diet: Patient instructed regarding a heart failure healthy diet, including discussion of reduced fat and 2000 mg daily sodium restriction, daily weights, medication purpose and compliance, fluid restrictions and resources for patient and family to access for assistance with heart failure management.       Labs: Patient was given results of the laboratory testing obtained today and patient was instructed about when to return for the next laboratory testing. Troponin added on - results pending    Med Reconcile: Reviewed and verified all current medications with the patient. The updated medication list was printed and given to the patient. STOP mexilitine. START restoril 7.5mg PRN for sleep. CONTINUE lasix 40mg daily.    Return Appointment: Patient given instructions regarding scheduling next clinic visit. RTC in 8 weeks with Dr. Loco    Patient stated he understood all health information given and agreed to call with further questions or concerns.       Calli Isabel RN

## 2019-03-28 NOTE — LETTER
3/28/2019      RE: Wesley Cooper  [address] [facility] [address]       Service Date: 2019      Jarod De La Rosa MD    [facility]   [address] [id number]      RE: Wesley Cooper   MRN: [id number]   : 1945      Dear Dr. De La Rosa:      We had the pleasure of seeing Mr. Wesley Cooper in our Heart Failure Clinic at the [facility].  As you know, he is a 74-year-old gentleman with a past medical history significant for:     1.  Coronary artery disease, status post coronary artery bypass grafting surgery.   2.  Refractory angina with patent LIMA to LAD, occlusion of the vein graft to RCA and left circumflex, and distal LAD native disease, in-stent restenosis of the mid-LAD.   3.  Ischemic cardiomyopathy with an estimated ejection fraction of 40%-45%, status post CRT-D.   4.  Type 2 diabetes.   5.  Atrial flutter.   6.  Chronic colostomy.      Mr. Cooper recently underwent a high-risk PCI by Dr. Field.  He had a successful orbital atherectomy of the left main, proximal and the mid left circumflex.  He had 2 drug-eluting stents placed in the left main and left circumflex, respectively.  A week after the procedure he noticed worsening exertional shortness of breath.  He had no chest pain.  He had no lightheadedness, dizziness or syncope.  He had no PND or orthopnea.  He had no lower extremity swelling.  He was seen in the local ER and subsequently admitted at [facility] in view of this worsening shortness of breath.  He was found to be volume overloaded and in heart failure.  A repeat echocardiogram done showed no pericardial effusion.  He had no significant wall motion abnormalities.  His estimated ejection fraction was 40%, just slightly lower than his baseline 45%.  He did not have an acute regional wall motion abnormality suggestive of an acute coronary event.  He had a mild elevation in troponin.  His EKG showed paced  ventricular rhythm.  He was intravenously diuresed with Lasix.  During this admission he was also noted to have nonsustained ventricular tachycardia and was started on mexiletine in addition to his sotalol.      He has been home now for the last week.  He states that his breathing has gotten better, but he is not back to baseline.  He is now back to work.  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  He denies having any chest pain or chest pressure.  He has exertional shortness of breath.  He has no symptoms at rest or with activities of daily living.  He has no PND or orthopnea.  He has no lower extremity swelling.  His weight has gone up since his recent PCI.  He has no lightheadedness, dizziness or syncope.  He had severe nausea following mexiletine and has not been taking it for the last several days.  He is currently on Lasix 40 mg daily.      MEDICATIONS:   Current Outpatient Medications   Medication Sig     ANTIPLATELET MEDICATION NOT PRESCRIBED, INTENTIONAL, Antiplatelet medication not prescribed intentionally due to Already on DAPT (clopidogrel/prasugrel/ticagrelor)     atorvastatin (LIPITOR) 80 MG tablet Take 80 mg by mouth daily     blood glucose monitoring (ACCU-CHEK TAIWO PLUS) meter device kit      carvedilol (COREG) 12.5 MG tablet Take 1 tablet (12.5 mg) by mouth 2 times daily (with meals)     clopidogrel (PLAVIX) 75 MG tablet Take 75 mg by mouth daily     diphenhydrAMINE-acetaminophen (TYLENOL PM)  MG tablet Take 1 tablet by mouth nightly as needed for sleep     furosemide (LASIX) 40 MG tablet Take 40 mg by mouth daily     gabapentin (NEURONTIN) 300 MG capsule Take 300 mg by mouth 3 times daily      glipiZIDE (GLUCOTROL) 5 MG tablet Take 5 mg by mouth 2 times daily (before meals)     isosorbide mononitrate (IMDUR) 30 MG 24 hr tablet Take 1 30mg tablet in the morning. Take 1 30mg tablet in the afternoon. Take 2 30mg tablets in the evening. Total daily dose of  120mg.     losartan (COZAAR) 25 MG tablet Take 1 tablet (25 mg) by mouth daily     nitroglycerin (NITROSTAT) 0.4 MG sublingual tablet Place 0.4 mg under the tongue every 5 minutes as needed for chest pain For chest pain place 1 tablet under the tongue every 5 minutes for 3 doses. If symptoms persist 5 minutes after 1st dose call 911.     potassium aminobenzoate 500 MG CAPS capsule      sotalol HCl, AF, 160 MG TABS Take 160 mg by mouth 2 times daily (Patient taking differently: Take 80 mg by mouth 2 times daily )     temazepam (RESTORIL) 7.5 MG capsule Take 1 capsule (7.5 mg) by mouth nightly as needed for sleep     warfarin (COUMADIN) 2.5 MG tablet Take 2 tablets (5 mg) by mouth daily Take 5 mg by mouth on Mondays and 2.5 mg 6x/week or as directed based on INR     pioglitazone (ACTOS) 30 MG tablet Take 30 mg by mouth daily     No current facility-administered medications for this visit.      REVIEW OF SYSTEMS:  A detailed 10-point review of systems was obtained as described in the History of Present Illness.  All other systems are reviewed and are negative.      PHYSICAL EXAMINATION:  He was comfortable.  He was in no apparent distress.  His blood pressure was 132/74, pulse rate was 77, respiratory rate was 16 and he was saturating 96% on room air.  He weighed 164 pounds.  He was 5 feet 5 inches tall.  His BMI was 27.36.  He had no pallor, cyanosis or jaundice.  He had mild jugular venous distention.  His carotids were 2+ bilaterally.  Cardiac auscultation revealed normal S1, S2 with no murmur, rub or gallop.  Auscultation of his lungs revealed equal air entry on both sides with no added sounds.  He had no lower extremity edema.  His abdomen was soft with normal bowel sounds, no tenderness, no rigidity.  He had a colostomy bag.  He had no focal neurological deficit.      He had a device interrogation today which revealed normal sinus rhythm with complete heart block and biventricular pacing.  He had no documented  evidence of ventricular tachycardia requiring ATP or shocks.  His AFib burden was 4%-5%.  He was in AFib mid March correlating to the time of his symptoms.  He is currently in sinus rhythm.      His NT-proBNP is significantly elevated compared to his baseline.  His troponin is within normal limits.  His renal function and electrolytes were within normal limits.  His CBC showed a low hemoglobin but not significantly different from his baseline.      ASSESSMENT AND PLAN:   Mr. Wesley Cooper is a very pleasant 74-year-old male with past medical history significant for diabetes, coronary artery disease, ischemic cardiomyopathy, atrial flutter and colostomy, who returns today for followup.      Mr. Cooper is having increased exertional shortness of breath following his high-risk PCI.  This is very likely due to worsening heart failure after the procedure.  He does not have any pericardial effusion.  His clinical symptoms are not consistent with acute closure of a stent.  I suspect his heart failure is very likely related to the contrast and volume overload during the procedure.  His troponin is negative.      He is symptomatically better on Lasix.  We will continue him on Lasix 40 mg daily.  He is already on low-dose beta blocker and carvedilol.      He was started on mexiletine for nonsustained ventricular tachycardia during his recent hospitalization at [place].  He is not tolerating it.  I do not think he needs a second antiarrhythmic agent at this time and point.  He is on sotalol 80 mg twice a day for his atrial flutter.  His device interrogation today did not show any increased ventricular arrhythmias.  He is on long-term anticoagulation with Coumadin.      2.  Atrial fibrillation.  He was in AFib at the time of his symptoms.  It is possible that he was volume overloaded and went into AFib.  He is currently in sinus rhythm.  We will continue him on sotalol.  He is on long-term anticoagulation with Coumadin.      3.   Coronary artery disease.  He recently underwent high-risk PCI by Dr. Field.  He is on Plavix and Coumadin.  He is on statin.  He is on beta blockers.  His clinical symptoms are not consistent with stent closure.      I have recommended him to return to clinic in 8 weeks.  He will call us in the interim if he has any further questions.  It was a pleasure meeting Jake Wesley Stevenson in our Heart Failure Clinic at the [facility].      Sincerely,   Claudy Loco MD   [facility]  Heart Failure, Transplant, and Mechanical Circulatory Support Cardiology   Cardiovascular Division  [facility]   [phone]                D: 2019   T: 2019   MT: SALVADOR      Name:     WESLEY STEVENSON   MRN:      [id number]        Account:      [id number]   :      1945           Service Date: 2019      Document: [id number]        Claudy Loco MD

## 2019-03-28 NOTE — LETTER
3/28/2019      RE: Wesley Cooper  [address]  [facility] [address]       Dear Colleague,    Thank you for the opportunity to participate in the care of your patient, Wesley Cooper, at the [facility] HEART [facility]. Please see a copy of my visit note below.    Service Date: 2019      Jarod De La Rosa MD    [facility]   [address] [place]  [address]      RE: Wesley Cooper   MRN: [id number]   : 1945      Dear Dr. De La Rosa:      We had the pleasure of seeing Mr. Wesley Cooper in our Heart Failure Clinic at the [facility].  As you know, he is a 74-year-old gentleman with a past medical history significant for:     1.  Coronary artery disease, status post coronary artery bypass grafting surgery.   2.  Refractory angina with patent LIMA to LAD, occlusion of the vein graft to RCA and left circumflex, and distal LAD native disease, in-stent restenosis of the mid-LAD.   3.  Ischemic cardiomyopathy with an estimated ejection fraction of 40%-45%, status post CRT-D.   4.  Type 2 diabetes.   5.  Atrial flutter.   6.  Chronic colostomy.      Mr. Cooper recently underwent a high-risk PCI by Dr. Field.  He had a successful orbital atherectomy of the left main, proximal and the mid left circumflex.  He had 2 drug-eluting stents placed in the left main and left circumflex, respectively.  A week after the procedure he noticed worsening exertional shortness of breath.  He had no chest pain.  He had no lightheadedness, dizziness or syncope.  He had no PND or orthopnea.  He had no lower extremity swelling.  He was seen in the local ER and subsequently admitted at [facility] in view of this worsening shortness of breath.  He was found to be volume overloaded and in heart failure.  A repeat echocardiogram done showed no pericardial effusion.  He had no significant wall motion abnormalities.  His  estimated ejection fraction was 40%, just slightly lower than his baseline 45%.  He did not have an acute regional wall motion abnormality suggestive of an acute coronary event.  He had a mild elevation in troponin.  His EKG showed paced ventricular rhythm.  He was intravenously diuresed with Lasix.  During this admission he was also noted to have nonsustained ventricular tachycardia and was started on mexiletine in addition to his sotalol.      He has been home now for the last week.  He states that his breathing has gotten better, but he is not back to baseline.  He is now back to work.  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  He denies having any chest pain or chest pressure.  He has exertional shortness of breath.  He has no symptoms at rest or with activities of daily living.  He has no PND or orthopnea.  He has no lower extremity swelling.  His weight has gone up since his recent PCI.  He has no lightheadedness, dizziness or syncope.  He had severe nausea following mexiletine and has not been taking it for the last several days.  He is currently on Lasix 40 mg daily.      MEDICATIONS:   Current Outpatient Medications   Medication Sig     ANTIPLATELET MEDICATION NOT PRESCRIBED, INTENTIONAL, Antiplatelet medication not prescribed intentionally due to Already on DAPT (clopidogrel/prasugrel/ticagrelor)     atorvastatin (LIPITOR) 80 MG tablet Take 80 mg by mouth daily     blood glucose monitoring (ACCU-CHEK TAIWO PLUS) meter device kit      carvedilol (COREG) 12.5 MG tablet Take 1 tablet (12.5 mg) by mouth 2 times daily (with meals)     clopidogrel (PLAVIX) 75 MG tablet Take 75 mg by mouth daily     diphenhydrAMINE-acetaminophen (TYLENOL PM)  MG tablet Take 1 tablet by mouth nightly as needed for sleep     furosemide (LASIX) 40 MG tablet Take 40 mg by mouth daily     gabapentin (NEURONTIN) 300 MG capsule Take 300 mg by mouth 3 times daily      glipiZIDE (GLUCOTROL) 5 MG  tablet Take 5 mg by mouth 2 times daily (before meals)     isosorbide mononitrate (IMDUR) 30 MG 24 hr tablet Take 1 30mg tablet in the morning. Take 1 30mg tablet in the afternoon. Take 2 30mg tablets in the evening. Total daily dose of 120mg.     losartan (COZAAR) 25 MG tablet Take 1 tablet (25 mg) by mouth daily     nitroglycerin (NITROSTAT) 0.4 MG sublingual tablet Place 0.4 mg under the tongue every 5 minutes as needed for chest pain For chest pain place 1 tablet under the tongue every 5 minutes for 3 doses. If symptoms persist 5 minutes after 1st dose call 911.     potassium aminobenzoate 500 MG CAPS capsule      sotalol HCl, AF, 160 MG TABS Take 160 mg by mouth 2 times daily (Patient taking differently: Take 80 mg by mouth 2 times daily )     temazepam (RESTORIL) 7.5 MG capsule Take 1 capsule (7.5 mg) by mouth nightly as needed for sleep     warfarin (COUMADIN) 2.5 MG tablet Take 2 tablets (5 mg) by mouth daily Take 5 mg by mouth on Mondays and 2.5 mg 6x/week or as directed based on INR     pioglitazone (ACTOS) 30 MG tablet Take 30 mg by mouth daily     No current facility-administered medications for this visit.      REVIEW OF SYSTEMS:  A detailed 10-point review of systems was obtained as described in the History of Present Illness.  All other systems are reviewed and are negative.      PHYSICAL EXAMINATION:  He was comfortable.  He was in no apparent distress.  His blood pressure was 132/74, pulse rate was 77, respiratory rate was 16 and he was saturating 96% on room air.  He weighed 164 pounds.  He was 5 feet 5 inches tall.  His BMI was 27.36.  He had no pallor, cyanosis or jaundice.  He had mild jugular venous distention.  His carotids were 2+ bilaterally.  Cardiac auscultation revealed normal S1, S2 with no murmur, rub or gallop.  Auscultation of his lungs revealed equal air entry on both sides with no added sounds.  He had no lower extremity edema.  His abdomen was soft with normal bowel sounds, no  tenderness, no rigidity.  He had a colostomy bag.  He had no focal neurological deficit.      He had a device interrogation today which revealed normal sinus rhythm with complete heart block and biventricular pacing.  He had no documented evidence of ventricular tachycardia requiring ATP or shocks.  His AFib burden was 4%-5%.  He was in AFib mid March correlating to the time of his symptoms.  He is currently in sinus rhythm.      His NT-proBNP is significantly elevated compared to his baseline.  His troponin is within normal limits.  His renal function and electrolytes were within normal limits.  His CBC showed a low hemoglobin but not significantly different from his baseline.      ASSESSMENT AND PLAN:   Mr. Wesley Cooper is a very pleasant 74-year-old male with past medical history significant for diabetes, coronary artery disease, ischemic cardiomyopathy, atrial flutter and colostomy, who returns today for followup.      Mr. Cooper is having increased exertional shortness of breath following his high-risk PCI.  This is very likely due to worsening heart failure after the procedure.  He does not have any pericardial effusion.  His clinical symptoms are not consistent with acute closure of a stent.  I suspect his heart failure is very likely related to the contrast and volume overload during the procedure.  His troponin is negative.      He is symptomatically better on Lasix.  We will continue him on Lasix 40 mg daily.  He is already on low-dose beta blocker and carvedilol.      He was started on mexiletine for nonsustained ventricular tachycardia during his recent hospitalization at [place].  He is not tolerating it.  I do not think he needs a second antiarrhythmic agent at this time and point.  He is on sotalol 80 mg twice a day for his atrial flutter.  His device interrogation today did not show any increased ventricular arrhythmias.  He is on long-term anticoagulation with Coumadin.      2.  Atrial fibrillation.   He was in AFib at the time of his symptoms.  It is possible that he was volume overloaded and went into AFib.  He is currently in sinus rhythm.  We will continue him on sotalol.  He is on long-term anticoagulation with Coumadin.      3.  Coronary artery disease.  He recently underwent high-risk PCI by Dr. Field.  He is on Plavix and Coumadin.  He is on statin.  He is on beta blockers.  His clinical symptoms are not consistent with stent closure.      I have recommended him to return to clinic in 8 weeks.  He will call us in the interim if he has any further questions.  It was a pleasure meeting  Flor Stevenson in our Heart Failure Clinic at the [facility].      Claudy Loco MD     D: 2019   T: 2019   MT: SALVADOR      Name:     FLOR STEVENSON   MRN:      [id number]        Account:      [id number]   :      1945           Service Date: 2019      Document: [id number]

## 2019-03-28 NOTE — PROGRESS NOTES
Pt seen in the [facility] for evaluation and iterative programming of an Abbott, Bi-Ventricular ICD, per Dr. Loco's orders. Today his intrinsic rhythm is his Sinus 72 with CHB- ventricular rate is <30 bpm. Normal ICD function. Sinus 72 with CHB- ventricular rate is <30 bpm. The most recent arrhythmia was AF on 3/14/19 lasting 5 days. AF burden= 4%. Pt reports he was in the hospital at that time. He is taking coumadin. AP= 1.8% and BVP >99%. Lead trends appear stable. Pt reports that he would like to transfer his care to our device clinic. Battery estimates 5 years to TAYLA. Plan for patient to return to clinic in 3 months.  Multi lead ICD    I have reviewed and interpreted the device interrogation, settings, programming and nurse s summary.  The device is functioning within normal device parameters.   I agree with the current findings, assessment and plan.

## 2019-03-29 NOTE — PROGRESS NOTES
Service Date: 2019      Jarod De La Rosa MD    [facility]   [address] [place] [address]      RE: Wesley Cooper   MRN: [id number]   : 1945      Dear Dr. De La Rosa:      We had the pleasure of seeing Mr. Wesley Cooper in our Heart Failure Clinic at the [facility].  As you know, he is a 74-year-old gentleman with a past medical history significant for:     1.  Coronary artery disease, status post coronary artery bypass grafting surgery.   2.  Refractory angina with patent LIMA to LAD, occlusion of the vein graft to RCA and left circumflex, and distal LAD native disease, in-stent restenosis of the mid-LAD.   3.  Ischemic cardiomyopathy with an estimated ejection fraction of 40%-45%, status post CRT-D.   4.  Type 2 diabetes.   5.  Atrial flutter.   6.  Chronic colostomy.      Mr. oCoper recently underwent a high-risk PCI by Dr. Field.  He had a successful orbital atherectomy of the left main, proximal and the mid left circumflex.  He had 2 drug-eluting stents placed in the left main and left circumflex, respectively.  A week after the procedure he noticed worsening exertional shortness of breath.  He had no chest pain.  He had no lightheadedness, dizziness or syncope.  He had no PND or orthopnea.  He had no lower extremity swelling.  He was seen in the local ER and subsequently admitted at [facility] in view of this worsening shortness of breath.  He was found to be volume overloaded and in heart failure.  A repeat echocardiogram done showed no pericardial effusion.  He had no significant wall motion abnormalities.  His estimated ejection fraction was 40%, just slightly lower than his baseline 45%.  He did not have an acute regional wall motion abnormality suggestive of an acute coronary event.  He had a mild elevation in troponin.  His EKG showed paced ventricular rhythm.  He was intravenously diuresed with Lasix.  During  this admission he was also noted to have nonsustained ventricular tachycardia and was started on mexiletine in addition to his sotalol.      He has been home now for the last week.  He states that his breathing has gotten better, but he is not back to baseline.  He is now back to work.  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  He denies having any chest pain or chest pressure.  He has exertional shortness of breath.  He has no symptoms at rest or with activities of daily living.  He has no PND or orthopnea.  He has no lower extremity swelling.  His weight has gone up since his recent PCI.  He has no lightheadedness, dizziness or syncope.  He had severe nausea following mexiletine and has not been taking it for the last several days.  He is currently on Lasix 40 mg daily.      MEDICATIONS:   Current Outpatient Medications   Medication Sig     ANTIPLATELET MEDICATION NOT PRESCRIBED, INTENTIONAL, Antiplatelet medication not prescribed intentionally due to Already on DAPT (clopidogrel/prasugrel/ticagrelor)     atorvastatin (LIPITOR) 80 MG tablet Take 80 mg by mouth daily     blood glucose monitoring (ACCU-CHEK TAIWO PLUS) meter device kit      carvedilol (COREG) 12.5 MG tablet Take 1 tablet (12.5 mg) by mouth 2 times daily (with meals)     clopidogrel (PLAVIX) 75 MG tablet Take 75 mg by mouth daily     diphenhydrAMINE-acetaminophen (TYLENOL PM)  MG tablet Take 1 tablet by mouth nightly as needed for sleep     furosemide (LASIX) 40 MG tablet Take 40 mg by mouth daily     gabapentin (NEURONTIN) 300 MG capsule Take 300 mg by mouth 3 times daily      glipiZIDE (GLUCOTROL) 5 MG tablet Take 5 mg by mouth 2 times daily (before meals)     isosorbide mononitrate (IMDUR) 30 MG 24 hr tablet Take 1 30mg tablet in the morning. Take 1 30mg tablet in the afternoon. Take 2 30mg tablets in the evening. Total daily dose of 120mg.     losartan (COZAAR) 25 MG tablet Take 1 tablet (25 mg) by mouth  daily     nitroglycerin (NITROSTAT) 0.4 MG sublingual tablet Place 0.4 mg under the tongue every 5 minutes as needed for chest pain For chest pain place 1 tablet under the tongue every 5 minutes for 3 doses. If symptoms persist 5 minutes after 1st dose call 911.     potassium aminobenzoate 500 MG CAPS capsule      sotalol HCl, AF, 160 MG TABS Take 160 mg by mouth 2 times daily (Patient taking differently: Take 80 mg by mouth 2 times daily )     temazepam (RESTORIL) 7.5 MG capsule Take 1 capsule (7.5 mg) by mouth nightly as needed for sleep     warfarin (COUMADIN) 2.5 MG tablet Take 2 tablets (5 mg) by mouth daily Take 5 mg by mouth on Mondays and 2.5 mg 6x/week or as directed based on INR     pioglitazone (ACTOS) 30 MG tablet Take 30 mg by mouth daily     No current facility-administered medications for this visit.      REVIEW OF SYSTEMS:  A detailed 10-point review of systems was obtained as described in the History of Present Illness.  All other systems are reviewed and are negative.      PHYSICAL EXAMINATION:  He was comfortable.  He was in no apparent distress.  His blood pressure was 132/74, pulse rate was 77, respiratory rate was 16 and he was saturating 96% on room air.  He weighed 164 pounds.  He was 5 feet 5 inches tall.  His BMI was 27.36.  He had no pallor, cyanosis or jaundice.  He had mild jugular venous distention.  His carotids were 2+ bilaterally.  Cardiac auscultation revealed normal S1, S2 with no murmur, rub or gallop.  Auscultation of his lungs revealed equal air entry on both sides with no added sounds.  He had no lower extremity edema.  His abdomen was soft with normal bowel sounds, no tenderness, no rigidity.  He had a colostomy bag.  He had no focal neurological deficit.      He had a device interrogation today which revealed normal sinus rhythm with complete heart block and biventricular pacing.  He had no documented evidence of ventricular tachycardia requiring ATP or shocks.  His AFib  burden was 4%-5%.  He was in AFib mid March correlating to the time of his symptoms.  He is currently in sinus rhythm.      His NT-proBNP is significantly elevated compared to his baseline.  His troponin is within normal limits.  His renal function and electrolytes were within normal limits.  His CBC showed a low hemoglobin but not significantly different from his baseline.      ASSESSMENT AND PLAN:   Mr. Wesley Cooper is a very pleasant 74-year-old male with past medical history significant for diabetes, coronary artery disease, ischemic cardiomyopathy, atrial flutter and colostomy, who returns today for followup.      Mr. Cooper is having increased exertional shortness of breath following his high-risk PCI.  This is very likely due to worsening heart failure after the procedure.  He does not have any pericardial effusion.  His clinical symptoms are not consistent with acute closure of a stent.  I suspect his heart failure is very likely related to the contrast and volume overload during the procedure.  His troponin is negative.      He is symptomatically better on Lasix.  We will continue him on Lasix 40 mg daily.  He is already on low-dose beta blocker and carvedilol.      He was started on mexiletine for nonsustained ventricular tachycardia during his recent hospitalization at [place].  He is not tolerating it.  I do not think he needs a second antiarrhythmic agent at this time and point.  He is on sotalol 80 mg twice a day for his atrial flutter.  His device interrogation today did not show any increased ventricular arrhythmias.  He is on long-term anticoagulation with Coumadin.      2.  Atrial fibrillation.  He was in AFib at the time of his symptoms.  It is possible that he was volume overloaded and went into AFib.  He is currently in sinus rhythm.  We will continue him on sotalol.  He is on long-term anticoagulation with Coumadin.      3.  Coronary artery disease.  He recently underwent high-risk PCI by   Rashida.  He is on Plavix and Coumadin.  He is on statin.  He is on beta blockers.  His clinical symptoms are not consistent with stent closure.      I have recommended him to return to clinic in 8 weeks.  He will call us in the interim if he has any further questions.  It was a pleasure meeting Mr. Wesley Stevenson in our Heart Failure Clinic at the [facility].      Sincerely,   Claudy Loco MD   [facility]  Heart Failure, Transplant, and Mechanical Circulatory Support Cardiology   Cardiovascular Division  [facility]   [phone]                D: 2019   T: 2019   MT: SALVADOR      Name:     WESLEY STEVENSON   MRN:      [id number]        Account:      [id number]   :      1945           Service Date: 2019      Document: [id number]

## 2019-03-30 PROBLEM — G47.00 INSOMNIA: Status: ACTIVE | Noted: 2019-03-30

## 2019-04-01 LAB
MDC_IDC_LEAD_IMPLANT_DT: NORMAL
MDC_IDC_LEAD_LOCATION: NORMAL
MDC_IDC_LEAD_LOCATION_DETAIL_1: NORMAL
MDC_IDC_LEAD_MFG: NORMAL
MDC_IDC_LEAD_MODEL: NORMAL
MDC_IDC_LEAD_POLARITY_TYPE: NORMAL
MDC_IDC_LEAD_SERIAL: NORMAL
MDC_IDC_MSMT_BATTERY_REMAINING_LONGEVITY: 60 MO
MDC_IDC_MSMT_CAP_CHARGE_DTM: NORMAL
MDC_IDC_MSMT_CAP_CHARGE_TIME: 8.88
MDC_IDC_MSMT_CAP_CHARGE_TYPE: NORMAL
MDC_IDC_MSMT_CAP_CHARGE_TYPE: NORMAL
MDC_IDC_MSMT_LEADCHNL_LV_IMPEDANCE_VALUE: 800 OHM
MDC_IDC_MSMT_LEADCHNL_LV_PACING_THRESHOLD_AMPLITUDE: 1 V
MDC_IDC_MSMT_LEADCHNL_LV_PACING_THRESHOLD_PULSEWIDTH: 0.5 MS
MDC_IDC_MSMT_LEADCHNL_RA_IMPEDANCE_VALUE: 375 OHM
MDC_IDC_MSMT_LEADCHNL_RA_PACING_THRESHOLD_AMPLITUDE: 0.5 V
MDC_IDC_MSMT_LEADCHNL_RA_PACING_THRESHOLD_PULSEWIDTH: 0.5 MS
MDC_IDC_MSMT_LEADCHNL_RA_SENSING_INTR_AMPL: 5 MV
MDC_IDC_MSMT_LEADCHNL_RV_IMPEDANCE_VALUE: 375 OHM
MDC_IDC_MSMT_LEADCHNL_RV_PACING_THRESHOLD_AMPLITUDE: 0.5 V
MDC_IDC_MSMT_LEADCHNL_RV_PACING_THRESHOLD_PULSEWIDTH: 0.5 MS
MDC_IDC_PG_IMPLANT_DTM: NORMAL
MDC_IDC_PG_MFG: NORMAL
MDC_IDC_PG_MODEL: NORMAL
MDC_IDC_PG_SERIAL: NORMAL
MDC_IDC_PG_TYPE: NORMAL
MDC_IDC_SESS_CLINIC_NAME: NORMAL
MDC_IDC_SESS_DTM: NORMAL
MDC_IDC_SESS_TYPE: NORMAL
MDC_IDC_SET_BRADY_AT_MODE_SWITCH_MODE: NORMAL
MDC_IDC_SET_BRADY_AT_MODE_SWITCH_RATE: 180 {BEATS}/MIN
MDC_IDC_SET_BRADY_HYSTRATE: NORMAL
MDC_IDC_SET_BRADY_LOWRATE: 60 {BEATS}/MIN
MDC_IDC_SET_BRADY_MAX_SENSOR_RATE: 110 {BEATS}/MIN
MDC_IDC_SET_BRADY_MAX_TRACKING_RATE: 110 {BEATS}/MIN
MDC_IDC_SET_BRADY_MODE: NORMAL
MDC_IDC_SET_BRADY_NIGHT_RATE: NORMAL
MDC_IDC_SET_BRADY_PAV_DELAY_LOW: 180 MS
MDC_IDC_SET_BRADY_SAV_DELAY_LOW: 160 MS
MDC_IDC_SET_CRT_LVRV_DELAY: 20 MS
MDC_IDC_SET_CRT_PACED_CHAMBERS: NORMAL
MDC_IDC_SET_LEADCHNL_LV_PACING_AMPLITUDE: 2 V
MDC_IDC_SET_LEADCHNL_LV_PACING_ANODE_ELECTRODE_1: NORMAL
MDC_IDC_SET_LEADCHNL_LV_PACING_ANODE_LOCATION_1: NORMAL
MDC_IDC_SET_LEADCHNL_LV_PACING_CAPTURE_MODE: NORMAL
MDC_IDC_SET_LEADCHNL_LV_PACING_CATHODE_ELECTRODE_1: NORMAL
MDC_IDC_SET_LEADCHNL_LV_PACING_CATHODE_LOCATION_1: NORMAL
MDC_IDC_SET_LEADCHNL_LV_PACING_POLARITY: NORMAL
MDC_IDC_SET_LEADCHNL_LV_PACING_PULSEWIDTH: 0.5 MS
MDC_IDC_SET_LEADCHNL_RA_PACING_AMPLITUDE: 1.5 V
MDC_IDC_SET_LEADCHNL_RA_PACING_ANODE_ELECTRODE_1: NORMAL
MDC_IDC_SET_LEADCHNL_RA_PACING_ANODE_LOCATION_1: NORMAL
MDC_IDC_SET_LEADCHNL_RA_PACING_CAPTURE_MODE: NORMAL
MDC_IDC_SET_LEADCHNL_RA_PACING_CATHODE_ELECTRODE_1: NORMAL
MDC_IDC_SET_LEADCHNL_RA_PACING_CATHODE_LOCATION_1: NORMAL
MDC_IDC_SET_LEADCHNL_RA_PACING_POLARITY: NORMAL
MDC_IDC_SET_LEADCHNL_RA_PACING_PULSEWIDTH: 0.5 MS
MDC_IDC_SET_LEADCHNL_RA_SENSING_ADAPTATION_MODE: NORMAL
MDC_IDC_SET_LEADCHNL_RA_SENSING_ANODE_ELECTRODE_1: NORMAL
MDC_IDC_SET_LEADCHNL_RA_SENSING_ANODE_LOCATION_1: NORMAL
MDC_IDC_SET_LEADCHNL_RA_SENSING_CATHODE_ELECTRODE_1: NORMAL
MDC_IDC_SET_LEADCHNL_RA_SENSING_CATHODE_LOCATION_1: NORMAL
MDC_IDC_SET_LEADCHNL_RA_SENSING_POLARITY: NORMAL
MDC_IDC_SET_LEADCHNL_RA_SENSING_SENSITIVITY: 0.3 MV
MDC_IDC_SET_LEADCHNL_RV_PACING_AMPLITUDE: 2 V
MDC_IDC_SET_LEADCHNL_RV_PACING_ANODE_ELECTRODE_1: NORMAL
MDC_IDC_SET_LEADCHNL_RV_PACING_ANODE_LOCATION_1: NORMAL
MDC_IDC_SET_LEADCHNL_RV_PACING_CAPTURE_MODE: NORMAL
MDC_IDC_SET_LEADCHNL_RV_PACING_CATHODE_ELECTRODE_1: NORMAL
MDC_IDC_SET_LEADCHNL_RV_PACING_CATHODE_LOCATION_1: NORMAL
MDC_IDC_SET_LEADCHNL_RV_PACING_POLARITY: NORMAL
MDC_IDC_SET_LEADCHNL_RV_PACING_PULSEWIDTH: 0.5 MS
MDC_IDC_SET_LEADCHNL_RV_SENSING_ANODE_ELECTRODE_1: NORMAL
MDC_IDC_SET_LEADCHNL_RV_SENSING_ANODE_LOCATION_1: NORMAL
MDC_IDC_SET_LEADCHNL_RV_SENSING_CATHODE_ELECTRODE_1: NORMAL
MDC_IDC_SET_LEADCHNL_RV_SENSING_CATHODE_LOCATION_1: NORMAL
MDC_IDC_SET_LEADCHNL_RV_SENSING_POLARITY: NORMAL
MDC_IDC_SET_LEADCHNL_RV_SENSING_SENSITIVITY: 0.5 MV
MDC_IDC_SET_ZONE_DETECTION_INTERVAL: 320 MS
MDC_IDC_SET_ZONE_DETECTION_INTERVAL: 360 MS
MDC_IDC_SET_ZONE_DETECTION_INTERVAL: 460 MS
MDC_IDC_SET_ZONE_TYPE: NORMAL
MDC_IDC_SET_ZONE_VENDOR_TYPE: NORMAL
MDC_IDC_STAT_AT_DTM_END: NORMAL
MDC_IDC_STAT_AT_DTM_START: NORMAL
MDC_IDC_STAT_AT_MODE_SW_COUNT: 0
MDC_IDC_STAT_BRADY_DTM_END: NORMAL
MDC_IDC_STAT_BRADY_DTM_START: NORMAL
MDC_IDC_STAT_BRADY_RA_PERCENT_PACED: 1.7 %
MDC_IDC_STAT_BRADY_RV_PERCENT_PACED: 99.32 %
MDC_IDC_STAT_EPISODE_RECENT_COUNT: 0
MDC_IDC_STAT_EPISODE_RECENT_COUNT: 0
MDC_IDC_STAT_EPISODE_RECENT_COUNT_DTM_END: NORMAL
MDC_IDC_STAT_EPISODE_RECENT_COUNT_DTM_END: NORMAL
MDC_IDC_STAT_EPISODE_RECENT_COUNT_DTM_START: NORMAL
MDC_IDC_STAT_EPISODE_RECENT_COUNT_DTM_START: NORMAL
MDC_IDC_STAT_EPISODE_TYPE: NORMAL
MDC_IDC_STAT_EPISODE_TYPE: NORMAL
MDC_IDC_STAT_EPISODE_VENDOR_TYPE: NORMAL
MDC_IDC_STAT_HEART_RATE_DTM_END: NORMAL
MDC_IDC_STAT_HEART_RATE_DTM_START: NORMAL
MDC_IDC_STAT_TACHYTHERAPY_ATP_DELIVERED_RECENT: 0
MDC_IDC_STAT_TACHYTHERAPY_RECENT_DTM_END: NORMAL
MDC_IDC_STAT_TACHYTHERAPY_RECENT_DTM_START: NORMAL
MDC_IDC_STAT_TACHYTHERAPY_SHOCKS_ABORTED_RECENT: 0
MDC_IDC_STAT_TACHYTHERAPY_SHOCKS_DELIVERED_RECENT: 0

## 2019-04-08 DIAGNOSIS — I50.22 CHRONIC SYSTOLIC HEART FAILURE (H): ICD-10-CM

## 2019-04-09 RX ORDER — WARFARIN SODIUM 2.5 MG/1
TABLET ORAL
Qty: 60 TABLET | Refills: 0 | OUTPATIENT
Start: 2019-04-09

## 2019-04-09 NOTE — TELEPHONE ENCOUNTER
Pt followed by [facility]: [phone], they requested refill go to PCP Dr. De La Rosa. Pharmacy called and they will forward RF to Dr. De La Rosa.

## 2019-05-06 ENCOUNTER — TELEPHONE (OUTPATIENT)
Dept: CALL CENTER | Age: 74
End: 2019-05-06

## 2019-05-06 NOTE — TELEPHONE ENCOUNTER
Discussed with Dr. Loco. Agree with recommendation for Wesley to go to the local emergency room. Called Wesley. He reports he is currently at the emergency room being evaluated. He has the on call number if they need to consult with physicians here at the .

## 2019-05-06 NOTE — TELEPHONE ENCOUNTER
"[facility]: Nurse Triage Note  SITUATION/BACKGROUND                                                      Wesley Cooper is a 74 year old male who is calling  in Cardiology.      Description:  Wesley is calling to report \"constant chest pain\" \"hard to explain\" \"hurt around my heart\" for 1 1/2 weeks.  States he's a little worried about this pain. Different than his usual chest pain, which he takes nitroglycerine for.  In addition has needed more nitroglycerine lately, needing twice a day for the past 3-5 days. Nitroglycerin does stop his \"chest pain\" but not this other pain.  At work now as a .  Denies being short of breath.  States he had pain similar to this before his artery surgeries.  Onset/duration:  1 1/2 weeks  Precip. factors:  unsure  Associated symptoms:  As above    Pain scale (0-10)  = 8/10 this pain around his heart    MEDICATIONS: Not assessed.  Allergies: No Known Allergies    ASSESSMENT      Needs assessment.  States he's 3 hours from the [place].  There is a local hospital 2 blocks from him.  Advised ED evaluation.  Gave him provider to provider phone number so local ED could call and speak with Cardiology on call if need be.    RECOMMENDATION/PLAN                                                      RECOMMENDED DISPOSITION:  To ED, another person to drive -   Will comply with recommendation: Yes    If further questions/concerns or if symptoms do not improve, worsen or new symptoms develop, call your PCP or [phone] to talk with the Resident on call, as soon as possible.    Guideline used: Chest Pain page 118  Telephone Triage Protocols for Nurses, Fifth Edition, Janet Moreau RN  "

## 2019-05-16 DIAGNOSIS — I25.5 ISCHEMIC CARDIOMYOPATHY: ICD-10-CM

## 2019-05-16 DIAGNOSIS — I50.22 CHRONIC SYSTOLIC HEART FAILURE (H): Primary | Chronic | ICD-10-CM

## 2019-05-20 ENCOUNTER — ANCILLARY PROCEDURE (OUTPATIENT)
Dept: CARDIOLOGY | Facility: CLINIC | Age: 74
End: 2019-05-20
Attending: INTERNAL MEDICINE
Payer: COMMERCIAL

## 2019-05-20 VITALS
OXYGEN SATURATION: 97 % | WEIGHT: 169.9 LBS | SYSTOLIC BLOOD PRESSURE: 120 MMHG | HEIGHT: 65 IN | HEART RATE: 68 BPM | BODY MASS INDEX: 28.31 KG/M2 | DIASTOLIC BLOOD PRESSURE: 78 MMHG

## 2019-05-20 DIAGNOSIS — I50.22 CHRONIC SYSTOLIC HEART FAILURE (H): Chronic | ICD-10-CM

## 2019-05-20 DIAGNOSIS — I25.5 ISCHEMIC CARDIOMYOPATHY: ICD-10-CM

## 2019-05-20 DIAGNOSIS — G47.00 INSOMNIA, UNSPECIFIED TYPE: Primary | ICD-10-CM

## 2019-05-20 DIAGNOSIS — I20.0 UNSTABLE ANGINA PECTORIS (H): ICD-10-CM

## 2019-05-20 DIAGNOSIS — I42.9 CARDIOMYOPATHY (H): ICD-10-CM

## 2019-05-20 LAB
ANION GAP SERPL CALCULATED.3IONS-SCNC: 7 MMOL/L (ref 3–14)
BUN SERPL-MCNC: 26 MG/DL (ref 7–30)
CALCIUM SERPL-MCNC: 9.1 MG/DL (ref 8.5–10.1)
CHLORIDE SERPL-SCNC: 104 MMOL/L (ref 94–109)
CO2 SERPL-SCNC: 28 MMOL/L (ref 20–32)
CREAT SERPL-MCNC: 1.31 MG/DL (ref 0.66–1.25)
ERYTHROCYTE [DISTWIDTH] IN BLOOD BY AUTOMATED COUNT: 21 % (ref 10–15)
GFR SERPL CREATININE-BSD FRML MDRD: 52 ML/MIN/{1.73_M2}
GLUCOSE SERPL-MCNC: 164 MG/DL (ref 70–99)
HCT VFR BLD AUTO: 38.2 % (ref 40–53)
HGB BLD-MCNC: 10.9 G/DL (ref 13.3–17.7)
MCH RBC QN AUTO: 22.8 PG (ref 26.5–33)
MCHC RBC AUTO-ENTMCNC: 28.5 G/DL (ref 31.5–36.5)
MCV RBC AUTO: 80 FL (ref 78–100)
NT-PROBNP SERPL-MCNC: 834 PG/ML (ref 0–450)
PLATELET # BLD AUTO: 239 10E9/L (ref 150–450)
POTASSIUM SERPL-SCNC: 4.5 MMOL/L (ref 3.4–5.3)
RBC # BLD AUTO: 4.78 10E12/L (ref 4.4–5.9)
SODIUM SERPL-SCNC: 139 MMOL/L (ref 133–144)
WBC # BLD AUTO: 5.1 10E9/L (ref 4–11)

## 2019-05-20 PROCEDURE — 85027 COMPLETE CBC AUTOMATED: CPT | Performed by: INTERNAL MEDICINE

## 2019-05-20 PROCEDURE — G0463 HOSPITAL OUTPT CLINIC VISIT: HCPCS

## 2019-05-20 PROCEDURE — 36415 COLL VENOUS BLD VENIPUNCTURE: CPT | Performed by: INTERNAL MEDICINE

## 2019-05-20 PROCEDURE — 99215 OFFICE O/P EST HI 40 MIN: CPT | Mod: GC | Performed by: INTERNAL MEDICINE

## 2019-05-20 PROCEDURE — 80048 BASIC METABOLIC PNL TOTAL CA: CPT | Performed by: INTERNAL MEDICINE

## 2019-05-20 PROCEDURE — 83880 ASSAY OF NATRIURETIC PEPTIDE: CPT | Performed by: INTERNAL MEDICINE

## 2019-05-20 RX ORDER — ZOLPIDEM TARTRATE 5 MG/1
5 TABLET ORAL
Qty: 15 TABLET | Refills: 0 | Status: SHIPPED | OUTPATIENT
Start: 2019-05-20 | End: 2020-09-09

## 2019-05-20 RX ORDER — ISOSORBIDE MONONITRATE 60 MG/1
TABLET, EXTENDED RELEASE ORAL
Qty: 270 TABLET | Refills: 3 | Status: SHIPPED | OUTPATIENT
Start: 2019-05-20 | End: 2019-08-19

## 2019-05-20 ASSESSMENT — MIFFLIN-ST. JEOR: SCORE: 1427.54

## 2019-05-20 ASSESSMENT — PAIN SCALES - GENERAL: PAINLEVEL: NO PAIN (0)

## 2019-05-20 NOTE — NURSING NOTE
Chief Complaint   Patient presents with     Follow Up     74 year old male presents with chronic systolic heart failure, refractory angina, ICM for follow up with labs prior     Vitals were taken and medications reconciled.     Hilario Hidalgo CMA  2:01 PM

## 2019-05-20 NOTE — PROGRESS NOTES
Heart Failure Cardiology Consultation      HPI:       Mr. Cooper is [id number] gentleman with a past medical history significant for HFrEF (EF 40-45%) 2/2 ICM, CAD s/p CABG (LIMA-LAD, occluded SVG-RCA, SVG-LCx), s/p CRT-D.     In 3/2019, he underwent a high-risk PCI by Dr. Field.  He had a successful orbital atherectomy of the left main, proximal and the mid left circumflex.  He had 2 drug-eluting stents placed in the left main and left circumflex, respectively.  A week after the procedure he noticed worsening exertional shortness of breath.  His estimated ejection fraction was 40%, just slightly lower than his baseline 45%. During this admission he was also noted to have nonsustained ventricular tachycardia and was started on mexiletine in addition to his sotalol.      Today, patient reports that overall since his complex PCI in 3/2019 he does not think that his level of energy has increase nor his ability to exert himself more.     He is still very limited in his activities in that he cannot walk for more than 5 minutes w/o getting short of breath and needing to stop. No chest pain or pressure. No orthopnea, PND, lightheadedness, dizziness, syncope.        CURRENT MEDICATIONS:  Current Outpatient Medications   Medication Sig Dispense Refill     ANTIPLATELET MEDICATION NOT PRESCRIBED, INTENTIONAL, Antiplatelet medication not prescribed intentionally due to Already on DAPT (clopidogrel/prasugrel/ticagrelor)       atorvastatin (LIPITOR) 80 MG tablet Take 80 mg by mouth daily       blood glucose monitoring (ACCU-CHEK TAIWO PLUS) meter device kit        carvedilol (COREG) 12.5 MG tablet Take 1 tablet (12.5 mg) by mouth 2 times daily (with meals) 60 tablet 1     clopidogrel (PLAVIX) 75 MG tablet Take 75 mg by mouth daily       diphenhydrAMINE-acetaminophen (TYLENOL PM)  MG tablet Take 1 tablet by mouth nightly as needed for sleep       furosemide (LASIX) 40 MG tablet Take 40 mg by mouth daily    "    gabapentin (NEURONTIN) 300 MG capsule Take 300 mg by mouth 3 times daily        glipiZIDE (GLUCOTROL) 5 MG tablet Take 5 mg by mouth 2 times daily (before meals)       isosorbide mononitrate (IMDUR) 30 MG 24 hr tablet Take 1 30mg tablet in the morning. Take 1 30mg tablet in the afternoon. Take 2 30mg tablets in the evening. Total daily dose of 120mg. 180 tablet 1     losartan (COZAAR) 25 MG tablet Take 1 tablet (25 mg) by mouth daily 30 tablet 3     nitroglycerin (NITROSTAT) 0.4 MG sublingual tablet Place 0.4 mg under the tongue every 5 minutes as needed for chest pain For chest pain place 1 tablet under the tongue every 5 minutes for 3 doses. If symptoms persist 5 minutes after 1st dose call 911.       potassium aminobenzoate 500 MG CAPS capsule        sotalol HCl, AF, 160 MG TABS Take 160 mg by mouth 2 times daily (Patient taking differently: Take 80 mg by mouth 2 times daily ) 180 tablet 3     pioglitazone (ACTOS) 30 MG tablet Take 30 mg by mouth daily       temazepam (RESTORIL) 7.5 MG capsule Take 1 capsule (7.5 mg) by mouth nightly as needed for sleep (Patient not taking: Reported on 5/20/2019) 15 capsule 0     warfarin (COUMADIN) 2.5 MG tablet Take 2 tablets (5 mg) by mouth daily Take 5 mg by mouth on Mondays and 2.5 mg 6x/week or as directed based on INR 60 tablet 0   ALLERGIES   No Known Allergies      ROS:   Constitutional: No fever, chills, or sweats. No weight gain/loss   ENT: No visual disturbance, ear ache, epistaxis, sore throat  Allergies/Immunologic: Negative.   Respiratory: No cough, hemoptysia  Cardiovascular: As per HPI  GI: No nausea, vomiting, hematemesis, melena, or hematochezia  : No urinary frequency, dysuria, or hematuria  Integument: Negative  Psychiatric: Negative  Neuro: Negative  Endocrinology: Negative   Musculoskeletal: Negative    EXAM:  /78 (BP Location: Right arm, Patient Position: Chair, Cuff Size: Adult Regular)   Pulse 68   Ht 1.651 m (5' 5\")   Wt 77.1 kg (169 lb " 14.4 oz)   SpO2 97%   BMI 28.27 kg/m    In general, the patient is a pleasant male in no apparent distress.    HEENT: NC/AT.  PERRLA.  EOMI.  Sclerae white, not injected.  Nares clear.  Pharynx without erythema or exudate.  Dentition intact.    Neck: No adenopathy.  No thyromegaly. Carotids +4/4 bilaterally without bruits.  No jugular venous distension.   Heart: RRR. Normal S1, S2 splits physiologically. No murmur, rub, click, or gallop. The PMI is in the 5th ICS in the midclavicular line. There is no heave.    Lungs: CTA.  No ronchi, wheezes, rales.  No dullness to percussion.   Abdomen: Soft, nontender, nondistended. No organomegaly.  No bruits.   Extremities: No clubbing, cyanosis, or edema.  The pulses are +4/4 at the radial, brachial, femoral, popliteal, DP, and PT sites bilaterally.  No bruits are noted.  Neurologic: Alert and oriented to person/place/time, normal speech, gait and affect  Skin: No petechiae, purpura or rash.    Labs:  LIPID RESULTS:  No results found for: CHOL, HDL, LDL, TRIG, CHOLHDLRATIO, NHDL    LIVER ENZYME RESULTS:  Lab Results   Component Value Date    AST 19 11/06/2018    ALT 19 11/06/2018       CBC RESULTS:  Lab Results   Component Value Date    WBC 5.1 05/20/2019    RBC 4.78 05/20/2019    HGB 10.9 (L) 05/20/2019    HCT 38.2 (L) 05/20/2019    MCV 80 05/20/2019    MCH 22.8 (L) 05/20/2019    MCHC 28.5 (L) 05/20/2019    RDW 21.0 (H) 05/20/2019     05/20/2019       BMP RESULTS:  Lab Results   Component Value Date     05/20/2019    POTASSIUM 4.5 05/20/2019    CHLORIDE 104 05/20/2019    CO2 28 05/20/2019    ANIONGAP 7 05/20/2019     (H) 05/20/2019    BUN 26 05/20/2019    CR 1.31 (H) 05/20/2019    GFRESTIMATED 52 (L) 05/20/2019    GFRESTBLACK 61 05/20/2019    KEIRA 9.1 05/20/2019        A1C RESULTS:  No results found for: A1C    INR RESULTS:  Lab Results   Component Value Date    INR 2.58 (H) 03/28/2019    INR 1.97 (H) 03/12/2019       Procedures:      Assessment and Plan:      Mr. Wesley Cooper is a very pleasant 74-year-old male with past medical history significant for diabetes, coronary artery disease, ischemic cardiomyopathy EF 40-45%, atrial flutter and colostomy, who returns today for followup.       Overall, patient is still having CHURCHILL, which may be related to his heart failure vs. Angina from coronary artery disease. His creatinine has increased and his weight is 169 up from 164 lbs at last visit. More likely related to angina, thus we will increase his Imdur to 120 mg PO BID. We will continue him on Lasix 40 mg daily.  He is already on low-dose beta blocker and carvedilol.      He is on sotalol 80 mg twice a day for his atrial flutter.  His device interrogation today did not show any increased ventricular arrhythmias.  He is on long-term anticoagulation with Coumadin.      2.  Atrial fibrillation.  He was in AFib at the time of his symptoms.  It is possible that he was volume overloaded and went into AFib.  He is currently in sinus rhythm.  We will continue him on sotalol.  He is on long-term anticoagulation with Coumadin.      3.  Coronary artery disease.  He recently underwent high-risk PCI by Dr. Field.  He is on Plavix and Coumadin.  He is on statin.  He is on beta blockers.  His clinical symptoms are not consistent with stent closure.       Jhony Luna M.D.  Cardiology Fellow    Plan discussed with Dr. Loco    I have personally seen and examined the patient, and then discussed with , and agree with the findings and plan in this note. I have reviewed today's vital signs, medications, labs, and imaging.     Will increase nitrates for anginal symptoms. IMDUR to 120 mg in am and 60 mg in PM.   Ambien 5 mg daily for 15 days for insomnia.     Sincerely,    Claudy Loco MD   [facility]  Section of Advanced Heart Failure   Cardiovascular Division  [facility]   [phone]      CC  Patient Care Team:  Estrella  Jarod as PCP - General (Family Practice)  Claudy Loco MD as MD (Cardiology)  Calli Isabel RN as Nurse Coordinator (Cardiology)  SELF, REFERRED

## 2019-05-20 NOTE — PATIENT INSTRUCTIONS
It was a pleasure to see you in clinic today.  Please do not hesitate to call with any questions or concerns.  We look forward to seeing you in clinic at your next device check in 3 months.    Amparo Ibarra, RN, MS, CCRN  Electrophysiology Nurse Clinician  [facility]    During Business Hours Please Call:  [phone]  After Hours Please Call:  [phone] - select option #4 and ask for job code [phone]

## 2019-05-20 NOTE — NURSING NOTE
Diet: Patient instructed regarding a heart failure healthy diet, including discussion of reduced fat and 2000 mg daily sodium restriction, daily weights, medication purpose and compliance, fluid restrictions and resources for patient and family to access for assistance with heart failure management.       Labs: Patient was given results of the laboratory testing obtained today and patient was instructed about when to return for the next laboratory testing.     Med Reconcile: Reviewed and verified all current medications with the patient. The updated medication list was printed and given to the patient. INCREASE IMDUR dosage to 120mg in am and 60mg in pm. Start ambien 5mg at night PRN for sleep.    Return Appointment: Patient given instructions regarding scheduling next clinic visit. rTC in 3 months with dr villagomez    Patient stated he understood all health information given and agreed to call with further questions or concerns.       Calli Isabel RN

## 2019-05-20 NOTE — LETTER
5/20/2019      RE: Wesley Cooper  [address]  [place] [address]                Heart Failure Cardiology Consultation      HPI:       Mr. Cooper is [id number] gentleman with a past medical history significant for HFrEF (EF 40-45%) 2/2 ICM, CAD s/p CABG (LIMA-LAD, occluded SVG-RCA, SVG-LCx), s/p CRT-D.     In 3/2019, he underwent a high-risk PCI by Dr. Field.  He had a successful orbital atherectomy of the left main, proximal and the mid left circumflex.  He had 2 drug-eluting stents placed in the left main and left circumflex, respectively.  A week after the procedure he noticed worsening exertional shortness of breath.  His estimated ejection fraction was 40%, just slightly lower than his baseline 45%. During this admission he was also noted to have nonsustained ventricular tachycardia and was started on mexiletine in addition to his sotalol.      Today, patient reports that overall since his complex PCI in 3/2019 he does not think that his level of energy has increase nor his ability to exert himself more.     He is still very limited in his activities in that he cannot walk for more than 5 minutes w/o getting short of breath and needing to stop. No chest pain or pressure. No orthopnea, PND, lightheadedness, dizziness, syncope.        CURRENT MEDICATIONS:  Current Outpatient Medications   Medication Sig Dispense Refill     ANTIPLATELET MEDICATION NOT PRESCRIBED, INTENTIONAL, Antiplatelet medication not prescribed intentionally due to Already on DAPT (clopidogrel/prasugrel/ticagrelor)       atorvastatin (LIPITOR) 80 MG tablet Take 80 mg by mouth daily       blood glucose monitoring (ACCU-CHEK TAIWO PLUS) meter device kit        carvedilol (COREG) 12.5 MG tablet Take 1 tablet (12.5 mg) by mouth 2 times daily (with meals) 60 tablet 1     clopidogrel (PLAVIX) 75 MG tablet Take 75 mg by mouth daily       diphenhydrAMINE-acetaminophen (TYLENOL PM)  MG tablet Take 1 tablet by mouth nightly as needed for  sleep       furosemide (LASIX) 40 MG tablet Take 40 mg by mouth daily       gabapentin (NEURONTIN) 300 MG capsule Take 300 mg by mouth 3 times daily        glipiZIDE (GLUCOTROL) 5 MG tablet Take 5 mg by mouth 2 times daily (before meals)       isosorbide mononitrate (IMDUR) 30 MG 24 hr tablet Take 1 30mg tablet in the morning. Take 1 30mg tablet in the afternoon. Take 2 30mg tablets in the evening. Total daily dose of 120mg. 180 tablet 1     losartan (COZAAR) 25 MG tablet Take 1 tablet (25 mg) by mouth daily 30 tablet 3     nitroglycerin (NITROSTAT) 0.4 MG sublingual tablet Place 0.4 mg under the tongue every 5 minutes as needed for chest pain For chest pain place 1 tablet under the tongue every 5 minutes for 3 doses. If symptoms persist 5 minutes after 1st dose call 911.       potassium aminobenzoate 500 MG CAPS capsule        sotalol HCl, AF, 160 MG TABS Take 160 mg by mouth 2 times daily (Patient taking differently: Take 80 mg by mouth 2 times daily ) 180 tablet 3     pioglitazone (ACTOS) 30 MG tablet Take 30 mg by mouth daily       temazepam (RESTORIL) 7.5 MG capsule Take 1 capsule (7.5 mg) by mouth nightly as needed for sleep (Patient not taking: Reported on 5/20/2019) 15 capsule 0     warfarin (COUMADIN) 2.5 MG tablet Take 2 tablets (5 mg) by mouth daily Take 5 mg by mouth on Mondays and 2.5 mg 6x/week or as directed based on INR 60 tablet 0   ALLERGIES   No Known Allergies      ROS:   Constitutional: No fever, chills, or sweats. No weight gain/loss   ENT: No visual disturbance, ear ache, epistaxis, sore throat  Allergies/Immunologic: Negative.   Respiratory: No cough, hemoptysia  Cardiovascular: As per HPI  GI: No nausea, vomiting, hematemesis, melena, or hematochezia  : No urinary frequency, dysuria, or hematuria  Integument: Negative  Psychiatric: Negative  Neuro: Negative  Endocrinology: Negative   Musculoskeletal: Negative    EXAM:  /78 (BP Location: Right arm, Patient Position: Chair, Cuff Size:  "Adult Regular)   Pulse 68   Ht 1.651 m (5' 5\")   Wt 77.1 kg (169 lb 14.4 oz)   SpO2 97%   BMI 28.27 kg/m     In general, the patient is a pleasant male in no apparent distress.    HEENT: NC/AT.  PERRLA.  EOMI.  Sclerae white, not injected.  Nares clear.  Pharynx without erythema or exudate.  Dentition intact.    Neck: No adenopathy.  No thyromegaly. Carotids +4/4 bilaterally without bruits.  No jugular venous distension.   Heart: RRR. Normal S1, S2 splits physiologically. No murmur, rub, click, or gallop. The PMI is in the 5th ICS in the midclavicular line. There is no heave.    Lungs: CTA.  No ronchi, wheezes, rales.  No dullness to percussion.   Abdomen: Soft, nontender, nondistended. No organomegaly.  No bruits.   Extremities: No clubbing, cyanosis, or edema.  The pulses are +4/4 at the radial, brachial, femoral, popliteal, DP, and PT sites bilaterally.  No bruits are noted.  Neurologic: Alert and oriented to person/place/time, normal speech, gait and affect  Skin: No petechiae, purpura or rash.    Labs:  LIPID RESULTS:  No results found for: CHOL, HDL, LDL, TRIG, CHOLHDLRATIO, NHDL    LIVER ENZYME RESULTS:  Lab Results   Component Value Date    AST 19 11/06/2018    ALT 19 11/06/2018       CBC RESULTS:  Lab Results   Component Value Date    WBC 5.1 05/20/2019    RBC 4.78 05/20/2019    HGB 10.9 (L) 05/20/2019    HCT 38.2 (L) 05/20/2019    MCV 80 05/20/2019    MCH 22.8 (L) 05/20/2019    MCHC 28.5 (L) 05/20/2019    RDW 21.0 (H) 05/20/2019     05/20/2019       BMP RESULTS:  Lab Results   Component Value Date     05/20/2019    POTASSIUM 4.5 05/20/2019    CHLORIDE 104 05/20/2019    CO2 28 05/20/2019    ANIONGAP 7 05/20/2019     (H) 05/20/2019    BUN 26 05/20/2019    CR 1.31 (H) 05/20/2019    GFRESTIMATED 52 (L) 05/20/2019    GFRESTBLACK 61 05/20/2019    KEIRA 9.1 05/20/2019        A1C RESULTS:  No results found for: A1C    INR RESULTS:  Lab Results   Component Value Date    INR 2.58 (H) " 03/28/2019    INR 1.97 (H) 03/12/2019       Procedures:      Assessment and Plan:     Mr. Wesley Cooper is a very pleasant 74-year-old male with past medical history significant for diabetes, coronary artery disease, ischemic cardiomyopathy EF 40-45%, atrial flutter and colostomy, who returns today for followup.       Overall, patient is still having CHURCHILL, which may be related to his heart failure vs. Angina from coronary artery disease. His creatinine has increased and his weight is 169 up from 164 lbs at last visit. More likely related to angina, thus we will increase his Imdur to 120 mg PO BID. We will continue him on Lasix 40 mg daily.  He is already on low-dose beta blocker and carvedilol.      He is on sotalol 80 mg twice a day for his atrial flutter.  His device interrogation today did not show any increased ventricular arrhythmias.  He is on long-term anticoagulation with Coumadin.      2.  Atrial fibrillation.  He was in AFib at the time of his symptoms.  It is possible that he was volume overloaded and went into AFib.  He is currently in sinus rhythm.  We will continue him on sotalol.  He is on long-term anticoagulation with Coumadin.      3.  Coronary artery disease.  He recently underwent high-risk PCI by Dr. Field.  He is on Plavix and Coumadin.  He is on statin.  He is on beta blockers.  His clinical symptoms are not consistent with stent closure.       Jhony Luna M.D.  Cardiology Fellow    Plan discussed with Dr. Loco    I have personally seen and examined the patient, and then discussed with , and agree with the findings and plan in this note. I have reviewed today's vital signs, medications, labs, and imaging.     Will increase nitrates for anginal symptoms. IMDUR to 120 mg in am and 60 mg in PM.   Ambien 5 mg daily for 15 days for insomnia.     Sincerely,    Claudy Loco MD   [facility]  Section of Advanced Heart Failure   Cardiovascular  Division  [facility]   [phone]      CC  Patient Care Team:  Jarod De La Rosa as PCP - General (Family Practice)  Claudy Loco MD as MD (Cardiology)  Calli Isabel RN as Nurse Coordinator (Cardiology)  SELF, REFERRED    Claudy Loco MD

## 2019-05-20 NOTE — LETTER
5/20/2019    RE: Wesley Cooper  [address]  [facility] [address]     Dear Colleague,    Thank you for the opportunity to participate in the care of your patient, Wesley Cooper, at the [facility]. Please see a copy of my visit note below.             Heart Failure Cardiology Consultation      HPI:       Mr. Cooper is [id number] gentleman with a past medical history significant for HFrEF (EF 40-45%) 2/2 ICM, CAD s/p CABG (LIMA-LAD, occluded SVG-RCA, SVG-LCx), s/p CRT-D.     In 3/2019, he underwent a high-risk PCI by Dr. Field.  He had a successful orbital atherectomy of the left main, proximal and the mid left circumflex.  He had 2 drug-eluting stents placed in the left main and left circumflex, respectively.  A week after the procedure he noticed worsening exertional shortness of breath.  His estimated ejection fraction was 40%, just slightly lower than his baseline 45%. During this admission he was also noted to have nonsustained ventricular tachycardia and was started on mexiletine in addition to his sotalol.      Today, patient reports that overall since his complex PCI in 3/2019 he does not think that his level of energy has increase nor his ability to exert himself more.     He is still very limited in his activities in that he cannot walk for more than 5 minutes w/o getting short of breath and needing to stop. No chest pain or pressure. No orthopnea, PND, lightheadedness, dizziness, syncope.        CURRENT MEDICATIONS:  Current Outpatient Medications   Medication Sig Dispense Refill     ANTIPLATELET MEDICATION NOT PRESCRIBED, INTENTIONAL, Antiplatelet medication not prescribed intentionally due to Already on DAPT (clopidogrel/prasugrel/ticagrelor)       atorvastatin (LIPITOR) 80 MG tablet Take 80 mg by mouth daily       blood glucose monitoring (ACCU-CHEK TAIWO PLUS) meter device kit        carvedilol (COREG) 12.5 MG tablet Take 1 tablet (12.5  mg) by mouth 2 times daily (with meals) 60 tablet 1     clopidogrel (PLAVIX) 75 MG tablet Take 75 mg by mouth daily       diphenhydrAMINE-acetaminophen (TYLENOL PM)  MG tablet Take 1 tablet by mouth nightly as needed for sleep       furosemide (LASIX) 40 MG tablet Take 40 mg by mouth daily       gabapentin (NEURONTIN) 300 MG capsule Take 300 mg by mouth 3 times daily        glipiZIDE (GLUCOTROL) 5 MG tablet Take 5 mg by mouth 2 times daily (before meals)       isosorbide mononitrate (IMDUR) 30 MG 24 hr tablet Take 1 30mg tablet in the morning. Take 1 30mg tablet in the afternoon. Take 2 30mg tablets in the evening. Total daily dose of 120mg. 180 tablet 1     losartan (COZAAR) 25 MG tablet Take 1 tablet (25 mg) by mouth daily 30 tablet 3     nitroglycerin (NITROSTAT) 0.4 MG sublingual tablet Place 0.4 mg under the tongue every 5 minutes as needed for chest pain For chest pain place 1 tablet under the tongue every 5 minutes for 3 doses. If symptoms persist 5 minutes after 1st dose call 911.       potassium aminobenzoate 500 MG CAPS capsule        sotalol HCl, AF, 160 MG TABS Take 160 mg by mouth 2 times daily (Patient taking differently: Take 80 mg by mouth 2 times daily ) 180 tablet 3     pioglitazone (ACTOS) 30 MG tablet Take 30 mg by mouth daily       temazepam (RESTORIL) 7.5 MG capsule Take 1 capsule (7.5 mg) by mouth nightly as needed for sleep (Patient not taking: Reported on 5/20/2019) 15 capsule 0     warfarin (COUMADIN) 2.5 MG tablet Take 2 tablets (5 mg) by mouth daily Take 5 mg by mouth on Mondays and 2.5 mg 6x/week or as directed based on INR 60 tablet 0   ALLERGIES   No Known Allergies      ROS:   Constitutional: No fever, chills, or sweats. No weight gain/loss   ENT: No visual disturbance, ear ache, epistaxis, sore throat  Allergies/Immunologic: Negative.   Respiratory: No cough, hemoptysia  Cardiovascular: As per HPI  GI: No nausea, vomiting, hematemesis, melena, or hematochezia  : No urinary  "frequency, dysuria, or hematuria  Integument: Negative  Psychiatric: Negative  Neuro: Negative  Endocrinology: Negative   Musculoskeletal: Negative    EXAM:  /78 (BP Location: Right arm, Patient Position: Chair, Cuff Size: Adult Regular)   Pulse 68   Ht 1.651 m (5' 5\")   Wt 77.1 kg (169 lb 14.4 oz)   SpO2 97%   BMI 28.27 kg/m     In general, the patient is a pleasant male in no apparent distress.    HEENT: NC/AT.  PERRLA.  EOMI.  Sclerae white, not injected.  Nares clear.  Pharynx without erythema or exudate.  Dentition intact.    Neck: No adenopathy.  No thyromegaly. Carotids +4/4 bilaterally without bruits.  No jugular venous distension.   Heart: RRR. Normal S1, S2 splits physiologically. No murmur, rub, click, or gallop. The PMI is in the 5th ICS in the midclavicular line. There is no heave.    Lungs: CTA.  No ronchi, wheezes, rales.  No dullness to percussion.   Abdomen: Soft, nontender, nondistended. No organomegaly.  No bruits.   Extremities: No clubbing, cyanosis, or edema.  The pulses are +4/4 at the radial, brachial, femoral, popliteal, DP, and PT sites bilaterally.  No bruits are noted.  Neurologic: Alert and oriented to person/place/time, normal speech, gait and affect  Skin: No petechiae, purpura or rash.    Labs:  LIPID RESULTS:  No results found for: CHOL, HDL, LDL, TRIG, CHOLHDLRATIO, NHDL    LIVER ENZYME RESULTS:  Lab Results   Component Value Date    AST 19 11/06/2018    ALT 19 11/06/2018       CBC RESULTS:  Lab Results   Component Value Date    WBC 5.1 05/20/2019    RBC 4.78 05/20/2019    HGB 10.9 (L) 05/20/2019    HCT 38.2 (L) 05/20/2019    MCV 80 05/20/2019    MCH 22.8 (L) 05/20/2019    MCHC 28.5 (L) 05/20/2019    RDW 21.0 (H) 05/20/2019     05/20/2019       BMP RESULTS:  Lab Results   Component Value Date     05/20/2019    POTASSIUM 4.5 05/20/2019    CHLORIDE 104 05/20/2019    CO2 28 05/20/2019    ANIONGAP 7 05/20/2019     (H) 05/20/2019    BUN 26 05/20/2019    CR " 1.31 (H) 05/20/2019    GFRESTIMATED 52 (L) 05/20/2019    GFRESTBLACK 61 05/20/2019    KEIRA 9.1 05/20/2019        A1C RESULTS:  No results found for: A1C    INR RESULTS:  Lab Results   Component Value Date    INR 2.58 (H) 03/28/2019    INR 1.97 (H) 03/12/2019     Procedures:      Assessment and Plan:     Mr. Wesley Cooper is a very pleasant 74-year-old male with past medical history significant for diabetes, coronary artery disease, ischemic cardiomyopathy EF 40-45%, atrial flutter and colostomy, who returns today for followup.       Overall, patient is still having CHURCHILL, which may be related to his heart failure vs. Angina from coronary artery disease. His creatinine has increased and his weight is 169 up from 164 lbs at last visit. More likely related to angina, thus we will increase his Imdur to 120 mg PO BID. We will continue him on Lasix 40 mg daily.  He is already on low-dose beta blocker and carvedilol.      He is on sotalol 80 mg twice a day for his atrial flutter.  His device interrogation today did not show any increased ventricular arrhythmias.  He is on long-term anticoagulation with Coumadin.      2.  Atrial fibrillation.  He was in AFib at the time of his symptoms.  It is possible that he was volume overloaded and went into AFib.  He is currently in sinus rhythm.  We will continue him on sotalol.  He is on long-term anticoagulation with Coumadin.      3.  Coronary artery disease.  He recently underwent high-risk PCI by Dr. Field.  He is on Plavix and Coumadin.  He is on statin.  He is on beta blockers.  His clinical symptoms are not consistent with stent closure.       Jhony Luna M.D.  Cardiology Fellow    Plan discussed with Dr. Loco    I have personally seen and examined the patient, and then discussed with , and agree with the findings and plan in this note. I have reviewed today's vital signs, medications, labs, and imaging.   Will increase nitrates for anginal symptoms. IMDUR  to 120 mg in am and 60 mg in PM.   Ambien 5 mg daily for 15 days for insomnia.     Sincerely,       Claudy Loco MD   [facility]  Heart Failure, Transplant, and Mechanical Circulatory Support Cardiology   Cardiovascular Division  [facility]   [phone]    CC  Patient Care Team:  Jarod De La Rosa as PCP - General (Family Practice)  Claudy Loco MD as MD (Cardiology)  Calli Isabel RN as Nurse Coordinator (Cardiology)  SELF, REFERRED

## 2019-05-20 NOTE — PATIENT INSTRUCTIONS
You were seen today in the Cardiovascular Clinic at the [facility].       Cardiology Providers you saw during your visit: Dr. Loco      Medication Changes:   1. Increase your imdur dosage. We will change this to : 120mg in the morning and 60mg in the evening. Please continue on this dose for 1 month then we will call you to check in. Depending on how you tolerate this dose, we may increase the dosage again.    Please call us if you are light headed or dizzy on this increased dose.     Follow up Appointment Information:  1. Return to clinic in 3 months with labs prior.       Results:    Results for FLOR STEVENSON (MRN [id number]) as of 5/20/2019 14:56   Ref. Range 5/20/2019 12:34   Sodium Latest Ref Range: 133 - 144 mmol/L 139   Potassium Latest Ref Range: 3.4 - 5.3 mmol/L 4.5   Chloride Latest Ref Range: 94 - 109 mmol/L 104   Carbon Dioxide Latest Ref Range: 20 - 32 mmol/L 28   Urea Nitrogen Latest Ref Range: 7 - 30 mg/dL 26   Creatinine Latest Ref Range: 0.66 - 1.25 mg/dL 1.31 (H)   GFR Estimate Latest Ref Range: >60 mL/min/1.73_m2 52 (L)   GFR Estimate If Black Latest Ref Range: >60 mL/min/1.73_m2 61   Calcium Latest Ref Range: 8.5 - 10.1 mg/dL 9.1   Anion Gap Latest Ref Range: 3 - 14 mmol/L 7   N-Terminal Pro Bnp Latest Ref Range: 0 - 450 pg/mL 834 (H)   Glucose Latest Ref Range: 70 - 99 mg/dL 164 (H)   WBC Latest Ref Range: 4.0 - 11.0 10e9/L 5.1   Hemoglobin Latest Ref Range: 13.3 - 17.7 g/dL 10.9 (L)   Hematocrit Latest Ref Range: 40.0 - 53.0 % 38.2 (L)   Platelet Count Latest Ref Range: 150 - 450 10e9/L 239   RBC Count Latest Ref Range: 4.4 - 5.9 10e12/L 4.78   MCV Latest Ref Range: 78 - 100 fl 80   MCH Latest Ref Range: 26.5 - 33.0 pg 22.8 (L)   MCHC Latest Ref Range: 31.5 - 36.5 g/dL 28.5 (L)   RDW Latest Ref Range: 10.0 - 15.0 % 21.0 (H)         [address] [place] [address]        Thank you for allowing us to be a part of your care here at the [place] [facility]  [facility]      If you have questions or concerns please contact us at:      Calli Isabel RN BSN   Cardiology Care Coordinator  [facility]   Questions and [phone]   First press #1 for the University and then press #4 to \"send a message to your care team\"     "

## 2019-05-22 LAB
MDC_IDC_LEAD_IMPLANT_DT: NORMAL
MDC_IDC_LEAD_LOCATION: NORMAL
MDC_IDC_LEAD_LOCATION_DETAIL_1: NORMAL
MDC_IDC_LEAD_MFG: NORMAL
MDC_IDC_LEAD_MODEL: NORMAL
MDC_IDC_LEAD_POLARITY_TYPE: NORMAL
MDC_IDC_LEAD_SERIAL: NORMAL
MDC_IDC_MSMT_BATTERY_REMAINING_LONGEVITY: 57 MO
MDC_IDC_MSMT_CAP_CHARGE_DTM: NORMAL
MDC_IDC_MSMT_CAP_CHARGE_TIME: 8.88
MDC_IDC_MSMT_CAP_CHARGE_TYPE: NORMAL
MDC_IDC_MSMT_CAP_CHARGE_TYPE: NORMAL
MDC_IDC_MSMT_LEADCHNL_LV_IMPEDANCE_VALUE: 825 OHM
MDC_IDC_MSMT_LEADCHNL_LV_PACING_THRESHOLD_AMPLITUDE: 1 V
MDC_IDC_MSMT_LEADCHNL_LV_PACING_THRESHOLD_PULSEWIDTH: 0.5 MS
MDC_IDC_MSMT_LEADCHNL_RA_IMPEDANCE_VALUE: 375 OHM
MDC_IDC_MSMT_LEADCHNL_RA_PACING_THRESHOLD_AMPLITUDE: 0.5 V
MDC_IDC_MSMT_LEADCHNL_RA_PACING_THRESHOLD_PULSEWIDTH: 0.5 MS
MDC_IDC_MSMT_LEADCHNL_RA_SENSING_INTR_AMPL: 5 MV
MDC_IDC_MSMT_LEADCHNL_RV_IMPEDANCE_VALUE: 425 OHM
MDC_IDC_MSMT_LEADCHNL_RV_PACING_THRESHOLD_AMPLITUDE: 0.5 V
MDC_IDC_MSMT_LEADCHNL_RV_PACING_THRESHOLD_PULSEWIDTH: 0.5 MS
MDC_IDC_MSMT_LEADCHNL_RV_SENSING_INTR_AMPL: 12 MV
MDC_IDC_PG_IMPLANT_DTM: NORMAL
MDC_IDC_PG_MFG: NORMAL
MDC_IDC_PG_MODEL: NORMAL
MDC_IDC_PG_SERIAL: NORMAL
MDC_IDC_PG_TYPE: NORMAL
MDC_IDC_SESS_CLINIC_NAME: NORMAL
MDC_IDC_SESS_DTM: NORMAL
MDC_IDC_SESS_TYPE: NORMAL
MDC_IDC_SET_BRADY_AT_MODE_SWITCH_MODE: NORMAL
MDC_IDC_SET_BRADY_AT_MODE_SWITCH_RATE: 180 {BEATS}/MIN
MDC_IDC_SET_BRADY_HYSTRATE: NORMAL
MDC_IDC_SET_BRADY_LOWRATE: 60 {BEATS}/MIN
MDC_IDC_SET_BRADY_MAX_SENSOR_RATE: 110 {BEATS}/MIN
MDC_IDC_SET_BRADY_MAX_TRACKING_RATE: 110 {BEATS}/MIN
MDC_IDC_SET_BRADY_MODE: NORMAL
MDC_IDC_SET_BRADY_NIGHT_RATE: NORMAL
MDC_IDC_SET_BRADY_PAV_DELAY_LOW: 180 MS
MDC_IDC_SET_BRADY_SAV_DELAY_LOW: 160 MS
MDC_IDC_SET_CRT_LVRV_DELAY: 20 MS
MDC_IDC_SET_CRT_PACED_CHAMBERS: NORMAL
MDC_IDC_SET_LEADCHNL_LV_PACING_AMPLITUDE: 2 V
MDC_IDC_SET_LEADCHNL_LV_PACING_ANODE_ELECTRODE_1: NORMAL
MDC_IDC_SET_LEADCHNL_LV_PACING_ANODE_LOCATION_1: NORMAL
MDC_IDC_SET_LEADCHNL_LV_PACING_CAPTURE_MODE: NORMAL
MDC_IDC_SET_LEADCHNL_LV_PACING_CATHODE_ELECTRODE_1: NORMAL
MDC_IDC_SET_LEADCHNL_LV_PACING_CATHODE_LOCATION_1: NORMAL
MDC_IDC_SET_LEADCHNL_LV_PACING_POLARITY: NORMAL
MDC_IDC_SET_LEADCHNL_LV_PACING_PULSEWIDTH: 0.5 MS
MDC_IDC_SET_LEADCHNL_RA_PACING_AMPLITUDE: 1.5 V
MDC_IDC_SET_LEADCHNL_RA_PACING_ANODE_ELECTRODE_1: NORMAL
MDC_IDC_SET_LEADCHNL_RA_PACING_ANODE_LOCATION_1: NORMAL
MDC_IDC_SET_LEADCHNL_RA_PACING_CAPTURE_MODE: NORMAL
MDC_IDC_SET_LEADCHNL_RA_PACING_CATHODE_ELECTRODE_1: NORMAL
MDC_IDC_SET_LEADCHNL_RA_PACING_CATHODE_LOCATION_1: NORMAL
MDC_IDC_SET_LEADCHNL_RA_PACING_POLARITY: NORMAL
MDC_IDC_SET_LEADCHNL_RA_PACING_PULSEWIDTH: 0.5 MS
MDC_IDC_SET_LEADCHNL_RA_SENSING_ADAPTATION_MODE: NORMAL
MDC_IDC_SET_LEADCHNL_RA_SENSING_ANODE_ELECTRODE_1: NORMAL
MDC_IDC_SET_LEADCHNL_RA_SENSING_ANODE_LOCATION_1: NORMAL
MDC_IDC_SET_LEADCHNL_RA_SENSING_CATHODE_ELECTRODE_1: NORMAL
MDC_IDC_SET_LEADCHNL_RA_SENSING_CATHODE_LOCATION_1: NORMAL
MDC_IDC_SET_LEADCHNL_RA_SENSING_POLARITY: NORMAL
MDC_IDC_SET_LEADCHNL_RA_SENSING_SENSITIVITY: 0.3 MV
MDC_IDC_SET_LEADCHNL_RV_PACING_AMPLITUDE: 2 V
MDC_IDC_SET_LEADCHNL_RV_PACING_ANODE_ELECTRODE_1: NORMAL
MDC_IDC_SET_LEADCHNL_RV_PACING_ANODE_LOCATION_1: NORMAL
MDC_IDC_SET_LEADCHNL_RV_PACING_CAPTURE_MODE: NORMAL
MDC_IDC_SET_LEADCHNL_RV_PACING_CATHODE_ELECTRODE_1: NORMAL
MDC_IDC_SET_LEADCHNL_RV_PACING_CATHODE_LOCATION_1: NORMAL
MDC_IDC_SET_LEADCHNL_RV_PACING_POLARITY: NORMAL
MDC_IDC_SET_LEADCHNL_RV_PACING_PULSEWIDTH: 0.5 MS
MDC_IDC_SET_LEADCHNL_RV_SENSING_ANODE_ELECTRODE_1: NORMAL
MDC_IDC_SET_LEADCHNL_RV_SENSING_ANODE_LOCATION_1: NORMAL
MDC_IDC_SET_LEADCHNL_RV_SENSING_CATHODE_ELECTRODE_1: NORMAL
MDC_IDC_SET_LEADCHNL_RV_SENSING_CATHODE_LOCATION_1: NORMAL
MDC_IDC_SET_LEADCHNL_RV_SENSING_POLARITY: NORMAL
MDC_IDC_SET_LEADCHNL_RV_SENSING_SENSITIVITY: 0.5 MV
MDC_IDC_SET_ZONE_DETECTION_INTERVAL: 320 MS
MDC_IDC_SET_ZONE_DETECTION_INTERVAL: 360 MS
MDC_IDC_SET_ZONE_DETECTION_INTERVAL: 460 MS
MDC_IDC_SET_ZONE_TYPE: NORMAL
MDC_IDC_SET_ZONE_VENDOR_TYPE: NORMAL
MDC_IDC_STAT_AT_DTM_END: NORMAL
MDC_IDC_STAT_AT_DTM_START: NORMAL
MDC_IDC_STAT_AT_MODE_SW_COUNT: 0
MDC_IDC_STAT_BRADY_DTM_END: NORMAL
MDC_IDC_STAT_BRADY_DTM_START: NORMAL
MDC_IDC_STAT_BRADY_RA_PERCENT_PACED: 0.97 %
MDC_IDC_STAT_BRADY_RV_PERCENT_PACED: 99.48 %
MDC_IDC_STAT_EPISODE_RECENT_COUNT: 0
MDC_IDC_STAT_EPISODE_RECENT_COUNT: 0
MDC_IDC_STAT_EPISODE_RECENT_COUNT_DTM_END: NORMAL
MDC_IDC_STAT_EPISODE_RECENT_COUNT_DTM_END: NORMAL
MDC_IDC_STAT_EPISODE_RECENT_COUNT_DTM_START: NORMAL
MDC_IDC_STAT_EPISODE_RECENT_COUNT_DTM_START: NORMAL
MDC_IDC_STAT_EPISODE_TYPE: NORMAL
MDC_IDC_STAT_EPISODE_TYPE: NORMAL
MDC_IDC_STAT_EPISODE_VENDOR_TYPE: NORMAL
MDC_IDC_STAT_HEART_RATE_DTM_END: NORMAL
MDC_IDC_STAT_HEART_RATE_DTM_START: NORMAL
MDC_IDC_STAT_TACHYTHERAPY_ATP_DELIVERED_RECENT: 0
MDC_IDC_STAT_TACHYTHERAPY_RECENT_DTM_END: NORMAL
MDC_IDC_STAT_TACHYTHERAPY_RECENT_DTM_START: NORMAL
MDC_IDC_STAT_TACHYTHERAPY_SHOCKS_ABORTED_RECENT: 0
MDC_IDC_STAT_TACHYTHERAPY_SHOCKS_DELIVERED_RECENT: 0

## 2019-06-19 ENCOUNTER — PATIENT OUTREACH (OUTPATIENT)
Dept: CARDIOLOGY | Facility: CLINIC | Age: 74
End: 2019-06-19

## 2019-06-19 NOTE — PROGRESS NOTES
Called Wesley to check in 1 month after medication changes. He reports he is doing okay. He states he still doesn't have energy and he is sleeping a lot but otherwise he is feeling pretty good. He tells me his chest pain/pressure has gotten much better since increasing medications at his last appointment. He reports once episode of feeling dizzy but otherwise denies feeling dizzy, lightheaded or feeling like he is going to pass out. He tells me he does not wake up feeling rested and he feels 'pretty dragged out.' He tells me he stopped using the prescription sleeping pills and now takes tylenol PM to help him fall asleep and stay asleep. Will route to provider for further review.

## 2019-08-15 DIAGNOSIS — I50.22 CHRONIC SYSTOLIC HEART FAILURE (H): Primary | Chronic | ICD-10-CM

## 2019-08-19 ENCOUNTER — OFFICE VISIT (OUTPATIENT)
Dept: CARDIOLOGY | Facility: CLINIC | Age: 74
End: 2019-08-19
Attending: INTERNAL MEDICINE
Payer: COMMERCIAL

## 2019-08-19 VITALS — BODY MASS INDEX: 28.71 KG/M2 | WEIGHT: 168.2 LBS | HEIGHT: 64 IN

## 2019-08-19 DIAGNOSIS — I50.22 CHRONIC SYSTOLIC HEART FAILURE (H): Chronic | ICD-10-CM

## 2019-08-19 DIAGNOSIS — I25.5 ISCHEMIC CARDIOMYOPATHY: ICD-10-CM

## 2019-08-19 DIAGNOSIS — I20.0 UNSTABLE ANGINA PECTORIS (H): ICD-10-CM

## 2019-08-19 DIAGNOSIS — I42.9 CARDIOMYOPATHY (H): ICD-10-CM

## 2019-08-19 LAB
ANION GAP SERPL CALCULATED.3IONS-SCNC: 5 MMOL/L (ref 3–14)
BUN SERPL-MCNC: 24 MG/DL (ref 7–30)
CALCIUM SERPL-MCNC: 8.3 MG/DL (ref 8.5–10.1)
CHLORIDE SERPL-SCNC: 103 MMOL/L (ref 94–109)
CO2 SERPL-SCNC: 27 MMOL/L (ref 20–32)
CREAT SERPL-MCNC: 1.14 MG/DL (ref 0.66–1.25)
ERYTHROCYTE [DISTWIDTH] IN BLOOD BY AUTOMATED COUNT: 17.5 % (ref 10–15)
GFR SERPL CREATININE-BSD FRML MDRD: 62 ML/MIN/{1.73_M2}
GLUCOSE SERPL-MCNC: 349 MG/DL (ref 70–99)
HCT VFR BLD AUTO: 42.8 % (ref 40–53)
HGB BLD-MCNC: 12.4 G/DL (ref 13.3–17.7)
MCH RBC QN AUTO: 23.8 PG (ref 26.5–33)
MCHC RBC AUTO-ENTMCNC: 29 G/DL (ref 31.5–36.5)
MCV RBC AUTO: 82 FL (ref 78–100)
NT-PROBNP SERPL-MCNC: 706 PG/ML (ref 0–450)
PLATELET # BLD AUTO: 194 10E9/L (ref 150–450)
POTASSIUM SERPL-SCNC: 4.4 MMOL/L (ref 3.4–5.3)
RBC # BLD AUTO: 5.22 10E12/L (ref 4.4–5.9)
SODIUM SERPL-SCNC: 135 MMOL/L (ref 133–144)
WBC # BLD AUTO: 6.5 10E9/L (ref 4–11)

## 2019-08-19 PROCEDURE — 36415 COLL VENOUS BLD VENIPUNCTURE: CPT | Performed by: INTERNAL MEDICINE

## 2019-08-19 PROCEDURE — G0463 HOSPITAL OUTPT CLINIC VISIT: HCPCS | Mod: ZF

## 2019-08-19 PROCEDURE — 85027 COMPLETE CBC AUTOMATED: CPT | Performed by: INTERNAL MEDICINE

## 2019-08-19 PROCEDURE — 99215 OFFICE O/P EST HI 40 MIN: CPT | Mod: GC | Performed by: INTERNAL MEDICINE

## 2019-08-19 PROCEDURE — 83880 ASSAY OF NATRIURETIC PEPTIDE: CPT | Performed by: INTERNAL MEDICINE

## 2019-08-19 PROCEDURE — 80048 BASIC METABOLIC PNL TOTAL CA: CPT | Performed by: INTERNAL MEDICINE

## 2019-08-19 RX ORDER — ISOSORBIDE MONONITRATE 60 MG/1
120 TABLET, EXTENDED RELEASE ORAL 2 TIMES DAILY
Qty: 360 TABLET | Refills: 3 | Status: SHIPPED | OUTPATIENT
Start: 2019-08-19 | End: 2020-04-10

## 2019-08-19 ASSESSMENT — MIFFLIN-ST. JEOR: SCORE: 1403.95

## 2019-08-19 ASSESSMENT — PAIN SCALES - GENERAL: PAINLEVEL: MILD PAIN (2)

## 2019-08-19 NOTE — LETTER
8/19/2019      RE: Wesley Cooper  [address]  [place] [address]                Heart Failure Cardiology Consultation      HPI:   Mr. Cooper is a 74-year-old gentleman with a past medical history significant for HFrEF (EF 40-45%) 2/2 ICM, CAD s/p CABG (LIMA-LAD, occluded SVG-RCA, SVG-LCx), s/p CRT-D.     In 3/2019, he underwent a high-risk PCI by Dr. Field.  He had a successful orbital atherectomy of the left main, proximal and the mid left circumflex.  He had 2 drug-eluting stents placed in the left main and left circumflex, respectively.      After the procedure, patient's chest pain has resolved; however, he continues to have exertional shortness of breath. He denies orthopnea/PND or weight gain. He denies ICD shocks. Patient reports the last time he felt normal was around 1 year ago.     CURRENT MEDICATIONS:  Current Outpatient Medications   Medication Sig Dispense Refill     atorvastatin (LIPITOR) 80 MG tablet Take 80 mg by mouth daily       carvedilol (COREG) 12.5 MG tablet Take 1 tablet (12.5 mg) by mouth 2 times daily (with meals) 60 tablet 1     clopidogrel (PLAVIX) 75 MG tablet Take 75 mg by mouth daily       furosemide (LASIX) 40 MG tablet Take 40 mg by mouth daily       gabapentin (NEURONTIN) 300 MG capsule Take 300 mg by mouth 3 times daily        glipiZIDE (GLUCOTROL) 5 MG tablet Take 5 mg by mouth 2 times daily (before meals)       isosorbide mononitrate (IMDUR) 60 MG 24 hr tablet Take 120mg (2 tablets) in the morning. Take 60mg (1 tablet) in the evening. 270 tablet 3     losartan (COZAAR) 25 MG tablet Take 1 tablet (25 mg) by mouth daily 30 tablet 3     nitroglycerin (NITROSTAT) 0.4 MG sublingual tablet Place 0.4 mg under the tongue every 5 minutes as needed for chest pain For chest pain place 1 tablet under the tongue every 5 minutes for 3 doses. If symptoms persist 5 minutes after 1st dose call 911.       pioglitazone (ACTOS) 30 MG tablet Take 30 mg by mouth daily       potassium  "aminobenzoate 500 MG CAPS capsule        sotalol HCl, AF, 160 MG TABS Take 160 mg by mouth 2 times daily (Patient taking differently: Take 80 mg by mouth 2 times daily ) 180 tablet 3     warfarin (COUMADIN) 2.5 MG tablet Take 2 tablets (5 mg) by mouth daily Take 5 mg by mouth on Mondays and 2.5 mg 6x/week or as directed based on INR 60 tablet 0     zolpidem (AMBIEN) 5 MG tablet Take 1 tablet (5 mg) by mouth nightly as needed for sleep 15 tablet 0   ALLERGIES   No Known Allergies      ROS:   Negative unless stated in the HPI     EXAM:  Ht 1.626 m (5' 4\")   Wt 76.3 kg (168 lb 3.2 oz)   BMI 28.87 kg/m      General: No acute distress   HEENT: NC/AT   CV: RRR, +S1/S2, No murmurs, rubs, gallops. JVP is not elevated.    Pulm: Unlabored breathing, CTAB   GI: s/nt/nd   Ext: No edema   Neuro: No focal defects   Psych: Normal Affect    Labs:  LIPID RESULTS:  No results found for: CHOL, HDL, LDL, TRIG, CHOLHDLRATIO, NHDL    LIVER ENZYME RESULTS:  Lab Results   Component Value Date    AST 19 11/06/2018    ALT 19 11/06/2018       CBC RESULTS:  Lab Results   Component Value Date    WBC 6.5 08/19/2019    RBC 5.22 08/19/2019    HGB 12.4 (L) 08/19/2019    HCT 42.8 08/19/2019    MCV 82 08/19/2019    MCH 23.8 (L) 08/19/2019    MCHC 29.0 (L) 08/19/2019    RDW 17.5 (H) 08/19/2019     08/19/2019       BMP RESULTS:  Lab Results   Component Value Date     08/19/2019    POTASSIUM 4.4 08/19/2019    CHLORIDE 103 08/19/2019    CO2 27 08/19/2019    ANIONGAP 5 08/19/2019     (H) 08/19/2019    BUN 24 08/19/2019    CR 1.14 08/19/2019    GFRESTIMATED 62 08/19/2019    GFRESTBLACK 72 08/19/2019    KEIRA 8.3 (L) 08/19/2019        A1C RESULTS:  No results found for: A1C    INR RESULTS:  Lab Results   Component Value Date    INR 2.58 (H) 03/28/2019    INR 1.97 (H) 03/12/2019       Procedures:    Reason For Study: Chest pain    Interpretation Summary  Qualitative ejection fraction is 45-50% (mildly reduced).The left ventricle is normal " size.Abnormal septal motion secondary to pacemaker activity.There is mild  global hypokinesis of the left ventricle.  The right ventricular systolic function is reduced.  The aortic valve is tricuspid without significant stenosis or insufficiency.  There is mild mitral regurgitation.No mitral valve stenosis.  Right ventricular systolic pressure estimate is 30-35 mmHg.  Inferior vena cava size normal and collapsibility > 50% normal indicating normal right atrial pressure (3 mm Hg).  There is no pericardial effusion.  No prior ECHO for comparison  Assessment and Plan:   Mr. Cooper is a 74-year-old gentleman with a past medical history significant for HFrEF (EF 40-45%) 2/2 ICM, CAD s/p CABG (LIMA-LAD, occluded SVG-RCA, SVG-LCx) and Complex PCI, s/p CRT-D.     Patient continues to have significant dyspnea on exertion (NYHA Class II/III). The etiology could be related to coronary artery disease or heart failure with mid-range ejection fraction. On examination, his right sided filling pressures appear to be normal. Therefore, at this time, we will try to maximize his anti-anginal therapy. We will increase his Imdur to 120mg BID. He will continue Coreg 12.5 mg BID and Lasix 40mg daily.     #Atrial Fibrillation   -Patient is currently on Sotalol 80mg BID and Coreg 12.5mg BID. At this time, we will continue both of these medications.   -Continue Warfarin.     #History of Ventricular Arrhythmias   -Continue Sotalol at this time     #Coronary Artery Disease  -Continue dual therapy with Plavix and Warfarin.  -Continue Atorvastatin 80 mg.  -Continue BB as stated above.  -Antianginals as stated above.     #DM2  -Patient is currently on pioglitazone for his diabetes. Given his history of CHF and incidence of volume retention, this could possibly be switched to a different medication without a similar side effect profile. Will discuss this with patient on RTC.     RTC in 3 months to determine if increase in Imdur helped with  symptoms of dyspnea on exertion. Will obtain TTE and repeat labs prior to visit.     Tasia Barreto   Cardiology Fellow   Pager: [phone]      I have personally seen and examined the patient, and then discussed with Dr. Barreto, and agree with the findings and plan in this note. I have reviewed today's vital signs, medications, labs, and imaging.     Sincerely,    Claudy Loco MD   [facility]  Section of Advanced Heart Failure   Cardiovascular Division  [facility]   [phone]          CC  Patient Care Team:  Jarod De La Rosa as PCP - General (Family Practice)  Claudy Loco MD as MD (Cardiology)  Calli Aguayo, RN as Nurse Coordinator (Cardiology)  SELF, REFERRED    Claudy Loco MD

## 2019-08-19 NOTE — NURSING NOTE
Diet: Patient instructed regarding a heart failure healthy diet, including discussion of reduced fat and 2000 mg daily sodium restriction, daily weights, medication purpose and compliance, fluid restrictions and resources for patient and family to access for assistance with heart failure management.       Labs: Patient was given results of the laboratory testing obtained today and patient was instructed about when to return for the next laboratory testing.     Med Reconcile: Reviewed and verified all current medications with the patient. The updated medication list was printed and given to the patient. Increase Imdur 120mg BID.    Return Appointment: Patient given instructions regarding scheduling next clinic visit. RTC in 3 months to see Dr Loco with echo and labs prior.    Patient stated he understood all health information given and agreed to call with further questions or concerns.     Calli Aguaoy RN

## 2019-08-19 NOTE — PATIENT INSTRUCTIONS
You were seen today in the Cardiovascular Clinic at the [facility].       Cardiology Providers you saw during your visit: Dr. Loco      Medication Changes:   1. Increase Imdur to 120mg (2 tablets) twice per day.    Follow up Appointment Information:  1. Return to clinic in 3 months to see Dr. Loco with labs and echocardiogram prior.      Results:    Results for FLOR STEVENSON (MRN [id number]) as of 2019 13:05   Ref. Range 2019 11:33   Sodium Latest Ref Range: 133 - 144 mmol/L 135   Potassium Latest Ref Range: 3.4 - 5.3 mmol/L 4.4   Chloride Latest Ref Range: 94 - 109 mmol/L 103   Carbon Dioxide Latest Ref Range: 20 - 32 mmol/L 27   Urea Nitrogen Latest Ref Range: 7 - 30 mg/dL 24   Creatinine Latest Ref Range: 0.66 - 1.25 mg/dL 1.14   GFR Estimate Latest Ref Range: >60 mL/min/1.73_m2 62   GFR Estimate If Black Latest Ref Range: >60 mL/min/1.73_m2 72   Calcium Latest Ref Range: 8.5 - 10.1 mg/dL 8.3 (L)   Anion Gap Latest Ref Range: 3 - 14 mmol/L 5   N-Terminal Pro Bnp Latest Ref Range: 0 - 450 pg/mL 706 (H)   Glucose Latest Ref Range: 70 - 99 mg/dL 349 (H)   WBC Latest Ref Range: 4.0 - 11.0 10e9/L 6.5   Hemoglobin Latest Ref Range: 13.3 - 17.7 g/dL 12.4 (L)   Hematocrit Latest Ref Range: 40.0 - 53.0 % 42.8   Platelet Count Latest Ref Range: 150 - 450 10e9/L 194   RBC Count Latest Ref Range: 4.4 - 5.9 10e12/L 5.22   MCV Latest Ref Range: 78 - 100 fl 82   MCH Latest Ref Range: 26.5 - 33.0 pg 23.8 (L)   MCHC Latest Ref Range: 31.5 - 36.5 g/dL 29.0 (L)   RDW Latest Ref Range: 10.0 - 15.0 % 17.5 (H)         [address] [facility] on [address] [place] [address]        Thank you for allowing us to be a part of your care here at the [facility]      If you have questions or concerns please contact us at:      Calli Aguayo RN BSN   Cardiology Care Coordinator  [facility]   Questions and [phone]   First press #1 for the  "University and then press #4 to \"send a message to your care team\"     "

## 2019-08-19 NOTE — LETTER
8/19/2019      RE: Wesley Cooper  [address]  [facility] [address]       Dear Colleague,    Thank you for the opportunity to participate in the care of your patient, Wesley Cooper, at the [facility]. Please see a copy of my visit note below.         Heart Failure Cardiology Consultation      HPI:   Mr. Cooper is a 74-year-old gentleman with a past medical history significant for HFrEF (EF 40-45%) 2/2 ICM, CAD s/p CABG (LIMA-LAD, occluded SVG-RCA, SVG-LCx), s/p CRT-D.     In 3/2019, he underwent a high-risk PCI by Dr. Field.  He had a successful orbital atherectomy of the left main, proximal and the mid left circumflex.  He had 2 drug-eluting stents placed in the left main and left circumflex, respectively.      After the procedure, patient's chest pain has resolved; however, he continues to have exertional shortness of breath. He denies orthopnea/PND or weight gain. He denies ICD shocks. Patient reports the last time he felt normal was around 1 year ago.     CURRENT MEDICATIONS:  Current Outpatient Medications   Medication Sig Dispense Refill     atorvastatin (LIPITOR) 80 MG tablet Take 80 mg by mouth daily       carvedilol (COREG) 12.5 MG tablet Take 1 tablet (12.5 mg) by mouth 2 times daily (with meals) 60 tablet 1     clopidogrel (PLAVIX) 75 MG tablet Take 75 mg by mouth daily       furosemide (LASIX) 40 MG tablet Take 40 mg by mouth daily       gabapentin (NEURONTIN) 300 MG capsule Take 300 mg by mouth 3 times daily        glipiZIDE (GLUCOTROL) 5 MG tablet Take 5 mg by mouth 2 times daily (before meals)       isosorbide mononitrate (IMDUR) 60 MG 24 hr tablet Take 120mg (2 tablets) in the morning. Take 60mg (1 tablet) in the evening. 270 tablet 3     losartan (COZAAR) 25 MG tablet Take 1 tablet (25 mg) by mouth daily 30 tablet 3     nitroglycerin (NITROSTAT) 0.4 MG sublingual tablet Place 0.4 mg under the tongue every 5 minutes as needed for chest  "pain For chest pain place 1 tablet under the tongue every 5 minutes for 3 doses. If symptoms persist 5 minutes after 1st dose call 911.       pioglitazone (ACTOS) 30 MG tablet Take 30 mg by mouth daily       potassium aminobenzoate 500 MG CAPS capsule        sotalol HCl, AF, 160 MG TABS Take 160 mg by mouth 2 times daily (Patient taking differently: Take 80 mg by mouth 2 times daily ) 180 tablet 3     warfarin (COUMADIN) 2.5 MG tablet Take 2 tablets (5 mg) by mouth daily Take 5 mg by mouth on Mondays and 2.5 mg 6x/week or as directed based on INR 60 tablet 0     zolpidem (AMBIEN) 5 MG tablet Take 1 tablet (5 mg) by mouth nightly as needed for sleep 15 tablet 0   ALLERGIES   No Known Allergies      ROS:   Negative unless stated in the HPI     EXAM:  Ht 1.626 m (5' 4\")   Wt 76.3 kg (168 lb 3.2 oz)   BMI 28.87 kg/m      General: No acute distress   HEENT: NC/AT   CV: RRR, +S1/S2, No murmurs, rubs, gallops. JVP is not elevated.    Pulm: Unlabored breathing, CTAB   GI: s/nt/nd   Ext: No edema   Neuro: No focal defects   Psych: Normal Affect    Labs:  LIPID RESULTS:  No results found for: CHOL, HDL, LDL, TRIG, CHOLHDLRATIO, NHDL    LIVER ENZYME RESULTS:  Lab Results   Component Value Date    AST 19 11/06/2018    ALT 19 11/06/2018       CBC RESULTS:  Lab Results   Component Value Date    WBC 6.5 08/19/2019    RBC 5.22 08/19/2019    HGB 12.4 (L) 08/19/2019    HCT 42.8 08/19/2019    MCV 82 08/19/2019    MCH 23.8 (L) 08/19/2019    MCHC 29.0 (L) 08/19/2019    RDW 17.5 (H) 08/19/2019     08/19/2019       BMP RESULTS:  Lab Results   Component Value Date     08/19/2019    POTASSIUM 4.4 08/19/2019    CHLORIDE 103 08/19/2019    CO2 27 08/19/2019    ANIONGAP 5 08/19/2019     (H) 08/19/2019    BUN 24 08/19/2019    CR 1.14 08/19/2019    GFRESTIMATED 62 08/19/2019    GFRESTBLACK 72 08/19/2019    KEIRA 8.3 (L) 08/19/2019        A1C RESULTS:  No results found for: A1C    INR RESULTS:  Lab Results   Component Value " Date    INR 2.58 (H) 03/28/2019    INR 1.97 (H) 03/12/2019       Procedures:    Reason For Study: Chest pain    Interpretation Summary  Qualitative ejection fraction is 45-50% (mildly reduced).The left ventricle is normal size.Abnormal septal motion secondary to pacemaker activity.There is mild  global hypokinesis of the left ventricle.  The right ventricular systolic function is reduced.  The aortic valve is tricuspid without significant stenosis or insufficiency.  There is mild mitral regurgitation.No mitral valve stenosis.  Right ventricular systolic pressure estimate is 30-35 mmHg.  Inferior vena cava size normal and collapsibility > 50% normal indicating normal right atrial pressure (3 mm Hg).  There is no pericardial effusion.  No prior ECHO for comparison  Assessment and Plan:   Mr. Cooper is a 74-year-old gentleman with a past medical history significant for HFrEF (EF 40-45%) 2/2 ICM, CAD s/p CABG (LIMA-LAD, occluded SVG-RCA, SVG-LCx) and Complex PCI, s/p CRT-D.     Patient continues to have significant dyspnea on exertion (NYHA Class II/III). The etiology could be related to coronary artery disease or heart failure with mid-range ejection fraction. On examination, his right sided filling pressures appear to be normal. Therefore, at this time, we will try to maximize his anti-anginal therapy. We will increase his Imdur to 120mg BID. He will continue Coreg 12.5 mg BID and Lasix 40mg daily.     #Atrial Fibrillation   -Patient is currently on Sotalol 80mg BID and Coreg 12.5mg BID. At this time, we will continue both of these medications.   -Continue Warfarin.     #History of Ventricular Arrhythmias   -Continue Sotalol at this time     #Coronary Artery Disease  -Continue dual therapy with Plavix and Warfarin.  -Continue Atorvastatin 80 mg.  -Continue BB as stated above.  -Antianginals as stated above.     #DM2  -Patient is currently on pioglitazone for his diabetes. Given his history of CHF and incidence of  volume retention, this could possibly be switched to a different medication without a similar side effect profile. Will discuss this with patient on RTC.     RTC in 3 months to determine if increase in Imdur helped with symptoms of dyspnea on exertion. Will obtain TTE and repeat labs prior to visit.     Tasia Barreto   Cardiology Fellow   Pager: [phone]      I have personally seen and examined the patient, and then discussed with Dr. Barreto, and agree with the findings and plan in this note. I have reviewed today's vital signs, medications, labs, and imaging.     Sincerely,    Claudy Loco MD   Center for Pulmonary Hypertension  Section of Advanced Heart Failure   Cardiovascular Division  [facility]   [phone]          CC  Patient Care Team:  Jarod De La Rosa as PCP - General (Family Practice)  Claudy Loco MD as MD (Cardiology)  Calli Aguayo, SHANELL as Nurse Coordinator (Cardiology)  SELF, REFERRED

## 2019-08-19 NOTE — PROGRESS NOTES
Heart Failure Cardiology Consultation      HPI:   Mr. Cooper is a 74-year-old gentleman with a past medical history significant for HFrEF (EF 40-45%) 2/2 ICM, CAD s/p CABG (LIMA-LAD, occluded SVG-RCA, SVG-LCx), s/p CRT-D.     In 3/2019, he underwent a high-risk PCI by Dr. Field.  He had a successful orbital atherectomy of the left main, proximal and the mid left circumflex.  He had 2 drug-eluting stents placed in the left main and left circumflex, respectively.      After the procedure, patient's chest pain has resolved; however, he continues to have exertional shortness of breath. He denies orthopnea/PND or weight gain. He denies ICD shocks. Patient reports the last time he felt normal was around 1 year ago.     CURRENT MEDICATIONS:  Current Outpatient Medications   Medication Sig Dispense Refill     atorvastatin (LIPITOR) 80 MG tablet Take 80 mg by mouth daily       carvedilol (COREG) 12.5 MG tablet Take 1 tablet (12.5 mg) by mouth 2 times daily (with meals) 60 tablet 1     clopidogrel (PLAVIX) 75 MG tablet Take 75 mg by mouth daily       furosemide (LASIX) 40 MG tablet Take 40 mg by mouth daily       gabapentin (NEURONTIN) 300 MG capsule Take 300 mg by mouth 3 times daily        glipiZIDE (GLUCOTROL) 5 MG tablet Take 5 mg by mouth 2 times daily (before meals)       isosorbide mononitrate (IMDUR) 60 MG 24 hr tablet Take 120mg (2 tablets) in the morning. Take 60mg (1 tablet) in the evening. 270 tablet 3     losartan (COZAAR) 25 MG tablet Take 1 tablet (25 mg) by mouth daily 30 tablet 3     nitroglycerin (NITROSTAT) 0.4 MG sublingual tablet Place 0.4 mg under the tongue every 5 minutes as needed for chest pain For chest pain place 1 tablet under the tongue every 5 minutes for 3 doses. If symptoms persist 5 minutes after 1st dose call 911.       pioglitazone (ACTOS) 30 MG tablet Take 30 mg by mouth daily       potassium aminobenzoate 500 MG CAPS capsule        sotalol HCl, AF, 160 MG TABS Take 160 mg  "by mouth 2 times daily (Patient taking differently: Take 80 mg by mouth 2 times daily ) 180 tablet 3     warfarin (COUMADIN) 2.5 MG tablet Take 2 tablets (5 mg) by mouth daily Take 5 mg by mouth on Mondays and 2.5 mg 6x/week or as directed based on INR 60 tablet 0     zolpidem (AMBIEN) 5 MG tablet Take 1 tablet (5 mg) by mouth nightly as needed for sleep 15 tablet 0   ALLERGIES   No Known Allergies      ROS:   Negative unless stated in the HPI     EXAM:  Ht 1.626 m (5' 4\")   Wt 76.3 kg (168 lb 3.2 oz)   BMI 28.87 kg/m     General: No acute distress   HEENT: NC/AT   CV: RRR, +S1/S2, No murmurs, rubs, gallops. JVP is not elevated.    Pulm: Unlabored breathing, CTAB   GI: s/nt/nd   Ext: No edema   Neuro: No focal defects   Psych: Normal Affect    Labs:  LIPID RESULTS:  No results found for: CHOL, HDL, LDL, TRIG, CHOLHDLRATIO, NHDL    LIVER ENZYME RESULTS:  Lab Results   Component Value Date    AST 19 11/06/2018    ALT 19 11/06/2018       CBC RESULTS:  Lab Results   Component Value Date    WBC 6.5 08/19/2019    RBC 5.22 08/19/2019    HGB 12.4 (L) 08/19/2019    HCT 42.8 08/19/2019    MCV 82 08/19/2019    MCH 23.8 (L) 08/19/2019    MCHC 29.0 (L) 08/19/2019    RDW 17.5 (H) 08/19/2019     08/19/2019       BMP RESULTS:  Lab Results   Component Value Date     08/19/2019    POTASSIUM 4.4 08/19/2019    CHLORIDE 103 08/19/2019    CO2 27 08/19/2019    ANIONGAP 5 08/19/2019     (H) 08/19/2019    BUN 24 08/19/2019    CR 1.14 08/19/2019    GFRESTIMATED 62 08/19/2019    GFRESTBLACK 72 08/19/2019    KEIRA 8.3 (L) 08/19/2019        A1C RESULTS:  No results found for: A1C    INR RESULTS:  Lab Results   Component Value Date    INR 2.58 (H) 03/28/2019    INR 1.97 (H) 03/12/2019       Procedures:    Reason For Study: Chest pain    Interpretation Summary  Qualitative ejection fraction is 45-50% (mildly reduced).The left ventricle is normal size.Abnormal septal motion secondary to pacemaker activity.There is mild  global " hypokinesis of the left ventricle.  The right ventricular systolic function is reduced.  The aortic valve is tricuspid without significant stenosis or insufficiency.  There is mild mitral regurgitation.No mitral valve stenosis.  Right ventricular systolic pressure estimate is 30-35 mmHg.  Inferior vena cava size normal and collapsibility > 50% normal indicating normal right atrial pressure (3 mm Hg).  There is no pericardial effusion.  No prior ECHO for comparison  Assessment and Plan:   Mr. Cooper is a 74-year-old gentleman with a past medical history significant for HFrEF (EF 40-45%) 2/2 ICM, CAD s/p CABG (LIMA-LAD, occluded SVG-RCA, SVG-LCx) and Complex PCI, s/p CRT-D.     Patient continues to have significant dyspnea on exertion (NYHA Class II/III). The etiology could be related to coronary artery disease or heart failure with mid-range ejection fraction. On examination, his right sided filling pressures appear to be normal. Therefore, at this time, we will try to maximize his anti-anginal therapy. We will increase his Imdur to 120mg BID. He will continue Coreg 12.5 mg BID and Lasix 40mg daily.     #Atrial Fibrillation   -Patient is currently on Sotalol 80mg BID and Coreg 12.5mg BID. At this time, we will continue both of these medications.   -Continue Warfarin.     #History of Ventricular Arrhythmias   -Continue Sotalol at this time     #Coronary Artery Disease  -Continue dual therapy with Plavix and Warfarin.  -Continue Atorvastatin 80 mg.  -Continue BB as stated above.  -Antianginals as stated above.     #DM2  -Patient is currently on pioglitazone for his diabetes. Given his history of CHF and incidence of volume retention, this could possibly be switched to a different medication without a similar side effect profile. Will discuss this with patient on RTC.     RTC in 3 months to determine if increase in Imdur helped with symptoms of dyspnea on exertion. Will obtain TTE and repeat labs prior to visit.      Tasia Barreto   Cardiology Fellow   Pager: [phone]      I have personally seen and examined the patient, and then discussed with Dr. Barreto, and agree with the findings and plan in this note. I have reviewed today's vital signs, medications, labs, and imaging.     Sincerely,    Claudy Loco MD   Center for Pulmonary Hypertension  Section of Advanced Heart Failure   Cardiovascular Division  [facility]   [phone]          CC  Patient Care Team:  Jarod De La Rosa as PCP - General (Family Practice)  Claudy Loco MD as MD (Cardiology)  Calli Aguayo, SHANELL as Nurse Coordinator (Cardiology)  SELF, REFERRED

## 2019-08-19 NOTE — PATIENT INSTRUCTIONS
It was a pleasure to see you in clinic today.  Please do not hesitate to call with any questions or concerns.  You are scheduled for a remote transmission on 11/20/19.  We look forward to seeing you in clinic at your next device check in 6 months.    Jasmyne Kasper RN, BSN  Electrophysiology Nurse Clinician  [facility]    During Business Hours Please Call:  [phone]  After Hours Please Call:  [phone] - select option #4 and ask for job code [id number]

## 2019-08-20 LAB
MDC_IDC_LEAD_IMPLANT_DT: NORMAL
MDC_IDC_LEAD_LOCATION: NORMAL
MDC_IDC_LEAD_LOCATION_DETAIL_1: NORMAL
MDC_IDC_LEAD_MFG: NORMAL
MDC_IDC_LEAD_MODEL: NORMAL
MDC_IDC_LEAD_POLARITY_TYPE: NORMAL
MDC_IDC_LEAD_SERIAL: NORMAL
MDC_IDC_MSMT_BATTERY_REMAINING_LONGEVITY: 56 MO
MDC_IDC_MSMT_CAP_CHARGE_DTM: NORMAL
MDC_IDC_MSMT_CAP_CHARGE_TIME: 8.88
MDC_IDC_MSMT_CAP_CHARGE_TYPE: NORMAL
MDC_IDC_MSMT_CAP_CHARGE_TYPE: NORMAL
MDC_IDC_MSMT_LEADCHNL_LV_IMPEDANCE_VALUE: 937.5 OHM
MDC_IDC_MSMT_LEADCHNL_LV_PACING_THRESHOLD_AMPLITUDE: 1.25 V
MDC_IDC_MSMT_LEADCHNL_LV_PACING_THRESHOLD_PULSEWIDTH: 0.5 MS
MDC_IDC_MSMT_LEADCHNL_RA_IMPEDANCE_VALUE: 425 OHM
MDC_IDC_MSMT_LEADCHNL_RA_PACING_THRESHOLD_AMPLITUDE: 0.5 V
MDC_IDC_MSMT_LEADCHNL_RA_PACING_THRESHOLD_PULSEWIDTH: 0.5 MS
MDC_IDC_MSMT_LEADCHNL_RA_SENSING_INTR_AMPL: 5 MV
MDC_IDC_MSMT_LEADCHNL_RV_IMPEDANCE_VALUE: 437.5 OHM
MDC_IDC_MSMT_LEADCHNL_RV_PACING_THRESHOLD_AMPLITUDE: 0.5 V
MDC_IDC_MSMT_LEADCHNL_RV_PACING_THRESHOLD_PULSEWIDTH: 0.5 MS
MDC_IDC_PG_IMPLANT_DTM: NORMAL
MDC_IDC_PG_MFG: NORMAL
MDC_IDC_PG_MODEL: NORMAL
MDC_IDC_PG_SERIAL: NORMAL
MDC_IDC_PG_TYPE: NORMAL
MDC_IDC_SESS_CLINIC_NAME: NORMAL
MDC_IDC_SESS_DTM: NORMAL
MDC_IDC_SESS_TYPE: NORMAL
MDC_IDC_SET_BRADY_AT_MODE_SWITCH_MODE: NORMAL
MDC_IDC_SET_BRADY_AT_MODE_SWITCH_RATE: 180 {BEATS}/MIN
MDC_IDC_SET_BRADY_HYSTRATE: NORMAL
MDC_IDC_SET_BRADY_LOWRATE: 60 {BEATS}/MIN
MDC_IDC_SET_BRADY_MAX_SENSOR_RATE: 110 {BEATS}/MIN
MDC_IDC_SET_BRADY_MAX_TRACKING_RATE: 110 {BEATS}/MIN
MDC_IDC_SET_BRADY_MODE: NORMAL
MDC_IDC_SET_BRADY_NIGHT_RATE: NORMAL
MDC_IDC_SET_BRADY_PAV_DELAY_LOW: 180 MS
MDC_IDC_SET_BRADY_SAV_DELAY_LOW: 160 MS
MDC_IDC_SET_CRT_LVRV_DELAY: 20 MS
MDC_IDC_SET_CRT_PACED_CHAMBERS: NORMAL
MDC_IDC_SET_LEADCHNL_LV_PACING_AMPLITUDE: 2 V
MDC_IDC_SET_LEADCHNL_LV_PACING_ANODE_ELECTRODE_1: NORMAL
MDC_IDC_SET_LEADCHNL_LV_PACING_ANODE_LOCATION_1: NORMAL
MDC_IDC_SET_LEADCHNL_LV_PACING_CAPTURE_MODE: NORMAL
MDC_IDC_SET_LEADCHNL_LV_PACING_CATHODE_ELECTRODE_1: NORMAL
MDC_IDC_SET_LEADCHNL_LV_PACING_CATHODE_LOCATION_1: NORMAL
MDC_IDC_SET_LEADCHNL_LV_PACING_POLARITY: NORMAL
MDC_IDC_SET_LEADCHNL_LV_PACING_PULSEWIDTH: 0.5 MS
MDC_IDC_SET_LEADCHNL_RA_PACING_AMPLITUDE: 1.5 V
MDC_IDC_SET_LEADCHNL_RA_PACING_ANODE_ELECTRODE_1: NORMAL
MDC_IDC_SET_LEADCHNL_RA_PACING_ANODE_LOCATION_1: NORMAL
MDC_IDC_SET_LEADCHNL_RA_PACING_CAPTURE_MODE: NORMAL
MDC_IDC_SET_LEADCHNL_RA_PACING_CATHODE_ELECTRODE_1: NORMAL
MDC_IDC_SET_LEADCHNL_RA_PACING_CATHODE_LOCATION_1: NORMAL
MDC_IDC_SET_LEADCHNL_RA_PACING_POLARITY: NORMAL
MDC_IDC_SET_LEADCHNL_RA_PACING_PULSEWIDTH: 0.5 MS
MDC_IDC_SET_LEADCHNL_RA_SENSING_ADAPTATION_MODE: NORMAL
MDC_IDC_SET_LEADCHNL_RA_SENSING_ANODE_ELECTRODE_1: NORMAL
MDC_IDC_SET_LEADCHNL_RA_SENSING_ANODE_LOCATION_1: NORMAL
MDC_IDC_SET_LEADCHNL_RA_SENSING_CATHODE_ELECTRODE_1: NORMAL
MDC_IDC_SET_LEADCHNL_RA_SENSING_CATHODE_LOCATION_1: NORMAL
MDC_IDC_SET_LEADCHNL_RA_SENSING_POLARITY: NORMAL
MDC_IDC_SET_LEADCHNL_RA_SENSING_SENSITIVITY: 0.3 MV
MDC_IDC_SET_LEADCHNL_RV_PACING_AMPLITUDE: 2 V
MDC_IDC_SET_LEADCHNL_RV_PACING_ANODE_ELECTRODE_1: NORMAL
MDC_IDC_SET_LEADCHNL_RV_PACING_ANODE_LOCATION_1: NORMAL
MDC_IDC_SET_LEADCHNL_RV_PACING_CAPTURE_MODE: NORMAL
MDC_IDC_SET_LEADCHNL_RV_PACING_CATHODE_ELECTRODE_1: NORMAL
MDC_IDC_SET_LEADCHNL_RV_PACING_CATHODE_LOCATION_1: NORMAL
MDC_IDC_SET_LEADCHNL_RV_PACING_POLARITY: NORMAL
MDC_IDC_SET_LEADCHNL_RV_PACING_PULSEWIDTH: 0.5 MS
MDC_IDC_SET_LEADCHNL_RV_SENSING_ANODE_ELECTRODE_1: NORMAL
MDC_IDC_SET_LEADCHNL_RV_SENSING_ANODE_LOCATION_1: NORMAL
MDC_IDC_SET_LEADCHNL_RV_SENSING_CATHODE_ELECTRODE_1: NORMAL
MDC_IDC_SET_LEADCHNL_RV_SENSING_CATHODE_LOCATION_1: NORMAL
MDC_IDC_SET_LEADCHNL_RV_SENSING_POLARITY: NORMAL
MDC_IDC_SET_LEADCHNL_RV_SENSING_SENSITIVITY: 0.5 MV
MDC_IDC_SET_ZONE_DETECTION_INTERVAL: 320 MS
MDC_IDC_SET_ZONE_DETECTION_INTERVAL: 360 MS
MDC_IDC_SET_ZONE_DETECTION_INTERVAL: 460 MS
MDC_IDC_SET_ZONE_TYPE: NORMAL
MDC_IDC_SET_ZONE_VENDOR_TYPE: NORMAL
MDC_IDC_STAT_AT_DTM_END: NORMAL
MDC_IDC_STAT_AT_DTM_START: NORMAL
MDC_IDC_STAT_AT_MODE_SW_COUNT: 0
MDC_IDC_STAT_BRADY_DTM_END: NORMAL
MDC_IDC_STAT_BRADY_DTM_START: NORMAL
MDC_IDC_STAT_BRADY_RA_PERCENT_PACED: 5.8 %
MDC_IDC_STAT_BRADY_RV_PERCENT_PACED: 99.89 %
MDC_IDC_STAT_EPISODE_RECENT_COUNT: 0
MDC_IDC_STAT_EPISODE_RECENT_COUNT: 0
MDC_IDC_STAT_EPISODE_RECENT_COUNT_DTM_END: NORMAL
MDC_IDC_STAT_EPISODE_RECENT_COUNT_DTM_END: NORMAL
MDC_IDC_STAT_EPISODE_RECENT_COUNT_DTM_START: NORMAL
MDC_IDC_STAT_EPISODE_RECENT_COUNT_DTM_START: NORMAL
MDC_IDC_STAT_EPISODE_TYPE: NORMAL
MDC_IDC_STAT_EPISODE_TYPE: NORMAL
MDC_IDC_STAT_EPISODE_VENDOR_TYPE: NORMAL
MDC_IDC_STAT_HEART_RATE_DTM_END: NORMAL
MDC_IDC_STAT_HEART_RATE_DTM_START: NORMAL
MDC_IDC_STAT_TACHYTHERAPY_ATP_DELIVERED_RECENT: 0
MDC_IDC_STAT_TACHYTHERAPY_RECENT_DTM_END: NORMAL
MDC_IDC_STAT_TACHYTHERAPY_RECENT_DTM_START: NORMAL
MDC_IDC_STAT_TACHYTHERAPY_SHOCKS_ABORTED_RECENT: 0
MDC_IDC_STAT_TACHYTHERAPY_SHOCKS_DELIVERED_RECENT: 0

## 2019-11-21 DIAGNOSIS — I50.22 CHRONIC SYSTOLIC HEART FAILURE (H): Primary | Chronic | ICD-10-CM

## 2019-11-25 ENCOUNTER — ANCILLARY PROCEDURE (OUTPATIENT)
Dept: CARDIOLOGY | Facility: CLINIC | Age: 74
End: 2019-11-25
Attending: INTERNAL MEDICINE
Payer: COMMERCIAL

## 2019-11-25 VITALS
WEIGHT: 157 LBS | HEIGHT: 67 IN | SYSTOLIC BLOOD PRESSURE: 97 MMHG | BODY MASS INDEX: 24.64 KG/M2 | DIASTOLIC BLOOD PRESSURE: 60 MMHG | HEART RATE: 60 BPM | OXYGEN SATURATION: 100 %

## 2019-11-25 DIAGNOSIS — I25.5 ISCHEMIC CARDIOMYOPATHY: ICD-10-CM

## 2019-11-25 DIAGNOSIS — I50.22 CHRONIC SYSTOLIC HEART FAILURE (H): Chronic | ICD-10-CM

## 2019-11-25 DIAGNOSIS — I42.9 CARDIOMYOPATHY (H): ICD-10-CM

## 2019-11-25 DIAGNOSIS — Z95.810 AUTOMATIC IMPLANTABLE CARDIOVERTER-DEFIBRILLATOR IN SITU: Primary | ICD-10-CM

## 2019-11-25 LAB
ANION GAP SERPL CALCULATED.3IONS-SCNC: 6 MMOL/L (ref 3–14)
BUN SERPL-MCNC: 27 MG/DL (ref 7–30)
CALCIUM SERPL-MCNC: 8.8 MG/DL (ref 8.5–10.1)
CHLORIDE SERPL-SCNC: 104 MMOL/L (ref 94–109)
CO2 SERPL-SCNC: 23 MMOL/L (ref 20–32)
CREAT SERPL-MCNC: 1.45 MG/DL (ref 0.66–1.25)
ERYTHROCYTE [DISTWIDTH] IN BLOOD BY AUTOMATED COUNT: 15.6 % (ref 10–15)
GFR SERPL CREATININE-BSD FRML MDRD: 46 ML/MIN/{1.73_M2}
GLUCOSE SERPL-MCNC: 302 MG/DL (ref 70–99)
HCT VFR BLD AUTO: 39.5 % (ref 40–53)
HGB BLD-MCNC: 12.3 G/DL (ref 13.3–17.7)
MCH RBC QN AUTO: 25.5 PG (ref 26.5–33)
MCHC RBC AUTO-ENTMCNC: 31.1 G/DL (ref 31.5–36.5)
MCV RBC AUTO: 82 FL (ref 78–100)
NT-PROBNP SERPL-MCNC: 473 PG/ML (ref 0–450)
PLATELET # BLD AUTO: 213 10E9/L (ref 150–450)
POTASSIUM SERPL-SCNC: 4.5 MMOL/L (ref 3.4–5.3)
RBC # BLD AUTO: 4.82 10E12/L (ref 4.4–5.9)
SODIUM SERPL-SCNC: 133 MMOL/L (ref 133–144)
WBC # BLD AUTO: 7.3 10E9/L (ref 4–11)

## 2019-11-25 PROCEDURE — 85027 COMPLETE CBC AUTOMATED: CPT | Performed by: INTERNAL MEDICINE

## 2019-11-25 PROCEDURE — G0463 HOSPITAL OUTPT CLINIC VISIT: HCPCS | Mod: 25,ZF

## 2019-11-25 PROCEDURE — 83880 ASSAY OF NATRIURETIC PEPTIDE: CPT | Performed by: INTERNAL MEDICINE

## 2019-11-25 PROCEDURE — 99214 OFFICE O/P EST MOD 30 MIN: CPT | Mod: ZP | Performed by: INTERNAL MEDICINE

## 2019-11-25 PROCEDURE — 36415 COLL VENOUS BLD VENIPUNCTURE: CPT | Performed by: INTERNAL MEDICINE

## 2019-11-25 PROCEDURE — 80048 BASIC METABOLIC PNL TOTAL CA: CPT | Performed by: INTERNAL MEDICINE

## 2019-11-25 RX ADMIN — Medication 5 ML: at 14:30

## 2019-11-25 ASSESSMENT — PAIN SCALES - GENERAL: PAINLEVEL: NO PAIN (0)

## 2019-11-25 ASSESSMENT — PATIENT HEALTH QUESTIONNAIRE - PHQ9: SUM OF ALL RESPONSES TO PHQ QUESTIONS 1-9: 6

## 2019-11-25 ASSESSMENT — MIFFLIN-ST. JEOR: SCORE: 1400.78

## 2019-11-25 NOTE — NURSING NOTE
Chief Complaint   Patient presents with     Follow Up     74 year old male presents with chronic systolic heart failure, refractory angina, ICM for follow up with labs, echo and device prior     Vitals were taken and medications were reconciled.     Charlene Olson RMA  2:40 PM

## 2019-11-25 NOTE — LETTER
11/25/2019      RE: Wesley Cooper  [address]  [facility] [address]                Heart Failure Cardiology Consultation      HPI:   Mr. Cooper is a 74-year-old gentleman with a past medical history significant for HFrEF (EF 40-45%) 2/2 ICM, CAD s/p CABG (LIMA-LAD, occluded SVG-RCA, SVG-LCx), s/p CRT-D. In 3/2019, he underwent a high-risk PCI by Dr. Field.  He had a successful orbital atherectomy of the left main, proximal and the mid left circumflex.  He had 2 drug-eluting stents placed in the left main and left circumflex, respectively.  After the procedure, patient's chest pain has resolved; however, he continued to have exertional shortness of breath. His EF dropped to 35-40%. He has been medically managed for his heart failure with neurohormonal therapy as well as diuretics.     We saw him in clinic in August.  At that time he was complaining of exertional shortness of breath.  He did not have any other signs of heart failure.  His shortness of breath sounded more like angina equivalent as it was relieved with sublingual nitroglycerin.  We increase his Imdur to 120 mg twice a day.  He returns today for follow-up.    Overall, he is feeling better.  He has not used his sublingual nitroglycerin in the last 3 months.  He continues to have some shortness of breath on overexertion.  He has no shortness of breath with activities of daily living.  I would characterize him as functional class II.  He still works in his construction business 5 days a week.  He has no exertional chest pain or pain or pressure.  He has not had any worsening lower extremity swelling, abdominal distention or weight gain.  He denies having any PND or orthopnea.  He has not had any ICD shocks.  He has not had any exertional presyncope or syncope.  No recent hospitalization or ER visit.    ROS:   Negative unless stated in the HPI     EXAM:  BP 97/60 (BP Location: Right arm, Patient Position: Chair, Cuff Size: Adult Regular)   Pulse 60   " Ht 1.702 m (5' 7\")   Wt 71.2 kg (157 lb)   SpO2 100%   BMI 24.59 kg/m      General: No acute distress   HEENT: NC/AT   CV: RRR, +S1/S2, No murmurs, rubs, gallops. JVP is not elevated.    Pulm: Unlabored breathing, CTAB   GI: s/nt/nd   Ext: No edema   Neuro: No focal defects   Psych: Normal Affect    Labs:    CBC RESULTS:  Lab Results   Component Value Date    WBC 7.3 11/25/2019    RBC 4.82 11/25/2019    HGB 12.3 (L) 11/25/2019    HCT 39.5 (L) 11/25/2019    MCV 82 11/25/2019    MCH 25.5 (L) 11/25/2019    MCHC 31.1 (L) 11/25/2019    RDW 15.6 (H) 11/25/2019     11/25/2019       BMP RESULTS:  Lab Results   Component Value Date     11/25/2019    POTASSIUM 4.5 11/25/2019    CHLORIDE 104 11/25/2019    CO2 23 11/25/2019    ANIONGAP 6 11/25/2019     (H) 11/25/2019    BUN 27 11/25/2019    CR 1.45 (H) 11/25/2019    GFRESTIMATED 46 (L) 11/25/2019    GFRESTBLACK 54 (L) 11/25/2019    KEIRA 8.8 11/25/2019        Echo (11/2019)    Moderately (EF 35-40%, traced 36%) reduced left ventricular function is  present. There is mild diffuse hypokinesis with superimposed severe  hypokinesis involving the rcjfj-ja-bms inferior, ytpws-td-gpi inferoseptal,  and vwsfy-vv-piy inferolateral myocardial segments.  Global right ventricular function is mildly reduced.  Rheumatic mitral valve without significant mitral stenosis and mild mitral  regurgitation.  This study was compared with the study from 11/15/18: Comparison is limited  due to lack of contrast on the prior study, however LVEF and regional wall  motion abnormalities are probably unchanged.    Coronary Angiogram (03/2019):    Three vessel disease with patent RODRIGUEZ to LAD  Occluded SVG to PDA and OM1  Atherectomy of the left main and proximal and mid LCX and PCI of the LM and proximal LCX.     Device Check (11/2019):    Pt seen in the [facility] for evaluation and iterative programming of an Abbott (SJM) Bi-Ventricular ICD, per MD orders. He also has an appointment with Dr." Claudy. Today his intrinsic rhythm is Sinus 64 with CHB-  @ 30 bpm. Normal ICD function. No arrhythmias recorded. CorVue thoracic impedance is near his baseline. AP= 12% and BVP >99%. Lead trends appear stable. Pt reports that he is feeling well. Battery estimates 4.5 years to TAYLA.  Plan for patient to send a remote transmission in 3 months and return to clinic in 6 months.     Assessment and Plan:   Mr. Cooper is a 74-year-old gentleman with a past medical history significant for HFrEF (EF 35-40%) 2/2 ICM, CAD s/p CABG (LIMA-LAD, occluded SVG-RCA, SVG-LCx) and Complex PCI, s/p CRT-D.     Overall, he is stable and doing reasonably okay.  He is functional class II.  He does not have angina now on higher dose of Imdur.  He has no evidence of fluid overload or endorgan dysfunction.  He does have some exertional shortness of breath on extreme exertion but this is not affecting his quality of life.     #HFrEF  Continue Coreg 12.5 mg BID, Cozaar 25 mg daily, and Lasix 40mg daily. No further room in his blood pressure for up titration of Cozaar or starting spironolactone.    He is currently on CRT-D therapy.  He is being biventricular lead paced greater than 99 percentage of the time.  No indication for destination LVAD therapy at this time in point.    #Atrial Fibrillation   -Patient is currently on Sotalol 80mg BID and Coreg 12.5mg BID. At this time, we will continue both of these medications.   -Continue Warfarin.     #History of Ventricular Arrhythmias   -Continue Sotalol at this time.  No further VT.    #Coronary Artery Disease  -Continue dual therapy with Plavix and Warfarin.  -Continue Atorvastatin 80 mg.  -Continue BB as stated above.  -Antianginals as stated above.     #DM2  -Patient is currently on pioglitazone for his diabetes. Given his history of CHF and incidence of volume retention, this could possibly be switched to a different medication without a similar side effect profile. Will discuss this with  patient on RTC.     RTC in 6 months sooner if he has any worsening symptoms.    It was a pleasure meeting Mr. Wesley Cooper in our heart failure clinic and he is to [facility].  We thank you for involving us in his care.    Sincerely,    Claudy Loco MD   [facility]  Section of Advanced Heart Failure   Cardiovascular Division  [facility]   [phone]        Service Date: 2019      [place] [address]      RE: Wesley Cooper    MRN: [id number]   : 1945      Dear Dr. De La Rosa:       We had the pleasure of seeing Mr. Wesley Cooper for followup in our Heart Failure Clinic at the [facility].  As you know, he is a 74-year-old gentleman with a past medical history significant for:     1.  Severe 3 vessel epicardial coronary artery disease, status post LIMA to LAD, occluded SVG to RCA and occluded SVG to left circumflex.  Status post complex PCI of the left circumflex in 2019.   2.  Ischemic cardiomyopathy with an estimated ejection fraction of 40%-45%.   3.  Status post ileostomy.      Mr. Cooper returns for followup.  We saw him in August.  He was complaining of exertional shortness of breath.  This was thought to be more due to his angina than heart failure.  In light of this, we increased his Imdur to 120 mg twice a day.  He states that he has not used any nitroglycerin.  He has not had any chest pain or chest pressure.  However, he still continues to have shortness of breath when he overexerts.  He has no symptoms at rest or with his usual activities.  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  I would currently characterize him as functional class II.  He has no lower extremity swelling or abdominal distention.  No exertional chest pain or chest pressure.  No exertional presyncope or syncope.  No PND or orthopnea.  He has not had any  hospitalizations or ER visits.      REVIEW OF SYSTEMS:  A detailed 10 point review of systems was obtained as described in the History of Present Illness.  All other systems were reviewed and are negative.       MEDICATIONS:    Current Outpatient Medications   Medication Sig     atorvastatin (LIPITOR) 80 MG tablet Take 80 mg by mouth daily     carvedilol (COREG) 12.5 MG tablet Take 1 tablet (12.5 mg) by mouth 2 times daily (with meals)     clopidogrel (PLAVIX) 75 MG tablet Take 75 mg by mouth daily     furosemide (LASIX) 40 MG tablet Take 40 mg by mouth daily     gabapentin (NEURONTIN) 300 MG capsule Take 300 mg by mouth 3 times daily      glipiZIDE (GLUCOTROL) 5 MG tablet Take 5 mg by mouth 2 times daily (before meals)     isosorbide mononitrate (IMDUR) 60 MG 24 hr tablet Take 2 tablets (120 mg) by mouth 2 times daily     losartan (COZAAR) 25 MG tablet Take 1 tablet (25 mg) by mouth daily     nitroglycerin (NITROSTAT) 0.4 MG sublingual tablet Place 0.4 mg under the tongue every 5 minutes as needed for chest pain For chest pain place 1 tablet under the tongue every 5 minutes for 3 doses. If symptoms persist 5 minutes after 1st dose call 911.     potassium aminobenzoate 500 MG CAPS capsule      sotalol HCl, AF, 160 MG TABS Take 160 mg by mouth 2 times daily (Patient taking differently: Take 80 mg by mouth 2 times daily )     zolpidem (AMBIEN) 5 MG tablet Take 1 tablet (5 mg) by mouth nightly as needed for sleep     pioglitazone (ACTOS) 30 MG tablet Take 30 mg by mouth daily     warfarin (COUMADIN) 2.5 MG tablet Take 2 tablets (5 mg) by mouth daily Take 5 mg by mouth on Mondays and 2.5 mg 6x/week or as directed based on INR     No current facility-administered medications for this visit.      Facility-Administered Medications Ordered in Other Visits   Medication     sodium chloride (PF) 0.9% PF flush 10 mL       PHYSICAL EXAMINATION:  He was comfortable.  He was in no apparent distress.  His blood pressure was 97/60.   Pulse rate was 60.  Respiratory rate was 18.  He was saturating 100% on room air.  He weighed 157 pounds, which is 9 pounds less when compared to his last clinic visit.  He was 5 feet 7 inches tall.  His BMI was 24.59.  He had no pallor, cyanosis or jaundice.  His neck exam revealed no jugular venous distention.  He had no pedal edema.  Cardiac auscultation revealed normal S1 and S2 with no murmur, rub or gallop.  Auscultation of the lungs revealed equal air entry on both sides with no added sounds.  His abdomen was soft with normal bowel sounds; no tenderness, no rigidity, no guarding.  He had no focal neurological deficit.  His extremities showed no edema.      LABORATORY:  His NT-proBNP was slightly improved at 473.      CBC was unremarkable.      His chemistry showed a mild elevation in creatinine at 1.45.  Otherwise, his electrolytes were unremarkable.      IMAGING:  He had a repeat echocardiogram.  His left ventricular systolic function appeared to be mildly reduced when compared to his previous echocardiogram.  His EF was approximately 35%-40%.  He had inferior and lateral wall motion abnormality.  His anterior wall was viable and rox.  He had no other significant valvular abnormalities.  His right ventricle was normal in size and function.      ASSESSMENT AND PLAN:      Mr. Wesley Cooper is a very pleasant, 74-year-old male with severe 3 vessel coronary artery disease, ischemic cardiomyopathy and moderate LV systolic dysfunction who returns today for followup.     1.  Severe coronary artery disease.  He currently does not have any angina.  He has a patent LIMA to LAD and underwent recent revascularization of his native left circumflex in 03/2019.  He will stay on Plavix and Coumadin.  He is on Lipitor 80 mg daily.  He is on a low dose of beta blocker.     2.  Ischemic cardiomyopathy with moderately reduced RV function.  He is not in overt heart failure.  He is NYHA functional class II.  He is not  volume overloaded.  His renal function is slightly higher than baseline, but not significantly worse.  We will continue him on the current regimen of Cozaar 25 mg daily, Coreg 12.5 mg twice a day and Lasix 40 mg daily.  We will wait for the official report of his echocardiogram.  If his ejection fraction turns out to be less than 35%, we will consider starting him on spironolactone and increasing his Cozaar if there is room on his blood pressure.  He does not have much room for further up titration of these medications.     3.  AFib.  He is currently in sinus rhythm.  He is on sotalol 80 mg twice daily.  He is anticoagulated with Coumadin.       4.  His heart failure is currently not severe enough to be considered for LVAD destination therapy.  He also has a colostomy site.  Currently, his heart failure is not severe enough to be considered for advanced heart failure therapy.  His colostomy may be an issue if he were to need an LVAD in the future.      I have recommended him to return to clinic in 6 months with a nuclear stress test to follow up on his coronary artery disease.  He will call us in the interim if he has any further questions.      It was a pleasure meeting Mr. Flor Stevenson in our Heart Failure Clinic at the [facility].      Sincerely,   Claudy Loco MD   [facility]  Heart Failure, Transplant, and Mechanical Circulatory Support Cardiology   Cardiovascular Division  [facility]   [phone]            D: 2019   T: 2019   MT: geraldo      Name:     FLOR STEVENSON   MRN:      [id number]        Account:      [id number]   :      1945           Service Date: 2019      Document: [id number]        Claudy Loco MD

## 2019-11-25 NOTE — NURSING NOTE
Diet: Patient instructed regarding a heart failure healthy diet, including discussion of reduced fat and 2000 mg daily sodium restriction, daily weights, medication purpose and compliance, fluid restrictions and resources for patient and family to access for assistance with heart failure management.       Labs: Patient was given results of the laboratory testing obtained today and patient was instructed about when to return for the next laboratory testing.     Med Reconcile: Reviewed and verified all current medications with the patient. The updated medication list was printed and given to the patient. NO CHANGES.    Return Appointment: Patient given instructions regarding scheduling next clinic visit. RTC in 6 months with labs and lexiscan prior to appointment with dr villagomez.    Patient stated he understood all health information given and agreed to call with further questions or concerns.     Calli Aguayo RN

## 2019-11-25 NOTE — LETTER
11/25/2019      RE: Wesley Cooper  [address]  [facility] [address]       Dear Colleague,    Thank you for the opportunity to participate in the care of your patient, Wesley Cooper, at the [facility]. Please see a copy of my visit note below.         Heart Failure Cardiology Consultation      HPI:   Mr. Cooper is a 74-year-old gentleman with a past medical history significant for HFrEF (EF 40-45%) 2/2 ICM, CAD s/p CABG (LIMA-LAD, occluded SVG-RCA, SVG-LCx), s/p CRT-D. In 3/2019, he underwent a high-risk PCI by Dr. Field.  He had a successful orbital atherectomy of the left main, proximal and the mid left circumflex.  He had 2 drug-eluting stents placed in the left main and left circumflex, respectively.  After the procedure, patient's chest pain has resolved; however, he continued to have exertional shortness of breath. His EF dropped to 35-40%. He has been medically managed for his heart failure with neurohormonal therapy as well as diuretics.     We saw him in clinic in August.  At that time he was complaining of exertional shortness of breath.  He did not have any other signs of heart failure.  His shortness of breath sounded more like angina equivalent as it was relieved with sublingual nitroglycerin.  We increase his Imdur to 120 mg twice a day.  He returns today for follow-up.    Overall, he is feeling better.  He has not used his sublingual nitroglycerin in the last 3 months.  He continues to have some shortness of breath on overexertion.  He has no shortness of breath with activities of daily living.  I would characterize him as functional class II.  He still works in his construction business 5 days a week.  He has no exertional chest pain or pain or pressure.  He has not had any worsening lower extremity swelling, abdominal distention or weight gain.  He denies having any PND or orthopnea.  He has not had any ICD shocks.  He has not had any  "exertional presyncope or syncope.  No recent hospitalization or ER visit.    ROS:   Negative unless stated in the HPI     EXAM:  BP 97/60 (BP Location: Right arm, Patient Position: Chair, Cuff Size: Adult Regular)   Pulse 60   Ht 1.702 m (5' 7\")   Wt 71.2 kg (157 lb)   SpO2 100%   BMI 24.59 kg/m      General: No acute distress   HEENT: NC/AT   CV: RRR, +S1/S2, No murmurs, rubs, gallops. JVP is not elevated.    Pulm: Unlabored breathing, CTAB   GI: s/nt/nd   Ext: No edema   Neuro: No focal defects   Psych: Normal Affect    Labs:    CBC RESULTS:  Lab Results   Component Value Date    WBC 7.3 11/25/2019    RBC 4.82 11/25/2019    HGB 12.3 (L) 11/25/2019    HCT 39.5 (L) 11/25/2019    MCV 82 11/25/2019    MCH 25.5 (L) 11/25/2019    MCHC 31.1 (L) 11/25/2019    RDW 15.6 (H) 11/25/2019     11/25/2019       BMP RESULTS:  Lab Results   Component Value Date     11/25/2019    POTASSIUM 4.5 11/25/2019    CHLORIDE 104 11/25/2019    CO2 23 11/25/2019    ANIONGAP 6 11/25/2019     (H) 11/25/2019    BUN 27 11/25/2019    CR 1.45 (H) 11/25/2019    GFRESTIMATED 46 (L) 11/25/2019    GFRESTBLACK 54 (L) 11/25/2019    KEIRA 8.8 11/25/2019        Echo (11/2019)    Moderately (EF 35-40%, traced 36%) reduced left ventricular function is  present. There is mild diffuse hypokinesis with superimposed severe  hypokinesis involving the ckxtb-yg-end inferior, uzkyl-np-wqi inferoseptal,  and ilmoc-fb-kvv inferolateral myocardial segments.  Global right ventricular function is mildly reduced.  Rheumatic mitral valve without significant mitral stenosis and mild mitral  regurgitation.  This study was compared with the study from 11/15/18: Comparison is limited  due to lack of contrast on the prior study, however LVEF and regional wall  motion abnormalities are probably unchanged.    Coronary Angiogram (03/2019):    Three vessel disease with patent RODRIGUEZ to LAD  Occluded SVG to PDA and OM1  Atherectomy of the left main and proximal " and mid LCX and PCI of the LM and proximal LCX.     Device Check (11/2019):    [place] seen in [place] [facility] [place] evaluation and iterative programming of an Abbott (SJ) Bi-Ventricular ICD, per MD orders. He also has an appointment with Dr. Loco. Today his intrinsic rhythm is Sinus 64 with CHB-  @ 30 bpm. Normal ICD function. No arrhythmias recorded. CorVue thoracic impedance is near his baseline. AP= 12% and BVP >99%. Lead trends appear stable. Pt reports that he is feeling well. Battery estimates 4.5 years to TAYLA.  Plan for patient to send a remote transmission in 3 months and return to clinic in 6 months.     Assessment and Plan:   Mr. Cooper is a 74-year-old gentleman with a past medical history significant for HFrEF (EF 35-40%) 2/2 ICM, CAD s/p CABG (LIMA-LAD, occluded SVG-RCA, SVG-LCx) and Complex PCI, s/p CRT-D.     Overall, he is stable and doing reasonably okay.  He is functional class II.  He does not have angina now on higher dose of Imdur.  He has no evidence of fluid overload or endorgan dysfunction.  He does have some exertional shortness of breath on extreme exertion but this is not affecting his quality of life.     #HFrEF  Continue Coreg 12.5 mg BID, Cozaar 25 mg daily, and Lasix 40mg daily. No further room in his blood pressure for up titration of Cozaar or starting spironolactone.    He is currently on CRT-D therapy.  He is being biventricular lead paced greater than 99 percentage of the time.  No indication for destination LVAD therapy at this time in point.    #Atrial Fibrillation   -Patient is currently on Sotalol 80mg BID and Coreg 12.5mg BID. At this time, we will continue both of these medications.   -Continue Warfarin.     #History of Ventricular Arrhythmias   -Continue Sotalol at this time.  No further VT.    #Coronary Artery Disease  -Continue dual therapy with Plavix and Warfarin.  -Continue Atorvastatin 80 mg.  -Continue BB as stated above.  -Antianginals as stated above.     #DM2  -Patient  is currently on pioglitazone for his diabetes. Given his history of CHF and incidence of volume retention, this could possibly be switched to a different medication without a similar side effect profile. Will discuss this with patient on RTC.     RTC in 6 months sooner if he has any worsening symptoms.    It was a pleasure meeting Mr. Wesley Cooper in our heart failure clinic and he is to [facility].  We thank you for involving us in his care.    Sincerely,    Claudy Loco MD   Center for Pulmonary Hypertension  Section of Advanced Heart Failure   Cardiovascular Division  [facility]   [phone]        Service Date: 2019      [place] [address]      RE: Wesley Cooper    MRN: [id number]   : 1945      Dear Dr. De La Rosa:       We had the pleasure of seeing Mr. Wesley Cooper for followup in our Heart Failure Clinic at the [facility].  As you know, he is a 74-year-old gentleman with a past medical history significant for:     1.  Severe 3 vessel epicardial coronary artery disease, status post LIMA to LAD, occluded SVG to RCA and occluded SVG to left circumflex.  Status post complex PCI of the left circumflex in 2019.   2.  Ischemic cardiomyopathy with an estimated ejection fraction of 40%-45%.   3.  Status post ileostomy.      Mr. Cooper returns for followup.  We saw him in August.  He was complaining of exertional shortness of breath.  This was thought to be more due to his angina than heart failure.  In light of this, we increased his Imdur to 120 mg twice a day.  He states that he has not used any nitroglycerin.  He has not had any chest pain or chest pressure.  However, he still continues to have shortness of breath when he overexerts.  He has no symptoms at rest or with his usual activities.  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  I would currently  characterize him as functional class II.  He has no lower extremity swelling or abdominal distention.  No exertional chest pain or chest pressure.  No exertional presyncope or syncope.  No PND or orthopnea.  He has not had any hospitalizations or ER visits.      REVIEW OF SYSTEMS:  A detailed 10 point review of systems was obtained as described in the History of Present Illness.  All other systems were reviewed and are negative.       MEDICATIONS:    Current Outpatient Medications   Medication Sig     atorvastatin (LIPITOR) 80 MG tablet Take 80 mg by mouth daily     carvedilol (COREG) 12.5 MG tablet Take 1 tablet (12.5 mg) by mouth 2 times daily (with meals)     clopidogrel (PLAVIX) 75 MG tablet Take 75 mg by mouth daily     furosemide (LASIX) 40 MG tablet Take 40 mg by mouth daily     gabapentin (NEURONTIN) 300 MG capsule Take 300 mg by mouth 3 times daily      glipiZIDE (GLUCOTROL) 5 MG tablet Take 5 mg by mouth 2 times daily (before meals)     isosorbide mononitrate (IMDUR) 60 MG 24 hr tablet Take 2 tablets (120 mg) by mouth 2 times daily     losartan (COZAAR) 25 MG tablet Take 1 tablet (25 mg) by mouth daily     nitroglycerin (NITROSTAT) 0.4 MG sublingual tablet Place 0.4 mg under the tongue every 5 minutes as needed for chest pain For chest pain place 1 tablet under the tongue every 5 minutes for 3 doses. If symptoms persist 5 minutes after 1st dose call 911.     potassium aminobenzoate 500 MG CAPS capsule      sotalol HCl, AF, 160 MG TABS Take 160 mg by mouth 2 times daily (Patient taking differently: Take 80 mg by mouth 2 times daily )     zolpidem (AMBIEN) 5 MG tablet Take 1 tablet (5 mg) by mouth nightly as needed for sleep     pioglitazone (ACTOS) 30 MG tablet Take 30 mg by mouth daily     warfarin (COUMADIN) 2.5 MG tablet Take 2 tablets (5 mg) by mouth daily Take 5 mg by mouth on Mondays and 2.5 mg 6x/week or as directed based on INR     No current facility-administered medications for this visit.       Facility-Administered Medications Ordered in Other Visits   Medication     sodium chloride (PF) 0.9% PF flush 10 mL       PHYSICAL EXAMINATION:  He was comfortable.  He was in no apparent distress.  His blood pressure was 97/60.  Pulse rate was 60.  Respiratory rate was 18.  He was saturating 100% on room air.  He weighed 157 pounds, which is 9 pounds less when compared to his last clinic visit.  He was 5 feet 7 inches tall.  His BMI was 24.59.  He had no pallor, cyanosis or jaundice.  His neck exam revealed no jugular venous distention.  He had no pedal edema.  Cardiac auscultation revealed normal S1 and S2 with no murmur, rub or gallop.  Auscultation of the lungs revealed equal air entry on both sides with no added sounds.  His abdomen was soft with normal bowel sounds; no tenderness, no rigidity, no guarding.  He had no focal neurological deficit.  His extremities showed no edema.      LABORATORY:  His NT-proBNP was slightly improved at 473.      CBC was unremarkable.      His chemistry showed a mild elevation in creatinine at 1.45.  Otherwise, his electrolytes were unremarkable.      IMAGING:  He had a repeat echocardiogram.  His left ventricular systolic function appeared to be mildly reduced when compared to his previous echocardiogram.  His EF was approximately 35%-40%.  He had inferior and lateral wall motion abnormality.  His anterior wall was viable and rox.  He had no other significant valvular abnormalities.  His right ventricle was normal in size and function.      ASSESSMENT AND PLAN:      Mr. Wesley Cooper is a very pleasant, 74-year-old male with severe 3 vessel coronary artery disease, ischemic cardiomyopathy and moderate LV systolic dysfunction who returns today for followup.     1.  Severe coronary artery disease.  He currently does not have any angina.  He has a patent LIMA to LAD and underwent recent revascularization of his native left circumflex in 03/2019.  He will stay on Plavix  and Coumadin.  He is on Lipitor 80 mg daily.  He is on a low dose of beta blocker.     2.  Ischemic cardiomyopathy with moderately reduced RV function.  He is not in overt heart failure.  He is NYHA functional class II.  He is not volume overloaded.  His renal function is slightly higher than baseline, but not significantly worse.  We will continue him on the current regimen of Cozaar 25 mg daily, Coreg 12.5 mg twice a day and Lasix 40 mg daily.  We will wait for the official report of his echocardiogram.  If his ejection fraction turns out to be less than 35%, we will consider starting him on spironolactone and increasing his Cozaar if there is room on his blood pressure.  He does not have much room for further up titration of these medications.     3.  AFib.  He is currently in sinus rhythm.  He is on sotalol 80 mg twice daily.  He is anticoagulated with Coumadin.       4.  His heart failure is currently not severe enough to be considered for LVAD destination therapy.  He also has a colostomy site.  Currently, his heart failure is not severe enough to be considered for advanced heart failure therapy.  His colostomy may be an issue if he were to need an LVAD in the future.      I have recommended him to return to clinic in 6 months with a nuclear stress test to follow up on his coronary artery disease.  He will call us in the interim if he has any further questions.      It was a pleasure meeting Mr. Flor Stevenson in our Heart Failure Clinic at the [facility].      Sincerely,   Claudy Loco MD   Center for Pulmonary Hypertension  Heart Failure, Transplant, and Mechanical Circulatory Support Cardiology   Cardiovascular Division  [facility]   [phone]            D: 2019   T: 2019   MT: geraldo      Name:     FLOR STEVENSON   MRN:      [id number]        Account:      [id number]   :      1945           Service Date: 2019       Document: [id number]

## 2019-11-25 NOTE — PROGRESS NOTES
Service Date: 2019      Jarod De La Rosa MD   [place] [id number]      RE: Wesley Cooper    MRN: [id number]   : 1945      Dear Dr. De La Rosa:       We had the pleasure of seeing Mr. Wesley Cooper for followup in our Heart Failure Clinic at the [facility].  As you know, he is a 74-year-old gentleman with a past medical history significant for:     1.  Severe 3 vessel epicardial coronary artery disease, status post LIMA to LAD, occluded SVG to RCA and occluded SVG to left circumflex.  Status post complex PCI of the left circumflex in 2019.   2.  Ischemic cardiomyopathy with an estimated ejection fraction of 40%-45%.   3.  Status post ileostomy.      Mr. Cooper returns for followup.  We saw him in August.  He was complaining of exertional shortness of breath.  This was thought to be more due to his angina than heart failure.  In light of this, we increased his Imdur to 120 mg twice a day.  He states that he has not used any nitroglycerin.  He has not had any chest pain or chest pressure.  However, he still continues to have shortness of breath when he overexerts.  He has no symptoms at rest or with his usual activities.  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  I would currently characterize him as functional class II.  He has no lower extremity swelling or abdominal distention.  No exertional chest pain or chest pressure.  No exertional presyncope or syncope.  No PND or orthopnea.  He has not had any hospitalizations or ER visits.      REVIEW OF SYSTEMS:  A detailed 10 point review of systems was obtained as described in the History of Present Illness.  All other systems were reviewed and are negative.       MEDICATIONS:    Current Outpatient Medications   Medication Sig     atorvastatin (LIPITOR) 80 MG tablet Take 80 mg by mouth daily     carvedilol (COREG) 12.5 MG tablet Take 1 tablet (12.5 mg) by mouth 2 times  daily (with meals)     clopidogrel (PLAVIX) 75 MG tablet Take 75 mg by mouth daily     furosemide (LASIX) 40 MG tablet Take 40 mg by mouth daily     gabapentin (NEURONTIN) 300 MG capsule Take 300 mg by mouth 3 times daily      glipiZIDE (GLUCOTROL) 5 MG tablet Take 5 mg by mouth 2 times daily (before meals)     isosorbide mononitrate (IMDUR) 60 MG 24 hr tablet Take 2 tablets (120 mg) by mouth 2 times daily     losartan (COZAAR) 25 MG tablet Take 1 tablet (25 mg) by mouth daily     nitroglycerin (NITROSTAT) 0.4 MG sublingual tablet Place 0.4 mg under the tongue every 5 minutes as needed for chest pain For chest pain place 1 tablet under the tongue every 5 minutes for 3 doses. If symptoms persist 5 minutes after 1st dose call 911.     potassium aminobenzoate 500 MG CAPS capsule      sotalol HCl, AF, 160 MG TABS Take 160 mg by mouth 2 times daily (Patient taking differently: Take 80 mg by mouth 2 times daily )     zolpidem (AMBIEN) 5 MG tablet Take 1 tablet (5 mg) by mouth nightly as needed for sleep     pioglitazone (ACTOS) 30 MG tablet Take 30 mg by mouth daily     warfarin (COUMADIN) 2.5 MG tablet Take 2 tablets (5 mg) by mouth daily Take 5 mg by mouth on Mondays and 2.5 mg 6x/week or as directed based on INR     No current facility-administered medications for this visit.      Facility-Administered Medications Ordered in Other Visits   Medication     sodium chloride (PF) 0.9% PF flush 10 mL       PHYSICAL EXAMINATION:  He was comfortable.  He was in no apparent distress.  His blood pressure was 97/60.  Pulse rate was 60.  Respiratory rate was 18.  He was saturating 100% on room air.  He weighed 157 pounds, which is 9 pounds less when compared to his last clinic visit.  He was 5 feet 7 inches tall.  His BMI was 24.59.  He had no pallor, cyanosis or jaundice.  His neck exam revealed no jugular venous distention.  He had no pedal edema.  Cardiac auscultation revealed normal S1 and S2 with no murmur, rub or gallop.   Auscultation of the lungs revealed equal air entry on both sides with no added sounds.  His abdomen was soft with normal bowel sounds; no tenderness, no rigidity, no guarding.  He had no focal neurological deficit.  His extremities showed no edema.      LABORATORY:  His NT-proBNP was slightly improved at 473.      CBC was unremarkable.      His chemistry showed a mild elevation in creatinine at 1.45.  Otherwise, his electrolytes were unremarkable.      IMAGING:  He had a repeat echocardiogram.  His left ventricular systolic function appeared to be mildly reduced when compared to his previous echocardiogram.  His EF was approximately 35%-40%.  He had inferior and lateral wall motion abnormality.  His anterior wall was viable and rox.  He had no other significant valvular abnormalities.  His right ventricle was normal in size and function.      ASSESSMENT AND PLAN:      Mr. Wesley Cooper is a very pleasant, 74-year-old male with severe 3 vessel coronary artery disease, ischemic cardiomyopathy and moderate LV systolic dysfunction who returns today for followup.     1.  Severe coronary artery disease.  He currently does not have any angina.  He has a patent LIMA to LAD and underwent recent revascularization of his native left circumflex in 03/2019.  He will stay on Plavix and Coumadin.  He is on Lipitor 80 mg daily.  He is on a low dose of beta blocker.     2.  Ischemic cardiomyopathy with moderately reduced RV function.  He is not in overt heart failure.  He is NYHA functional class II.  He is not volume overloaded.  His renal function is slightly higher than baseline, but not significantly worse.  We will continue him on the current regimen of Cozaar 25 mg daily, Coreg 12.5 mg twice a day and Lasix 40 mg daily.  We will wait for the official report of his echocardiogram.  If his ejection fraction turns out to be less than 35%, we will consider starting him on spironolactone and increasing his Cozaar if there is  room on his blood pressure.  He does not have much room for further up titration of these medications.     3.  AFib.  He is currently in sinus rhythm.  He is on sotalol 80 mg twice daily.  He is anticoagulated with Coumadin.       4.  His heart failure is currently not severe enough to be considered for LVAD destination therapy.  He also has a colostomy site.  Currently, his heart failure is not severe enough to be considered for advanced heart failure therapy.  His colostomy may be an issue if he were to need an LVAD in the future.      I have recommended him to return to clinic in 6 months with a nuclear stress test to follow up on his coronary artery disease.  He will call us in the interim if he has any further questions.      It was a pleasure meeting  Flor Stevenson in our Heart Failure Clinic at the [facility].      Sincerely,   Claudy Loco MD   Center for Pulmonary Hypertension  Heart Failure, Transplant, and Mechanical Circulatory Support Cardiology   Cardiovascular Division  [facility]   [phone]            D: 2019   T: 2019   MT: geraldo      Name:     FLOR STEVENSON   MRN:      [id number]        Account:      [id number]   :      1945           Service Date: 2019      Document: [id number]

## 2019-11-25 NOTE — PATIENT INSTRUCTIONS
You were seen today in the Cardiovascular Clinic at the [facility].       Cardiology Providers you saw during your visit: Dr. Loco      Medication Changes:   1. No medication changes.       Patient Instructions:  1. Call us if you have any questions or concerns.  Have a great trip!!!    Follow up Appointment Information:  1. Return to clinic in 6 months with lexiscan stress test and labs prior.    Monday June 1st, 2020:  12:30pm - check in at the [facility] waiting room ([address] [facility]) for your stress test.  3:00pm - Check in to the [facility] and surgery center ([address] [place] [address]) first floor for labs.  3:30pm - check into the 3rd floor to see Dr. Loco    Results:    Results for GRAHAM FLOR (MRN [id number]) as of 11/25/2019 15:00   Ref. Range 11/25/2019 12:12   Sodium Latest Ref Range: 133 - 144 mmol/L 133   Potassium Latest Ref Range: 3.4 - 5.3 mmol/L 4.5   Chloride Latest Ref Range: 94 - 109 mmol/L 104   Carbon Dioxide Latest Ref Range: 20 - 32 mmol/L 23   Urea Nitrogen Latest Ref Range: 7 - 30 mg/dL 27   Creatinine Latest Ref Range: 0.66 - 1.25 mg/dL 1.45 (H)   GFR Estimate Latest Ref Range: >60 mL/min/1.73_m2 46 (L)   GFR Estimate If Black Latest Ref Range: >60 mL/min/1.73_m2 54 (L)   Calcium Latest Ref Range: 8.5 - 10.1 mg/dL 8.8   Anion Gap Latest Ref Range: 3 - 14 mmol/L 6   N-Terminal Pro Bnp Latest Ref Range: 0 - 450 pg/mL 473 (H)   Glucose Latest Ref Range: 70 - 99 mg/dL 302 (H)   WBC Latest Ref Range: 4.0 - 11.0 10e9/L 7.3   Hemoglobin Latest Ref Range: 13.3 - 17.7 g/dL 12.3 (L)   Hematocrit Latest Ref Range: 40.0 - 53.0 % 39.5 (L)   Platelet Count Latest Ref Range: 150 - 450 10e9/L 213   RBC Count Latest Ref Range: 4.4 - 5.9 10e12/L 4.82   MCV Latest Ref Range: 78 - 100 fl 82   MCH Latest Ref Range: 26.5 - 33.0 pg 25.5 (L)   MCHC Latest Ref Range: 31.5 - 36.5 g/dL 31.1 (L)   RDW Latest Ref Range: 10.0 - 15.0 % 15.6 (H)         [address] [facility] on 3rd [place]  "[id number]        Thank you for allowing us to be a part of your care here at the [facility]      If you have questions or concerns please contact us at:      Calli Aguayo RN BSN   Cardiology Care Coordinator  [facility]   Questions and [phone]   First press #1 for the [place] and then press #4 to \"send a message to your care team\"     "

## 2019-11-27 NOTE — PROGRESS NOTES
"         Heart Failure Cardiology Consultation      HPI:   Mr. Cooper is a 74-year-old gentleman with a past medical history significant for HFrEF (EF 40-45%) 2/2 ICM, CAD s/p CABG (LIMA-LAD, occluded SVG-RCA, SVG-LCx), s/p CRT-D. In 3/2019, he underwent a high-risk PCI by Dr. Field.  He had a successful orbital atherectomy of the left main, proximal and the mid left circumflex.  He had 2 drug-eluting stents placed in the left main and left circumflex, respectively.  After the procedure, patient's chest pain has resolved; however, he continued to have exertional shortness of breath. His EF dropped to 35-40%. He has been medically managed for his heart failure with neurohormonal therapy as well as diuretics.     We saw him in clinic in August.  At that time he was complaining of exertional shortness of breath.  He did not have any other signs of heart failure.  His shortness of breath sounded more like angina equivalent as it was relieved with sublingual nitroglycerin.  We increase his Imdur to 120 mg twice a day.  He returns today for follow-up.    Overall, he is feeling better.  He has not used his sublingual nitroglycerin in the last 3 months.  He continues to have some shortness of breath on overexertion.  He has no shortness of breath with activities of daily living.  I would characterize him as functional class II.  He still works in his construction business 5 days a week.  He has no exertional chest pain or pain or pressure.  He has not had any worsening lower extremity swelling, abdominal distention or weight gain.  He denies having any PND or orthopnea.  He has not had any ICD shocks.  He has not had any exertional presyncope or syncope.  No recent hospitalization or ER visit.    ROS:   Negative unless stated in the HPI     EXAM:  BP 97/60 (BP Location: Right arm, Patient Position: Chair, Cuff Size: Adult Regular)   Pulse 60   Ht 1.702 m (5' 7\")   Wt 71.2 kg (157 lb)   SpO2 100%   BMI 24.59 kg/m  "    General: No acute distress   HEENT: NC/AT   CV: RRR, +S1/S2, No murmurs, rubs, gallops. JVP is not elevated.    Pulm: Unlabored breathing, CTAB   GI: s/nt/nd   Ext: No edema   Neuro: No focal defects   Psych: Normal Affect    Labs:    CBC RESULTS:  Lab Results   Component Value Date    WBC 7.3 11/25/2019    RBC 4.82 11/25/2019    HGB 12.3 (L) 11/25/2019    HCT 39.5 (L) 11/25/2019    MCV 82 11/25/2019    MCH 25.5 (L) 11/25/2019    MCHC 31.1 (L) 11/25/2019    RDW 15.6 (H) 11/25/2019     11/25/2019       BMP RESULTS:  Lab Results   Component Value Date     11/25/2019    POTASSIUM 4.5 11/25/2019    CHLORIDE 104 11/25/2019    CO2 23 11/25/2019    ANIONGAP 6 11/25/2019     (H) 11/25/2019    BUN 27 11/25/2019    CR 1.45 (H) 11/25/2019    GFRESTIMATED 46 (L) 11/25/2019    GFRESTBLACK 54 (L) 11/25/2019    KEIRA 8.8 11/25/2019        Echo (11/2019)    Moderately (EF 35-40%, traced 36%) reduced left ventricular function is  present. There is mild diffuse hypokinesis with superimposed severe  hypokinesis involving the hunmi-is-yao inferior, dgrng-wr-zqe inferoseptal,  and fecea-gu-rlp inferolateral myocardial segments.  Global right ventricular function is mildly reduced.  Rheumatic mitral valve without significant mitral stenosis and mild mitral  regurgitation.  This study was compared with the study from 11/15/18: Comparison is limited  due to lack of contrast on the prior study, however LVEF and regional wall  motion abnormalities are probably unchanged.    Coronary Angiogram (03/2019):    Three vessel disease with patent RODRIGUEZ to LAD  Occluded SVG to PDA and OM1  Atherectomy of the left main and proximal and mid LCX and PCI of the LM and proximal LCX.     Device Check (11/2019):    [place] seen in the [facility] for evaluation and iterative programming of an [employer] ([employer]J) Bi-Ventricular ICD, per MD orders. He also has an appointment with Dr. Loco. Today his intrinsic rhythm is Sinus 64 with CHB-  @ 30 bpm.  Normal ICD function. No arrhythmias recorded. CorVue thoracic impedance is near his baseline. AP= 12% and BVP >99%. Lead trends appear stable. Pt reports that he is feeling well. Battery estimates 4.5 years to TAYLA.  Plan for patient to send a remote transmission in 3 months and return to clinic in 6 months.     Assessment and Plan:   Mr. Cooper is a 74-year-old gentleman with a past medical history significant for HFrEF (EF 35-40%) 2/2 ICM, CAD s/p CABG (LIMA-LAD, occluded SVG-RCA, SVG-LCx) and Complex PCI, s/p CRT-D.     Overall, he is stable and doing reasonably okay.  He is functional class II.  He does not have angina now on higher dose of Imdur.  He has no evidence of fluid overload or endorgan dysfunction.  He does have some exertional shortness of breath on extreme exertion but this is not affecting his quality of life.     #HFrEF  Continue Coreg 12.5 mg BID, Cozaar 25 mg daily, and Lasix 40mg daily. No further room in his blood pressure for up titration of Cozaar or starting spironolactone.    He is currently on CRT-D therapy.  He is being biventricular lead paced greater than 99 percentage of the time.  No indication for destination LVAD therapy at this time in point.    #Atrial Fibrillation   -Patient is currently on Sotalol 80mg BID and Coreg 12.5mg BID. At this time, we will continue both of these medications.   -Continue Warfarin.     #History of Ventricular Arrhythmias   -Continue Sotalol at this time.  No further VT.    #Coronary Artery Disease  -Continue dual therapy with Plavix and Warfarin.  -Continue Atorvastatin 80 mg.  -Continue BB as stated above.  -Antianginals as stated above.     #DM2  -Patient is currently on pioglitazone for his diabetes. Given his history of CHF and incidence of volume retention, this could possibly be switched to a different medication without a similar side effect profile. Will discuss this with patient on RTC.     RTC in 6 months sooner if he has any worsening  symptoms.    It was a pleasure meeting  Wesley Cooper in our heart failure clinic and he is to [facility].  We thank you for involving us in his care.    Sincerely,    Claudy Loco MD   [facility]  Section of Advanced Heart Failure   Cardiovascular Division  [facility]   [phone]

## 2019-11-28 LAB
MDC_IDC_LEAD_IMPLANT_DT: NORMAL
MDC_IDC_LEAD_LOCATION: NORMAL
MDC_IDC_LEAD_LOCATION_DETAIL_1: NORMAL
MDC_IDC_LEAD_MFG: NORMAL
MDC_IDC_LEAD_MODEL: NORMAL
MDC_IDC_LEAD_POLARITY_TYPE: NORMAL
MDC_IDC_LEAD_SERIAL: NORMAL
MDC_IDC_MSMT_BATTERY_REMAINING_LONGEVITY: 54 MO
MDC_IDC_MSMT_CAP_CHARGE_DTM: NORMAL
MDC_IDC_MSMT_CAP_CHARGE_TIME: 8.88
MDC_IDC_MSMT_CAP_CHARGE_TYPE: NORMAL
MDC_IDC_MSMT_CAP_CHARGE_TYPE: NORMAL
MDC_IDC_MSMT_LEADCHNL_LV_IMPEDANCE_VALUE: 862.5 OHM
MDC_IDC_MSMT_LEADCHNL_LV_PACING_THRESHOLD_AMPLITUDE: 1.5 V
MDC_IDC_MSMT_LEADCHNL_LV_PACING_THRESHOLD_AMPLITUDE: 1.5 V
MDC_IDC_MSMT_LEADCHNL_LV_PACING_THRESHOLD_PULSEWIDTH: 0.5 MS
MDC_IDC_MSMT_LEADCHNL_LV_PACING_THRESHOLD_PULSEWIDTH: 0.5 MS
MDC_IDC_MSMT_LEADCHNL_RA_IMPEDANCE_VALUE: 425 OHM
MDC_IDC_MSMT_LEADCHNL_RA_PACING_THRESHOLD_AMPLITUDE: 0.62 V
MDC_IDC_MSMT_LEADCHNL_RA_PACING_THRESHOLD_PULSEWIDTH: 0.5 MS
MDC_IDC_MSMT_LEADCHNL_RA_SENSING_INTR_AMPL: 5 MV
MDC_IDC_MSMT_LEADCHNL_RV_IMPEDANCE_VALUE: 525 OHM
MDC_IDC_MSMT_LEADCHNL_RV_PACING_THRESHOLD_AMPLITUDE: 0.5 V
MDC_IDC_MSMT_LEADCHNL_RV_PACING_THRESHOLD_PULSEWIDTH: 0.5 MS
MDC_IDC_MSMT_LEADCHNL_RV_SENSING_INTR_AMPL: 12 MV
MDC_IDC_PG_IMPLANT_DTM: NORMAL
MDC_IDC_PG_MFG: NORMAL
MDC_IDC_PG_MODEL: NORMAL
MDC_IDC_PG_SERIAL: NORMAL
MDC_IDC_PG_TYPE: NORMAL
MDC_IDC_SESS_CLINIC_NAME: NORMAL
MDC_IDC_SESS_DTM: NORMAL
MDC_IDC_SESS_TYPE: NORMAL
MDC_IDC_SET_BRADY_AT_MODE_SWITCH_MODE: NORMAL
MDC_IDC_SET_BRADY_AT_MODE_SWITCH_RATE: 180 {BEATS}/MIN
MDC_IDC_SET_BRADY_HYSTRATE: NORMAL
MDC_IDC_SET_BRADY_LOWRATE: 60 {BEATS}/MIN
MDC_IDC_SET_BRADY_MAX_SENSOR_RATE: 110 {BEATS}/MIN
MDC_IDC_SET_BRADY_MAX_TRACKING_RATE: 110 {BEATS}/MIN
MDC_IDC_SET_BRADY_MODE: NORMAL
MDC_IDC_SET_BRADY_NIGHT_RATE: NORMAL
MDC_IDC_SET_BRADY_PAV_DELAY_LOW: 180 MS
MDC_IDC_SET_BRADY_SAV_DELAY_LOW: 160 MS
MDC_IDC_SET_CRT_LVRV_DELAY: 20 MS
MDC_IDC_SET_CRT_PACED_CHAMBERS: NORMAL
MDC_IDC_SET_LEADCHNL_LV_PACING_AMPLITUDE: 2.25 V
MDC_IDC_SET_LEADCHNL_LV_PACING_ANODE_ELECTRODE_1: NORMAL
MDC_IDC_SET_LEADCHNL_LV_PACING_ANODE_LOCATION_1: NORMAL
MDC_IDC_SET_LEADCHNL_LV_PACING_CAPTURE_MODE: NORMAL
MDC_IDC_SET_LEADCHNL_LV_PACING_CATHODE_ELECTRODE_1: NORMAL
MDC_IDC_SET_LEADCHNL_LV_PACING_CATHODE_LOCATION_1: NORMAL
MDC_IDC_SET_LEADCHNL_LV_PACING_POLARITY: NORMAL
MDC_IDC_SET_LEADCHNL_LV_PACING_PULSEWIDTH: 0.5 MS
MDC_IDC_SET_LEADCHNL_RA_PACING_AMPLITUDE: 1.5 V
MDC_IDC_SET_LEADCHNL_RA_PACING_ANODE_ELECTRODE_1: NORMAL
MDC_IDC_SET_LEADCHNL_RA_PACING_ANODE_LOCATION_1: NORMAL
MDC_IDC_SET_LEADCHNL_RA_PACING_CAPTURE_MODE: NORMAL
MDC_IDC_SET_LEADCHNL_RA_PACING_CATHODE_ELECTRODE_1: NORMAL
MDC_IDC_SET_LEADCHNL_RA_PACING_CATHODE_LOCATION_1: NORMAL
MDC_IDC_SET_LEADCHNL_RA_PACING_POLARITY: NORMAL
MDC_IDC_SET_LEADCHNL_RA_PACING_PULSEWIDTH: 0.5 MS
MDC_IDC_SET_LEADCHNL_RA_SENSING_ADAPTATION_MODE: NORMAL
MDC_IDC_SET_LEADCHNL_RA_SENSING_ANODE_ELECTRODE_1: NORMAL
MDC_IDC_SET_LEADCHNL_RA_SENSING_ANODE_LOCATION_1: NORMAL
MDC_IDC_SET_LEADCHNL_RA_SENSING_CATHODE_ELECTRODE_1: NORMAL
MDC_IDC_SET_LEADCHNL_RA_SENSING_CATHODE_LOCATION_1: NORMAL
MDC_IDC_SET_LEADCHNL_RA_SENSING_POLARITY: NORMAL
MDC_IDC_SET_LEADCHNL_RA_SENSING_SENSITIVITY: 0.3 MV
MDC_IDC_SET_LEADCHNL_RV_PACING_AMPLITUDE: 2 V
MDC_IDC_SET_LEADCHNL_RV_PACING_ANODE_ELECTRODE_1: NORMAL
MDC_IDC_SET_LEADCHNL_RV_PACING_ANODE_LOCATION_1: NORMAL
MDC_IDC_SET_LEADCHNL_RV_PACING_CAPTURE_MODE: NORMAL
MDC_IDC_SET_LEADCHNL_RV_PACING_CATHODE_ELECTRODE_1: NORMAL
MDC_IDC_SET_LEADCHNL_RV_PACING_CATHODE_LOCATION_1: NORMAL
MDC_IDC_SET_LEADCHNL_RV_PACING_POLARITY: NORMAL
MDC_IDC_SET_LEADCHNL_RV_PACING_PULSEWIDTH: 0.5 MS
MDC_IDC_SET_LEADCHNL_RV_SENSING_ANODE_ELECTRODE_1: NORMAL
MDC_IDC_SET_LEADCHNL_RV_SENSING_ANODE_LOCATION_1: NORMAL
MDC_IDC_SET_LEADCHNL_RV_SENSING_CATHODE_ELECTRODE_1: NORMAL
MDC_IDC_SET_LEADCHNL_RV_SENSING_CATHODE_LOCATION_1: NORMAL
MDC_IDC_SET_LEADCHNL_RV_SENSING_POLARITY: NORMAL
MDC_IDC_SET_LEADCHNL_RV_SENSING_SENSITIVITY: 0.5 MV
MDC_IDC_SET_ZONE_DETECTION_INTERVAL: 320 MS
MDC_IDC_SET_ZONE_DETECTION_INTERVAL: 360 MS
MDC_IDC_SET_ZONE_DETECTION_INTERVAL: 460 MS
MDC_IDC_SET_ZONE_TYPE: NORMAL
MDC_IDC_SET_ZONE_VENDOR_TYPE: NORMAL
MDC_IDC_STAT_AT_DTM_END: NORMAL
MDC_IDC_STAT_AT_DTM_START: NORMAL
MDC_IDC_STAT_AT_MODE_SW_COUNT: 0
MDC_IDC_STAT_BRADY_DTM_END: NORMAL
MDC_IDC_STAT_BRADY_DTM_START: NORMAL
MDC_IDC_STAT_BRADY_RA_PERCENT_PACED: 12 %
MDC_IDC_STAT_BRADY_RV_PERCENT_PACED: 99.95 %
MDC_IDC_STAT_EPISODE_RECENT_COUNT: 0
MDC_IDC_STAT_EPISODE_RECENT_COUNT: 0
MDC_IDC_STAT_EPISODE_RECENT_COUNT_DTM_END: NORMAL
MDC_IDC_STAT_EPISODE_RECENT_COUNT_DTM_END: NORMAL
MDC_IDC_STAT_EPISODE_RECENT_COUNT_DTM_START: NORMAL
MDC_IDC_STAT_EPISODE_RECENT_COUNT_DTM_START: NORMAL
MDC_IDC_STAT_EPISODE_TYPE: NORMAL
MDC_IDC_STAT_EPISODE_TYPE: NORMAL
MDC_IDC_STAT_EPISODE_VENDOR_TYPE: NORMAL
MDC_IDC_STAT_HEART_RATE_DTM_END: NORMAL
MDC_IDC_STAT_HEART_RATE_DTM_START: NORMAL
MDC_IDC_STAT_TACHYTHERAPY_ATP_DELIVERED_RECENT: 0
MDC_IDC_STAT_TACHYTHERAPY_RECENT_DTM_END: NORMAL
MDC_IDC_STAT_TACHYTHERAPY_RECENT_DTM_START: NORMAL
MDC_IDC_STAT_TACHYTHERAPY_SHOCKS_ABORTED_RECENT: 0
MDC_IDC_STAT_TACHYTHERAPY_SHOCKS_DELIVERED_RECENT: 0

## 2020-01-01 ENCOUNTER — HOSPITAL ENCOUNTER (OUTPATIENT)
Facility: CLINIC | Age: 75
Discharge: HOME | End: 2020-12-16
Attending: RADIOLOGY | Admitting: RADIOLOGY
Payer: COMMERCIAL

## 2020-01-01 ENCOUNTER — TELEPHONE (OUTPATIENT)
Dept: VASCULAR SURGERY | Facility: CLINIC | Age: 75
End: 2020-01-01

## 2020-01-01 ENCOUNTER — PATIENT OUTREACH (OUTPATIENT)
Dept: ONCOLOGY | Facility: CLINIC | Age: 75
End: 2020-01-01

## 2020-01-01 ENCOUNTER — TEAM CONFERENCE (OUTPATIENT)
Dept: VASCULAR SURGERY | Facility: CLINIC | Age: 75
End: 2020-01-01

## 2020-01-01 ENCOUNTER — VIRTUAL VISIT (OUTPATIENT)
Dept: ONCOLOGY | Facility: CLINIC | Age: 75
End: 2020-01-01
Attending: INTERNAL MEDICINE
Payer: COMMERCIAL

## 2020-01-01 ENCOUNTER — TELEPHONE (OUTPATIENT)
Dept: ONCOLOGY | Facility: CLINIC | Age: 75
End: 2020-01-01

## 2020-01-01 ENCOUNTER — HOSPITAL ENCOUNTER (OUTPATIENT)
Facility: CLINIC | Age: 75
Discharge: HOME | End: 2020-12-11
Attending: RADIOLOGY | Admitting: RADIOLOGY
Payer: COMMERCIAL

## 2020-01-01 ENCOUNTER — ANCILLARY PROCEDURE (OUTPATIENT)
Dept: CARDIOLOGY | Facility: CLINIC | Age: 75
End: 2020-01-01
Attending: INTERNAL MEDICINE
Payer: COMMERCIAL

## 2020-01-01 ENCOUNTER — VIRTUAL VISIT (OUTPATIENT)
Dept: PHARMACY | Facility: CLINIC | Age: 75
End: 2020-01-01
Payer: COMMERCIAL

## 2020-01-01 ENCOUNTER — DOCUMENTATION ONLY (OUTPATIENT)
Dept: ONCOLOGY | Facility: CLINIC | Age: 75
End: 2020-01-01

## 2020-01-01 ENCOUNTER — HOSPITAL ENCOUNTER (OUTPATIENT)
Dept: NUCLEAR MEDICINE | Facility: CLINIC | Age: 75
Setting detail: NUCLEAR MEDICINE
End: 2020-12-16
Attending: RADIOLOGY
Payer: COMMERCIAL

## 2020-01-01 ENCOUNTER — APPOINTMENT (OUTPATIENT)
Dept: ULTRASOUND IMAGING | Facility: CLINIC | Age: 75
DRG: 374 | End: 2020-01-01
Attending: INTERNAL MEDICINE
Payer: COMMERCIAL

## 2020-01-01 ENCOUNTER — PATIENT OUTREACH (OUTPATIENT)
Dept: CARE COORDINATION | Facility: CLINIC | Age: 75
End: 2020-01-01

## 2020-01-01 ENCOUNTER — TRANSCRIBE ORDERS (OUTPATIENT)
Facility: CLINIC | Age: 75
End: 2020-01-01

## 2020-01-01 ENCOUNTER — APPOINTMENT (OUTPATIENT)
Dept: INTERVENTIONAL RADIOLOGY/VASCULAR | Facility: CLINIC | Age: 75
DRG: 374 | End: 2020-01-01
Attending: PHYSICIAN ASSISTANT
Payer: COMMERCIAL

## 2020-01-01 ENCOUNTER — TELEPHONE (OUTPATIENT)
Dept: FAMILY MEDICINE | Facility: CLINIC | Age: 75
End: 2020-01-01

## 2020-01-01 ENCOUNTER — APPOINTMENT (OUTPATIENT)
Dept: CT IMAGING | Facility: CLINIC | Age: 75
DRG: 374 | End: 2020-01-01
Attending: INTERNAL MEDICINE
Payer: COMMERCIAL

## 2020-01-01 ENCOUNTER — APPOINTMENT (OUTPATIENT)
Dept: INTERVENTIONAL RADIOLOGY/VASCULAR | Facility: CLINIC | Age: 75
End: 2020-01-01
Attending: RADIOLOGY
Payer: COMMERCIAL

## 2020-01-01 ENCOUNTER — DOCUMENTATION ONLY (OUTPATIENT)
Dept: CARE COORDINATION | Facility: CLINIC | Age: 75
End: 2020-01-01

## 2020-01-01 ENCOUNTER — PRE VISIT (OUTPATIENT)
Dept: RADIOLOGY | Facility: CLINIC | Age: 75
End: 2020-01-01

## 2020-01-01 ENCOUNTER — PRE VISIT (OUTPATIENT)
Dept: ONCOLOGY | Facility: CLINIC | Age: 75
End: 2020-01-01

## 2020-01-01 ENCOUNTER — TELEPHONE (OUTPATIENT)
Dept: INTERVENTIONAL RADIOLOGY/VASCULAR | Facility: CLINIC | Age: 75
End: 2020-01-01

## 2020-01-01 ENCOUNTER — APPOINTMENT (OUTPATIENT)
Dept: MEDSURG UNIT | Facility: CLINIC | Age: 75
End: 2020-01-01
Payer: COMMERCIAL

## 2020-01-01 ENCOUNTER — APPOINTMENT (OUTPATIENT)
Dept: CARDIOLOGY | Facility: CLINIC | Age: 75
DRG: 374 | End: 2020-01-01
Attending: INTERNAL MEDICINE
Payer: COMMERCIAL

## 2020-01-01 ENCOUNTER — VIRTUAL VISIT (OUTPATIENT)
Dept: RADIOLOGY | Facility: CLINIC | Age: 75
End: 2020-01-01
Attending: RADIOLOGY
Payer: COMMERCIAL

## 2020-01-01 ENCOUNTER — HOSPITAL ENCOUNTER (OUTPATIENT)
Dept: NUCLEAR MEDICINE | Facility: CLINIC | Age: 75
Setting detail: NUCLEAR MEDICINE
Discharge: HOME | End: 2020-12-11
Attending: RADIOLOGY | Admitting: RADIOLOGY
Payer: COMMERCIAL

## 2020-01-01 ENCOUNTER — TELEPHONE (OUTPATIENT)
Dept: CARDIOLOGY | Facility: CLINIC | Age: 75
End: 2020-01-01

## 2020-01-01 ENCOUNTER — PATIENT OUTREACH (OUTPATIENT)
Dept: SURGERY | Facility: CLINIC | Age: 75
End: 2020-01-01

## 2020-01-01 ENCOUNTER — VIRTUAL VISIT (OUTPATIENT)
Dept: ONCOLOGY | Facility: CLINIC | Age: 75
End: 2020-01-01
Attending: PHYSICIAN ASSISTANT
Payer: COMMERCIAL

## 2020-01-01 ENCOUNTER — HOSPITAL ENCOUNTER (INPATIENT)
Facility: CLINIC | Age: 75
LOS: 12 days | Discharge: HOME | DRG: 374 | End: 2020-11-08
Attending: INTERNAL MEDICINE | Admitting: STUDENT IN AN ORGANIZED HEALTH CARE EDUCATION/TRAINING PROGRAM
Payer: COMMERCIAL

## 2020-01-01 ENCOUNTER — DOCUMENTATION ONLY (OUTPATIENT)
Dept: CARDIOLOGY | Facility: CLINIC | Age: 75
End: 2020-01-01

## 2020-01-01 ENCOUNTER — TUMOR CONFERENCE (OUTPATIENT)
Dept: ONCOLOGY | Facility: CLINIC | Age: 75
End: 2020-01-01
Payer: COMMERCIAL

## 2020-01-01 ENCOUNTER — APPOINTMENT (OUTPATIENT)
Dept: MRI IMAGING | Facility: CLINIC | Age: 75
DRG: 374 | End: 2020-01-01
Attending: INTERNAL MEDICINE
Payer: COMMERCIAL

## 2020-01-01 ENCOUNTER — APPOINTMENT (OUTPATIENT)
Dept: MEDSURG UNIT | Facility: CLINIC | Age: 75
End: 2020-01-01
Attending: INTERNAL MEDICINE
Payer: COMMERCIAL

## 2020-01-01 VITALS
DIASTOLIC BLOOD PRESSURE: 54 MMHG | OXYGEN SATURATION: 96 % | WEIGHT: 145.1 LBS | BODY MASS INDEX: 24.18 KG/M2 | HEIGHT: 65 IN | RESPIRATION RATE: 18 BRPM | TEMPERATURE: 98.3 F | HEART RATE: 55 BPM | SYSTOLIC BLOOD PRESSURE: 97 MMHG

## 2020-01-01 VITALS
WEIGHT: 141 LBS | TEMPERATURE: 97.4 F | HEART RATE: 60 BPM | RESPIRATION RATE: 16 BRPM | SYSTOLIC BLOOD PRESSURE: 117 MMHG | OXYGEN SATURATION: 98 % | BODY MASS INDEX: 23.46 KG/M2 | DIASTOLIC BLOOD PRESSURE: 69 MMHG

## 2020-01-01 VITALS
RESPIRATION RATE: 16 BRPM | DIASTOLIC BLOOD PRESSURE: 81 MMHG | OXYGEN SATURATION: 100 % | TEMPERATURE: 97.4 F | SYSTOLIC BLOOD PRESSURE: 148 MMHG | HEART RATE: 68 BPM

## 2020-01-01 VITALS
SYSTOLIC BLOOD PRESSURE: 116 MMHG | HEART RATE: 60 BPM | OXYGEN SATURATION: 100 % | WEIGHT: 144 LBS | BODY MASS INDEX: 24.59 KG/M2 | DIASTOLIC BLOOD PRESSURE: 72 MMHG | HEIGHT: 64 IN

## 2020-01-01 DIAGNOSIS — I48.92 ATRIAL FLUTTER WITH RAPID VENTRICULAR RESPONSE (H): ICD-10-CM

## 2020-01-01 DIAGNOSIS — C18.3 MALIGNANT NEOPLASM OF HEPATIC FLEXURE (H): ICD-10-CM

## 2020-01-01 DIAGNOSIS — C22.0 HCC (HEPATOCELLULAR CARCINOMA) (H): Primary | ICD-10-CM

## 2020-01-01 DIAGNOSIS — Z11.59 ENCOUNTER FOR SCREENING FOR OTHER VIRAL DISEASES: Primary | ICD-10-CM

## 2020-01-01 DIAGNOSIS — R52 PAIN: Primary | ICD-10-CM

## 2020-01-01 DIAGNOSIS — C22.0 HEPATOCELLULAR CARCINOMA (H): Primary | ICD-10-CM

## 2020-01-01 DIAGNOSIS — I50.22 CHRONIC SYSTOLIC HEART FAILURE (H): ICD-10-CM

## 2020-01-01 DIAGNOSIS — Z95.810 AUTOMATIC IMPLANTABLE CARDIOVERTER-DEFIBRILLATOR IN SITU: ICD-10-CM

## 2020-01-01 DIAGNOSIS — C18.9 COLON CANCER (H): Primary | ICD-10-CM

## 2020-01-01 DIAGNOSIS — Z11.59 ENCOUNTER FOR SCREENING FOR OTHER VIRAL DISEASES: ICD-10-CM

## 2020-01-01 DIAGNOSIS — N40.0 BENIGN PROSTATIC HYPERPLASIA, UNSPECIFIED WHETHER LOWER URINARY TRACT SYMPTOMS PRESENT: ICD-10-CM

## 2020-01-01 DIAGNOSIS — I25.810 CORONARY ARTERY DISEASE INVOLVING AUTOLOGOUS ARTERY CORONARY BYPASS GRAFT, ANGINA PRESENCE UNSPECIFIED: ICD-10-CM

## 2020-01-01 DIAGNOSIS — I50.22 CHRONIC SYSTOLIC HEART FAILURE (H): Primary | ICD-10-CM

## 2020-01-01 DIAGNOSIS — C22.0 HCC (HEPATOCELLULAR CARCINOMA) (H): ICD-10-CM

## 2020-01-01 DIAGNOSIS — C18.5 MALIGNANT NEOPLASM OF SPLENIC FLEXURE (H): Primary | ICD-10-CM

## 2020-01-01 DIAGNOSIS — K21.9 GASTROESOPHAGEAL REFLUX DISEASE WITHOUT ESOPHAGITIS: ICD-10-CM

## 2020-01-01 DIAGNOSIS — E11.9 TYPE 2 DIABETES MELLITUS WITHOUT COMPLICATION, UNSPECIFIED WHETHER LONG TERM INSULIN USE (H): ICD-10-CM

## 2020-01-01 DIAGNOSIS — I50.22 CHRONIC SYSTOLIC HEART FAILURE (H): Primary | Chronic | ICD-10-CM

## 2020-01-01 DIAGNOSIS — I25.5 ISCHEMIC CARDIOMYOPATHY: ICD-10-CM

## 2020-01-01 DIAGNOSIS — Z78.9 TAKES DIETARY SUPPLEMENTS: ICD-10-CM

## 2020-01-01 DIAGNOSIS — I50.22 CHRONIC SYSTOLIC HEART FAILURE (H): Chronic | ICD-10-CM

## 2020-01-01 DIAGNOSIS — I10 ESSENTIAL HYPERTENSION: ICD-10-CM

## 2020-01-01 DIAGNOSIS — R52 PAIN: ICD-10-CM

## 2020-01-01 LAB
AFP SERPL-MCNC: 2727 UG/L (ref 0–8)
ALBUMIN SERPL-MCNC: 2.8 G/DL (ref 3.4–5)
ALBUMIN SERPL-MCNC: 2.8 G/DL (ref 3.4–5)
ALBUMIN SERPL-MCNC: 3.2 G/DL (ref 3.4–5)
ALBUMIN SERPL-MCNC: 3.4 G/DL (ref 3.4–5)
ALBUMIN UR-MCNC: 10 MG/DL
ALP SERPL-CCNC: 123 U/L (ref 40–150)
ALP SERPL-CCNC: 123 U/L (ref 40–150)
ALP SERPL-CCNC: 183 U/L (ref 40–150)
ALP SERPL-CCNC: 229 U/L (ref 40–150)
ALT SERPL W P-5'-P-CCNC: 14 U/L (ref 0–70)
ALT SERPL W P-5'-P-CCNC: 19 U/L (ref 0–70)
ALT SERPL W P-5'-P-CCNC: 23 U/L (ref 0–70)
ALT SERPL W P-5'-P-CCNC: 27 U/L (ref 0–70)
ANION GAP SERPL CALCULATED.3IONS-SCNC: 10 MMOL/L (ref 3–14)
ANION GAP SERPL CALCULATED.3IONS-SCNC: 5 MMOL/L (ref 3–14)
ANION GAP SERPL CALCULATED.3IONS-SCNC: 6 MMOL/L (ref 3–14)
ANION GAP SERPL CALCULATED.3IONS-SCNC: 6 MMOL/L (ref 3–14)
ANION GAP SERPL CALCULATED.3IONS-SCNC: 7 MMOL/L (ref 3–14)
ANION GAP SERPL CALCULATED.3IONS-SCNC: 8 MMOL/L (ref 3–14)
ANION GAP SERPL CALCULATED.3IONS-SCNC: 8 MMOL/L (ref 3–14)
ANION GAP SERPL CALCULATED.3IONS-SCNC: 9 MMOL/L (ref 3–14)
ANION GAP SERPL CALCULATED.3IONS-SCNC: 9 MMOL/L (ref 3–14)
APPEARANCE UR: CLEAR
APTT PPP: 34 SEC (ref 22–37)
AST SERPL W P-5'-P-CCNC: 25 U/L (ref 0–45)
AST SERPL W P-5'-P-CCNC: 52 U/L (ref 0–45)
AST SERPL W P-5'-P-CCNC: 57 U/L (ref 0–45)
AST SERPL W P-5'-P-CCNC: 60 U/L (ref 0–45)
BACTERIA SPEC CULT: NO GROWTH
BACTERIA SPEC CULT: NO GROWTH
BASOPHILS # BLD AUTO: 0 10E9/L (ref 0–0.2)
BASOPHILS # BLD AUTO: 0.1 10E9/L (ref 0–0.2)
BASOPHILS NFR BLD AUTO: 0.3 %
BASOPHILS NFR BLD AUTO: 0.7 %
BILIRUB DIRECT SERPL-MCNC: 0.1 MG/DL (ref 0–0.2)
BILIRUB DIRECT SERPL-MCNC: 0.3 MG/DL (ref 0–0.2)
BILIRUB SERPL-MCNC: 0.3 MG/DL (ref 0.2–1.3)
BILIRUB SERPL-MCNC: 0.9 MG/DL (ref 0.2–1.3)
BILIRUB SERPL-MCNC: 1 MG/DL (ref 0.2–1.3)
BILIRUB SERPL-MCNC: 1.2 MG/DL (ref 0.2–1.3)
BILIRUB UR QL STRIP: NEGATIVE
BUN SERPL-MCNC: 10 MG/DL (ref 7–30)
BUN SERPL-MCNC: 10 MG/DL (ref 7–30)
BUN SERPL-MCNC: 11 MG/DL (ref 7–30)
BUN SERPL-MCNC: 12 MG/DL (ref 7–30)
BUN SERPL-MCNC: 13 MG/DL (ref 7–30)
BUN SERPL-MCNC: 14 MG/DL (ref 7–30)
BUN SERPL-MCNC: 22 MG/DL (ref 7–30)
BUN SERPL-MCNC: 24 MG/DL (ref 7–30)
BUN SERPL-MCNC: 7 MG/DL (ref 7–30)
BUN SERPL-MCNC: 8 MG/DL (ref 7–30)
C COLI+JEJUNI+LARI FUSA STL QL NAA+PROBE: NORMAL
C COLI+JEJUNI+LARI FUSA STL QL NAA+PROBE: NOT DETECTED
C DIFF TOX B STL QL: NEGATIVE
CALCIUM SERPL-MCNC: 8 MG/DL (ref 8.5–10.1)
CALCIUM SERPL-MCNC: 8.1 MG/DL (ref 8.5–10.1)
CALCIUM SERPL-MCNC: 8.2 MG/DL (ref 8.5–10.1)
CALCIUM SERPL-MCNC: 8.3 MG/DL (ref 8.5–10.1)
CALCIUM SERPL-MCNC: 8.3 MG/DL (ref 8.5–10.1)
CALCIUM SERPL-MCNC: 8.4 MG/DL (ref 8.5–10.1)
CALCIUM SERPL-MCNC: 8.4 MG/DL (ref 8.5–10.1)
CALCIUM SERPL-MCNC: 8.5 MG/DL (ref 8.5–10.1)
CALCIUM SERPL-MCNC: 8.8 MG/DL (ref 8.5–10.1)
CALCIUM SERPL-MCNC: 8.8 MG/DL (ref 8.5–10.1)
CALCIUM SERPL-MCNC: 8.9 MG/DL (ref 8.5–10.1)
CALCIUM SERPL-MCNC: 8.9 MG/DL (ref 8.5–10.1)
CALCIUM SERPL-MCNC: 9 MG/DL (ref 8.5–10.1)
CALCIUM SERPL-MCNC: 9.3 MG/DL (ref 8.5–10.1)
CANCER AG19-9 SERPL-ACNC: 35 U/ML (ref 0–37)
CEA SERPL-MCNC: 1.5 UG/L (ref 0–2.5)
CEA SERPL-MCNC: 2.5 UG/L (ref 0–2.5)
CHLORIDE SERPL-SCNC: 106 MMOL/L (ref 94–109)
CHLORIDE SERPL-SCNC: 107 MMOL/L (ref 94–109)
CHLORIDE SERPL-SCNC: 107 MMOL/L (ref 94–109)
CHLORIDE SERPL-SCNC: 108 MMOL/L (ref 94–109)
CHLORIDE SERPL-SCNC: 109 MMOL/L (ref 94–109)
CHLORIDE SERPL-SCNC: 110 MMOL/L (ref 94–109)
CHLORIDE SERPL-SCNC: 112 MMOL/L (ref 94–109)
CHLORIDE SERPL-SCNC: 114 MMOL/L (ref 94–109)
CO2 SERPL-SCNC: 20 MMOL/L (ref 20–32)
CO2 SERPL-SCNC: 22 MMOL/L (ref 20–32)
CO2 SERPL-SCNC: 23 MMOL/L (ref 20–32)
CO2 SERPL-SCNC: 24 MMOL/L (ref 20–32)
CO2 SERPL-SCNC: 24 MMOL/L (ref 20–32)
CO2 SERPL-SCNC: 25 MMOL/L (ref 20–32)
CO2 SERPL-SCNC: 26 MMOL/L (ref 20–32)
COLONOSCOPY: NORMAL
COLOR UR AUTO: ABNORMAL
COPATH REPORT: NORMAL
CREAT SERPL-MCNC: 1.11 MG/DL (ref 0.66–1.25)
CREAT SERPL-MCNC: 1.12 MG/DL (ref 0.66–1.25)
CREAT SERPL-MCNC: 1.21 MG/DL (ref 0.66–1.25)
CREAT SERPL-MCNC: 1.22 MG/DL (ref 0.66–1.25)
CREAT SERPL-MCNC: 1.23 MG/DL (ref 0.66–1.25)
CREAT SERPL-MCNC: 1.33 MG/DL (ref 0.66–1.25)
CREAT SERPL-MCNC: 1.34 MG/DL (ref 0.66–1.25)
CREAT SERPL-MCNC: 1.43 MG/DL (ref 0.66–1.25)
CREAT SERPL-MCNC: 1.49 MG/DL (ref 0.66–1.25)
CREAT SERPL-MCNC: 1.57 MG/DL (ref 0.66–1.25)
CREAT SERPL-MCNC: 1.62 MG/DL (ref 0.66–1.25)
CREAT SERPL-MCNC: 1.75 MG/DL (ref 0.66–1.25)
CREAT SERPL-MCNC: 2.69 MG/DL (ref 0.66–1.25)
CREAT SERPL-MCNC: 2.77 MG/DL (ref 0.66–1.25)
CRP SERPL-MCNC: 13 MG/L (ref 0–8)
CRP SERPL-MCNC: 39 MG/L (ref 0–8)
CRP SERPL-MCNC: 41 MG/L (ref 0–8)
DIFFERENTIAL METHOD BLD: ABNORMAL
DIFFERENTIAL METHOD BLD: NORMAL
EC STX1 GENE STL QL NAA+PROBE: NORMAL
EC STX1 GENE STL QL NAA+PROBE: NOT DETECTED
EC STX2 GENE STL QL NAA+PROBE: NORMAL
EC STX2 GENE STL QL NAA+PROBE: NOT DETECTED
ENTERIC PATHOGEN COMMENT: NORMAL
ENTERIC PATHOGEN COMMENT: NORMAL
EOSINOPHIL # BLD AUTO: 0 10E9/L (ref 0–0.7)
EOSINOPHIL # BLD AUTO: 0.1 10E9/L (ref 0–0.7)
EOSINOPHIL NFR BLD AUTO: 0.2 %
EOSINOPHIL NFR BLD AUTO: 0.8 %
ERYTHROCYTE [DISTWIDTH] IN BLOOD BY AUTOMATED COUNT: 14.2 % (ref 10–15)
ERYTHROCYTE [DISTWIDTH] IN BLOOD BY AUTOMATED COUNT: 14.2 % (ref 10–15)
ERYTHROCYTE [DISTWIDTH] IN BLOOD BY AUTOMATED COUNT: 14.4 % (ref 10–15)
ERYTHROCYTE [DISTWIDTH] IN BLOOD BY AUTOMATED COUNT: 14.4 % (ref 10–15)
ERYTHROCYTE [DISTWIDTH] IN BLOOD BY AUTOMATED COUNT: 14.5 % (ref 10–15)
ERYTHROCYTE [DISTWIDTH] IN BLOOD BY AUTOMATED COUNT: 14.5 % (ref 10–15)
ERYTHROCYTE [DISTWIDTH] IN BLOOD BY AUTOMATED COUNT: 14.6 % (ref 10–15)
ERYTHROCYTE [DISTWIDTH] IN BLOOD BY AUTOMATED COUNT: 14.7 % (ref 10–15)
ERYTHROCYTE [DISTWIDTH] IN BLOOD BY AUTOMATED COUNT: 14.7 % (ref 10–15)
ERYTHROCYTE [DISTWIDTH] IN BLOOD BY AUTOMATED COUNT: 14.8 % (ref 10–15)
ERYTHROCYTE [DISTWIDTH] IN BLOOD BY AUTOMATED COUNT: 14.8 % (ref 10–15)
ERYTHROCYTE [DISTWIDTH] IN BLOOD BY AUTOMATED COUNT: 15 % (ref 10–15)
FERRITIN SERPL-MCNC: 119 NG/ML (ref 26–388)
GFR SERPL CREATININE-BSD FRML MDRD: 21 ML/MIN/{1.73_M2}
GFR SERPL CREATININE-BSD FRML MDRD: 22 ML/MIN/{1.73_M2}
GFR SERPL CREATININE-BSD FRML MDRD: 37 ML/MIN/{1.73_M2}
GFR SERPL CREATININE-BSD FRML MDRD: 40 ML/MIN/{1.73_M2}
GFR SERPL CREATININE-BSD FRML MDRD: 42 ML/MIN/{1.73_M2}
GFR SERPL CREATININE-BSD FRML MDRD: 44 ML/MIN/{1.73_M2}
GFR SERPL CREATININE-BSD FRML MDRD: 47 ML/MIN/{1.73_M2}
GFR SERPL CREATININE-BSD FRML MDRD: 50 ML/MIN/{1.73_M2}
GFR SERPL CREATININE-BSD FRML MDRD: 51 ML/MIN/{1.73_M2}
GFR SERPL CREATININE-BSD FRML MDRD: 56 ML/MIN/{1.73_M2}
GFR SERPL CREATININE-BSD FRML MDRD: 57 ML/MIN/{1.73_M2}
GFR SERPL CREATININE-BSD FRML MDRD: 57 ML/MIN/{1.73_M2}
GFR SERPL CREATININE-BSD FRML MDRD: 63 ML/MIN/{1.73_M2}
GFR SERPL CREATININE-BSD FRML MDRD: 63 ML/MIN/{1.73_M2}
GLUCOSE BLDC GLUCOMTR-MCNC: 108 MG/DL (ref 70–99)
GLUCOSE BLDC GLUCOMTR-MCNC: 115 MG/DL (ref 70–99)
GLUCOSE BLDC GLUCOMTR-MCNC: 132 MG/DL (ref 70–99)
GLUCOSE BLDC GLUCOMTR-MCNC: 135 MG/DL (ref 70–99)
GLUCOSE BLDC GLUCOMTR-MCNC: 136 MG/DL (ref 70–99)
GLUCOSE BLDC GLUCOMTR-MCNC: 139 MG/DL (ref 70–99)
GLUCOSE BLDC GLUCOMTR-MCNC: 154 MG/DL (ref 70–99)
GLUCOSE BLDC GLUCOMTR-MCNC: 157 MG/DL (ref 70–99)
GLUCOSE BLDC GLUCOMTR-MCNC: 159 MG/DL (ref 70–99)
GLUCOSE BLDC GLUCOMTR-MCNC: 160 MG/DL (ref 70–99)
GLUCOSE BLDC GLUCOMTR-MCNC: 163 MG/DL (ref 70–99)
GLUCOSE BLDC GLUCOMTR-MCNC: 166 MG/DL (ref 70–99)
GLUCOSE BLDC GLUCOMTR-MCNC: 166 MG/DL (ref 70–99)
GLUCOSE BLDC GLUCOMTR-MCNC: 170 MG/DL (ref 70–99)
GLUCOSE BLDC GLUCOMTR-MCNC: 176 MG/DL (ref 70–99)
GLUCOSE BLDC GLUCOMTR-MCNC: 176 MG/DL (ref 70–99)
GLUCOSE BLDC GLUCOMTR-MCNC: 179 MG/DL (ref 70–99)
GLUCOSE BLDC GLUCOMTR-MCNC: 182 MG/DL (ref 70–99)
GLUCOSE BLDC GLUCOMTR-MCNC: 187 MG/DL (ref 70–99)
GLUCOSE BLDC GLUCOMTR-MCNC: 188 MG/DL (ref 70–99)
GLUCOSE BLDC GLUCOMTR-MCNC: 190 MG/DL (ref 70–99)
GLUCOSE BLDC GLUCOMTR-MCNC: 191 MG/DL (ref 70–99)
GLUCOSE BLDC GLUCOMTR-MCNC: 200 MG/DL (ref 70–99)
GLUCOSE BLDC GLUCOMTR-MCNC: 201 MG/DL (ref 70–99)
GLUCOSE BLDC GLUCOMTR-MCNC: 201 MG/DL (ref 70–99)
GLUCOSE BLDC GLUCOMTR-MCNC: 202 MG/DL (ref 70–99)
GLUCOSE BLDC GLUCOMTR-MCNC: 205 MG/DL (ref 70–99)
GLUCOSE BLDC GLUCOMTR-MCNC: 208 MG/DL (ref 70–99)
GLUCOSE BLDC GLUCOMTR-MCNC: 209 MG/DL (ref 70–99)
GLUCOSE BLDC GLUCOMTR-MCNC: 210 MG/DL (ref 70–99)
GLUCOSE BLDC GLUCOMTR-MCNC: 213 MG/DL (ref 70–99)
GLUCOSE BLDC GLUCOMTR-MCNC: 216 MG/DL (ref 70–99)
GLUCOSE BLDC GLUCOMTR-MCNC: 221 MG/DL (ref 70–99)
GLUCOSE BLDC GLUCOMTR-MCNC: 224 MG/DL (ref 70–99)
GLUCOSE BLDC GLUCOMTR-MCNC: 224 MG/DL (ref 70–99)
GLUCOSE BLDC GLUCOMTR-MCNC: 231 MG/DL (ref 70–99)
GLUCOSE BLDC GLUCOMTR-MCNC: 233 MG/DL (ref 70–99)
GLUCOSE BLDC GLUCOMTR-MCNC: 236 MG/DL (ref 70–99)
GLUCOSE BLDC GLUCOMTR-MCNC: 241 MG/DL (ref 70–99)
GLUCOSE BLDC GLUCOMTR-MCNC: 250 MG/DL (ref 70–99)
GLUCOSE BLDC GLUCOMTR-MCNC: 257 MG/DL (ref 70–99)
GLUCOSE BLDC GLUCOMTR-MCNC: 260 MG/DL (ref 70–99)
GLUCOSE BLDC GLUCOMTR-MCNC: 265 MG/DL (ref 70–99)
GLUCOSE BLDC GLUCOMTR-MCNC: 265 MG/DL (ref 70–99)
GLUCOSE BLDC GLUCOMTR-MCNC: 270 MG/DL (ref 70–99)
GLUCOSE BLDC GLUCOMTR-MCNC: 272 MG/DL (ref 70–99)
GLUCOSE BLDC GLUCOMTR-MCNC: 274 MG/DL (ref 70–99)
GLUCOSE BLDC GLUCOMTR-MCNC: 293 MG/DL (ref 70–99)
GLUCOSE BLDC GLUCOMTR-MCNC: 302 MG/DL (ref 70–99)
GLUCOSE BLDC GLUCOMTR-MCNC: 312 MG/DL (ref 70–99)
GLUCOSE BLDC GLUCOMTR-MCNC: 313 MG/DL (ref 70–99)
GLUCOSE BLDC GLUCOMTR-MCNC: 392 MG/DL (ref 70–99)
GLUCOSE BLDC GLUCOMTR-MCNC: 83 MG/DL (ref 70–99)
GLUCOSE BLDC GLUCOMTR-MCNC: 95 MG/DL (ref 70–99)
GLUCOSE BLDC GLUCOMTR-MCNC: 99 MG/DL (ref 70–99)
GLUCOSE SERPL-MCNC: 104 MG/DL (ref 70–99)
GLUCOSE SERPL-MCNC: 137 MG/DL (ref 70–99)
GLUCOSE SERPL-MCNC: 149 MG/DL (ref 70–99)
GLUCOSE SERPL-MCNC: 159 MG/DL (ref 70–99)
GLUCOSE SERPL-MCNC: 163 MG/DL (ref 70–99)
GLUCOSE SERPL-MCNC: 180 MG/DL (ref 70–99)
GLUCOSE SERPL-MCNC: 183 MG/DL (ref 70–99)
GLUCOSE SERPL-MCNC: 194 MG/DL (ref 70–99)
GLUCOSE SERPL-MCNC: 200 MG/DL (ref 70–99)
GLUCOSE SERPL-MCNC: 206 MG/DL (ref 70–99)
GLUCOSE SERPL-MCNC: 235 MG/DL (ref 70–99)
GLUCOSE SERPL-MCNC: 258 MG/DL (ref 70–99)
GLUCOSE SERPL-MCNC: 69 MG/DL (ref 70–99)
GLUCOSE SERPL-MCNC: 95 MG/DL (ref 70–99)
GLUCOSE UR STRIP-MCNC: >1000 MG/DL
HAV IGM SERPL QL IA: NONREACTIVE
HBA1C MFR BLD: 8.6 % (ref 0–5.6)
HBV CORE AB SERPL QL IA: NONREACTIVE
HBV SURFACE AB SERPL IA-ACNC: 0 M[IU]/ML
HBV SURFACE AG SERPL QL IA: NONREACTIVE
HCT VFR BLD AUTO: 35 % (ref 40–53)
HCT VFR BLD AUTO: 35.1 % (ref 40–53)
HCT VFR BLD AUTO: 35.3 % (ref 40–53)
HCT VFR BLD AUTO: 35.9 % (ref 40–53)
HCT VFR BLD AUTO: 36.3 % (ref 40–53)
HCT VFR BLD AUTO: 36.9 % (ref 40–53)
HCT VFR BLD AUTO: 37.5 % (ref 40–53)
HCT VFR BLD AUTO: 37.8 % (ref 40–53)
HCT VFR BLD AUTO: 37.8 % (ref 40–53)
HCT VFR BLD AUTO: 38.2 % (ref 40–53)
HCT VFR BLD AUTO: 38.3 % (ref 40–53)
HCT VFR BLD AUTO: 38.3 % (ref 40–53)
HCT VFR BLD AUTO: 38.4 % (ref 40–53)
HCT VFR BLD AUTO: 38.7 % (ref 40–53)
HCT VFR BLD AUTO: 43.7 % (ref 40–53)
HCT VFR BLD AUTO: 45.2 % (ref 40–53)
HCV AB SERPL QL IA: NONREACTIVE
HGB BLD-MCNC: 11.3 G/DL (ref 13.3–17.7)
HGB BLD-MCNC: 11.4 G/DL (ref 13.3–17.7)
HGB BLD-MCNC: 11.5 G/DL (ref 13.3–17.7)
HGB BLD-MCNC: 11.5 G/DL (ref 13.3–17.7)
HGB BLD-MCNC: 11.8 G/DL (ref 13.3–17.7)
HGB BLD-MCNC: 11.9 G/DL (ref 13.3–17.7)
HGB BLD-MCNC: 12.1 G/DL (ref 13.3–17.7)
HGB BLD-MCNC: 12.1 G/DL (ref 13.3–17.7)
HGB BLD-MCNC: 12.3 G/DL (ref 13.3–17.7)
HGB BLD-MCNC: 12.5 G/DL (ref 13.3–17.7)
HGB BLD-MCNC: 13.8 G/DL (ref 13.3–17.7)
HGB BLD-MCNC: 14.4 G/DL (ref 13.3–17.7)
HGB UR QL STRIP: NEGATIVE
IMM GRANULOCYTES # BLD: 0 10E9/L (ref 0–0.4)
IMM GRANULOCYTES # BLD: 0 10E9/L (ref 0–0.4)
IMM GRANULOCYTES NFR BLD: 0.3 %
IMM GRANULOCYTES NFR BLD: 0.5 %
INR PPP: 1.29 (ref 0.86–1.14)
INR PPP: 1.29 (ref 0.86–1.14)
INR PPP: 1.3 (ref 0.86–1.14)
INR PPP: 1.43 (ref 0.86–1.14)
INR PPP: 1.46 (ref 0.86–1.14)
INR PPP: 1.65 (ref 0.86–1.14)
INR PPP: 2.84 (ref 0.86–1.14)
INR PPP: 3.12 (ref 0.86–1.14)
INTERPRETATION ECG - MUSE: NORMAL
INTERPRETATION ECG - MUSE: NORMAL
IRON SATN MFR SERPL: 16 % (ref 15–46)
IRON SERPL-MCNC: 36 UG/DL (ref 35–180)
KETONES UR STRIP-MCNC: NEGATIVE MG/DL
LABORATORY COMMENT REPORT: NORMAL
LACTATE BLD-SCNC: 1 MMOL/L (ref 0.7–2)
LACTATE BLD-SCNC: 1.4 MMOL/L (ref 0.7–2)
LACTATE BLD-SCNC: 1.4 MMOL/L (ref 0.7–2)
LACTATE BLD-SCNC: 1.7 MMOL/L (ref 0.7–2)
LEUKOCYTE ESTERASE UR QL STRIP: NEGATIVE
LMWH PPP CHRO-ACNC: 1.51 IU/ML
LYMPHOCYTES # BLD AUTO: 1 10E9/L (ref 0.8–5.3)
LYMPHOCYTES # BLD AUTO: 2.3 10E9/L (ref 0.8–5.3)
LYMPHOCYTES NFR BLD AUTO: 14.4 %
LYMPHOCYTES NFR BLD AUTO: 30.2 %
MCH RBC QN AUTO: 29.3 PG (ref 26.5–33)
MCH RBC QN AUTO: 29.4 PG (ref 26.5–33)
MCH RBC QN AUTO: 29.5 PG (ref 26.5–33)
MCH RBC QN AUTO: 29.7 PG (ref 26.5–33)
MCH RBC QN AUTO: 29.8 PG (ref 26.5–33)
MCH RBC QN AUTO: 29.9 PG (ref 26.5–33)
MCH RBC QN AUTO: 29.9 PG (ref 26.5–33)
MCH RBC QN AUTO: 30.2 PG (ref 26.5–33)
MCH RBC QN AUTO: 30.2 PG (ref 26.5–33)
MCH RBC QN AUTO: 30.3 PG (ref 26.5–33)
MCH RBC QN AUTO: 30.4 PG (ref 26.5–33)
MCH RBC QN AUTO: 30.5 PG (ref 26.5–33)
MCH RBC QN AUTO: 31 PG (ref 26.5–33)
MCHC RBC AUTO-ENTMCNC: 31.6 G/DL (ref 31.5–36.5)
MCHC RBC AUTO-ENTMCNC: 31.8 G/DL (ref 31.5–36.5)
MCHC RBC AUTO-ENTMCNC: 31.8 G/DL (ref 31.5–36.5)
MCHC RBC AUTO-ENTMCNC: 31.9 G/DL (ref 31.5–36.5)
MCHC RBC AUTO-ENTMCNC: 32 G/DL (ref 31.5–36.5)
MCHC RBC AUTO-ENTMCNC: 32 G/DL (ref 31.5–36.5)
MCHC RBC AUTO-ENTMCNC: 32.2 G/DL (ref 31.5–36.5)
MCHC RBC AUTO-ENTMCNC: 32.3 G/DL (ref 31.5–36.5)
MCHC RBC AUTO-ENTMCNC: 32.5 G/DL (ref 31.5–36.5)
MCHC RBC AUTO-ENTMCNC: 32.5 G/DL (ref 31.5–36.5)
MCHC RBC AUTO-ENTMCNC: 32.6 G/DL (ref 31.5–36.5)
MCHC RBC AUTO-ENTMCNC: 32.8 G/DL (ref 31.5–36.5)
MCHC RBC AUTO-ENTMCNC: 32.8 G/DL (ref 31.5–36.5)
MCHC RBC AUTO-ENTMCNC: 33.3 G/DL (ref 31.5–36.5)
MCV RBC AUTO: 91 FL (ref 78–100)
MCV RBC AUTO: 92 FL (ref 78–100)
MCV RBC AUTO: 93 FL (ref 78–100)
MCV RBC AUTO: 94 FL (ref 78–100)
MDC_IDC_LEAD_IMPLANT_DT: NORMAL
MDC_IDC_LEAD_LOCATION: NORMAL
MDC_IDC_LEAD_LOCATION_DETAIL_1: NORMAL
MDC_IDC_LEAD_MFG: NORMAL
MDC_IDC_LEAD_MODEL: NORMAL
MDC_IDC_LEAD_POLARITY_TYPE: NORMAL
MDC_IDC_LEAD_SERIAL: NORMAL
MDC_IDC_LEAD_SPECIAL_FUNCTION: NORMAL
MDC_IDC_MSMT_BATTERY_DTM: NORMAL
MDC_IDC_MSMT_BATTERY_REMAINING_LONGEVITY: 37 MO
MDC_IDC_MSMT_BATTERY_REMAINING_LONGEVITY: 54 MO
MDC_IDC_MSMT_BATTERY_REMAINING_LONGEVITY: 54 MO
MDC_IDC_MSMT_BATTERY_REMAINING_PERCENTAGE: 46 %
MDC_IDC_MSMT_BATTERY_STATUS: NORMAL
MDC_IDC_MSMT_CAP_CHARGE_DTM: NORMAL
MDC_IDC_MSMT_CAP_CHARGE_TIME: 9.25 S
MDC_IDC_MSMT_CAP_CHARGE_TYPE: NORMAL
MDC_IDC_MSMT_LEADCHNL_LV_IMPEDANCE_VALUE: 787.5 OHM
MDC_IDC_MSMT_LEADCHNL_LV_IMPEDANCE_VALUE: 787.5 OHM
MDC_IDC_MSMT_LEADCHNL_LV_IMPEDANCE_VALUE: 800 OHM
MDC_IDC_MSMT_LEADCHNL_LV_PACING_THRESHOLD_AMPLITUDE: 1.5 V
MDC_IDC_MSMT_LEADCHNL_LV_PACING_THRESHOLD_AMPLITUDE: 1.75 V
MDC_IDC_MSMT_LEADCHNL_LV_PACING_THRESHOLD_PULSEWIDTH: 0.5 MS
MDC_IDC_MSMT_LEADCHNL_RA_IMPEDANCE_VALUE: 425 OHM
MDC_IDC_MSMT_LEADCHNL_RA_IMPEDANCE_VALUE: 437.5 OHM
MDC_IDC_MSMT_LEADCHNL_RA_IMPEDANCE_VALUE: 437.5 OHM
MDC_IDC_MSMT_LEADCHNL_RA_PACING_THRESHOLD_AMPLITUDE: 0.75 V
MDC_IDC_MSMT_LEADCHNL_RA_PACING_THRESHOLD_PULSEWIDTH: 0.5 MS
MDC_IDC_MSMT_LEADCHNL_RA_SENSING_INTR_AMPL: 5 MV
MDC_IDC_MSMT_LEADCHNL_RV_IMPEDANCE_VALUE: 575 OHM
MDC_IDC_MSMT_LEADCHNL_RV_IMPEDANCE_VALUE: 575 OHM
MDC_IDC_MSMT_LEADCHNL_RV_IMPEDANCE_VALUE: 600 OHM
MDC_IDC_MSMT_LEADCHNL_RV_PACING_THRESHOLD_AMPLITUDE: 0.5 V
MDC_IDC_MSMT_LEADCHNL_RV_PACING_THRESHOLD_PULSEWIDTH: 0.5 MS
MDC_IDC_MSMT_LEADCHNL_RV_SENSING_INTR_AMPL: 12 MV
MDC_IDC_PG_IMPLANT_DTM: NORMAL
MDC_IDC_PG_MFG: NORMAL
MDC_IDC_PG_MODEL: NORMAL
MDC_IDC_PG_SERIAL: NORMAL
MDC_IDC_PG_TYPE: NORMAL
MDC_IDC_SESS_CLINIC_NAME: NORMAL
MDC_IDC_SESS_DTM: NORMAL
MDC_IDC_SESS_TYPE: NORMAL
MDC_IDC_SET_BRADY_AT_MODE_SWITCH_MODE: NORMAL
MDC_IDC_SET_BRADY_AT_MODE_SWITCH_RATE: 180 {BEATS}/MIN
MDC_IDC_SET_BRADY_HYSTRATE: NORMAL
MDC_IDC_SET_BRADY_LOWRATE: 60 {BEATS}/MIN
MDC_IDC_SET_BRADY_MAX_SENSOR_RATE: 110 {BEATS}/MIN
MDC_IDC_SET_BRADY_MAX_TRACKING_RATE: 110 {BEATS}/MIN
MDC_IDC_SET_BRADY_MODE: NORMAL
MDC_IDC_SET_BRADY_NIGHT_RATE: NORMAL
MDC_IDC_SET_BRADY_PAV_DELAY_LOW: 150 MS
MDC_IDC_SET_BRADY_SAV_DELAY_LOW: 100 MS
MDC_IDC_SET_CRT_LVRV_DELAY: 20 MS
MDC_IDC_SET_CRT_PACED_CHAMBERS: NORMAL
MDC_IDC_SET_LEADCHNL_LV_PACING_AMPLITUDE: 2.25 V
MDC_IDC_SET_LEADCHNL_LV_PACING_AMPLITUDE: 2.25 V
MDC_IDC_SET_LEADCHNL_LV_PACING_AMPLITUDE: 2.75 V
MDC_IDC_SET_LEADCHNL_LV_PACING_ANODE_ELECTRODE_1: NORMAL
MDC_IDC_SET_LEADCHNL_LV_PACING_ANODE_LOCATION_1: NORMAL
MDC_IDC_SET_LEADCHNL_LV_PACING_CAPTURE_MODE: NORMAL
MDC_IDC_SET_LEADCHNL_LV_PACING_CATHODE_ELECTRODE_1: NORMAL
MDC_IDC_SET_LEADCHNL_LV_PACING_CATHODE_LOCATION_1: NORMAL
MDC_IDC_SET_LEADCHNL_LV_PACING_POLARITY: NORMAL
MDC_IDC_SET_LEADCHNL_LV_PACING_PULSEWIDTH: 0.5 MS
MDC_IDC_SET_LEADCHNL_RA_PACING_AMPLITUDE: 1.5 V
MDC_IDC_SET_LEADCHNL_RA_PACING_CAPTURE_MODE: NORMAL
MDC_IDC_SET_LEADCHNL_RA_PACING_POLARITY: NORMAL
MDC_IDC_SET_LEADCHNL_RA_PACING_PULSEWIDTH: 0.5 MS
MDC_IDC_SET_LEADCHNL_RA_SENSING_ADAPTATION_MODE: NORMAL
MDC_IDC_SET_LEADCHNL_RA_SENSING_POLARITY: NORMAL
MDC_IDC_SET_LEADCHNL_RA_SENSING_SENSITIVITY: 0.3 MV
MDC_IDC_SET_LEADCHNL_RV_PACING_AMPLITUDE: 2 V
MDC_IDC_SET_LEADCHNL_RV_PACING_CAPTURE_MODE: NORMAL
MDC_IDC_SET_LEADCHNL_RV_PACING_POLARITY: NORMAL
MDC_IDC_SET_LEADCHNL_RV_PACING_PULSEWIDTH: 0.5 MS
MDC_IDC_SET_LEADCHNL_RV_SENSING_POLARITY: NORMAL
MDC_IDC_SET_LEADCHNL_RV_SENSING_SENSITIVITY: 2 MV
MDC_IDC_SET_ZONE_DETECTION_INTERVAL: 320 MS
MDC_IDC_SET_ZONE_DETECTION_INTERVAL: 360 MS
MDC_IDC_SET_ZONE_DETECTION_INTERVAL: 460 MS
MDC_IDC_SET_ZONE_TYPE: NORMAL
MDC_IDC_SET_ZONE_VENDOR_TYPE: NORMAL
MDC_IDC_STAT_AT_DTM_END: NORMAL
MDC_IDC_STAT_AT_DTM_START: NORMAL
MDC_IDC_STAT_AT_MODE_SW_COUNT: 0
MDC_IDC_STAT_BRADY_DTM_END: NORMAL
MDC_IDC_STAT_BRADY_DTM_START: NORMAL
MDC_IDC_STAT_BRADY_RA_PERCENT_PACED: 0 %
MDC_IDC_STAT_BRADY_RA_PERCENT_PACED: 0 %
MDC_IDC_STAT_BRADY_RA_PERCENT_PACED: 37 %
MDC_IDC_STAT_BRADY_RV_PERCENT_PACED: 0 %
MDC_IDC_STAT_BRADY_RV_PERCENT_PACED: 0 %
MDC_IDC_STAT_BRADY_RV_PERCENT_PACED: 99.23 %
MDC_IDC_STAT_EPISODE_RECENT_COUNT: 0
MDC_IDC_STAT_EPISODE_RECENT_COUNT_DTM_END: NORMAL
MDC_IDC_STAT_EPISODE_RECENT_COUNT_DTM_START: NORMAL
MDC_IDC_STAT_EPISODE_TYPE: NORMAL
MDC_IDC_STAT_EPISODE_VENDOR_TYPE: NORMAL
MDC_IDC_STAT_HEART_RATE_DTM_END: NORMAL
MDC_IDC_STAT_HEART_RATE_DTM_START: NORMAL
MDC_IDC_STAT_TACHYTHERAPY_ATP_DELIVERED_RECENT: 0
MDC_IDC_STAT_TACHYTHERAPY_RECENT_DTM_END: NORMAL
MDC_IDC_STAT_TACHYTHERAPY_RECENT_DTM_START: NORMAL
MDC_IDC_STAT_TACHYTHERAPY_SHOCKS_ABORTED_RECENT: 0
MDC_IDC_STAT_TACHYTHERAPY_SHOCKS_DELIVERED_RECENT: 0
MONOCYTES # BLD AUTO: 0.4 10E9/L (ref 0–1.3)
MONOCYTES # BLD AUTO: 0.7 10E9/L (ref 0–1.3)
MONOCYTES NFR BLD AUTO: 6.4 %
MONOCYTES NFR BLD AUTO: 8.9 %
MUCOUS THREADS #/AREA URNS LPF: PRESENT /LPF
NEUTROPHILS # BLD AUTO: 4.5 10E9/L (ref 1.6–8.3)
NEUTROPHILS # BLD AUTO: 5.2 10E9/L (ref 1.6–8.3)
NEUTROPHILS NFR BLD AUTO: 58.9 %
NEUTROPHILS NFR BLD AUTO: 78.4 %
NITRATE UR QL: NEGATIVE
NOROV GI+II ORF1-ORF2 JNC STL QL NAA+PR: NORMAL
NOROV GI+II ORF1-ORF2 JNC STL QL NAA+PR: NOT DETECTED
NRBC # BLD AUTO: 0 10*3/UL
NRBC # BLD AUTO: 0 10*3/UL
NRBC BLD AUTO-RTO: 0 /100
NRBC BLD AUTO-RTO: 0 /100
NT-PROBNP SERPL-MCNC: 1410 PG/ML (ref 0–450)
PH UR STRIP: 5.5 PH (ref 5–7)
PLATELET # BLD AUTO: 120 10E9/L (ref 150–450)
PLATELET # BLD AUTO: 120 10E9/L (ref 150–450)
PLATELET # BLD AUTO: 121 10E9/L (ref 150–450)
PLATELET # BLD AUTO: 123 10E9/L (ref 150–450)
PLATELET # BLD AUTO: 125 10E9/L (ref 150–450)
PLATELET # BLD AUTO: 125 10E9/L (ref 150–450)
PLATELET # BLD AUTO: 126 10E9/L (ref 150–450)
PLATELET # BLD AUTO: 127 10E9/L (ref 150–450)
PLATELET # BLD AUTO: 129 10E9/L (ref 150–450)
PLATELET # BLD AUTO: 130 10E9/L (ref 150–450)
PLATELET # BLD AUTO: 131 10E9/L (ref 150–450)
PLATELET # BLD AUTO: 133 10E9/L (ref 150–450)
PLATELET # BLD AUTO: 137 10E9/L (ref 150–450)
PLATELET # BLD AUTO: 138 10E9/L (ref 150–450)
PLATELET # BLD AUTO: 157 10E9/L (ref 150–450)
PLATELET # BLD AUTO: 163 10E9/L (ref 150–450)
POTASSIUM SERPL-SCNC: 3.4 MMOL/L (ref 3.4–5.3)
POTASSIUM SERPL-SCNC: 3.5 MMOL/L (ref 3.4–5.3)
POTASSIUM SERPL-SCNC: 3.5 MMOL/L (ref 3.4–5.3)
POTASSIUM SERPL-SCNC: 3.6 MMOL/L (ref 3.4–5.3)
POTASSIUM SERPL-SCNC: 3.7 MMOL/L (ref 3.4–5.3)
POTASSIUM SERPL-SCNC: 3.8 MMOL/L (ref 3.4–5.3)
POTASSIUM SERPL-SCNC: 3.8 MMOL/L (ref 3.4–5.3)
POTASSIUM SERPL-SCNC: 3.9 MMOL/L (ref 3.4–5.3)
POTASSIUM SERPL-SCNC: 4 MMOL/L (ref 3.4–5.3)
POTASSIUM SERPL-SCNC: 4.2 MMOL/L (ref 3.4–5.3)
POTASSIUM SERPL-SCNC: 4.2 MMOL/L (ref 3.4–5.3)
POTASSIUM SERPL-SCNC: 4.6 MMOL/L (ref 3.4–5.3)
PROCALCITONIN SERPL-MCNC: 0.17 NG/ML
PROCALCITONIN SERPL-MCNC: 0.22 NG/ML
PROCALCITONIN SERPL-MCNC: 0.3 NG/ML
PROT SERPL-MCNC: 6.4 G/DL (ref 6.8–8.8)
PROT SERPL-MCNC: 6.5 G/DL (ref 6.8–8.8)
PROT SERPL-MCNC: 7.3 G/DL (ref 6.8–8.8)
PROT SERPL-MCNC: 8.4 G/DL (ref 6.8–8.8)
RADIOLOGIST FLAGS: ABNORMAL
RBC # BLD AUTO: 3.8 10E12/L (ref 4.4–5.9)
RBC # BLD AUTO: 3.8 10E12/L (ref 4.4–5.9)
RBC # BLD AUTO: 3.81 10E12/L (ref 4.4–5.9)
RBC # BLD AUTO: 3.84 10E12/L (ref 4.4–5.9)
RBC # BLD AUTO: 3.95 10E12/L (ref 4.4–5.9)
RBC # BLD AUTO: 3.98 10E12/L (ref 4.4–5.9)
RBC # BLD AUTO: 4.03 10E12/L (ref 4.4–5.9)
RBC # BLD AUTO: 4.03 10E12/L (ref 4.4–5.9)
RBC # BLD AUTO: 4.05 10E12/L (ref 4.4–5.9)
RBC # BLD AUTO: 4.07 10E12/L (ref 4.4–5.9)
RBC # BLD AUTO: 4.08 10E12/L (ref 4.4–5.9)
RBC # BLD AUTO: 4.08 10E12/L (ref 4.4–5.9)
RBC # BLD AUTO: 4.13 10E12/L (ref 4.4–5.9)
RBC # BLD AUTO: 4.18 10E12/L (ref 4.4–5.9)
RBC # BLD AUTO: 4.68 10E12/L (ref 4.4–5.9)
RBC # BLD AUTO: 4.92 10E12/L (ref 4.4–5.9)
RBC #/AREA URNS AUTO: <1 /HPF (ref 0–2)
RVA NSP5 STL QL NAA+PROBE: NORMAL
RVA NSP5 STL QL NAA+PROBE: NOT DETECTED
SALMONELLA SP RPOD STL QL NAA+PROBE: NORMAL
SALMONELLA SP RPOD STL QL NAA+PROBE: NOT DETECTED
SARS-COV-2 RNA SPEC QL NAA+PROBE: NEGATIVE
SARS-COV-2 RNA SPEC QL NAA+PROBE: NORMAL
SHIGELLA SP+EIEC IPAH STL QL NAA+PROBE: NORMAL
SHIGELLA SP+EIEC IPAH STL QL NAA+PROBE: NOT DETECTED
SODIUM SERPL-SCNC: 137 MMOL/L (ref 133–144)
SODIUM SERPL-SCNC: 137 MMOL/L (ref 133–144)
SODIUM SERPL-SCNC: 139 MMOL/L (ref 133–144)
SODIUM SERPL-SCNC: 140 MMOL/L (ref 133–144)
SODIUM SERPL-SCNC: 141 MMOL/L (ref 133–144)
SODIUM SERPL-SCNC: 141 MMOL/L (ref 133–144)
SODIUM SERPL-SCNC: 142 MMOL/L (ref 133–144)
SODIUM SERPL-SCNC: 144 MMOL/L (ref 133–144)
SOURCE: ABNORMAL
SP GR UR STRIP: 1.01 (ref 1–1.03)
SPECIMEN SOURCE: NORMAL
TIBC SERPL-MCNC: 229 UG/DL (ref 240–430)
TRANSFERRIN SERPL-MCNC: 172 MG/DL (ref 210–360)
TROPONIN I SERPL-MCNC: <0.015 UG/L (ref 0–0.04)
UROBILINOGEN UR STRIP-MCNC: NORMAL MG/DL (ref 0–2)
V CHOL+PARA RFBL+TRKH+TNAA STL QL NAA+PR: NORMAL
V CHOL+PARA RFBL+TRKH+TNAA STL QL NAA+PR: NOT DETECTED
VANCOMYCIN SERPL-MCNC: 9.1 MG/L
WBC # BLD AUTO: 3.6 10E9/L (ref 4–11)
WBC # BLD AUTO: 3.8 10E9/L (ref 4–11)
WBC # BLD AUTO: 3.8 10E9/L (ref 4–11)
WBC # BLD AUTO: 4.4 10E9/L (ref 4–11)
WBC # BLD AUTO: 4.4 10E9/L (ref 4–11)
WBC # BLD AUTO: 4.5 10E9/L (ref 4–11)
WBC # BLD AUTO: 4.6 10E9/L (ref 4–11)
WBC # BLD AUTO: 4.6 10E9/L (ref 4–11)
WBC # BLD AUTO: 4.9 10E9/L (ref 4–11)
WBC # BLD AUTO: 5.1 10E9/L (ref 4–11)
WBC # BLD AUTO: 5.6 10E9/L (ref 4–11)
WBC # BLD AUTO: 6.6 10E9/L (ref 4–11)
WBC # BLD AUTO: 6.6 10E9/L (ref 4–11)
WBC # BLD AUTO: 7.1 10E9/L (ref 4–11)
WBC # BLD AUTO: 7.6 10E9/L (ref 4–11)
WBC # BLD AUTO: 7.7 10E9/L (ref 4–11)
WBC #/AREA URNS AUTO: 1 /HPF (ref 0–5)
Y ENTERO RECN STL QL NAA+PROBE: NORMAL
Y ENTERO RECN STL QL NAA+PROBE: NOT DETECTED

## 2020-01-01 PROCEDURE — 36415 COLL VENOUS BLD VENIPUNCTURE: CPT | Performed by: STUDENT IN AN ORGANIZED HEALTH CARE EDUCATION/TRAINING PROGRAM

## 2020-01-01 PROCEDURE — 250N000013 HC RX MED GY IP 250 OP 250 PS 637: Performed by: STUDENT IN AN ORGANIZED HEALTH CARE EDUCATION/TRAINING PROGRAM

## 2020-01-01 PROCEDURE — 87340 HEPATITIS B SURFACE AG IA: CPT | Performed by: STUDENT IN AN ORGANIZED HEALTH CARE EDUCATION/TRAINING PROGRAM

## 2020-01-01 PROCEDURE — 37243 VASC EMBOLIZE/OCCLUDE ORGAN: CPT

## 2020-01-01 PROCEDURE — 82378 CARCINOEMBRYONIC ANTIGEN: CPT | Mod: 90 | Performed by: PATHOLOGY

## 2020-01-01 PROCEDURE — 999N001070 HC STATISTIC R-RUSH PROCESSING: Performed by: INTERNAL MEDICINE

## 2020-01-01 PROCEDURE — 250N000011 HC RX IP 250 OP 636: Performed by: PHYSICIAN ASSISTANT

## 2020-01-01 PROCEDURE — 120N000002 HC R&B MED SURG/OB UMMC

## 2020-01-01 PROCEDURE — 80048 BASIC METABOLIC PNL TOTAL CA: CPT | Performed by: STUDENT IN AN ORGANIZED HEALTH CARE EDUCATION/TRAINING PROGRAM

## 2020-01-01 PROCEDURE — 85610 PROTHROMBIN TIME: CPT | Performed by: STUDENT IN AN ORGANIZED HEALTH CARE EDUCATION/TRAINING PROGRAM

## 2020-01-01 PROCEDURE — 74176 CT ABD & PELVIS W/O CONTRAST: CPT

## 2020-01-01 PROCEDURE — 255N000002 HC RX 255 OP 636: Performed by: STUDENT IN AN ORGANIZED HEALTH CARE EDUCATION/TRAINING PROGRAM

## 2020-01-01 PROCEDURE — 88341 IMHCHEM/IMCYTCHM EA ADD ANTB: CPT | Mod: 26 | Performed by: PATHOLOGY

## 2020-01-01 PROCEDURE — 999N001017 HC STATISTIC GLUCOSE BY METER IP

## 2020-01-01 PROCEDURE — 75726 ARTERY X-RAYS ABDOMEN: CPT

## 2020-01-01 PROCEDURE — 80076 HEPATIC FUNCTION PANEL: CPT | Performed by: STUDENT IN AN ORGANIZED HEALTH CARE EDUCATION/TRAINING PROGRAM

## 2020-01-01 PROCEDURE — 93284 PRGRMG EVAL IMPLANTABLE DFB: CPT | Performed by: INTERNAL MEDICINE

## 2020-01-01 PROCEDURE — 99207 PR CDG-HISTORY COMPONENT: MEETS DETAILED - DOWN CODED LACK OF PFSH: CPT | Performed by: STUDENT IN AN ORGANIZED HEALTH CARE EDUCATION/TRAINING PROGRAM

## 2020-01-01 PROCEDURE — 85025 COMPLETE CBC W/AUTO DIFF WBC: CPT | Performed by: PATHOLOGY

## 2020-01-01 PROCEDURE — 272N000566 HC SHEATH CR3

## 2020-01-01 PROCEDURE — 85027 COMPLETE CBC AUTOMATED: CPT | Performed by: STUDENT IN AN ORGANIZED HEALTH CARE EDUCATION/TRAINING PROGRAM

## 2020-01-01 PROCEDURE — 278N000001 HC RX 278: Mod: XU | Performed by: RADIOLOGY

## 2020-01-01 PROCEDURE — 74176 CT ABD & PELVIS W/O CONTRAST: CPT | Mod: 26 | Performed by: RADIOLOGY

## 2020-01-01 PROCEDURE — 99152 MOD SED SAME PHYS/QHP 5/>YRS: CPT | Performed by: RADIOLOGY

## 2020-01-01 PROCEDURE — 250N000011 HC RX IP 250 OP 636: Performed by: STUDENT IN AN ORGANIZED HEALTH CARE EDUCATION/TRAINING PROGRAM

## 2020-01-01 PROCEDURE — 999N000134 HC STATISTIC PP CARE STAGE 3

## 2020-01-01 PROCEDURE — 88307 TISSUE EXAM BY PATHOLOGIST: CPT | Mod: TC | Performed by: STUDENT IN AN ORGANIZED HEALTH CARE EDUCATION/TRAINING PROGRAM

## 2020-01-01 PROCEDURE — C1769 GUIDE WIRE: HCPCS

## 2020-01-01 PROCEDURE — 255N000002 HC RX 255 OP 636: Performed by: RADIOLOGY

## 2020-01-01 PROCEDURE — 250N000011 HC RX IP 250 OP 636: Performed by: INTERNAL MEDICINE

## 2020-01-01 PROCEDURE — 99233 SBSQ HOSP IP/OBS HIGH 50: CPT | Performed by: PHYSICIAN ASSISTANT

## 2020-01-01 PROCEDURE — 99153 MOD SED SAME PHYS/QHP EA: CPT

## 2020-01-01 PROCEDURE — 250N000013 HC RX MED GY IP 250 OP 250 PS 637: Performed by: INTERNAL MEDICINE

## 2020-01-01 PROCEDURE — 250N000009 HC RX 250: Performed by: RADIOLOGY

## 2020-01-01 PROCEDURE — C1887 CATHETER, GUIDING: HCPCS

## 2020-01-01 PROCEDURE — 258N000003 HC RX IP 258 OP 636: Performed by: PHYSICIAN ASSISTANT

## 2020-01-01 PROCEDURE — 250N000009 HC RX 250: Performed by: STUDENT IN AN ORGANIZED HEALTH CARE EDUCATION/TRAINING PROGRAM

## 2020-01-01 PROCEDURE — 82378 CARCINOEMBRYONIC ANTIGEN: CPT | Performed by: STUDENT IN AN ORGANIZED HEALTH CARE EDUCATION/TRAINING PROGRAM

## 2020-01-01 PROCEDURE — 999N000128 HC STATISTIC PERIPHERAL IV START W/O US GUIDANCE

## 2020-01-01 PROCEDURE — 99233 SBSQ HOSP IP/OBS HIGH 50: CPT | Performed by: INTERNAL MEDICINE

## 2020-01-01 PROCEDURE — 80053 COMPREHEN METABOLIC PANEL: CPT | Performed by: STUDENT IN AN ORGANIZED HEALTH CARE EDUCATION/TRAINING PROGRAM

## 2020-01-01 PROCEDURE — 99152 MOD SED SAME PHYS/QHP 5/>YRS: CPT | Mod: GC | Performed by: RADIOLOGY

## 2020-01-01 PROCEDURE — A9581 GADOXETATE DISODIUM INJ: HCPCS | Performed by: INTERNAL MEDICINE

## 2020-01-01 PROCEDURE — 76942 ECHO GUIDE FOR BIOPSY: CPT | Mod: 26 | Performed by: RADIOLOGY

## 2020-01-01 PROCEDURE — G0452 MOLECULAR PATHOLOGY INTERPR: HCPCS | Mod: 26 | Performed by: PATHOLOGY

## 2020-01-01 PROCEDURE — 0FB03ZX EXCISION OF LIVER, PERCUTANEOUS APPROACH, DIAGNOSTIC: ICD-10-PCS | Performed by: RADIOLOGY

## 2020-01-01 PROCEDURE — 999N000127 HC STATISTIC PERIPHERAL IV START W US GUIDANCE

## 2020-01-01 PROCEDURE — 36247 INS CATH ABD/L-EXT ART 3RD: CPT | Mod: GC | Performed by: RADIOLOGY

## 2020-01-01 PROCEDURE — 82105 ALPHA-FETOPROTEIN SERUM: CPT | Performed by: STUDENT IN AN ORGANIZED HEALTH CARE EDUCATION/TRAINING PROGRAM

## 2020-01-01 PROCEDURE — 83036 HEMOGLOBIN GLYCOSYLATED A1C: CPT | Performed by: STUDENT IN AN ORGANIZED HEALTH CARE EDUCATION/TRAINING PROGRAM

## 2020-01-01 PROCEDURE — 75774 ARTERY X-RAY EACH VESSEL: CPT | Mod: 26 | Performed by: RADIOLOGY

## 2020-01-01 PROCEDURE — 99233 SBSQ HOSP IP/OBS HIGH 50: CPT | Mod: GC | Performed by: INTERNAL MEDICINE

## 2020-01-01 PROCEDURE — 36247 INS CATH ABD/L-EXT ART 3RD: CPT | Mod: GZ

## 2020-01-01 PROCEDURE — 79445 NUCLEAR RX INTRA-ARTERIAL: CPT

## 2020-01-01 PROCEDURE — 83880 ASSAY OF NATRIURETIC PEPTIDE: CPT | Performed by: PATHOLOGY

## 2020-01-01 PROCEDURE — 88342 IMHCHEM/IMCYTCHM 1ST ANTB: CPT | Mod: 26 | Performed by: PATHOLOGY

## 2020-01-01 PROCEDURE — 258N000003 HC RX IP 258 OP 636: Performed by: STUDENT IN AN ORGANIZED HEALTH CARE EDUCATION/TRAINING PROGRAM

## 2020-01-01 PROCEDURE — 99152 MOD SED SAME PHYS/QHP 5/>YRS: CPT

## 2020-01-01 PROCEDURE — 81445 SO NEO GSAP 5-50DNA/DNA&RNA: CPT | Performed by: STUDENT IN AN ORGANIZED HEALTH CARE EDUCATION/TRAINING PROGRAM

## 2020-01-01 PROCEDURE — C2616 BRACHYTX, NON-STR,YTTRIUM-90: HCPCS | Mod: XU | Performed by: RADIOLOGY

## 2020-01-01 PROCEDURE — 36415 COLL VENOUS BLD VENIPUNCTURE: CPT | Performed by: INTERNAL MEDICINE

## 2020-01-01 PROCEDURE — 81001 URINALYSIS AUTO W/SCOPE: CPT | Performed by: STUDENT IN AN ORGANIZED HEALTH CARE EDUCATION/TRAINING PROGRAM

## 2020-01-01 PROCEDURE — 255N000002 HC RX 255 OP 636: Performed by: INTERNAL MEDICINE

## 2020-01-01 PROCEDURE — 84484 ASSAY OF TROPONIN QUANT: CPT | Performed by: STUDENT IN AN ORGANIZED HEALTH CARE EDUCATION/TRAINING PROGRAM

## 2020-01-01 PROCEDURE — 82728 ASSAY OF FERRITIN: CPT | Performed by: STUDENT IN AN ORGANIZED HEALTH CARE EDUCATION/TRAINING PROGRAM

## 2020-01-01 PROCEDURE — 86140 C-REACTIVE PROTEIN: CPT | Performed by: STUDENT IN AN ORGANIZED HEALTH CARE EDUCATION/TRAINING PROGRAM

## 2020-01-01 PROCEDURE — 999N000197 HC STATISTIC WOC PT EDUCATION, 0-15 MIN

## 2020-01-01 PROCEDURE — U0003 INFECTIOUS AGENT DETECTION BY NUCLEIC ACID (DNA OR RNA); SEVERE ACUTE RESPIRATORY SYNDROME CORONAVIRUS 2 (SARS-COV-2) (CORONAVIRUS DISEASE [COVID-19]), AMPLIFIED PROBE TECHNIQUE, MAKING USE OF HIGH THROUGHPUT TECHNOLOGIES AS DESCRIBED BY CMS-2020-01-R: HCPCS | Mod: 90 | Performed by: PATHOLOGY

## 2020-01-01 PROCEDURE — 85730 THROMBOPLASTIN TIME PARTIAL: CPT | Performed by: RADIOLOGY

## 2020-01-01 PROCEDURE — 272N000143 HC KIT CR3

## 2020-01-01 PROCEDURE — 84145 PROCALCITONIN (PCT): CPT | Performed by: STUDENT IN AN ORGANIZED HEALTH CARE EDUCATION/TRAINING PROGRAM

## 2020-01-01 PROCEDURE — 83550 IRON BINDING TEST: CPT | Performed by: STUDENT IN AN ORGANIZED HEALTH CARE EDUCATION/TRAINING PROGRAM

## 2020-01-01 PROCEDURE — 83605 ASSAY OF LACTIC ACID: CPT | Performed by: INTERNAL MEDICINE

## 2020-01-01 PROCEDURE — 78830 RP LOCLZJ TUM SPECT W/CT 1: CPT | Mod: 26 | Performed by: RADIOLOGY

## 2020-01-01 PROCEDURE — 99238 HOSP IP/OBS DSCHRG MGMT 30/<: CPT | Performed by: INTERNAL MEDICINE

## 2020-01-01 PROCEDURE — 258N000003 HC RX IP 258 OP 636: Performed by: INTERNAL MEDICINE

## 2020-01-01 PROCEDURE — 999N000007 HC SITE CHECK

## 2020-01-01 PROCEDURE — 86301 IMMUNOASSAY TUMOR CA 19-9: CPT | Performed by: STUDENT IN AN ORGANIZED HEALTH CARE EDUCATION/TRAINING PROGRAM

## 2020-01-01 PROCEDURE — 272N000567 HC SHEATH CR4

## 2020-01-01 PROCEDURE — 86706 HEP B SURFACE ANTIBODY: CPT | Performed by: STUDENT IN AN ORGANIZED HEALTH CARE EDUCATION/TRAINING PROGRAM

## 2020-01-01 PROCEDURE — 78830 RP LOCLZJ TUM SPECT W/CT 1: CPT

## 2020-01-01 PROCEDURE — 93005 ELECTROCARDIOGRAM TRACING: CPT

## 2020-01-01 PROCEDURE — 83605 ASSAY OF LACTIC ACID: CPT | Performed by: STUDENT IN AN ORGANIZED HEALTH CARE EDUCATION/TRAINING PROGRAM

## 2020-01-01 PROCEDURE — 75774 ARTERY X-RAY EACH VESSEL: CPT | Mod: XU

## 2020-01-01 PROCEDURE — 272N000504 HC NEEDLE CR4

## 2020-01-01 PROCEDURE — 74183 MRI ABD W/O CNTR FLWD CNTR: CPT

## 2020-01-01 PROCEDURE — 86803 HEPATITIS C AB TEST: CPT | Performed by: STUDENT IN AN ORGANIZED HEALTH CARE EDUCATION/TRAINING PROGRAM

## 2020-01-01 PROCEDURE — 99153 MOD SED SAME PHYS/QHP EA: CPT | Performed by: INTERNAL MEDICINE

## 2020-01-01 PROCEDURE — 80053 COMPREHEN METABOLIC PANEL: CPT | Performed by: PATHOLOGY

## 2020-01-01 PROCEDURE — 76937 US GUIDE VASCULAR ACCESS: CPT | Mod: 26 | Performed by: RADIOLOGY

## 2020-01-01 PROCEDURE — 75726 ARTERY X-RAYS ABDOMEN: CPT | Mod: XU

## 2020-01-01 PROCEDURE — 250N000011 HC RX IP 250 OP 636: Performed by: RADIOLOGY

## 2020-01-01 PROCEDURE — 85027 COMPLETE CBC AUTOMATED: CPT | Performed by: RADIOLOGY

## 2020-01-01 PROCEDURE — 250N000012 HC RX MED GY IP 250 OP 636 PS 637: Performed by: STUDENT IN AN ORGANIZED HEALTH CARE EDUCATION/TRAINING PROGRAM

## 2020-01-01 PROCEDURE — 99221 1ST HOSP IP/OBS SF/LOW 40: CPT | Mod: AI | Performed by: STUDENT IN AN ORGANIZED HEALTH CARE EDUCATION/TRAINING PROGRAM

## 2020-01-01 PROCEDURE — 47000 NEEDLE BIOPSY OF LIVER PERQ: CPT | Performed by: RADIOLOGY

## 2020-01-01 PROCEDURE — 71250 CT THORAX DX C-: CPT | Mod: 26 | Performed by: RADIOLOGY

## 2020-01-01 PROCEDURE — 77300 RADIATION THERAPY DOSE PLAN: CPT | Mod: 26 | Performed by: RADIOLOGY

## 2020-01-01 PROCEDURE — 343N000001 HC RX 343: Performed by: RADIOLOGY

## 2020-01-01 PROCEDURE — 999N001193 HC VIDEO/TELEPHONE VISIT; NO CHARGE

## 2020-01-01 PROCEDURE — 74183 MRI ABD W/O CNTR FLWD CNTR: CPT | Mod: 26 | Performed by: RADIOLOGY

## 2020-01-01 PROCEDURE — 75726 ARTERY X-RAYS ABDOMEN: CPT | Mod: 26 | Performed by: RADIOLOGY

## 2020-01-01 PROCEDURE — 37243 VASC EMBOLIZE/OCCLUDE ORGAN: CPT | Mod: GC | Performed by: RADIOLOGY

## 2020-01-01 PROCEDURE — 93287 PERI-PX DEVICE EVAL & PRGR: CPT | Mod: 26 | Performed by: INTERNAL MEDICINE

## 2020-01-01 PROCEDURE — U0003 INFECTIOUS AGENT DETECTION BY NUCLEIC ACID (DNA OR RNA); SEVERE ACUTE RESPIRATORY SYNDROME CORONAVIRUS 2 (SARS-COV-2) (CORONAVIRUS DISEASE [COVID-19]), AMPLIFIED PROBE TECHNIQUE, MAKING USE OF HIGH THROUGHPUT TECHNOLOGIES AS DESCRIBED BY CMS-2020-01-R: HCPCS | Performed by: STUDENT IN AN ORGANIZED HEALTH CARE EDUCATION/TRAINING PROGRAM

## 2020-01-01 PROCEDURE — G0463 HOSPITAL OUTPT CLINIC VISIT: HCPCS

## 2020-01-01 PROCEDURE — 44389 COLONOSCOPY WITH BIOPSY: CPT | Performed by: INTERNAL MEDICINE

## 2020-01-01 PROCEDURE — 88305 TISSUE EXAM BY PATHOLOGIST: CPT | Mod: 26 | Performed by: PATHOLOGY

## 2020-01-01 PROCEDURE — 93975 VASCULAR STUDY: CPT

## 2020-01-01 PROCEDURE — 88305 TISSUE EXAM BY PATHOLOGIST: CPT | Mod: TC | Performed by: INTERNAL MEDICINE

## 2020-01-01 PROCEDURE — C9113 INJ PANTOPRAZOLE SODIUM, VIA: HCPCS | Performed by: PHYSICIAN ASSISTANT

## 2020-01-01 PROCEDURE — 77261 THER RADIOLOGY TX PLNG SMPL: CPT | Mod: 26 | Performed by: RADIOLOGY

## 2020-01-01 PROCEDURE — 80048 BASIC METABOLIC PNL TOTAL CA: CPT | Performed by: RADIOLOGY

## 2020-01-01 PROCEDURE — 87506 IADNA-DNA/RNA PROBE TQ 6-11: CPT | Performed by: STUDENT IN AN ORGANIZED HEALTH CARE EDUCATION/TRAINING PROGRAM

## 2020-01-01 PROCEDURE — 93306 TTE W/DOPPLER COMPLETE: CPT | Performed by: INTERNAL MEDICINE

## 2020-01-01 PROCEDURE — 99214 OFFICE O/P EST MOD 30 MIN: CPT | Mod: 95 | Performed by: INTERNAL MEDICINE

## 2020-01-01 PROCEDURE — 93295 DEV INTERROG REMOTE 1/2/MLT: CPT | Performed by: INTERNAL MEDICINE

## 2020-01-01 PROCEDURE — 99214 OFFICE O/P EST MOD 30 MIN: CPT | Mod: 25 | Performed by: INTERNAL MEDICINE

## 2020-01-01 PROCEDURE — 93296 REM INTERROG EVL PM/IDS: CPT

## 2020-01-01 PROCEDURE — 99207 PR STATISTIC IV PUSH SINGLE INITIAL SUBSTANCE: CPT | Performed by: INTERNAL MEDICINE

## 2020-01-01 PROCEDURE — 258N000003 HC RX IP 258 OP 636: Performed by: RADIOLOGY

## 2020-01-01 PROCEDURE — 88342 IMHCHEM/IMCYTCHM 1ST ANTB: CPT | Mod: TC | Performed by: INTERNAL MEDICINE

## 2020-01-01 PROCEDURE — 80202 ASSAY OF VANCOMYCIN: CPT | Performed by: STUDENT IN AN ORGANIZED HEALTH CARE EDUCATION/TRAINING PROGRAM

## 2020-01-01 PROCEDURE — 85610 PROTHROMBIN TIME: CPT | Performed by: RADIOLOGY

## 2020-01-01 PROCEDURE — 0DBL8ZX EXCISION OF TRANSVERSE COLON, VIA NATURAL OR ARTIFICIAL OPENING ENDOSCOPIC, DIAGNOSTIC: ICD-10-PCS | Performed by: INTERNAL MEDICINE

## 2020-01-01 PROCEDURE — 99605 MTMS BY PHARM NP 15 MIN: CPT | Mod: TEL | Performed by: PHARMACIST

## 2020-01-01 PROCEDURE — 93287 PERI-PX DEVICE EVAL & PRGR: CPT

## 2020-01-01 PROCEDURE — 45378 DIAGNOSTIC COLONOSCOPY: CPT | Performed by: INTERNAL MEDICINE

## 2020-01-01 PROCEDURE — 79445 NUCLEAR RX INTRA-ARTERIAL: CPT | Mod: 26 | Performed by: RADIOLOGY

## 2020-01-01 PROCEDURE — 93010 ELECTROCARDIOGRAM REPORT: CPT | Mod: 76 | Performed by: INTERNAL MEDICINE

## 2020-01-01 PROCEDURE — 47000 NEEDLE BIOPSY OF LIVER PERQ: CPT

## 2020-01-01 PROCEDURE — 85520 HEPARIN ASSAY: CPT | Performed by: INTERNAL MEDICINE

## 2020-01-01 PROCEDURE — 99205 OFFICE O/P NEW HI 60 MIN: CPT | Mod: TEL | Performed by: INTERNAL MEDICINE

## 2020-01-01 PROCEDURE — G0500 MOD SEDAT ENDO SERVICE >5YRS: HCPCS | Performed by: INTERNAL MEDICINE

## 2020-01-01 PROCEDURE — 99222 1ST HOSP IP/OBS MODERATE 55: CPT | Mod: GC | Performed by: COLON & RECTAL SURGERY

## 2020-01-01 PROCEDURE — 71250 CT THORAX DX C-: CPT

## 2020-01-01 PROCEDURE — 88307 TISSUE EXAM BY PATHOLOGIST: CPT | Mod: 26 | Performed by: PATHOLOGY

## 2020-01-01 PROCEDURE — 36415 COLL VENOUS BLD VENIPUNCTURE: CPT | Performed by: PATHOLOGY

## 2020-01-01 PROCEDURE — 80076 HEPATIC FUNCTION PANEL: CPT | Performed by: RADIOLOGY

## 2020-01-01 PROCEDURE — 999N001020 HC STATISTIC H-SEND OUTS PREP: Performed by: INTERNAL MEDICINE

## 2020-01-01 PROCEDURE — 45381 COLONOSCOPY SUBMUCOUS NJX: CPT | Performed by: INTERNAL MEDICINE

## 2020-01-01 PROCEDURE — 44389 COLONOSCOPY WITH BIOPSY: CPT | Mod: GC | Performed by: INTERNAL MEDICINE

## 2020-01-01 PROCEDURE — 88341 IMHCHEM/IMCYTCHM EA ADD ANTB: CPT | Mod: TC | Performed by: INTERNAL MEDICINE

## 2020-01-01 PROCEDURE — 84466 ASSAY OF TRANSFERRIN: CPT | Performed by: STUDENT IN AN ORGANIZED HEALTH CARE EDUCATION/TRAINING PROGRAM

## 2020-01-01 PROCEDURE — 76937 US GUIDE VASCULAR ACCESS: CPT

## 2020-01-01 PROCEDURE — C1760 CLOSURE DEV, VASC: HCPCS

## 2020-01-01 PROCEDURE — 999N000208 ECHOCARDIOGRAM COMPLETE

## 2020-01-01 PROCEDURE — 83540 ASSAY OF IRON: CPT | Performed by: STUDENT IN AN ORGANIZED HEALTH CARE EDUCATION/TRAINING PROGRAM

## 2020-01-01 PROCEDURE — 86704 HEP B CORE ANTIBODY TOTAL: CPT | Performed by: STUDENT IN AN ORGANIZED HEALTH CARE EDUCATION/TRAINING PROGRAM

## 2020-01-01 PROCEDURE — 78800 RP LOCLZJ TUM 1 AREA 1 D IMG: CPT | Mod: 26 | Performed by: RADIOLOGY

## 2020-01-01 PROCEDURE — 99207 IR SIRT (SELECTIVE INTERNAL RADIO THERAPY): CPT | Performed by: RADIOLOGY

## 2020-01-01 PROCEDURE — 86709 HEPATITIS A IGM ANTIBODY: CPT | Performed by: STUDENT IN AN ORGANIZED HEALTH CARE EDUCATION/TRAINING PROGRAM

## 2020-01-01 PROCEDURE — 93975 VASCULAR STUDY: CPT | Mod: 26 | Performed by: RADIOLOGY

## 2020-01-01 PROCEDURE — U0003 INFECTIOUS AGENT DETECTION BY NUCLEIC ACID (DNA OR RNA); SEVERE ACUTE RESPIRATORY SYNDROME CORONAVIRUS 2 (SARS-COV-2) (CORONAVIRUS DISEASE [COVID-19]), AMPLIFIED PROBE TECHNIQUE, MAKING USE OF HIGH THROUGHPUT TECHNOLOGIES AS DESCRIBED BY CMS-2020-01-R: HCPCS | Performed by: RADIOLOGY

## 2020-01-01 PROCEDURE — 93306 TTE W/DOPPLER COMPLETE: CPT | Mod: 26 | Performed by: INTERNAL MEDICINE

## 2020-01-01 PROCEDURE — A9540 TC99M MAA: HCPCS | Performed by: RADIOLOGY

## 2020-01-01 PROCEDURE — 87493 C DIFF AMPLIFIED PROBE: CPT | Performed by: STUDENT IN AN ORGANIZED HEALTH CARE EDUCATION/TRAINING PROGRAM

## 2020-01-01 PROCEDURE — 99231 SBSQ HOSP IP/OBS SF/LOW 25: CPT | Performed by: INTERNAL MEDICINE

## 2020-01-01 PROCEDURE — 99607 MTMS BY PHARM ADDL 15 MIN: CPT | Mod: TEL | Performed by: PHARMACIST

## 2020-01-01 PROCEDURE — 36247 INS CATH ABD/L-EXT ART 3RD: CPT

## 2020-01-01 PROCEDURE — 99223 1ST HOSP IP/OBS HIGH 75: CPT | Mod: GC | Performed by: INTERNAL MEDICINE

## 2020-01-01 RX ORDER — SODIUM CHLORIDE 9 MG/ML
INJECTION, SOLUTION INTRAVENOUS CONTINUOUS
Status: DISCONTINUED | OUTPATIENT
Start: 2020-01-01 | End: 2020-01-01

## 2020-01-01 RX ORDER — HEPARIN SODIUM 200 [USP'U]/100ML
1 INJECTION, SOLUTION INTRAVENOUS CONTINUOUS PRN
Status: DISCONTINUED | OUTPATIENT
Start: 2020-01-01 | End: 2020-01-01 | Stop reason: HOSPADM

## 2020-01-01 RX ORDER — IODIXANOL 320 MG/ML
150 INJECTION, SOLUTION INTRAVASCULAR ONCE
Status: COMPLETED | OUTPATIENT
Start: 2020-01-01 | End: 2020-01-01

## 2020-01-01 RX ORDER — NALOXONE HYDROCHLORIDE 0.4 MG/ML
.1-.4 INJECTION, SOLUTION INTRAMUSCULAR; INTRAVENOUS; SUBCUTANEOUS
Status: DISCONTINUED | OUTPATIENT
Start: 2020-01-01 | End: 2020-01-01 | Stop reason: HOSPADM

## 2020-01-01 RX ORDER — NALOXONE HYDROCHLORIDE 0.4 MG/ML
0.4 INJECTION, SOLUTION INTRAMUSCULAR; INTRAVENOUS; SUBCUTANEOUS
Status: DISCONTINUED | OUTPATIENT
Start: 2020-01-01 | End: 2020-01-01 | Stop reason: HOSPADM

## 2020-01-01 RX ORDER — OXYCODONE HYDROCHLORIDE 5 MG/1
5 TABLET ORAL EVERY 6 HOURS
Qty: 15 TABLET | Refills: 0 | Status: SHIPPED | OUTPATIENT
Start: 2020-01-01

## 2020-01-01 RX ORDER — NICOTINE POLACRILEX 4 MG
15-30 LOZENGE BUCCAL
Status: DISCONTINUED | OUTPATIENT
Start: 2020-01-01 | End: 2020-01-01 | Stop reason: HOSPADM

## 2020-01-01 RX ORDER — DEXTROSE MONOHYDRATE 25 G/50ML
25-50 INJECTION, SOLUTION INTRAVENOUS
Status: DISCONTINUED | OUTPATIENT
Start: 2020-01-01 | End: 2020-01-01

## 2020-01-01 RX ORDER — NALOXONE HYDROCHLORIDE 0.4 MG/ML
0.2 INJECTION, SOLUTION INTRAMUSCULAR; INTRAVENOUS; SUBCUTANEOUS
Status: DISCONTINUED | OUTPATIENT
Start: 2020-01-01 | End: 2020-01-01 | Stop reason: HOSPADM

## 2020-01-01 RX ORDER — PANTOPRAZOLE SODIUM 40 MG/1
40 TABLET, DELAYED RELEASE ORAL DAILY
Qty: 30 TABLET | Refills: 0 | Status: SHIPPED | OUTPATIENT
Start: 2020-01-01

## 2020-01-01 RX ORDER — POTASSIUM CHLORIDE 1.5 G/1.58G
20 POWDER, FOR SOLUTION ORAL 3 TIMES DAILY
Status: DISCONTINUED | OUTPATIENT
Start: 2020-01-01 | End: 2020-01-01

## 2020-01-01 RX ORDER — ONDANSETRON 4 MG/1
4 TABLET, ORALLY DISINTEGRATING ORAL EVERY 6 HOURS PRN
Status: DISCONTINUED | OUTPATIENT
Start: 2020-01-01 | End: 2020-01-01

## 2020-01-01 RX ORDER — POLYETHYLENE GLYCOL 3350 17 G/17G
17 POWDER, FOR SOLUTION ORAL DAILY PRN
Status: DISCONTINUED | OUTPATIENT
Start: 2020-01-01 | End: 2020-01-01 | Stop reason: HOSPADM

## 2020-01-01 RX ORDER — PIPERACILLIN SODIUM, TAZOBACTAM SODIUM 2; .25 G/10ML; G/10ML
2.25 INJECTION, POWDER, LYOPHILIZED, FOR SOLUTION INTRAVENOUS EVERY 6 HOURS
Status: DISCONTINUED | OUTPATIENT
Start: 2020-01-01 | End: 2020-01-01

## 2020-01-01 RX ORDER — LOSARTAN POTASSIUM 25 MG/1
25 TABLET ORAL DAILY
Status: DISCONTINUED | OUTPATIENT
Start: 2020-01-01 | End: 2020-01-01 | Stop reason: HOSPADM

## 2020-01-01 RX ORDER — CIPROFLOXACIN 500 MG/1
500 TABLET, FILM COATED ORAL EVERY 12 HOURS SCHEDULED
Status: CANCELLED | OUTPATIENT
Start: 2020-01-01 | End: 2020-01-01

## 2020-01-01 RX ORDER — FENTANYL CITRATE 50 UG/ML
25-50 INJECTION, SOLUTION INTRAMUSCULAR; INTRAVENOUS EVERY 5 MIN PRN
Status: DISCONTINUED | OUTPATIENT
Start: 2020-01-01 | End: 2020-01-01 | Stop reason: HOSPADM

## 2020-01-01 RX ORDER — ACETAMINOPHEN 500 MG
500 TABLET ORAL EVERY 6 HOURS PRN
Status: DISCONTINUED | OUTPATIENT
Start: 2020-01-01 | End: 2020-01-01 | Stop reason: HOSPADM

## 2020-01-01 RX ORDER — SODIUM CHLORIDE 9 MG/ML
INJECTION, SOLUTION INTRAVENOUS CONTINUOUS
Status: DISCONTINUED | OUTPATIENT
Start: 2020-01-01 | End: 2020-01-01 | Stop reason: HOSPADM

## 2020-01-01 RX ORDER — FAMOTIDINE 10 MG
10 TABLET ORAL 2 TIMES DAILY
Status: DISCONTINUED | OUTPATIENT
Start: 2020-01-01 | End: 2020-01-01 | Stop reason: HOSPADM

## 2020-01-01 RX ORDER — IPRATROPIUM BROMIDE 21 UG/1
2 SPRAY, METERED NASAL 4 TIMES DAILY PRN
Status: DISCONTINUED | OUTPATIENT
Start: 2020-01-01 | End: 2020-01-01 | Stop reason: HOSPADM

## 2020-01-01 RX ORDER — FUROSEMIDE 20 MG
20 TABLET ORAL DAILY
Status: DISCONTINUED | OUTPATIENT
Start: 2020-01-01 | End: 2020-01-01 | Stop reason: HOSPADM

## 2020-01-01 RX ORDER — AMOXICILLIN 250 MG
1 CAPSULE ORAL 2 TIMES DAILY PRN
Status: DISCONTINUED | OUTPATIENT
Start: 2020-01-01 | End: 2020-01-01 | Stop reason: HOSPADM

## 2020-01-01 RX ORDER — CARVEDILOL 12.5 MG/1
12.5 TABLET ORAL 2 TIMES DAILY WITH MEALS
Qty: 180 TABLET | Refills: 1 | Status: SHIPPED | OUTPATIENT
Start: 2020-01-01

## 2020-01-01 RX ORDER — FLUMAZENIL 0.1 MG/ML
0.2 INJECTION, SOLUTION INTRAVENOUS
Status: DISCONTINUED | OUTPATIENT
Start: 2020-01-01 | End: 2020-01-01 | Stop reason: HOSPADM

## 2020-01-01 RX ORDER — LIDOCAINE 40 MG/G
CREAM TOPICAL
Status: DISCONTINUED | OUTPATIENT
Start: 2020-01-01 | End: 2020-01-01 | Stop reason: HOSPADM

## 2020-01-01 RX ORDER — CEFTRIAXONE 2 G/1
2 INJECTION, POWDER, FOR SOLUTION INTRAMUSCULAR; INTRAVENOUS EVERY 24 HOURS
Status: COMPLETED | OUTPATIENT
Start: 2020-01-01 | End: 2020-01-01

## 2020-01-01 RX ORDER — CLOPIDOGREL BISULFATE 75 MG/1
75 TABLET ORAL DAILY
Status: CANCELLED | OUTPATIENT
Start: 2020-01-01

## 2020-01-01 RX ORDER — ISOSORBIDE MONONITRATE 30 MG/1
30 TABLET, EXTENDED RELEASE ORAL 2 TIMES DAILY
Status: DISCONTINUED | OUTPATIENT
Start: 2020-01-01 | End: 2020-01-01

## 2020-01-01 RX ORDER — POTASSIUM CHLORIDE 750 MG/1
20 TABLET, EXTENDED RELEASE ORAL DAILY
Status: DISCONTINUED | OUTPATIENT
Start: 2020-01-01 | End: 2020-01-01

## 2020-01-01 RX ORDER — AZITHROMYCIN 500 MG/1
TABLET, FILM COATED ORAL
COMMUNITY
Start: 2020-01-01 | End: 2020-01-01

## 2020-01-01 RX ORDER — CEFUROXIME AXETIL 500 MG/1
TABLET ORAL
COMMUNITY
Start: 2020-01-01 | End: 2020-01-01

## 2020-01-01 RX ORDER — ONDANSETRON 4 MG/1
4 TABLET, ORALLY DISINTEGRATING ORAL EVERY 4 HOURS PRN
Status: DISCONTINUED | OUTPATIENT
Start: 2020-01-01 | End: 2020-01-01 | Stop reason: HOSPADM

## 2020-01-01 RX ORDER — CLOPIDOGREL BISULFATE 75 MG/1
75 TABLET ORAL DAILY
Status: DISCONTINUED | OUTPATIENT
Start: 2020-01-01 | End: 2020-01-01 | Stop reason: HOSPADM

## 2020-01-01 RX ORDER — NITROGLYCERIN 0.4 MG/1
0.4 TABLET SUBLINGUAL ONCE
Status: COMPLETED | OUTPATIENT
Start: 2020-01-01 | End: 2020-01-01

## 2020-01-01 RX ORDER — ATORVASTATIN CALCIUM 40 MG/1
80 TABLET, FILM COATED ORAL DAILY
Status: DISCONTINUED | OUTPATIENT
Start: 2020-01-01 | End: 2020-01-01 | Stop reason: HOSPADM

## 2020-01-01 RX ORDER — ACETAMINOPHEN 325 MG/1
650 TABLET ORAL EVERY 4 HOURS PRN
Status: DISCONTINUED | OUTPATIENT
Start: 2020-01-01 | End: 2020-01-01 | Stop reason: HOSPADM

## 2020-01-01 RX ORDER — NICOTINE POLACRILEX 4 MG
15-30 LOZENGE BUCCAL
Status: DISCONTINUED | OUTPATIENT
Start: 2020-01-01 | End: 2020-01-01

## 2020-01-01 RX ORDER — PROCHLORPERAZINE MALEATE 5 MG
5 TABLET ORAL EVERY 6 HOURS PRN
Status: DISCONTINUED | OUTPATIENT
Start: 2020-01-01 | End: 2020-01-01 | Stop reason: HOSPADM

## 2020-01-01 RX ORDER — SODIUM CHLORIDE 9 MG/ML
INJECTION, SOLUTION INTRAVENOUS ONCE
Status: COMPLETED | OUTPATIENT
Start: 2020-01-01 | End: 2020-01-01

## 2020-01-01 RX ORDER — DEXTROSE MONOHYDRATE 25 G/50ML
25-50 INJECTION, SOLUTION INTRAVENOUS
Status: DISCONTINUED | OUTPATIENT
Start: 2020-01-01 | End: 2020-01-01 | Stop reason: HOSPADM

## 2020-01-01 RX ORDER — GLIPIZIDE 10 MG/1
10 TABLET ORAL
Status: DISCONTINUED | OUTPATIENT
Start: 2020-01-01 | End: 2020-01-01

## 2020-01-01 RX ORDER — CIPROFLOXACIN 500 MG/1
500 TABLET, FILM COATED ORAL EVERY 12 HOURS SCHEDULED
Status: DISCONTINUED | OUTPATIENT
Start: 2020-01-01 | End: 2020-01-01

## 2020-01-01 RX ORDER — ISOSORBIDE MONONITRATE 30 MG/1
30 TABLET, EXTENDED RELEASE ORAL
Status: DISCONTINUED | OUTPATIENT
Start: 2020-01-01 | End: 2020-01-01

## 2020-01-01 RX ORDER — OXYCODONE HYDROCHLORIDE 5 MG/1
5-10 TABLET ORAL EVERY 6 HOURS PRN
Qty: 10 TABLET | Refills: 0 | Status: SHIPPED | OUTPATIENT
Start: 2020-01-01

## 2020-01-01 RX ORDER — CARVEDILOL 6.25 MG/1
6.25 TABLET ORAL 2 TIMES DAILY WITH MEALS
Status: DISCONTINUED | OUTPATIENT
Start: 2020-01-01 | End: 2020-01-01

## 2020-01-01 RX ORDER — CARVEDILOL 6.25 MG/1
6.25 TABLET ORAL 2 TIMES DAILY WITH MEALS
Status: DISCONTINUED | OUTPATIENT
Start: 2020-01-01 | End: 2020-01-01 | Stop reason: HOSPADM

## 2020-01-01 RX ORDER — SODIUM CHLORIDE 9 MG/ML
INJECTION, SOLUTION INTRAVENOUS CONTINUOUS PRN
Status: COMPLETED | OUTPATIENT
Start: 2020-01-01 | End: 2020-01-01

## 2020-01-01 RX ORDER — OXYCODONE HYDROCHLORIDE 5 MG/1
5 TABLET ORAL EVERY 4 HOURS PRN
Status: DISCONTINUED | OUTPATIENT
Start: 2020-01-01 | End: 2020-01-01 | Stop reason: HOSPADM

## 2020-01-01 RX ORDER — ONDANSETRON 2 MG/ML
4 INJECTION INTRAMUSCULAR; INTRAVENOUS EVERY 6 HOURS PRN
Status: DISCONTINUED | OUTPATIENT
Start: 2020-01-01 | End: 2020-01-01

## 2020-01-01 RX ORDER — ONDANSETRON 2 MG/ML
4 INJECTION INTRAMUSCULAR; INTRAVENOUS EVERY 6 HOURS PRN
Status: DISCONTINUED | OUTPATIENT
Start: 2020-01-01 | End: 2020-01-01 | Stop reason: HOSPADM

## 2020-01-01 RX ORDER — METRONIDAZOLE 500 MG/1
500 TABLET ORAL 2 TIMES DAILY
Status: DISCONTINUED | OUTPATIENT
Start: 2020-01-01 | End: 2020-01-01

## 2020-01-01 RX ORDER — PROCHLORPERAZINE 25 MG
12.5 SUPPOSITORY, RECTAL RECTAL EVERY 12 HOURS PRN
Status: DISCONTINUED | OUTPATIENT
Start: 2020-01-01 | End: 2020-01-01 | Stop reason: HOSPADM

## 2020-01-01 RX ORDER — HYDROMORPHONE HYDROCHLORIDE 2 MG/1
2-4 TABLET ORAL EVERY 4 HOURS PRN
Status: DISCONTINUED | OUTPATIENT
Start: 2020-01-01 | End: 2020-01-01 | Stop reason: HOSPADM

## 2020-01-01 RX ORDER — GABAPENTIN 300 MG/1
300 CAPSULE ORAL 3 TIMES DAILY
Status: DISCONTINUED | OUTPATIENT
Start: 2020-01-01 | End: 2020-01-01 | Stop reason: HOSPADM

## 2020-01-01 RX ORDER — FINASTERIDE 5 MG/1
5 TABLET, FILM COATED ORAL DAILY
Status: DISCONTINUED | OUTPATIENT
Start: 2020-01-01 | End: 2020-01-01 | Stop reason: HOSPADM

## 2020-01-01 RX ORDER — ONDANSETRON 2 MG/ML
4 INJECTION INTRAMUSCULAR; INTRAVENOUS ONCE
Status: DISCONTINUED | OUTPATIENT
Start: 2020-01-01 | End: 2020-01-01 | Stop reason: HOSPADM

## 2020-01-01 RX ORDER — ONDANSETRON 4 MG/1
4-8 TABLET, FILM COATED ORAL EVERY 6 HOURS PRN
Qty: 40 TABLET | Refills: 0 | Status: SHIPPED | OUTPATIENT
Start: 2020-01-01

## 2020-01-01 RX ORDER — FENTANYL CITRATE 50 UG/ML
INJECTION, SOLUTION INTRAMUSCULAR; INTRAVENOUS PRN
Status: DISCONTINUED | OUTPATIENT
Start: 2020-01-01 | End: 2020-01-01 | Stop reason: HOSPADM

## 2020-01-01 RX ORDER — FERROUS SULFATE 325(65) MG
325 TABLET ORAL DAILY
Status: DISCONTINUED | OUTPATIENT
Start: 2020-01-01 | End: 2020-01-01 | Stop reason: HOSPADM

## 2020-01-01 RX ORDER — AMOXICILLIN 250 MG
2 CAPSULE ORAL 2 TIMES DAILY PRN
Status: DISCONTINUED | OUTPATIENT
Start: 2020-01-01 | End: 2020-01-01 | Stop reason: HOSPADM

## 2020-01-01 RX ORDER — POTASSIUM CHLORIDE 750 MG/1
20 TABLET, EXTENDED RELEASE ORAL 3 TIMES DAILY
Status: COMPLETED | OUTPATIENT
Start: 2020-01-01 | End: 2020-01-01

## 2020-01-01 RX ORDER — ISOSORBIDE MONONITRATE 30 MG/1
15 TABLET, EXTENDED RELEASE ORAL 2 TIMES DAILY
COMMUNITY
Start: 2020-01-01

## 2020-01-01 RX ORDER — PIPERACILLIN SODIUM, TAZOBACTAM SODIUM 3; .375 G/15ML; G/15ML
3.38 INJECTION, POWDER, LYOPHILIZED, FOR SOLUTION INTRAVENOUS EVERY 6 HOURS
Status: DISCONTINUED | OUTPATIENT
Start: 2020-01-01 | End: 2020-01-01

## 2020-01-01 RX ORDER — CIPROFLOXACIN 500 MG/1
500 TABLET, FILM COATED ORAL DAILY
Qty: 14 TABLET | Refills: 0 | Status: SHIPPED | OUTPATIENT
Start: 2020-01-01 | End: 2020-01-01

## 2020-01-01 RX ORDER — AMPICILLIN AND SULBACTAM 2; 1 G/1; G/1
3 INJECTION, POWDER, FOR SOLUTION INTRAMUSCULAR; INTRAVENOUS
Status: COMPLETED | OUTPATIENT
Start: 2020-01-01 | End: 2020-01-01

## 2020-01-01 RX ORDER — SIMETHICONE
LIQUID (ML) MISCELLANEOUS PRN
Status: DISCONTINUED | OUTPATIENT
Start: 2020-01-01 | End: 2020-01-01 | Stop reason: HOSPADM

## 2020-01-01 RX ORDER — METHYLPREDNISOLONE 4 MG
8 TABLET, DOSE PACK ORAL 2 TIMES DAILY
Qty: 21 TABLET | Refills: 0 | Status: SHIPPED | OUTPATIENT
Start: 2020-01-01

## 2020-01-01 RX ORDER — POTASSIUM CHLORIDE 1.5 G/1.58G
20 POWDER, FOR SOLUTION ORAL 2 TIMES DAILY
COMMUNITY
End: 2020-01-01

## 2020-01-01 RX ORDER — FUROSEMIDE 20 MG
20 TABLET ORAL DAILY
COMMUNITY
End: 2020-01-01

## 2020-01-01 RX ORDER — SOTALOL HYDROCHLORIDE 120 MG/1
120 TABLET ORAL 2 TIMES DAILY
Status: DISCONTINUED | OUTPATIENT
Start: 2020-01-01 | End: 2020-01-01 | Stop reason: HOSPADM

## 2020-01-01 RX ADMIN — ONDANSETRON 4 MG: 4 TABLET, ORALLY DISINTEGRATING ORAL at 12:26

## 2020-01-01 RX ADMIN — GABAPENTIN 300 MG: 300 CAPSULE ORAL at 13:24

## 2020-01-01 RX ADMIN — SOTALOL HYDROCHLORIDE 120 MG: 120 TABLET ORAL at 20:11

## 2020-01-01 RX ADMIN — SOTALOL HYDROCHLORIDE 120 MG: 120 TABLET ORAL at 07:52

## 2020-01-01 RX ADMIN — GABAPENTIN 300 MG: 300 CAPSULE ORAL at 08:10

## 2020-01-01 RX ADMIN — NITROGLYCERIN 0.4 MG: 0.4 TABLET, ORALLY DISINTEGRATING SUBLINGUAL at 07:03

## 2020-01-01 RX ADMIN — ENOXAPARIN SODIUM 70 MG: 80 INJECTION SUBCUTANEOUS at 08:33

## 2020-01-01 RX ADMIN — ACETAMINOPHEN 650 MG: 325 TABLET, FILM COATED ORAL at 22:14

## 2020-01-01 RX ADMIN — PIPERACILLIN SODIUM AND TAZOBACTAM SODIUM 2.25 G: 2; .25 INJECTION, POWDER, LYOPHILIZED, FOR SOLUTION INTRAVENOUS at 23:54

## 2020-01-01 RX ADMIN — PIPERACILLIN SODIUM AND TAZOBACTAM SODIUM 2.25 G: 2; .25 INJECTION, POWDER, LYOPHILIZED, FOR SOLUTION INTRAVENOUS at 11:47

## 2020-01-01 RX ADMIN — FERROUS SULFATE TAB 325 MG (65 MG ELEMENTAL FE) 325 MG: 325 (65 FE) TAB at 07:52

## 2020-01-01 RX ADMIN — ENOXAPARIN SODIUM 70 MG: 80 INJECTION SUBCUTANEOUS at 19:00

## 2020-01-01 RX ADMIN — GABAPENTIN 300 MG: 300 CAPSULE ORAL at 19:58

## 2020-01-01 RX ADMIN — INSULIN ASPART 5 UNITS: 100 INJECTION, SOLUTION INTRAVENOUS; SUBCUTANEOUS at 12:16

## 2020-01-01 RX ADMIN — SOTALOL HYDROCHLORIDE 120 MG: 120 TABLET ORAL at 07:58

## 2020-01-01 RX ADMIN — GADOXETATE DISODIUM 10 ML: 181.43 INJECTION, SOLUTION INTRAVENOUS at 11:03

## 2020-01-01 RX ADMIN — ACETAMINOPHEN 650 MG: 325 TABLET, FILM COATED ORAL at 23:56

## 2020-01-01 RX ADMIN — FENTANYL CITRATE 100 MCG: 50 INJECTION, SOLUTION INTRAMUSCULAR; INTRAVENOUS at 11:18

## 2020-01-01 RX ADMIN — PIPERACILLIN SODIUM, TAZOBACTAM SODIUM 3.38 G: 3; .375 INJECTION, POWDER, LYOPHILIZED, FOR SOLUTION INTRAVENOUS at 12:45

## 2020-01-01 RX ADMIN — Medication 15 MG: at 10:02

## 2020-01-01 RX ADMIN — APIXABAN 5 MG: 5 TABLET, FILM COATED ORAL at 21:13

## 2020-01-01 RX ADMIN — ENOXAPARIN SODIUM 70 MG: 80 INJECTION SUBCUTANEOUS at 08:53

## 2020-01-01 RX ADMIN — GABAPENTIN 300 MG: 300 CAPSULE ORAL at 07:58

## 2020-01-01 RX ADMIN — ONDANSETRON 4 MG: 4 TABLET, ORALLY DISINTEGRATING ORAL at 09:29

## 2020-01-01 RX ADMIN — POTASSIUM CHLORIDE 20 MEQ: 750 TABLET, EXTENDED RELEASE ORAL at 14:19

## 2020-01-01 RX ADMIN — LIDOCAINE HYDROCHLORIDE 10 ML: 10 INJECTION, SOLUTION EPIDURAL; INFILTRATION; INTRACAUDAL; PERINEURAL at 11:13

## 2020-01-01 RX ADMIN — SOTALOL HYDROCHLORIDE 120 MG: 120 TABLET ORAL at 19:58

## 2020-01-01 RX ADMIN — CEFTRIAXONE SODIUM 2 G: 2 INJECTION, POWDER, FOR SOLUTION INTRAMUSCULAR; INTRAVENOUS at 20:10

## 2020-01-01 RX ADMIN — FAMOTIDINE 10 MG: 10 TABLET, FILM COATED ORAL at 08:13

## 2020-01-01 RX ADMIN — SOTALOL HYDROCHLORIDE 120 MG: 120 TABLET ORAL at 19:47

## 2020-01-01 RX ADMIN — ATORVASTATIN CALCIUM 80 MG: 40 TABLET, FILM COATED ORAL at 07:58

## 2020-01-01 RX ADMIN — CARVEDILOL 6.25 MG: 6.25 TABLET, FILM COATED ORAL at 07:58

## 2020-01-01 RX ADMIN — POTASSIUM CHLORIDE 20 MEQ: 750 TABLET, EXTENDED RELEASE ORAL at 09:27

## 2020-01-01 RX ADMIN — PHYTONADIONE 5 MG: 10 INJECTION, EMULSION INTRAMUSCULAR; INTRAVENOUS; SUBCUTANEOUS at 18:23

## 2020-01-01 RX ADMIN — SOTALOL HYDROCHLORIDE 120 MG: 120 TABLET ORAL at 08:28

## 2020-01-01 RX ADMIN — INSULIN ASPART 10 UNITS: 100 INJECTION, SOLUTION INTRAVENOUS; SUBCUTANEOUS at 13:59

## 2020-01-01 RX ADMIN — FAMOTIDINE 10 MG: 10 TABLET, FILM COATED ORAL at 20:29

## 2020-01-01 RX ADMIN — AMPICILLIN SODIUM AND SULBACTAM SODIUM 3 G: 2; 1 INJECTION, POWDER, FOR SOLUTION INTRAMUSCULAR; INTRAVENOUS at 09:13

## 2020-01-01 RX ADMIN — FINASTERIDE 5 MG: 5 TABLET, FILM COATED ORAL at 09:27

## 2020-01-01 RX ADMIN — SOTALOL HYDROCHLORIDE 120 MG: 120 TABLET ORAL at 19:00

## 2020-01-01 RX ADMIN — Medication 36.6 MCI.: at 13:36

## 2020-01-01 RX ADMIN — FINASTERIDE 5 MG: 5 TABLET, FILM COATED ORAL at 08:13

## 2020-01-01 RX ADMIN — SOTALOL HYDROCHLORIDE 120 MG: 120 TABLET ORAL at 09:32

## 2020-01-01 RX ADMIN — ACETAMINOPHEN 650 MG: 325 TABLET, FILM COATED ORAL at 08:34

## 2020-01-01 RX ADMIN — SODIUM CHLORIDE: 9 INJECTION, SOLUTION INTRAVENOUS at 06:49

## 2020-01-01 RX ADMIN — CARVEDILOL 6.25 MG: 6.25 TABLET, FILM COATED ORAL at 08:13

## 2020-01-01 RX ADMIN — GABAPENTIN 300 MG: 300 CAPSULE ORAL at 21:13

## 2020-01-01 RX ADMIN — FERROUS SULFATE TAB 325 MG (65 MG ELEMENTAL FE) 325 MG: 325 (65 FE) TAB at 08:10

## 2020-01-01 RX ADMIN — SOTALOL HYDROCHLORIDE 120 MG: 120 TABLET ORAL at 08:13

## 2020-01-01 RX ADMIN — ATORVASTATIN CALCIUM 80 MG: 40 TABLET, FILM COATED ORAL at 08:22

## 2020-01-01 RX ADMIN — INSULIN ASPART 3 UNITS: 100 INJECTION, SOLUTION INTRAVENOUS; SUBCUTANEOUS at 16:59

## 2020-01-01 RX ADMIN — APIXABAN 5 MG: 5 TABLET, FILM COATED ORAL at 07:52

## 2020-01-01 RX ADMIN — CARVEDILOL 6.25 MG: 6.25 TABLET, FILM COATED ORAL at 09:27

## 2020-01-01 RX ADMIN — CIPROFLOXACIN HYDROCHLORIDE 500 MG: 500 TABLET, FILM COATED ORAL at 11:24

## 2020-01-01 RX ADMIN — FAMOTIDINE 10 MG: 10 TABLET, FILM COATED ORAL at 19:48

## 2020-01-01 RX ADMIN — GABAPENTIN 300 MG: 300 CAPSULE ORAL at 08:22

## 2020-01-01 RX ADMIN — CARVEDILOL 6.25 MG: 6.25 TABLET, FILM COATED ORAL at 18:59

## 2020-01-01 RX ADMIN — GABAPENTIN 300 MG: 300 CAPSULE ORAL at 20:29

## 2020-01-01 RX ADMIN — GABAPENTIN 300 MG: 300 CAPSULE ORAL at 20:02

## 2020-01-01 RX ADMIN — INSULIN ASPART 6 UNITS: 100 INJECTION, SOLUTION INTRAVENOUS; SUBCUTANEOUS at 17:34

## 2020-01-01 RX ADMIN — POTASSIUM CHLORIDE 20 MEQ: 750 TABLET, EXTENDED RELEASE ORAL at 08:22

## 2020-01-01 RX ADMIN — CARVEDILOL 6.25 MG: 6.25 TABLET, FILM COATED ORAL at 11:24

## 2020-01-01 RX ADMIN — INSULIN DETEMIR 22 UNITS: 100 INJECTION, SOLUTION SUBCUTANEOUS at 22:09

## 2020-01-01 RX ADMIN — INSULIN ASPART 2 UNITS: 100 INJECTION, SOLUTION INTRAVENOUS; SUBCUTANEOUS at 13:08

## 2020-01-01 RX ADMIN — ISOSORBIDE MONONITRATE 30 MG: 30 TABLET, EXTENDED RELEASE ORAL at 20:01

## 2020-01-01 RX ADMIN — CLOPIDOGREL BISULFATE 75 MG: 75 TABLET ORAL at 08:29

## 2020-01-01 RX ADMIN — INSULIN ASPART 2 UNITS: 100 INJECTION, SOLUTION INTRAVENOUS; SUBCUTANEOUS at 16:29

## 2020-01-01 RX ADMIN — ATORVASTATIN CALCIUM 80 MG: 40 TABLET, FILM COATED ORAL at 09:02

## 2020-01-01 RX ADMIN — ISOSORBIDE MONONITRATE 30 MG: 30 TABLET, EXTENDED RELEASE ORAL at 16:28

## 2020-01-01 RX ADMIN — OXYCODONE HYDROCHLORIDE 5 MG: 5 TABLET ORAL at 09:24

## 2020-01-01 RX ADMIN — Medication 15 MG: at 19:35

## 2020-01-01 RX ADMIN — FAMOTIDINE 10 MG: 10 TABLET, FILM COATED ORAL at 08:27

## 2020-01-01 RX ADMIN — ENOXAPARIN SODIUM 70 MG: 80 INJECTION SUBCUTANEOUS at 19:28

## 2020-01-01 RX ADMIN — FINASTERIDE 5 MG: 5 TABLET, FILM COATED ORAL at 08:10

## 2020-01-01 RX ADMIN — SOTALOL HYDROCHLORIDE 120 MG: 120 TABLET ORAL at 07:39

## 2020-01-01 RX ADMIN — ATORVASTATIN CALCIUM 80 MG: 40 TABLET, FILM COATED ORAL at 07:52

## 2020-01-01 RX ADMIN — CARVEDILOL 6.25 MG: 6.25 TABLET, FILM COATED ORAL at 17:53

## 2020-01-01 RX ADMIN — Medication 15 MG: at 19:29

## 2020-01-01 RX ADMIN — PIPERACILLIN SODIUM, TAZOBACTAM SODIUM 3.38 G: 3; .375 INJECTION, POWDER, LYOPHILIZED, FOR SOLUTION INTRAVENOUS at 00:13

## 2020-01-01 RX ADMIN — GABAPENTIN 300 MG: 300 CAPSULE ORAL at 08:13

## 2020-01-01 RX ADMIN — OXYCODONE HYDROCHLORIDE 5 MG: 5 TABLET ORAL at 14:01

## 2020-01-01 RX ADMIN — INSULIN ASPART 3 UNITS: 100 INJECTION, SOLUTION INTRAVENOUS; SUBCUTANEOUS at 08:29

## 2020-01-01 RX ADMIN — Medication 15 MG: at 20:12

## 2020-01-01 RX ADMIN — ONDANSETRON 4 MG: 4 TABLET, ORALLY DISINTEGRATING ORAL at 08:34

## 2020-01-01 RX ADMIN — GABAPENTIN 300 MG: 300 CAPSULE ORAL at 14:19

## 2020-01-01 RX ADMIN — FAMOTIDINE 10 MG: 10 TABLET, FILM COATED ORAL at 08:10

## 2020-01-01 RX ADMIN — METRONIDAZOLE 500 MG: 500 TABLET ORAL at 08:12

## 2020-01-01 RX ADMIN — FINASTERIDE 5 MG: 5 TABLET, FILM COATED ORAL at 09:03

## 2020-01-01 RX ADMIN — ENOXAPARIN SODIUM 70 MG: 80 INJECTION SUBCUTANEOUS at 19:09

## 2020-01-01 RX ADMIN — SODIUM CHLORIDE: 9 INJECTION, SOLUTION INTRAVENOUS at 14:07

## 2020-01-01 RX ADMIN — INSULIN ASPART 2 UNITS: 100 INJECTION, SOLUTION INTRAVENOUS; SUBCUTANEOUS at 01:25

## 2020-01-01 RX ADMIN — MIDAZOLAM 1 MG: 1 INJECTION INTRAMUSCULAR; INTRAVENOUS at 11:12

## 2020-01-01 RX ADMIN — FAMOTIDINE 10 MG: 10 TABLET, FILM COATED ORAL at 20:02

## 2020-01-01 RX ADMIN — LOSARTAN POTASSIUM 25 MG: 25 TABLET, FILM COATED ORAL at 07:39

## 2020-01-01 RX ADMIN — HYDROCORTISONE SODIUM SUCCINATE 100 MG: 100 INJECTION, POWDER, FOR SOLUTION INTRAMUSCULAR; INTRAVENOUS at 09:05

## 2020-01-01 RX ADMIN — ONDANSETRON 4 MG: 4 TABLET, ORALLY DISINTEGRATING ORAL at 10:27

## 2020-01-01 RX ADMIN — CLOPIDOGREL BISULFATE 75 MG: 75 TABLET ORAL at 09:03

## 2020-01-01 RX ADMIN — INSULIN ASPART 3 UNITS: 100 INJECTION, SOLUTION INTRAVENOUS; SUBCUTANEOUS at 17:29

## 2020-01-01 RX ADMIN — ACETAMINOPHEN 650 MG: 325 TABLET, FILM COATED ORAL at 02:58

## 2020-01-01 RX ADMIN — GABAPENTIN 300 MG: 300 CAPSULE ORAL at 08:54

## 2020-01-01 RX ADMIN — PIPERACILLIN SODIUM AND TAZOBACTAM SODIUM 2.25 G: 2; .25 INJECTION, POWDER, LYOPHILIZED, FOR SOLUTION INTRAVENOUS at 18:21

## 2020-01-01 RX ADMIN — SODIUM CHLORIDE: 9 INJECTION, SOLUTION INTRAVENOUS at 01:49

## 2020-01-01 RX ADMIN — GABAPENTIN 300 MG: 300 CAPSULE ORAL at 14:28

## 2020-01-01 RX ADMIN — FINASTERIDE 5 MG: 5 TABLET, FILM COATED ORAL at 08:22

## 2020-01-01 RX ADMIN — GABAPENTIN 300 MG: 300 CAPSULE ORAL at 19:29

## 2020-01-01 RX ADMIN — FAMOTIDINE 10 MG: 10 TABLET, FILM COATED ORAL at 09:02

## 2020-01-01 RX ADMIN — Medication 15 MG: at 08:10

## 2020-01-01 RX ADMIN — POTASSIUM CHLORIDE 20 MEQ: 750 TABLET, EXTENDED RELEASE ORAL at 08:53

## 2020-01-01 RX ADMIN — FAMOTIDINE 10 MG: 10 TABLET, FILM COATED ORAL at 19:09

## 2020-01-01 RX ADMIN — INSULIN ASPART 1 UNITS: 100 INJECTION, SOLUTION INTRAVENOUS; SUBCUTANEOUS at 09:56

## 2020-01-01 RX ADMIN — INSULIN ASPART 3 UNITS: 100 INJECTION, SOLUTION INTRAVENOUS; SUBCUTANEOUS at 18:27

## 2020-01-01 RX ADMIN — FAMOTIDINE 10 MG: 10 TABLET, FILM COATED ORAL at 07:52

## 2020-01-01 RX ADMIN — HEPARIN SODIUM 1 BAG: 200 INJECTION, SOLUTION INTRAVENOUS at 11:18

## 2020-01-01 RX ADMIN — ATORVASTATIN CALCIUM 80 MG: 40 TABLET, FILM COATED ORAL at 08:26

## 2020-01-01 RX ADMIN — SOTALOL HYDROCHLORIDE 120 MG: 120 TABLET ORAL at 19:35

## 2020-01-01 RX ADMIN — GABAPENTIN 300 MG: 300 CAPSULE ORAL at 14:49

## 2020-01-01 RX ADMIN — ONDANSETRON 4 MG: 4 TABLET, ORALLY DISINTEGRATING ORAL at 22:14

## 2020-01-01 RX ADMIN — ATORVASTATIN CALCIUM 80 MG: 40 TABLET, FILM COATED ORAL at 08:10

## 2020-01-01 RX ADMIN — ENOXAPARIN SODIUM 70 MG: 80 INJECTION SUBCUTANEOUS at 09:33

## 2020-01-01 RX ADMIN — LOSARTAN POTASSIUM 25 MG: 25 TABLET, FILM COATED ORAL at 07:58

## 2020-01-01 RX ADMIN — SODIUM CHLORIDE, POTASSIUM CHLORIDE, SODIUM LACTATE AND CALCIUM CHLORIDE 1000 ML: 600; 310; 30; 20 INJECTION, SOLUTION INTRAVENOUS at 21:59

## 2020-01-01 RX ADMIN — IODIXANOL 40 ML: 320 INJECTION, SOLUTION INTRAVASCULAR at 11:18

## 2020-01-01 RX ADMIN — VANCOMYCIN HYDROCHLORIDE 750 MG: 1 INJECTION, POWDER, LYOPHILIZED, FOR SOLUTION INTRAVENOUS at 08:33

## 2020-01-01 RX ADMIN — FERROUS SULFATE TAB 325 MG (65 MG ELEMENTAL FE) 325 MG: 325 (65 FE) TAB at 08:32

## 2020-01-01 RX ADMIN — FAMOTIDINE 10 MG: 10 TABLET, FILM COATED ORAL at 19:29

## 2020-01-01 RX ADMIN — CEFTRIAXONE SODIUM 2 G: 2 INJECTION, POWDER, FOR SOLUTION INTRAMUSCULAR; INTRAVENOUS at 19:35

## 2020-01-01 RX ADMIN — GABAPENTIN 300 MG: 300 CAPSULE ORAL at 12:46

## 2020-01-01 RX ADMIN — PIPERACILLIN SODIUM AND TAZOBACTAM SODIUM 2.25 G: 2; .25 INJECTION, POWDER, LYOPHILIZED, FOR SOLUTION INTRAVENOUS at 05:42

## 2020-01-01 RX ADMIN — SODIUM CHLORIDE: 9 INJECTION, SOLUTION INTRAVENOUS at 09:28

## 2020-01-01 RX ADMIN — FINASTERIDE 5 MG: 5 TABLET, FILM COATED ORAL at 08:28

## 2020-01-01 RX ADMIN — SOTALOL HYDROCHLORIDE 120 MG: 120 TABLET ORAL at 09:17

## 2020-01-01 RX ADMIN — Medication 6 ML: at 15:30

## 2020-01-01 RX ADMIN — PIPERACILLIN SODIUM AND TAZOBACTAM SODIUM 2.25 G: 2; .25 INJECTION, POWDER, LYOPHILIZED, FOR SOLUTION INTRAVENOUS at 18:30

## 2020-01-01 RX ADMIN — HUMAN ALBUMIN MICROSPHERES AND PERFLUTREN 5 ML: 10; .22 INJECTION, SOLUTION INTRAVENOUS at 14:00

## 2020-01-01 RX ADMIN — OXYCODONE HYDROCHLORIDE 5 MG: 5 TABLET ORAL at 12:46

## 2020-01-01 RX ADMIN — LIDOCAINE HYDROCHLORIDE 5 ML: 10 INJECTION, SOLUTION EPIDURAL; INFILTRATION; INTRACAUDAL; PERINEURAL at 11:18

## 2020-01-01 RX ADMIN — FINASTERIDE 5 MG: 5 TABLET, FILM COATED ORAL at 08:32

## 2020-01-01 RX ADMIN — FERROUS SULFATE TAB 325 MG (65 MG ELEMENTAL FE) 325 MG: 325 (65 FE) TAB at 08:13

## 2020-01-01 RX ADMIN — SODIUM CHLORIDE: 9 INJECTION, SOLUTION INTRAVENOUS at 10:50

## 2020-01-01 RX ADMIN — ACETAMINOPHEN 650 MG: 325 TABLET, FILM COATED ORAL at 14:50

## 2020-01-01 RX ADMIN — Medication 30 MG: at 19:57

## 2020-01-01 RX ADMIN — ATORVASTATIN CALCIUM 80 MG: 40 TABLET, FILM COATED ORAL at 07:39

## 2020-01-01 RX ADMIN — FERROUS SULFATE TAB 325 MG (65 MG ELEMENTAL FE) 325 MG: 325 (65 FE) TAB at 07:58

## 2020-01-01 RX ADMIN — HEPARIN SODIUM 1 BAG: 200 INJECTION, SOLUTION INTRAVENOUS at 11:19

## 2020-01-01 RX ADMIN — METRONIDAZOLE 500 MG: 500 TABLET ORAL at 19:57

## 2020-01-01 RX ADMIN — CIPROFLOXACIN HYDROCHLORIDE 500 MG: 500 TABLET, FILM COATED ORAL at 19:58

## 2020-01-01 RX ADMIN — SOTALOL HYDROCHLORIDE 120 MG: 120 TABLET ORAL at 08:29

## 2020-01-01 RX ADMIN — FAMOTIDINE 10 MG: 10 TABLET, FILM COATED ORAL at 21:13

## 2020-01-01 RX ADMIN — ONDANSETRON 4 MG: 4 TABLET, ORALLY DISINTEGRATING ORAL at 09:40

## 2020-01-01 RX ADMIN — CARVEDILOL 6.25 MG: 6.25 TABLET, FILM COATED ORAL at 07:40

## 2020-01-01 RX ADMIN — CARVEDILOL 6.25 MG: 6.25 TABLET, FILM COATED ORAL at 17:32

## 2020-01-01 RX ADMIN — GABAPENTIN 300 MG: 300 CAPSULE ORAL at 14:35

## 2020-01-01 RX ADMIN — INSULIN ASPART 6 UNITS: 100 INJECTION, SOLUTION INTRAVENOUS; SUBCUTANEOUS at 13:12

## 2020-01-01 RX ADMIN — LOSARTAN POTASSIUM 25 MG: 25 TABLET, FILM COATED ORAL at 09:27

## 2020-01-01 RX ADMIN — INSULIN ASPART 3 UNITS: 100 INJECTION, SOLUTION INTRAVENOUS; SUBCUTANEOUS at 14:07

## 2020-01-01 RX ADMIN — SOTALOL HYDROCHLORIDE 120 MG: 120 TABLET ORAL at 19:09

## 2020-01-01 RX ADMIN — ENOXAPARIN SODIUM 70 MG: 80 INJECTION SUBCUTANEOUS at 19:36

## 2020-01-01 RX ADMIN — CARVEDILOL 6.25 MG: 6.25 TABLET, FILM COATED ORAL at 18:08

## 2020-01-01 RX ADMIN — GABAPENTIN 300 MG: 300 CAPSULE ORAL at 14:34

## 2020-01-01 RX ADMIN — SOTALOL HYDROCHLORIDE 120 MG: 120 TABLET ORAL at 19:44

## 2020-01-01 RX ADMIN — ONDANSETRON 4 MG: 4 TABLET, ORALLY DISINTEGRATING ORAL at 02:33

## 2020-01-01 RX ADMIN — ONDANSETRON 4 MG: 4 TABLET, ORALLY DISINTEGRATING ORAL at 09:59

## 2020-01-01 RX ADMIN — MIDAZOLAM 1 MG: 1 INJECTION INTRAMUSCULAR; INTRAVENOUS at 11:17

## 2020-01-01 RX ADMIN — CARVEDILOL 6.25 MG: 6.25 TABLET, FILM COATED ORAL at 18:28

## 2020-01-01 RX ADMIN — OXYCODONE HYDROCHLORIDE 5 MG: 5 TABLET ORAL at 08:05

## 2020-01-01 RX ADMIN — METRONIDAZOLE 500 MG: 500 TABLET ORAL at 20:10

## 2020-01-01 RX ADMIN — PANTOPRAZOLE SODIUM 40 MG: 40 INJECTION, POWDER, FOR SOLUTION INTRAVENOUS at 09:09

## 2020-01-01 RX ADMIN — INSULIN ASPART 3 UNITS: 100 INJECTION, SOLUTION INTRAVENOUS; SUBCUTANEOUS at 09:34

## 2020-01-01 RX ADMIN — ACETAMINOPHEN 650 MG: 325 TABLET, FILM COATED ORAL at 16:47

## 2020-01-01 RX ADMIN — GABAPENTIN 300 MG: 300 CAPSULE ORAL at 20:11

## 2020-01-01 RX ADMIN — FINASTERIDE 5 MG: 5 TABLET, FILM COATED ORAL at 07:39

## 2020-01-01 RX ADMIN — ATORVASTATIN CALCIUM 80 MG: 40 TABLET, FILM COATED ORAL at 08:13

## 2020-01-01 RX ADMIN — OXYCODONE HYDROCHLORIDE 5 MG: 5 TABLET ORAL at 13:15

## 2020-01-01 RX ADMIN — LIDOCAINE HYDROCHLORIDE 10 ML: 10 INJECTION, SOLUTION EPIDURAL; INFILTRATION; INTRACAUDAL; PERINEURAL at 11:19

## 2020-01-01 RX ADMIN — IODIXANOL 10 ML: 320 INJECTION, SOLUTION INTRAVASCULAR at 11:15

## 2020-01-01 RX ADMIN — ISOSORBIDE MONONITRATE 30 MG: 30 TABLET, EXTENDED RELEASE ORAL at 16:25

## 2020-01-01 RX ADMIN — GABAPENTIN 300 MG: 300 CAPSULE ORAL at 13:12

## 2020-01-01 RX ADMIN — GABAPENTIN 300 MG: 300 CAPSULE ORAL at 19:48

## 2020-01-01 RX ADMIN — FAMOTIDINE 10 MG: 10 TABLET, FILM COATED ORAL at 19:57

## 2020-01-01 RX ADMIN — APIXABAN 5 MG: 5 TABLET, FILM COATED ORAL at 09:03

## 2020-01-01 RX ADMIN — ISOSORBIDE MONONITRATE 30 MG: 30 TABLET, EXTENDED RELEASE ORAL at 16:26

## 2020-01-01 RX ADMIN — POLYETHYLENE GLYCOL 3350, SODIUM SULFATE ANHYDROUS, SODIUM BICARBONATE, SODIUM CHLORIDE, POTASSIUM CHLORIDE 4000 ML: 236; 22.74; 6.74; 5.86; 2.97 POWDER, FOR SOLUTION ORAL at 19:16

## 2020-01-01 RX ADMIN — INSULIN ASPART 5 UNITS: 100 INJECTION, SOLUTION INTRAVENOUS; SUBCUTANEOUS at 17:53

## 2020-01-01 RX ADMIN — ISOSORBIDE MONONITRATE 30 MG: 30 TABLET, EXTENDED RELEASE ORAL at 07:39

## 2020-01-01 RX ADMIN — SOTALOL HYDROCHLORIDE 120 MG: 120 TABLET ORAL at 21:13

## 2020-01-01 RX ADMIN — MIDAZOLAM 2 MG: 1 INJECTION INTRAMUSCULAR; INTRAVENOUS at 11:19

## 2020-01-01 RX ADMIN — ISOSORBIDE MONONITRATE 30 MG: 30 TABLET, EXTENDED RELEASE ORAL at 07:58

## 2020-01-01 RX ADMIN — CARVEDILOL 6.25 MG: 6.25 TABLET, FILM COATED ORAL at 17:03

## 2020-01-01 RX ADMIN — ONDANSETRON 4 MG: 4 TABLET, ORALLY DISINTEGRATING ORAL at 16:47

## 2020-01-01 RX ADMIN — GABAPENTIN 300 MG: 300 CAPSULE ORAL at 09:27

## 2020-01-01 RX ADMIN — Medication 15 MG: at 08:29

## 2020-01-01 RX ADMIN — CARVEDILOL 6.25 MG: 6.25 TABLET, FILM COATED ORAL at 17:50

## 2020-01-01 RX ADMIN — FERROUS SULFATE TAB 325 MG (65 MG ELEMENTAL FE) 325 MG: 325 (65 FE) TAB at 08:54

## 2020-01-01 RX ADMIN — METRONIDAZOLE 500 MG: 500 TABLET ORAL at 08:13

## 2020-01-01 RX ADMIN — INSULIN ASPART 3 UNITS: 100 INJECTION, SOLUTION INTRAVENOUS; SUBCUTANEOUS at 08:40

## 2020-01-01 RX ADMIN — INSULIN ASPART 3 UNITS: 100 INJECTION, SOLUTION INTRAVENOUS; SUBCUTANEOUS at 20:59

## 2020-01-01 RX ADMIN — GABAPENTIN 300 MG: 300 CAPSULE ORAL at 19:09

## 2020-01-01 RX ADMIN — INSULIN ASPART 7 UNITS: 100 INJECTION, SOLUTION INTRAVENOUS; SUBCUTANEOUS at 17:05

## 2020-01-01 RX ADMIN — ACETAMINOPHEN 650 MG: 325 TABLET, FILM COATED ORAL at 00:03

## 2020-01-01 RX ADMIN — CIPROFLOXACIN HYDROCHLORIDE 500 MG: 500 TABLET, FILM COATED ORAL at 08:11

## 2020-01-01 RX ADMIN — FERROUS SULFATE TAB 325 MG (65 MG ELEMENTAL FE) 325 MG: 325 (65 FE) TAB at 09:27

## 2020-01-01 RX ADMIN — FAMOTIDINE 10 MG: 10 TABLET, FILM COATED ORAL at 19:35

## 2020-01-01 RX ADMIN — POTASSIUM CHLORIDE 20 MEQ: 1.5 POWDER, FOR SOLUTION ORAL at 11:47

## 2020-01-01 RX ADMIN — APIXABAN 5 MG: 5 TABLET, FILM COATED ORAL at 20:03

## 2020-01-01 RX ADMIN — GABAPENTIN 300 MG: 300 CAPSULE ORAL at 14:50

## 2020-01-01 RX ADMIN — SOTALOL HYDROCHLORIDE 120 MG: 120 TABLET ORAL at 19:29

## 2020-01-01 RX ADMIN — FAMOTIDINE 10 MG: 10 TABLET, FILM COATED ORAL at 08:53

## 2020-01-01 RX ADMIN — CARVEDILOL 6.25 MG: 6.25 TABLET, FILM COATED ORAL at 09:17

## 2020-01-01 RX ADMIN — GABAPENTIN 300 MG: 300 CAPSULE ORAL at 19:36

## 2020-01-01 RX ADMIN — SOTALOL HYDROCHLORIDE 120 MG: 120 TABLET ORAL at 08:32

## 2020-01-01 RX ADMIN — METRONIDAZOLE 500 MG: 500 TABLET ORAL at 11:24

## 2020-01-01 RX ADMIN — CARVEDILOL 6.25 MG: 6.25 TABLET, FILM COATED ORAL at 08:10

## 2020-01-01 RX ADMIN — POTASSIUM CHLORIDE 20 MEQ: 750 TABLET, EXTENDED RELEASE ORAL at 19:09

## 2020-01-01 RX ADMIN — PIPERACILLIN SODIUM, TAZOBACTAM SODIUM 3.38 G: 3; .375 INJECTION, POWDER, LYOPHILIZED, FOR SOLUTION INTRAVENOUS at 06:00

## 2020-01-01 RX ADMIN — POTASSIUM CHLORIDE 20 MEQ: 750 TABLET, EXTENDED RELEASE ORAL at 07:58

## 2020-01-01 RX ADMIN — ACETAMINOPHEN 650 MG: 325 TABLET, FILM COATED ORAL at 14:32

## 2020-01-01 RX ADMIN — FENTANYL CITRATE 75 MCG: 50 INJECTION, SOLUTION INTRAMUSCULAR; INTRAVENOUS at 11:17

## 2020-01-01 RX ADMIN — INSULIN ASPART 7 UNITS: 100 INJECTION, SOLUTION INTRAVENOUS; SUBCUTANEOUS at 17:15

## 2020-01-01 RX ADMIN — CARVEDILOL 6.25 MG: 6.25 TABLET, FILM COATED ORAL at 08:12

## 2020-01-01 RX ADMIN — SOTALOL HYDROCHLORIDE 120 MG: 120 TABLET ORAL at 20:01

## 2020-01-01 RX ADMIN — FAMOTIDINE 10 MG: 10 TABLET, FILM COATED ORAL at 08:22

## 2020-01-01 RX ADMIN — GABAPENTIN 300 MG: 300 CAPSULE ORAL at 14:00

## 2020-01-01 RX ADMIN — KIT FOR THE PREPARATION OF TECHNETIUM TC 99M ALBUMIN AGGREGATED 4 MILLICURIE: 2.5 INJECTION, POWDER, FOR SOLUTION INTRAVENOUS at 14:45

## 2020-01-01 RX ADMIN — FERROUS SULFATE TAB 325 MG (65 MG ELEMENTAL FE) 325 MG: 325 (65 FE) TAB at 07:39

## 2020-01-01 RX ADMIN — GABAPENTIN 300 MG: 300 CAPSULE ORAL at 09:02

## 2020-01-01 RX ADMIN — FAMOTIDINE 10 MG: 10 TABLET, FILM COATED ORAL at 07:58

## 2020-01-01 RX ADMIN — FAMOTIDINE 10 MG: 10 TABLET, FILM COATED ORAL at 08:33

## 2020-01-01 RX ADMIN — ATORVASTATIN CALCIUM 80 MG: 40 TABLET, FILM COATED ORAL at 08:12

## 2020-01-01 RX ADMIN — FINASTERIDE 5 MG: 5 TABLET, FILM COATED ORAL at 07:52

## 2020-01-01 RX ADMIN — FAMOTIDINE 10 MG: 10 TABLET, FILM COATED ORAL at 20:10

## 2020-01-01 RX ADMIN — ACETAMINOPHEN 650 MG: 325 TABLET, FILM COATED ORAL at 22:20

## 2020-01-01 RX ADMIN — METRONIDAZOLE 500 MG: 500 TABLET ORAL at 19:35

## 2020-01-01 RX ADMIN — GABAPENTIN 300 MG: 300 CAPSULE ORAL at 19:35

## 2020-01-01 RX ADMIN — ATORVASTATIN CALCIUM 80 MG: 40 TABLET, FILM COATED ORAL at 09:28

## 2020-01-01 RX ADMIN — FINASTERIDE 5 MG: 5 TABLET, FILM COATED ORAL at 08:54

## 2020-01-01 RX ADMIN — PIPERACILLIN SODIUM AND TAZOBACTAM SODIUM 2.25 G: 2; .25 INJECTION, POWDER, LYOPHILIZED, FOR SOLUTION INTRAVENOUS at 00:15

## 2020-01-01 RX ADMIN — GABAPENTIN 300 MG: 300 CAPSULE ORAL at 14:01

## 2020-01-01 RX ADMIN — GABAPENTIN 300 MG: 300 CAPSULE ORAL at 08:28

## 2020-01-01 RX ADMIN — ACETAMINOPHEN 650 MG: 325 TABLET, FILM COATED ORAL at 17:05

## 2020-01-01 RX ADMIN — SOTALOL HYDROCHLORIDE 120 MG: 120 TABLET ORAL at 20:29

## 2020-01-01 RX ADMIN — INSULIN ASPART 2 UNITS: 100 INJECTION, SOLUTION INTRAVENOUS; SUBCUTANEOUS at 05:25

## 2020-01-01 RX ADMIN — FAMOTIDINE 10 MG: 10 TABLET, FILM COATED ORAL at 19:00

## 2020-01-01 RX ADMIN — ATORVASTATIN CALCIUM 80 MG: 40 TABLET, FILM COATED ORAL at 08:32

## 2020-01-01 RX ADMIN — METRONIDAZOLE 500 MG: 500 TABLET ORAL at 08:10

## 2020-01-01 RX ADMIN — MIDAZOLAM 1 MG: 1 INJECTION INTRAMUSCULAR; INTRAVENOUS at 11:08

## 2020-01-01 RX ADMIN — GABAPENTIN 300 MG: 300 CAPSULE ORAL at 19:00

## 2020-01-01 RX ADMIN — GABAPENTIN 300 MG: 300 CAPSULE ORAL at 08:32

## 2020-01-01 RX ADMIN — FAMOTIDINE 10 MG: 10 TABLET, FILM COATED ORAL at 09:27

## 2020-01-01 RX ADMIN — ONDANSETRON 4 MG: 4 TABLET, ORALLY DISINTEGRATING ORAL at 19:05

## 2020-01-01 RX ADMIN — FENTANYL CITRATE 50 MCG: 50 INJECTION, SOLUTION INTRAMUSCULAR; INTRAVENOUS at 11:12

## 2020-01-01 RX ADMIN — ONDANSETRON 4 MG: 4 TABLET, ORALLY DISINTEGRATING ORAL at 21:58

## 2020-01-01 RX ADMIN — PIPERACILLIN SODIUM AND TAZOBACTAM SODIUM 2.25 G: 2; .25 INJECTION, POWDER, LYOPHILIZED, FOR SOLUTION INTRAVENOUS at 06:10

## 2020-01-01 RX ADMIN — SOTALOL HYDROCHLORIDE 120 MG: 120 TABLET ORAL at 08:53

## 2020-01-01 RX ADMIN — ONDANSETRON 4 MG: 4 TABLET, ORALLY DISINTEGRATING ORAL at 14:01

## 2020-01-01 RX ADMIN — ENOXAPARIN SODIUM 70 MG: 80 INJECTION SUBCUTANEOUS at 08:10

## 2020-01-01 RX ADMIN — FERROUS SULFATE TAB 325 MG (65 MG ELEMENTAL FE) 325 MG: 325 (65 FE) TAB at 08:27

## 2020-01-01 RX ADMIN — CIPROFLOXACIN HYDROCHLORIDE 500 MG: 500 TABLET, FILM COATED ORAL at 19:28

## 2020-01-01 RX ADMIN — SODIUM CHLORIDE: 9 INJECTION, SOLUTION INTRAVENOUS at 17:04

## 2020-01-01 RX ADMIN — FAMOTIDINE 10 MG: 10 TABLET, FILM COATED ORAL at 07:40

## 2020-01-01 RX ADMIN — METRONIDAZOLE 500 MG: 500 TABLET ORAL at 19:29

## 2020-01-01 RX ADMIN — FINASTERIDE 5 MG: 5 TABLET, FILM COATED ORAL at 07:58

## 2020-01-01 RX ADMIN — CARVEDILOL 6.25 MG: 6.25 TABLET, FILM COATED ORAL at 17:23

## 2020-01-01 RX ADMIN — METRONIDAZOLE 500 MG: 500 TABLET ORAL at 08:27

## 2020-01-01 RX ADMIN — FERROUS SULFATE TAB 325 MG (65 MG ELEMENTAL FE) 325 MG: 325 (65 FE) TAB at 09:01

## 2020-01-01 RX ADMIN — ATORVASTATIN CALCIUM 80 MG: 40 TABLET, FILM COATED ORAL at 08:53

## 2020-01-01 RX ADMIN — SODIUM CHLORIDE: 9 INJECTION, SOLUTION INTRAVENOUS at 09:05

## 2020-01-01 RX ADMIN — ONDANSETRON 4 MG: 4 TABLET, ORALLY DISINTEGRATING ORAL at 17:37

## 2020-01-01 RX ADMIN — FINASTERIDE 5 MG: 5 TABLET, FILM COATED ORAL at 08:12

## 2020-01-01 RX ADMIN — ISOSORBIDE MONONITRATE 30 MG: 30 TABLET, EXTENDED RELEASE ORAL at 09:32

## 2020-01-01 RX ADMIN — GABAPENTIN 300 MG: 300 CAPSULE ORAL at 07:52

## 2020-01-01 RX ADMIN — Medication 15 MG: at 11:48

## 2020-01-01 RX ADMIN — GABAPENTIN 300 MG: 300 CAPSULE ORAL at 07:39

## 2020-01-01 RX ADMIN — POTASSIUM CHLORIDE 20 MEQ: 750 TABLET, EXTENDED RELEASE ORAL at 08:32

## 2020-01-01 RX ADMIN — INSULIN ASPART 2 UNITS: 100 INJECTION, SOLUTION INTRAVENOUS; SUBCUTANEOUS at 12:19

## 2020-01-01 RX ADMIN — GABAPENTIN 300 MG: 300 CAPSULE ORAL at 14:29

## 2020-01-01 RX ADMIN — CARVEDILOL 6.25 MG: 6.25 TABLET, FILM COATED ORAL at 07:52

## 2020-01-01 RX ADMIN — FERROUS SULFATE TAB 325 MG (65 MG ELEMENTAL FE) 325 MG: 325 (65 FE) TAB at 08:22

## 2020-01-01 RX ADMIN — CLOPIDOGREL BISULFATE 75 MG: 75 TABLET ORAL at 07:52

## 2020-01-01 RX ADMIN — CARVEDILOL 6.25 MG: 6.25 TABLET, FILM COATED ORAL at 08:28

## 2020-01-01 RX ADMIN — POTASSIUM CHLORIDE 20 MEQ: 750 TABLET, EXTENDED RELEASE ORAL at 07:40

## 2020-01-01 RX ADMIN — ENOXAPARIN SODIUM 50 MG: 80 INJECTION SUBCUTANEOUS at 19:58

## 2020-01-01 RX ADMIN — CIPROFLOXACIN HYDROCHLORIDE 500 MG: 500 TABLET, FILM COATED ORAL at 08:10

## 2020-01-01 ASSESSMENT — ACTIVITIES OF DAILY LIVING (ADL)
DIFFICULTY_EATING/SWALLOWING: NO
ADLS_ACUITY_SCORE: 14
NUMBER_OF_TIMES_PATIENT_HAS_FALLEN_WITHIN_LAST_SIX_MONTHS: 5
ADLS_ACUITY_SCORE: 13
ADLS_ACUITY_SCORE: 15
ADLS_ACUITY_SCORE: 13
DRESSING/BATHING_DIFFICULTY: NO
ADLS_ACUITY_SCORE: 14
ADLS_ACUITY_SCORE: 14
WEAR_GLASSES_OR_BLIND: YES
ADLS_ACUITY_SCORE: 14
ADLS_ACUITY_SCORE: 13
ADLS_ACUITY_SCORE: 13
ADLS_ACUITY_SCORE: 14
CONCENTRATING,_REMEMBERING_OR_MAKING_DECISIONS_DIFFICULTY: NO
ADLS_ACUITY_SCORE: 14
ADLS_ACUITY_SCORE: 13
ADLS_ACUITY_SCORE: 13
ADLS_ACUITY_SCORE: 15
ADLS_ACUITY_SCORE: 13
DOING_ERRANDS_INDEPENDENTLY_DIFFICULTY: NO
ADLS_ACUITY_SCORE: 13
ADLS_ACUITY_SCORE: 14
WALKING_OR_CLIMBING_STAIRS_DIFFICULTY: NO
ADLS_ACUITY_SCORE: 14
ADLS_ACUITY_SCORE: 14
ADLS_ACUITY_SCORE: 13
ADLS_ACUITY_SCORE: 13
ADLS_ACUITY_SCORE: 14
TOILETING_ISSUES: NO
ADLS_ACUITY_SCORE: 13
ADLS_ACUITY_SCORE: 14
ADLS_ACUITY_SCORE: 13
ADLS_ACUITY_SCORE: 15
ADLS_ACUITY_SCORE: 14
ADLS_ACUITY_SCORE: 13
ADLS_ACUITY_SCORE: 14
ADLS_ACUITY_SCORE: 16
ADLS_ACUITY_SCORE: 14
ADLS_ACUITY_SCORE: 14
ADLS_ACUITY_SCORE: 16
ADLS_ACUITY_SCORE: 13
FALL_HISTORY_WITHIN_LAST_SIX_MONTHS: YES
ADLS_ACUITY_SCORE: 14
ADLS_ACUITY_SCORE: 13
ADLS_ACUITY_SCORE: 14
ADLS_ACUITY_SCORE: 13
ADLS_ACUITY_SCORE: 14
ADLS_ACUITY_SCORE: 13
ADLS_ACUITY_SCORE: 13
ADLS_ACUITY_SCORE: 14
ADLS_ACUITY_SCORE: 14
ADLS_ACUITY_SCORE: 13
ADLS_ACUITY_SCORE: 14
ADLS_ACUITY_SCORE: 13
ADLS_ACUITY_SCORE: 13
ADLS_ACUITY_SCORE: 14
ADLS_ACUITY_SCORE: 14
ADLS_ACUITY_SCORE: 15
ADLS_ACUITY_SCORE: 13
ADLS_ACUITY_SCORE: 13
ADLS_ACUITY_SCORE: 14
ADLS_ACUITY_SCORE: 13
DIFFICULTY_COMMUNICATING: NO
ADLS_ACUITY_SCORE: 13
ADLS_ACUITY_SCORE: 14
ADLS_ACUITY_SCORE: 13
ADLS_ACUITY_SCORE: 14

## 2020-01-01 ASSESSMENT — MIFFLIN-ST. JEOR
SCORE: 1315.49
SCORE: 1289.18
SCORE: 1335.9
SCORE: 1310.05
SCORE: 1322.3
SCORE: 1320.48
SCORE: 1326.38
SCORE: 1320.94
SCORE: 1313.68

## 2020-01-01 ASSESSMENT — PAIN SCALES - GENERAL: PAINLEVEL: NO PAIN (0)

## 2020-01-10 ENCOUNTER — TELEPHONE (OUTPATIENT)
Dept: CARDIOLOGY | Facility: CLINIC | Age: 75
End: 2020-01-10

## 2020-01-10 NOTE — TELEPHONE ENCOUNTER
[facility] Call Center    Phone Message    May a detailed message be left on voicemail: yes    Reason for Call: Symptoms or Concerns     If patient has red-flag symptoms, warm transfer to triage line    Current symptom or concern: dizziness and low blood pressure    Symptoms have been present for:  1 month(s)    Has patient previously been seen for this? Yes    By Dr. Claudy Sabillon    Are there any new or worsening symptoms? Yes: believes it may be a result of meds      Action Taken: Message routed to:  [facility] ([facility] cardio

## 2020-01-13 NOTE — TELEPHONE ENCOUNTER
Returned call to Wesley to discuss. Wesley is currently in [place], [place] for the winter. He reports that since he arrived there he has noticed that he feels dizzy and light headed; he describes it as feeling 'floppy.' He reports that the dizziness makes it difficult for him to walk. He tries to change positions slowly and sit on the edge of the bed before standing but the dizziness does not subside. He notices this in the night times when he gets up to go to the bathroom and reports that it persists into the mornings and sometimes lasts all day. Some days are better than others. He reports he has had no dietary change and reports he has been drinking a little more water due to the heat. Reports he doesn't weigh himself but that he feels his weight is stable; denies swelling in his legs or abdomen or changes in his breathing. He reports he cut his imdur dosage in half as he thought that may be contributing but he noticed no improvement after decreasing this. He has been checking his blood pressures. Today BP was 148/59; he reports this is similar to how it has been running although the diastolic number is occasionally in the 40s.  He wonders what he can do to decrease the dizziness.   Will route to provider for further review and call Wesley back with recommendations.

## 2020-01-13 NOTE — TELEPHONE ENCOUNTER
Discussed with DR. Loco.   Called Wesley back. He does have a remote monitor for his ICD, but he left it at home and did not bring it with him to [place]. He reports he also has a pulse ox that he will put on to check his oxygen and heart rate. We discussed that at this time we would recommend that he go in to see a primary care clinic or a family medicine doctor so that someone can evaluate him in person. He stated understanding. He will check his pulse ox and he reports that if he feels worse he will go in. Discussed that we recommend he be evaluated so that he can hopefully get feeling better to enjoy his time away. HE stated understanding. Encouraged him to call us with any questions, concerns or changes.

## 2020-03-10 ENCOUNTER — HEALTH MAINTENANCE LETTER (OUTPATIENT)
Age: 75
End: 2020-03-10

## 2020-03-12 ENCOUNTER — TELEPHONE (OUTPATIENT)
Dept: CARDIOLOGY | Facility: CLINIC | Age: 75
End: 2020-03-12

## 2020-03-12 NOTE — TELEPHONE ENCOUNTER
[facility] Call Center    Phone Message    May a detailed message be left on voicemail: yes     Reason for Call: Symptoms or Concerns     If patient has red-flag symptoms, warm transfer to triage line    Current symptom or concern: dizzy, blacking out and falling over    Symptoms have been present for:  3 month(s)    Has patient previously been seen for this? Yes    By emergency room :in [place], [place]    Date: on and off since 12/2020    Are there any new or worsening symptoms? No    Other: Pt wife calling in pt has been falling over and very dizzy since December , they are flying back April 4th and would like to be seen asap next available isnt until 07/2020 please call back for scheduling options  Call back at [phone]    Action Taken: Message routed to:  [facility] cardio     Travel Screening: Not Applicable

## 2020-03-12 NOTE — TELEPHONE ENCOUNTER
Returned call to Bianca. She reports that Wesley has had continued symptoms of dizziness and light headed. She reports he fell yesterday but did not seek treatment. LEt her know that if he falls again and especially if he hits his head he should seek care at the emergency room near them.   She reports after we last talked, he did see a local doctor in [place] where they have been wintering and they decreased his coreg which seemed to help for a little bit but symptoms are getting more frequent again. She reports the symptoms come and go where he gets dizzy, light headed and has trouble walking due to his dizziness. His last BP check was 116/60. She reports there is no pattern to the symptoms, sometimes they are in the morning and sometimes in the afternoon.   They will be returning early April and will want a sooner appointment. I will help coordinate an appointment.

## 2020-03-16 NOTE — TELEPHONE ENCOUNTER
Discussed with DR. Loco. No changes at this time. WIll ask pt to send in a remote device check once he gets home.  Tried to call them back, number is not in service. Send MyChart and will mail appointment reminder.

## 2020-04-06 ENCOUNTER — PATIENT OUTREACH (OUTPATIENT)
Dept: CARDIOLOGY | Facility: CLINIC | Age: 75
End: 2020-04-06

## 2020-04-06 NOTE — PROGRESS NOTES
Called Wesley to discuss rescheduling of his appointment to 4/10 and to convert to video or telephone appointment. No answer and phone call unable to accept incoming calls. Will send mychart and try again.

## 2020-04-10 ENCOUNTER — VIRTUAL VISIT (OUTPATIENT)
Dept: CARDIOLOGY | Facility: CLINIC | Age: 75
End: 2020-04-10
Attending: INTERNAL MEDICINE
Payer: COMMERCIAL

## 2020-04-10 ENCOUNTER — TELEPHONE (OUTPATIENT)
Dept: CARDIOLOGY | Facility: CLINIC | Age: 75
End: 2020-04-10

## 2020-04-10 DIAGNOSIS — I25.810 CORONARY ARTERY DISEASE INVOLVING AUTOLOGOUS ARTERY CORONARY BYPASS GRAFT, ANGINA PRESENCE UNSPECIFIED: Chronic | ICD-10-CM

## 2020-04-10 DIAGNOSIS — I25.5 ISCHEMIC CARDIOMYOPATHY: Primary | ICD-10-CM

## 2020-04-10 PROCEDURE — 99214 OFFICE O/P EST MOD 30 MIN: CPT | Mod: 95 | Performed by: INTERNAL MEDICINE

## 2020-04-10 NOTE — PROGRESS NOTES
Service Date: 04/10/2020      Jarod De La Rosa MD   [place] [id number]      RE: Wesley Cooper    MRN: [id number]   : 1945      Dear Dr. De La Rosa:       We had the pleasure of seeing Mr. Wesley Cooper for followup in our Heart Failure Clinic at the [facility].  As you know, he is a 74-year-old gentleman with a past medical history significant for:     1.  Severe 3 vessel epicardial coronary artery disease, status post LIMA to LAD, occluded SVG to RCA and occluded SVG to left circumflex.  Status post complex PCI of the left circumflex in 2019.   2.  Ischemic cardiomyopathy with an estimated ejection fraction of 40%-45%.   3.  Status post ileostomy.      Mr. Cooper returns for followup.  We last saw him in 2019.  He was hospitalized twice while he was wintering in [place] for severe lightheadedness and dizziness.  This was thought to be due to hypotension.  His Coreg dose was decreased to 3.125 mg twice daily and Imdur was decreased to 60 mg twice a day.    He continues to feel dizzy and lightheaded at home.  He is not able to walk more than few steps.  He had a syncopal episode 2 weeks ago.  He also complains of increasing shortness of breath.  He has no lower extremity swelling or abdominal distention.  His weight has been stable.  He has no proximal nocturnal dyspnea or orthopnea.  He denies having chest pain or pressure.  He has no palpitations.  He has not had any ICD shocks.       REVIEW OF SYSTEMS:  A detailed 10 point review of systems was obtained as described in the History of Present Illness.  All other systems were reviewed and are negative.       MEDICATIONS:    Current Outpatient Medications   Medication Sig     atorvastatin (LIPITOR) 80 MG tablet Take 80 mg by mouth daily     carvedilol (COREG) 12.5 MG tablet Take 1 tablet (12.5 mg) by mouth 2 times daily (with meals)     clopidogrel (PLAVIX) 75 MG tablet Take 75 mg by  mouth daily     furosemide (LASIX) 40 MG tablet Take 40 mg by mouth daily     gabapentin (NEURONTIN) 300 MG capsule Take 300 mg by mouth 3 times daily      glipiZIDE (GLUCOTROL) 5 MG tablet Take 5 mg by mouth 2 times daily (before meals)     isosorbide mononitrate (IMDUR) 60 MG 24 hr tablet Take 2 tablets (120 mg) by mouth 2 times daily     losartan (COZAAR) 25 MG tablet Take 1 tablet (25 mg) by mouth daily     nitroglycerin (NITROSTAT) 0.4 MG sublingual tablet Place 0.4 mg under the tongue every 5 minutes as needed for chest pain For chest pain place 1 tablet under the tongue every 5 minutes for 3 doses. If symptoms persist 5 minutes after 1st dose call 911.     pioglitazone (ACTOS) 30 MG tablet Take 30 mg by mouth daily     potassium aminobenzoate 500 MG CAPS capsule      sotalol HCl, AF, 160 MG TABS Take 160 mg by mouth 2 times daily (Patient taking differently: Take 80 mg by mouth 2 times daily )     zolpidem (AMBIEN) 5 MG tablet Take 1 tablet (5 mg) by mouth nightly as needed for sleep     warfarin (COUMADIN) 2.5 MG tablet Take 2 tablets (5 mg) by mouth daily Take 5 mg by mouth on Mondays and 2.5 mg 6x/week or as directed based on INR     No current facility-administered medications for this visit.      Facility-Administered Medications Ordered in Other Visits   Medication     sodium chloride (PF) 0.9% PF flush 10 mL       PHYSICAL EXAMINATION:      He was comfortable.  He was awake, alert, oriented x3.  He was in no apparent distress.  He had no jugular venous distention.  He had no lower extremity edema.  He had no pallor, cyanosis or jaundice.  CV: appears warm and well-perfused   PULM: easy work of breathing   NEURO: alert, conversant   PSYCH: affect normal  SKIN: no rash or lesion on visualized skin    Echocardiogram (11/2019):    His left ventricular systolic function appeared to be mildly reduced when compared to his previous echocardiogram.  His EF was approximately 35%-40%.  He had inferior and lateral  wall motion abnormality.  His anterior wall was viable and rox.  He had no other significant valvular abnormalities.  His right ventricle was normal in size and function.     CBC RESULTS:   Recent Labs   Lab Test 11/25/19  1212   WBC 7.3   RBC 4.82   HGB 12.3*   HCT 39.5*   MCV 82   MCH 25.5*   MCHC 31.1*   RDW 15.6*        Recent Labs   Lab Test 11/25/19  1212 08/19/19  1133    135   POTASSIUM 4.5 4.4   CHLORIDE 104 103   CO2 23 27   ANIONGAP 6 5   * 349*   BUN 27 24   CR 1.45* 1.14   KEIRA 8.8 8.3*     Liver Function Studies -   Recent Labs   Lab Test 11/06/18  0816   PROTTOTAL 8.2   ALBUMIN 3.8   BILITOTAL 0.8   ALKPHOS 80   AST 19   ALT 19       ASSESSMENT AND PLAN:      Mr. Wesley Cooper is a very pleasant, 75-year-old male with severe 3 vessel coronary artery disease, ischemic cardiomyopathy and moderate LV systolic dysfunction who returns today for followup.     He has not been feeling well for the last several weeks.  He was hospitalized twice in [place] in February/March this year.  His main symptom right now is lightheadedness or dizziness.  He is also complaining of mild increase in shortness of breath but he does not have any lower extremity swelling and his weight has been stable.    I wonder whether his lightheadedness dizziness is due to hypotension from his medications.  I have asked him to check his blood pressure at home and email me.  In the interim, I have recommended him to stop his Imdur for now.  He will continue on Coreg 3.125 mg twice a day Cozaar 725 mg daily, sotalol 80 mg twice a day and Lasix 40 mg daily.  We will give him a call next week to see how he is doing off of Imdur.  If he continues to have lightheadedness dizziness and exertional shortness of breath, I think he should be seen in person to determine whether he is having progression of his cardiomyopathy are coronary artery disease.  He is living on his LIMA to LAD only.  His SVG to the left circumflex  territory and RCA have occluded in the past.    We will follow-up with him next week.  I have asked him to call us in the interim if you have any further worsening symptoms. It was a pleasure meeting Mr. Flor Stevenson in our Heart Failure Clinic at the [facility].      Sincerely,   Claudy Loco MD   [facility]  Heart Failure, Transplant, and Mechanical Circulatory Support Cardiology   Cardiovascular Division  [facility]   [phone]            D: 2019   T: 2019   MT: geraldo      Name:     FLOR STEVENSON   MRN:      [id number]        Account:      [id number]   :      1945           Service Date: 2019      Document: [id number]

## 2020-04-10 NOTE — LETTER
"4/10/2020      RE: Wesley Cooper  [address]  [facility] [address]       Dear Colleague,    Thank you for the opportunity to participate in the care of your patient, Wesley Cooper, at the [facility]. Please see a copy of my visit note below.    Wesley Cooper is a 75 year old male who is being evaluated via a billable video visit.      The patient has been notified of following:     \"This video visit will be conducted via a call between you and your physician/provider. We have found that certain health care needs can be provided without the need for an in-person physical exam.  This service lets us provide the care you need with a video conversation.  If a prescription is necessary we can send it directly to your pharmacy.  If lab work is needed we can place an order for that and you can then stop by our lab to have the test done at a later time.    Video visits are billed at different rates depending on your insurance coverage.  Please reach out to your insurance provider with any questions.    If during the course of the call the physician/provider feels a video visit is not appropriate, you will not be charged for this service.\"    Patient has given verbal consent for Video visit? Yes    How would you like to obtain your AVS? MyChart    Patient would like the video invitation sent by: Send to e-mail at: [email or web address]         Medications were reconciled.     Radha Olson CMA    1:26 PM          Video Start Time: 2.30 PM    Wesley Cooper complains of    Chief Complaint   Patient presents with     Video Visit     75 year old male presents with chronic systolic heart failure, refractory angina, ICM for follow up in relation to his persistent dizziness over the last 4 months       I have reviewed and updated the patient's Past Medical History, Social History, Family History and Medication List.    ALLERGIES  Patient has no known " allergies.    Additional provider notes: see dictated notes    Video-Visit Details    Type of service:  Video Visit    Video End Time (time video stopped): 2.55 PM    Originating Location (pt. Location): Home    Distant Location (provider location):  [facility] HEART CARE     Mode of Communication:  Video Conference via Sunny Loco MD   [facility]  Heart Failure, Transplant, and Mechanical Circulatory Support Cardiology   Cardiovascular Division  [facility]   [phone]            Service Date: 04/10/2020      [place] [address]      RE: Wesley Cooper    MRN: [id number]   : 1945      Dear Dr. De La Rosa:       We had the pleasure of seeing Mr. Wesley Cooper for followup in our Heart Failure Clinic at the [facility].  As you know, he is a 74-year-old gentleman with a past medical history significant for:     1.  Severe 3 vessel epicardial coronary artery disease, status post LIMA to LAD, occluded SVG to RCA and occluded SVG to left circumflex.  Status post complex PCI of the left circumflex in 2019.   2.  Ischemic cardiomyopathy with an estimated ejection fraction of 40%-45%.   3.  Status post ileostomy.      Mr. Cooper returns for followup.  We last saw him in 2019.  He was hospitalized twice while he was wintering in [place] for severe lightheadedness and dizziness.  This was thought to be due to hypotension.  His Coreg dose was decreased to 3.125 mg twice daily and Imdur was decreased to 60 mg twice a day.    He continues to feel dizzy and lightheaded at home.  He is not able to walk more than few steps.  He had a syncopal episode 2 weeks ago.  He also complains of increasing shortness of breath.  He has no lower extremity swelling or abdominal distention.  His weight has been stable.  He has no proximal nocturnal dyspnea or  orthopnea.  He denies having chest pain or pressure.  He has no palpitations.  He has not had any ICD shocks.       REVIEW OF SYSTEMS:  A detailed 10 point review of systems was obtained as described in the History of Present Illness.  All other systems were reviewed and are negative.       MEDICATIONS:    Current Outpatient Medications   Medication Sig     atorvastatin (LIPITOR) 80 MG tablet Take 80 mg by mouth daily     carvedilol (COREG) 12.5 MG tablet Take 1 tablet (12.5 mg) by mouth 2 times daily (with meals)     clopidogrel (PLAVIX) 75 MG tablet Take 75 mg by mouth daily     furosemide (LASIX) 40 MG tablet Take 40 mg by mouth daily     gabapentin (NEURONTIN) 300 MG capsule Take 300 mg by mouth 3 times daily      glipiZIDE (GLUCOTROL) 5 MG tablet Take 5 mg by mouth 2 times daily (before meals)     isosorbide mononitrate (IMDUR) 60 MG 24 hr tablet Take 2 tablets (120 mg) by mouth 2 times daily     losartan (COZAAR) 25 MG tablet Take 1 tablet (25 mg) by mouth daily     nitroglycerin (NITROSTAT) 0.4 MG sublingual tablet Place 0.4 mg under the tongue every 5 minutes as needed for chest pain For chest pain place 1 tablet under the tongue every 5 minutes for 3 doses. If symptoms persist 5 minutes after 1st dose call 911.     pioglitazone (ACTOS) 30 MG tablet Take 30 mg by mouth daily     potassium aminobenzoate 500 MG CAPS capsule      sotalol HCl, AF, 160 MG TABS Take 160 mg by mouth 2 times daily (Patient taking differently: Take 80 mg by mouth 2 times daily )     zolpidem (AMBIEN) 5 MG tablet Take 1 tablet (5 mg) by mouth nightly as needed for sleep     warfarin (COUMADIN) 2.5 MG tablet Take 2 tablets (5 mg) by mouth daily Take 5 mg by mouth on Mondays and 2.5 mg 6x/week or as directed based on INR     No current facility-administered medications for this visit.      Facility-Administered Medications Ordered in Other Visits   Medication     sodium chloride (PF) 0.9% PF flush 10 mL       PHYSICAL EXAMINATION:       He was comfortable.  He was awake, alert, oriented x3.  He was in no apparent distress.  He had no jugular venous distention.  He had no lower extremity edema.  He had no pallor, cyanosis or jaundice.  CV: appears warm and well-perfused   PULM: easy work of breathing   NEURO: alert, conversant   PSYCH: affect normal  SKIN: no rash or lesion on visualized skin    Echocardiogram (11/2019):    His left ventricular systolic function appeared to be mildly reduced when compared to his previous echocardiogram.  His EF was approximately 35%-40%.  He had inferior and lateral wall motion abnormality.  His anterior wall was viable and rox.  He had no other significant valvular abnormalities.  His right ventricle was normal in size and function.     CBC RESULTS:   Recent Labs   Lab Test 11/25/19  1212   WBC 7.3   RBC 4.82   HGB 12.3*   HCT 39.5*   MCV 82   MCH 25.5*   MCHC 31.1*   RDW 15.6*        Recent Labs   Lab Test 11/25/19  1212 08/19/19  1133    135   POTASSIUM 4.5 4.4   CHLORIDE 104 103   CO2 23 27   ANIONGAP 6 5   * 349*   BUN 27 24   CR 1.45* 1.14   KEIRA 8.8 8.3*     Liver Function Studies -   Recent Labs   Lab Test 11/06/18  0816   PROTTOTAL 8.2   ALBUMIN 3.8   BILITOTAL 0.8   ALKPHOS 80   AST 19   ALT 19       ASSESSMENT AND PLAN:      Mr. Wesley Cooper is a very pleasant, 75-year-old male with severe 3 vessel coronary artery disease, ischemic cardiomyopathy and moderate LV systolic dysfunction who returns today for followup.     He has not been feeling well for the last several weeks.  He was hospitalized twice in [place] in February/March this year.  His main symptom right now is lightheadedness or dizziness.  He is also complaining of mild increase in shortness of breath but he does not have any lower extremity swelling and his weight has been stable.    I wonder whether his lightheadedness dizziness is due to hypotension from his medications.  I have asked him to check his blood  pressure at home and email me.  In the interim, I have recommended him to stop his Imdur for now.  He will continue on Coreg 3.125 mg twice a day Cozaar 725 mg daily, sotalol 80 mg twice a day and Lasix 40 mg daily.  We will give him a call next week to see how he is doing off of Imdur.  If he continues to have lightheadedness dizziness and exertional shortness of breath, I think he should be seen in person to determine whether he is having progression of his cardiomyopathy are coronary artery disease.  He is living on his LIMA to LAD only.  His SVG to the left circumflex territory and RCA have occluded in the past.    We will follow-up with him next week.  I have asked him to call us in the interim if you have any further worsening symptoms. It was a pleasure meeting Mr. Flor Stevenson in our Heart Failure Clinic at the [facility].      Sincerely,   Claudy Loco MD   [facility]  Heart Failure, Transplant, and Mechanical Circulatory Support Cardiology   Cardiovascular Division  [facility]   [phone]            D: 2019   T: 2019   MT: [email or web address]      Name:     FLOR STEVENSON   MRN:      [id number]        Account:      [id number]   :      1945           Service Date: 2019      Document: [id number]        Please do not hesitate to contact me if you have any questions/concerns.     Sincerely,     Claudy Loco MD

## 2020-04-10 NOTE — TELEPHONE ENCOUNTER
[facility] Call Center    Phone Message    May a detailed message be left on voicemail: yes     Reason for Call: Other: pt's wife calling because the pt blood pressure is 144/116. They are reporting this as asked. Please call the pt's wife back of needed.      Action Taken: Message routed to:  [facility] cardiology    Travel Screening: Not Applicable

## 2020-04-10 NOTE — PROGRESS NOTES
"Wesley Cooper is a 75 year old male who is being evaluated via a billable video visit.      The patient has been notified of following:     \"This video visit will be conducted via a call between you and your physician/provider. We have found that certain health care needs can be provided without the need for an in-person physical exam.  This service lets us provide the care you need with a video conversation.  If a prescription is necessary we can send it directly to your pharmacy.  If lab work is needed we can place an order for that and you can then stop by our lab to have the test done at a later time.    Video visits are billed at different rates depending on your insurance coverage.  Please reach out to your insurance provider with any questions.    If during the course of the call the physician/provider feels a video visit is not appropriate, you will not be charged for this service.\"    Patient has given verbal consent for Video visit? Yes    How would you like to obtain your AVS? Candaceharspike    Patient would like the video invitation sent by: Send to e-mail at: [email or web address]         Medications were reconciled.     Radha Olson CMA    1:26 PM          Video Start Time: 2.30 PM    Wesley Cooper complains of    Chief Complaint   Patient presents with     Video Visit     75 year old male presents with chronic systolic heart failure, refractory angina, ICM for follow up in relation to his persistent dizziness over the last 4 months       I have reviewed and updated the patient's Past Medical History, Social History, Family History and Medication List.    ALLERGIES  Patient has no known allergies.    Additional provider notes: see dictated notes    Video-Visit Details    Type of service:  Video Visit    Video End Time (time video stopped): 2.55 PM    Originating Location (pt. Location): Home    Distant Location (provider location):  [facility]     Mode of Communication:  Video Conference via " Sunny Loco MD   [facility]  Heart Failure, Transplant, and Mechanical Circulatory Support Cardiology   Cardiovascular Division  [facility]   [phone]

## 2020-04-10 NOTE — PATIENT INSTRUCTIONS
"You were seen today in the Cardiovascular Clinic at the [facility].       Cardiology Providers you saw during your visit: Dr. Loco      Medication Changes:   1. STOP imdur.       Patient Instructions:  1.Please keep track of your blood pressures and email them to Dr. Loco for him to review.   2.  I will call you on Tuesday to check in and see how your symptoms are doing.     Follow up Appointment Information:  1. We will determine a follow up plan after I talk to you on Tuesday.       Results:          [address] [place] [address] [place] [address]        Thank you for allowing us to be a part of your care here at the [facility] Care      If you have questions or concerns please contact us at:      Calli Aguayo RN BSN   Cardiology Care Coordinator  [facility]   Questions and [phone]   First press #1 for the [employer] and then press #4 to \"send a message to your care team\"     "

## 2020-04-15 NOTE — TELEPHONE ENCOUNTER
Called Wesley again to check in. HE reports his dizziness has returned and he is still 'wobbly.   He reports he feels 'kind of rotten' and does not feel any better.  Blood pressures:  4/14: 149/119  4/15: 147/112    He reports no new symptoms since stopping imdur and no chest pain. However, his symptoms have not improved since stopping imdur. Will route to dr. villagomez for review.

## 2020-04-20 DIAGNOSIS — I50.22 CHRONIC SYSTOLIC HEART FAILURE (H): ICD-10-CM

## 2020-04-20 RX ORDER — ISOSORBIDE MONONITRATE 60 MG/1
120 TABLET, EXTENDED RELEASE ORAL 2 TIMES DAILY
Qty: 360 TABLET | Refills: 1 | Status: SHIPPED | OUTPATIENT
Start: 2020-04-20 | End: 2020-07-14

## 2020-04-20 RX ORDER — CARVEDILOL 12.5 MG/1
6.25 TABLET ORAL 2 TIMES DAILY WITH MEALS
Qty: 90 TABLET | Refills: 1 | Status: SHIPPED | OUTPATIENT
Start: 2020-04-20 | End: 2020-01-01

## 2020-04-20 NOTE — TELEPHONE ENCOUNTER
[facility] Call Center    Phone Message    May a detailed message be left on voicemail: yes     Reason for Call: Other: Pt is calling back stating the nurse of Dr. Loco was going to call him last Thursday and hasnt heard anything as of yet, pt is asking for a call back today please     Action Taken: Message routed to:  [facility] Cardiology    Travel Screening: Not Applicable

## 2020-04-20 NOTE — TELEPHONE ENCOUNTER
Returned call to jose. He reports he has been still having dizzy spells. He fell last Thursday and hurt his back. He reports he has been using a heating pad and it is feeling better. He did not lose consciousness and remembers the entire event. Discussed that if he falls again and hits his head, that he needs to go in to make sure there isn't bleeding. He stated understanding. He continues to feel very unbalanced. BP last Thursday was 178/140. He hasn't checked his BP today. Discussed that high blood pressure can also contribute to dizziness and not feeling well.   Confirmed he is on the following medications:  Coreg 3.125mg BID (this has been decreased recently elsewhere)  Was on imdur 120mg BID before we stopped it on 4/10  He reports he doesn't have any losartan at this time. (listed in med list as 25mg).     Date: 4/20/2020    Time of Call: 12:11 PM     Diagnosis:  hypertension     [ VORB ] Ordering provider: Claudy Loco MD    Order: restart imdur at previous dose (120mg BID). Increase coreg to 6.25mg BID.     Order received by: siena srivastava RN     Follow-up/additional notes: med list updated. Discussed with jose. He stated understanding. Asked him to go slow with position changes and movements, especially with restarting meds. Asked him to continue to check his blood pressure. Will plan to check in again on Wednesday to see how symptoms and BP are doing.

## 2020-04-29 ENCOUNTER — PATIENT OUTREACH (OUTPATIENT)
Dept: CARDIOLOGY | Facility: CLINIC | Age: 75
End: 2020-04-29

## 2020-04-29 NOTE — TELEPHONE ENCOUNTER
Called Wesley to check in after medication changes last week. He reports he is not doing good. HE thinks his symptoms are worsening. He feels dizzy and wobbly most of the day where he previously felt like he got a few hours of reprieve. He has not had any more falls but reports he has 'almost fallen' a few times where he caught himself. He saw his primary care physician yesterday due to his worsening symptoms. His primary care decreased his imdur to 30mg BID and held his lasix. They are going to work on his diabetes control as he had elevated hgba1c and high blood sugar yesterday; PCP thinks this may be contributing. He tells me his BP cuff he had been using was a wrist cuff and on Monday his BP was 138/88 and on Tuesday was 136/78. However, at the doctors office yesterday his BP was 83/60 and on recheck 93/50. He tells me he did get a new BP machine but hasn't used it yet and hasn't checked his BP yet today. He tells me how frustrated he is that he hasn't felt better as he is going on almost 5 months of feeling like this. It is starting to wear on him and he reports he is 'close to just giving up.' I asked him what he meant by this and he reports that he would 'just stop taking the medicines.' He tells me he doesn't want to hurt himself but he is frustrated of feeling so poorly. He agreed that I would update DR. Villagomez and then call him back once I heard from the dr as he really wants dr villagomez's input.

## 2020-04-29 NOTE — TELEPHONE ENCOUNTER
Called jose to check in after medication changes last week. He is unavailable at this time. They ask for call back in about 2 hours.

## 2020-04-30 NOTE — TELEPHONE ENCOUNTER
Discussed with Dr. Loco. Agree with PCP medication changes. Continue to monitor BP at home utilizing new Bp cuff.  Called jose to discuss. He reports he thinks he maybe feels a little bit better and feels 'pretty good' right now. He reports BP on new cuff right now is 106/62. He will also bring his new BP cuff to his clinic appointment next week to verify that readings are accurate. Offered clinic appointment - will hold off for now and plan for another check in next week via phone. He states understanding. Appreciate for call, no further questions right now.

## 2020-05-06 ENCOUNTER — PATIENT OUTREACH (OUTPATIENT)
Dept: CARDIOLOGY | Facility: CLINIC | Age: 75
End: 2020-05-06

## 2020-05-06 NOTE — TELEPHONE ENCOUNTER
Called jose to check in and check on symptoms and blood pressures.  No answer.   left voicemail asking for call back when able.

## 2020-05-07 ENCOUNTER — PATIENT OUTREACH (OUTPATIENT)
Dept: CARDIOLOGY | Facility: CLINIC | Age: 75
End: 2020-05-07

## 2020-05-07 NOTE — TELEPHONE ENCOUNTER
Called wesley to check in. He reports he feels 'real good.' He says since his primary care made the last medication changes this is the best he has felt. He doesn't feel dizzy and feels that he is getting his energy back. He took his blood pressure while we were on the phone and it was 130/68. This is with his new cuff. He is going back to see his PCP on Monday and will bring the cuff with him to make sure it correlates with the BP readings at the office. He will also see a dietician next week for his diabetes. He has been put back on insulin to get better control of his blood sugars.   Plan to touch base next week again to see how he is doing and see about any changes made at his PCP.   Wesley states no further questions at this time.

## 2020-05-15 ENCOUNTER — PATIENT OUTREACH (OUTPATIENT)
Dept: CARDIOLOGY | Facility: CLINIC | Age: 75
End: 2020-05-15

## 2020-05-15 NOTE — TELEPHONE ENCOUNTER
Called jose to check in on symptoms and BP. No answer. Left voicemail asking him to call back with any questions or concerns, no call needed if continues to feel well. Reviewed notes from PCP visit this week where he reported feeling well.

## 2020-06-03 ENCOUNTER — ANCILLARY PROCEDURE (OUTPATIENT)
Dept: CARDIOLOGY | Facility: CLINIC | Age: 75
End: 2020-06-03
Attending: INTERNAL MEDICINE
Payer: COMMERCIAL

## 2020-06-03 DIAGNOSIS — Z95.810 AUTOMATIC IMPLANTABLE CARDIOVERTER-DEFIBRILLATOR IN SITU: ICD-10-CM

## 2020-06-03 PROCEDURE — 93296 REM INTERROG EVL PM/IDS: CPT | Mod: ZF

## 2020-06-03 PROCEDURE — 93295 DEV INTERROG REMOTE 1/2/MLT: CPT | Performed by: INTERNAL MEDICINE

## 2020-06-09 LAB
MDC_IDC_LEAD_IMPLANT_DT: NORMAL
MDC_IDC_LEAD_LOCATION: NORMAL
MDC_IDC_LEAD_LOCATION_DETAIL_1: NORMAL
MDC_IDC_LEAD_MFG: NORMAL
MDC_IDC_LEAD_MODEL: NORMAL
MDC_IDC_LEAD_POLARITY_TYPE: NORMAL
MDC_IDC_LEAD_SERIAL: NORMAL
MDC_IDC_MSMT_BATTERY_DTM: NORMAL
MDC_IDC_MSMT_BATTERY_REMAINING_LONGEVITY: 48 MO
MDC_IDC_MSMT_BATTERY_REMAINING_PERCENTAGE: 53 %
MDC_IDC_MSMT_BATTERY_RRT_TRIGGER: NORMAL
MDC_IDC_MSMT_BATTERY_STATUS: NORMAL
MDC_IDC_MSMT_BATTERY_VOLTAGE: 2.93 V
MDC_IDC_MSMT_CAP_CHARGE_DTM: NORMAL
MDC_IDC_MSMT_CAP_CHARGE_ENERGY: 40 J
MDC_IDC_MSMT_CAP_CHARGE_TIME: 8.9 S
MDC_IDC_MSMT_CAP_CHARGE_TYPE: NORMAL
MDC_IDC_MSMT_LEADCHNL_LV_IMPEDANCE_VALUE: 750 OHM
MDC_IDC_MSMT_LEADCHNL_LV_LEAD_CHANNEL_STATUS: NORMAL
MDC_IDC_MSMT_LEADCHNL_RA_IMPEDANCE_VALUE: 390 OHM
MDC_IDC_MSMT_LEADCHNL_RA_LEAD_CHANNEL_STATUS: NORMAL
MDC_IDC_MSMT_LEADCHNL_RA_PACING_THRESHOLD_AMPLITUDE: 0.62 V
MDC_IDC_MSMT_LEADCHNL_RA_PACING_THRESHOLD_PULSEWIDTH: 0.5 MS
MDC_IDC_MSMT_LEADCHNL_RA_SENSING_INTR_AMPL: 5 MV
MDC_IDC_MSMT_LEADCHNL_RV_IMPEDANCE_VALUE: 600 OHM
MDC_IDC_MSMT_LEADCHNL_RV_LEAD_CHANNEL_STATUS: NORMAL
MDC_IDC_MSMT_LEADCHNL_RV_PACING_THRESHOLD_AMPLITUDE: 0.5 V
MDC_IDC_MSMT_LEADCHNL_RV_PACING_THRESHOLD_PULSEWIDTH: 0.5 MS
MDC_IDC_MSMT_LEADCHNL_RV_SENSING_INTR_AMPL: 12 MV
MDC_IDC_PG_IMPLANT_DTM: NORMAL
MDC_IDC_PG_MFG: NORMAL
MDC_IDC_PG_MODEL: NORMAL
MDC_IDC_PG_SERIAL: NORMAL
MDC_IDC_PG_TYPE: NORMAL
MDC_IDC_SESS_CLINIC_NAME: NORMAL
MDC_IDC_SESS_DTM: NORMAL
MDC_IDC_SESS_REPROGRAMMED: NO
MDC_IDC_SESS_TYPE: NORMAL
MDC_IDC_SET_BRADY_AT_MODE_SWITCH_MODE: NORMAL
MDC_IDC_SET_BRADY_AT_MODE_SWITCH_RATE: 180 {BEATS}/MIN
MDC_IDC_SET_BRADY_LOWRATE: 60 {BEATS}/MIN
MDC_IDC_SET_BRADY_MAX_SENSOR_RATE: 110 {BEATS}/MIN
MDC_IDC_SET_BRADY_MAX_TRACKING_RATE: 110 {BEATS}/MIN
MDC_IDC_SET_BRADY_MODE: NORMAL
MDC_IDC_SET_BRADY_PAV_DELAY_LOW: 150 MS
MDC_IDC_SET_BRADY_SAV_DELAY_LOW: 100 MS
MDC_IDC_SET_CRT_LVRV_DELAY: 20 MS
MDC_IDC_SET_CRT_PACED_CHAMBERS: NORMAL
MDC_IDC_SET_LEADCHNL_LV_PACING_AMPLITUDE: 2.25 V
MDC_IDC_SET_LEADCHNL_LV_PACING_ANODE_ELECTRODE_1: NORMAL
MDC_IDC_SET_LEADCHNL_LV_PACING_ANODE_LOCATION_1: NORMAL
MDC_IDC_SET_LEADCHNL_LV_PACING_CAPTURE_MODE: NORMAL
MDC_IDC_SET_LEADCHNL_LV_PACING_CATHODE_ELECTRODE_1: NORMAL
MDC_IDC_SET_LEADCHNL_LV_PACING_CATHODE_LOCATION_1: NORMAL
MDC_IDC_SET_LEADCHNL_LV_PACING_POLARITY: NORMAL
MDC_IDC_SET_LEADCHNL_LV_PACING_PULSEWIDTH: 0.5 MS
MDC_IDC_SET_LEADCHNL_RA_PACING_AMPLITUDE: 1.5 V
MDC_IDC_SET_LEADCHNL_RA_PACING_ANODE_ELECTRODE_1: NORMAL
MDC_IDC_SET_LEADCHNL_RA_PACING_ANODE_LOCATION_1: NORMAL
MDC_IDC_SET_LEADCHNL_RA_PACING_CAPTURE_MODE: NORMAL
MDC_IDC_SET_LEADCHNL_RA_PACING_CATHODE_ELECTRODE_1: NORMAL
MDC_IDC_SET_LEADCHNL_RA_PACING_CATHODE_LOCATION_1: NORMAL
MDC_IDC_SET_LEADCHNL_RA_PACING_POLARITY: NORMAL
MDC_IDC_SET_LEADCHNL_RA_PACING_PULSEWIDTH: 0.5 MS
MDC_IDC_SET_LEADCHNL_RA_SENSING_ADAPTATION_MODE: NORMAL
MDC_IDC_SET_LEADCHNL_RA_SENSING_ANODE_ELECTRODE_1: NORMAL
MDC_IDC_SET_LEADCHNL_RA_SENSING_ANODE_LOCATION_1: NORMAL
MDC_IDC_SET_LEADCHNL_RA_SENSING_CATHODE_ELECTRODE_1: NORMAL
MDC_IDC_SET_LEADCHNL_RA_SENSING_CATHODE_LOCATION_1: NORMAL
MDC_IDC_SET_LEADCHNL_RA_SENSING_POLARITY: NORMAL
MDC_IDC_SET_LEADCHNL_RA_SENSING_SENSITIVITY: 0.3 MV
MDC_IDC_SET_LEADCHNL_RV_PACING_AMPLITUDE: 2 V
MDC_IDC_SET_LEADCHNL_RV_PACING_ANODE_ELECTRODE_1: NORMAL
MDC_IDC_SET_LEADCHNL_RV_PACING_ANODE_LOCATION_1: NORMAL
MDC_IDC_SET_LEADCHNL_RV_PACING_CAPTURE_MODE: NORMAL
MDC_IDC_SET_LEADCHNL_RV_PACING_CATHODE_ELECTRODE_1: NORMAL
MDC_IDC_SET_LEADCHNL_RV_PACING_CATHODE_LOCATION_1: NORMAL
MDC_IDC_SET_LEADCHNL_RV_PACING_POLARITY: NORMAL
MDC_IDC_SET_LEADCHNL_RV_PACING_PULSEWIDTH: 0.5 MS
MDC_IDC_SET_LEADCHNL_RV_SENSING_ADAPTATION_MODE: NORMAL
MDC_IDC_SET_LEADCHNL_RV_SENSING_ANODE_ELECTRODE_1: NORMAL
MDC_IDC_SET_LEADCHNL_RV_SENSING_ANODE_LOCATION_1: NORMAL
MDC_IDC_SET_LEADCHNL_RV_SENSING_CATHODE_ELECTRODE_1: NORMAL
MDC_IDC_SET_LEADCHNL_RV_SENSING_CATHODE_LOCATION_1: NORMAL
MDC_IDC_SET_LEADCHNL_RV_SENSING_POLARITY: NORMAL
MDC_IDC_SET_LEADCHNL_RV_SENSING_SENSITIVITY: 0.5 MV
MDC_IDC_SET_ZONE_DETECTION_INTERVAL: 320 MS
MDC_IDC_SET_ZONE_DETECTION_INTERVAL: 360 MS
MDC_IDC_SET_ZONE_DETECTION_INTERVAL: 460 MS
MDC_IDC_SET_ZONE_TYPE: NORMAL
MDC_IDC_SET_ZONE_VENDOR_TYPE: NORMAL
MDC_IDC_STAT_AT_BURDEN_PERCENT: 0 %
MDC_IDC_STAT_AT_DTM_END: NORMAL
MDC_IDC_STAT_AT_DTM_START: NORMAL
MDC_IDC_STAT_AT_MODE_SW_COUNT: 0
MDC_IDC_STAT_AT_MODE_SW_COUNT_PER_DAY: 0
MDC_IDC_STAT_AT_MODE_SW_PERCENT_TIME: 0 %
MDC_IDC_STAT_BRADY_AP_VP_PERCENT: 13 %
MDC_IDC_STAT_BRADY_AP_VS_PERCENT: 1 %
MDC_IDC_STAT_BRADY_AS_VP_PERCENT: 87 %
MDC_IDC_STAT_BRADY_AS_VS_PERCENT: 1 %
MDC_IDC_STAT_BRADY_DTM_END: NORMAL
MDC_IDC_STAT_BRADY_DTM_START: NORMAL
MDC_IDC_STAT_BRADY_RA_PERCENT_PACED: 13 %
MDC_IDC_STAT_CRT_DTM_END: NORMAL
MDC_IDC_STAT_CRT_DTM_START: NORMAL
MDC_IDC_STAT_CRT_PERCENT_PACED: 99 %
MDC_IDC_STAT_HEART_RATE_ATRIAL_MAX: 290 {BEATS}/MIN
MDC_IDC_STAT_HEART_RATE_ATRIAL_MEAN: 70 {BEATS}/MIN
MDC_IDC_STAT_HEART_RATE_ATRIAL_MIN: 50 {BEATS}/MIN
MDC_IDC_STAT_HEART_RATE_DTM_END: NORMAL
MDC_IDC_STAT_HEART_RATE_DTM_START: NORMAL
MDC_IDC_STAT_HEART_RATE_VENTRICULAR_MAX: 160 {BEATS}/MIN
MDC_IDC_STAT_HEART_RATE_VENTRICULAR_MEAN: 70 {BEATS}/MIN
MDC_IDC_STAT_HEART_RATE_VENTRICULAR_MIN: 50 {BEATS}/MIN
MDC_IDC_STAT_TACHYTHERAPY_ATP_DELIVERED_RECENT: 0
MDC_IDC_STAT_TACHYTHERAPY_RECENT_DTM_END: NORMAL
MDC_IDC_STAT_TACHYTHERAPY_RECENT_DTM_START: NORMAL
MDC_IDC_STAT_TACHYTHERAPY_SHOCKS_ABORTED_RECENT: 0
MDC_IDC_STAT_TACHYTHERAPY_SHOCKS_DELIVERED_RECENT: 0

## 2020-06-15 ENCOUNTER — VIRTUAL VISIT (OUTPATIENT)
Dept: CARDIOLOGY | Facility: CLINIC | Age: 75
End: 2020-06-15
Attending: INTERNAL MEDICINE
Payer: COMMERCIAL

## 2020-06-15 DIAGNOSIS — I25.5 ISCHEMIC CARDIOMYOPATHY: ICD-10-CM

## 2020-06-15 PROCEDURE — 99214 OFFICE O/P EST MOD 30 MIN: CPT | Mod: 95 | Performed by: INTERNAL MEDICINE

## 2020-06-15 RX ORDER — SOTALOL HYDROCHLORIDE 80 MG/1
80 TABLET ORAL 2 TIMES DAILY
COMMUNITY
End: 2020-09-09

## 2020-06-15 ASSESSMENT — PAIN SCALES - GENERAL: PAINLEVEL: NO PAIN (0)

## 2020-06-15 NOTE — LETTER
"6/15/2020      RE: Wesley Cooper  [address]  [facility] [address]       Dear Colleague,    Thank you for the opportunity to participate in the care of your patient, Wesley Cooper, at the [facility] HEART [facility] at [facility]. Please see a copy of my visit note below.    Wesley Cooper is a 75 year old male who is being evaluated via a billable telephone visit.      The patient has been notified of following:     \"This telephone visit will be conducted via a call between you and your physician/provider. We have found that certain health care needs can be provided without the need for a physical exam.  This service lets us provide the care you need with a short phone conversation.  If a prescription is necessary we can send it directly to your pharmacy.  If lab work is needed we can place an order for that and you can then stop by our lab to have the test done at a later time.    Telephone visits are billed at different rates depending on your insurance coverage. During this emergency period, for some insurers they may be billed the same as an in-person visit.  Please reach out to your insurance provider with any questions.    If during the course of the call the physician/provider feels a telephone visit is not appropriate, you will not be charged for this service.\"    Patient has given verbal consent for Telephone visit?  Yes    What phone number would you like to be contacted at? [phone]    How would you like to obtain your AVS? Mail a copy     Medications were reconciled.     Radha Olson CMA    3:16 PM                  Service Date: 2020     Jarod De La Rosa MD   [address] [address]      RE: Wesley Cooper    MRN: [id number]   : 1945      Dear Dr. De La Rosa:       We had the pleasure of seeing Mr. Wesley Cooper for followup in our Heart Failure Clinic at the [facility].  As you know, he is a " 75-year-old gentleman with a past medical history significant for:     1.  Severe 3 vessel epicardial coronary artery disease, status post LIMA to LAD, occluded SVG to RCA and occluded SVG to left circumflex.  Status post complex PCI of the left circumflex in 03/2019.   2.  Ischemic cardiomyopathy with an estimated ejection fraction of 40%-45%.   3.  Status post ileostomy.      Mr. Cooper returns for followup.  He had significant dizziness in April of this year.  His blood pressure was elevated by home cuff.  However with the increase in his antihypertensive therapy he felt more dizzy.  In light of this he was seen in person by his primary care physician.  Reportedly his home blood pressure cuff was not working properly and his blood pressure was in fact low.  In light of this his antihypertensive therapy was reduced. He feels much better after this. His dizziness has improved significantly. He has not had any furtehr syncopal episodes.     He continues to have some exertional shortness of breath but not more than his usual. He is currently FC II. He has no lower extremity swelling or abdominal distention.  His weight has been stable.  He has no proximal nocturnal dyspnea or orthopnea.  He denies having chest pain or pressure.  He has no palpitations.  He has not had any ICD shocks.       REVIEW OF SYSTEMS:  A detailed 10 point review of systems was obtained as described in the History of Present Illness.  All other systems were reviewed and are negative.       MEDICATIONS:    Current Outpatient Medications   Medication Sig     atorvastatin (LIPITOR) 80 MG tablet Take 80 mg by mouth daily     carvedilol (COREG) 12.5 MG tablet Take 0.5 tablets (6.25 mg) by mouth 2 times daily (with meals)     clopidogrel (PLAVIX) 75 MG tablet Take 75 mg by mouth daily     furosemide (LASIX) 40 MG tablet Take 40 mg by mouth daily     gabapentin (NEURONTIN) 300 MG capsule Take 300 mg by mouth 3 times daily      glipiZIDE (GLUCOTROL) 5  MG tablet Take 5 mg by mouth 2 times daily (before meals)     isosorbide mononitrate (IMDUR) 60 MG 24 hr tablet Take 2 tablets (120 mg) by mouth 2 times daily     nitroglycerin (NITROSTAT) 0.4 MG sublingual tablet Place 0.4 mg under the tongue every 5 minutes as needed for chest pain For chest pain place 1 tablet under the tongue every 5 minutes for 3 doses. If symptoms persist 5 minutes after 1st dose call 911.     pioglitazone (ACTOS) 30 MG tablet Take 30 mg by mouth daily     potassium aminobenzoate 500 MG CAPS capsule      sotalol (BETAPACE) 80 MG tablet Take 80 mg by mouth 2 times daily     zolpidem (AMBIEN) 5 MG tablet Take 1 tablet (5 mg) by mouth nightly as needed for sleep     warfarin (COUMADIN) 2.5 MG tablet Take 2 tablets (5 mg) by mouth daily Take 5 mg by mouth on Mondays and 2.5 mg 6x/week or as directed based on INR     No current facility-administered medications for this visit.      Facility-Administered Medications Ordered in Other Visits   Medication     sodium chloride (PF) 0.9% PF flush 10 mL       PHYSICAL EXAMINATION:      He was comfortable.  He was awake, alert, oriented x3.  He was in no apparent distress.  He had no jugular venous distention.  He had no lower extremity edema.  He had no pallor, cyanosis or jaundice.  CV: appears warm and well-perfused   PULM: easy work of breathing   NEURO: alert, conversant   PSYCH: affect normal  SKIN: no rash or lesion on visualized skin    Echocardiogram (11/2019):    His left ventricular systolic function appeared to be mildly reduced when compared to his previous echocardiogram.  His EF was approximately 35%-40%.  He had inferior and lateral wall motion abnormality.  His anterior wall was viable and rox.  He had no other significant valvular abnormalities.  His right ventricle was normal in size and function.     CBC RESULTS:   Recent Labs   Lab Test 11/25/19  1212   WBC 7.3   RBC 4.82   HGB 12.3*   HCT 39.5*   MCV 82   MCH 25.5*   MCHC 31.1*    RDW 15.6*        Recent Labs   Lab Test 11/25/19  1212 08/19/19  1133    135   POTASSIUM 4.5 4.4   CHLORIDE 104 103   CO2 23 27   ANIONGAP 6 5   * 349*   BUN 27 24   CR 1.45* 1.14   KEIRA 8.8 8.3*     Liver Function Studies -   Recent Labs   Lab Test 11/06/18  0816   PROTTOTAL 8.2   ALBUMIN 3.8   BILITOTAL 0.8   ALKPHOS 80   AST 19   ALT 19       ASSESSMENT AND PLAN:      Mr. Wesley Cooper is a very pleasant, 75-year-old male with severe 3 vessel coronary artery disease, ischemic cardiomyopathy and moderate LV systolic dysfunction who returns today for followup.     HFrEF - Appears to be doing ok with no evidence of fluid overload. FC II. He is only on Carvedilol 6.25 mg bid. He couldn't tolerate low dose Cozaar and Aldactone due to symptomatic hypotension. He has CRT-D. He will continue on Lasix 40 mg daily.     CAD - Stable. Anginal controlled on Coreg and Imdur 120 mg bid. He is on plavix, coumadin, and atorvastatin.    Atrial Fibrillation - In sinus rhythm. On Sotalol 80 mg bid. He is on coumadin for his long-term anticoagulation.      RTC in 3 months with CORE and with me in 6 months with labs and echocardiogram.       Sincerely,   Claudy Loco MD   [facility]  Heart Failure, Transplant, and Mechanical Circulatory Support Cardiology   Cardiovascular Division  [facility]   [phone]          Please do not hesitate to contact me if you have any questions/concerns.     Sincerely,     Claudy Loco MD

## 2020-06-15 NOTE — LETTER
6/15/2020      RE: Wesley Cooper  [address]  [facility] [address]       Dear Colleague,    Thank you for the opportunity to participate in the care of your patient, Wesley Cooper, at the [facility] HEART [facility]. Please see a copy of my visit note below.    Wesley Cooper is a 75 year old male who is being evaluated via a billable telephone visit.        Service Date: 2020     Jarod De La Rosa MD   [address] [address]      RE: Wesley Cooper    MRN: [id number]   : 1945      Dear Dr. De La Rosa:       We had the pleasure of seeing Mr. Wesley Cooper for followup in our Heart Failure Clinic at the [facility].  As you know, he is a 75-year-old gentleman with a past medical history significant for:     1.  Severe 3 vessel epicardial coronary artery disease, status post LIMA to LAD, occluded SVG to RCA and occluded SVG to left circumflex.  Status post complex PCI of the left circumflex in 2019.   2.  Ischemic cardiomyopathy with an estimated ejection fraction of 40%-45%.   3.  Status post ileostomy.      Mr. Cooper returns for followup.  He had significant dizziness in April of this year.  His blood pressure was elevated by home cuff.  However with the increase in his antihypertensive therapy he felt more dizzy.  In light of this he was seen in person by his primary care physician.  Reportedly his home blood pressure cuff was not working properly and his blood pressure was in fact low.  In light of this his antihypertensive therapy was reduced. He feels much better after this. His dizziness has improved significantly. He has not had any furtehr syncopal episodes.     He continues to have some exertional shortness of breath but not more than his usual. He is currently FC II. He has no lower extremity swelling or abdominal distention.  His weight has been stable.  He has no proximal nocturnal  dyspnea or orthopnea.  He denies having chest pain or pressure.  He has no palpitations.  He has not had any ICD shocks.       REVIEW OF SYSTEMS:  A detailed 10 point review of systems was obtained as described in the History of Present Illness.  All other systems were reviewed and are negative.       MEDICATIONS:    Current Outpatient Medications   Medication Sig     atorvastatin (LIPITOR) 80 MG tablet Take 80 mg by mouth daily     carvedilol (COREG) 12.5 MG tablet Take 0.5 tablets (6.25 mg) by mouth 2 times daily (with meals)     clopidogrel (PLAVIX) 75 MG tablet Take 75 mg by mouth daily     furosemide (LASIX) 40 MG tablet Take 40 mg by mouth daily     gabapentin (NEURONTIN) 300 MG capsule Take 300 mg by mouth 3 times daily      glipiZIDE (GLUCOTROL) 5 MG tablet Take 5 mg by mouth 2 times daily (before meals)     isosorbide mononitrate (IMDUR) 60 MG 24 hr tablet Take 2 tablets (120 mg) by mouth 2 times daily     nitroglycerin (NITROSTAT) 0.4 MG sublingual tablet Place 0.4 mg under the tongue every 5 minutes as needed for chest pain For chest pain place 1 tablet under the tongue every 5 minutes for 3 doses. If symptoms persist 5 minutes after 1st dose call 911.     pioglitazone (ACTOS) 30 MG tablet Take 30 mg by mouth daily     potassium aminobenzoate 500 MG CAPS capsule      sotalol (BETAPACE) 80 MG tablet Take 80 mg by mouth 2 times daily     zolpidem (AMBIEN) 5 MG tablet Take 1 tablet (5 mg) by mouth nightly as needed for sleep     warfarin (COUMADIN) 2.5 MG tablet Take 2 tablets (5 mg) by mouth daily Take 5 mg by mouth on Mondays and 2.5 mg 6x/week or as directed based on INR     No current facility-administered medications for this visit.      Facility-Administered Medications Ordered in Other Visits   Medication     sodium chloride (PF) 0.9% PF flush 10 mL       PHYSICAL EXAMINATION:      He was comfortable.  He was awake, alert, oriented x3.  He was in no apparent distress.  He had no jugular venous  distention.  He had no lower extremity edema.  He had no pallor, cyanosis or jaundice.  CV: appears warm and well-perfused   PULM: easy work of breathing   NEURO: alert, conversant   PSYCH: affect normal  SKIN: no rash or lesion on visualized skin    Echocardiogram (11/2019):    His left ventricular systolic function appeared to be mildly reduced when compared to his previous echocardiogram.  His EF was approximately 35%-40%.  He had inferior and lateral wall motion abnormality.  His anterior wall was viable and rox.  He had no other significant valvular abnormalities.  His right ventricle was normal in size and function.     CBC RESULTS:   Recent Labs   Lab Test 11/25/19  1212   WBC 7.3   RBC 4.82   HGB 12.3*   HCT 39.5*   MCV 82   MCH 25.5*   MCHC 31.1*   RDW 15.6*        Recent Labs   Lab Test 11/25/19  1212 08/19/19  1133    135   POTASSIUM 4.5 4.4   CHLORIDE 104 103   CO2 23 27   ANIONGAP 6 5   * 349*   BUN 27 24   CR 1.45* 1.14   KEIRA 8.8 8.3*     Liver Function Studies -   Recent Labs   Lab Test 11/06/18  0816   PROTTOTAL 8.2   ALBUMIN 3.8   BILITOTAL 0.8   ALKPHOS 80   AST 19   ALT 19       ASSESSMENT AND PLAN:      Mr. Wesley Cooper is a very pleasant, 75-year-old male with severe 3 vessel coronary artery disease, ischemic cardiomyopathy and moderate LV systolic dysfunction who returns today for followup.     HFrEF - Appears to be doing ok with no evidence of fluid overload. FC II. He is only on Carvedilol 6.25 mg bid. He couldn't tolerate low dose Cozaar and Aldactone due to symptomatic hypotension. He has CRT-D. He will continue on Lasix 40 mg daily.     CAD - Stable. Anginal controlled on Coreg and Imdur 120 mg bid. He is on plavix, coumadin, and atorvastatin.    Atrial Fibrillation - In sinus rhythm. On Sotalol 80 mg bid. He is on coumadin for his long-term anticoagulation.      RTC in 3 months with CORE and with me in 6 months with labs and echocardiogram.     This is a  telephone visit of 25 minutes duration with more than 50% of the time spent in counseling the patient.     Sincerely,   Claudy Loco MD   [facility]  Heart Failure, Transplant, and Mechanical Circulatory Support Cardiology   Cardiovascular Division  [facility]   [phone]

## 2020-06-15 NOTE — PROGRESS NOTES
"Wesley Cooper is a 75 year old male who is being evaluated via a billable telephone visit.      The patient has been notified of following:     \"This telephone visit will be conducted via a call between you and your physician/provider. We have found that certain health care needs can be provided without the need for a physical exam.  This service lets us provide the care you need with a short phone conversation.  If a prescription is necessary we can send it directly to your pharmacy.  If lab work is needed we can place an order for that and you can then stop by our lab to have the test done at a later time.    Telephone visits are billed at different rates depending on your insurance coverage. During this emergency period, for some insurers they may be billed the same as an in-person visit.  Please reach out to your insurance provider with any questions.    If during the course of the call the physician/provider feels a telephone visit is not appropriate, you will not be charged for this service.\"    Patient has given verbal consent for Telephone visit?  Yes    What phone number would you like to be contacted at? [phone]    How would you like to obtain your AVS? Mail a copy     Medications were reconciled.     Radha Olson CMA    3:16 PM                "

## 2020-06-16 NOTE — PATIENT INSTRUCTIONS
"You were seen today in the Cardiovascular Clinic at the [facility].       Cardiology Providers you saw during your visit: Dr. Loco      Medication Changes:   1. No medication changes.       Follow up Appointment Information:  1. Return to clinic in 3 months to see CORE clinic.  2. Return to clinic in 6 months to see Dr. Loco with labs and echocardiogram prior.      Results:          [address] [facility] on [address]   [place] [id number]        Thank you for allowing us to be a part of your care here at the [facility]      If you have questions or concerns please contact us at:      Calli Aguayo RN BSN   Cardiology Care Coordinator  [facility]   Questions and [phone]   First press #1 for the [facility] and then press #4 to \"send a message to your care team\"     "

## 2020-06-16 NOTE — NURSING NOTE
Diet: Patient instructed regarding a heart failure healthy diet, including discussion of reduced fat and 2000 mg daily sodium restriction, daily weights, medication purpose and compliance, fluid restrictions and resources for patient and family to access for assistance with heart failure management.       Labs: Patient was given results of the laboratory testing obtained today and patient was instructed about when to return for the next laboratory testing.     Med Reconcile: Reviewed and verified all current medications with the patient. The updated medication list was printed and given to the patient. NO CHANGES.     Return Appointment: Patient given instructions regarding scheduling next clinic visit. RTC in 3 months for CORE. RTC in 6 months to see dr villagomez with labs and echo prior.    Patient stated he understood all health information given and agreed to call with further questions or concerns.     Calli Aguayo RN

## 2020-06-19 ENCOUNTER — PATIENT OUTREACH (OUTPATIENT)
Dept: CARDIOLOGY | Facility: CLINIC | Age: 75
End: 2020-06-19

## 2020-06-19 NOTE — PROGRESS NOTES
Service Date: 2020     Jarod De La Rosa MD   [place] [id number]      RE: Wesley Cooper    MRN: [id number]   : 1945      Dear Dr. De La Rosa:       We had the pleasure of seeing Mr. Wesley Cooper for followup in our Heart Failure Clinic at the [facility].  As you know, he is a 75-year-old gentleman with a past medical history significant for:     1.  Severe 3 vessel epicardial coronary artery disease, status post LIMA to LAD, occluded SVG to RCA and occluded SVG to left circumflex.  Status post complex PCI of the left circumflex in 2019.   2.  Ischemic cardiomyopathy with an estimated ejection fraction of 40%-45%.   3.  Status post ileostomy.      Mr. Cooper returns for followup.  He had significant dizziness in April of this year.  His blood pressure was elevated by home cuff.  However with the increase in his antihypertensive therapy he felt more dizzy.  In light of this he was seen in person by his primary care physician.  Reportedly his home blood pressure cuff was not working properly and his blood pressure was in fact low.  In light of this his antihypertensive therapy was reduced. He feels much better after this. His dizziness has improved significantly. He has not had any furtehr syncopal episodes.     He continues to have some exertional shortness of breath but not more than his usual. He is currently FC II. He has no lower extremity swelling or abdominal distention.  His weight has been stable.  He has no proximal nocturnal dyspnea or orthopnea.  He denies having chest pain or pressure.  He has no palpitations.  He has not had any ICD shocks.       REVIEW OF SYSTEMS:  A detailed 10 point review of systems was obtained as described in the History of Present Illness.  All other systems were reviewed and are negative.       MEDICATIONS:    Current Outpatient Medications   Medication Sig     atorvastatin (LIPITOR) 80 MG tablet  Take 80 mg by mouth daily     carvedilol (COREG) 12.5 MG tablet Take 0.5 tablets (6.25 mg) by mouth 2 times daily (with meals)     clopidogrel (PLAVIX) 75 MG tablet Take 75 mg by mouth daily     furosemide (LASIX) 40 MG tablet Take 40 mg by mouth daily     gabapentin (NEURONTIN) 300 MG capsule Take 300 mg by mouth 3 times daily      glipiZIDE (GLUCOTROL) 5 MG tablet Take 5 mg by mouth 2 times daily (before meals)     isosorbide mononitrate (IMDUR) 60 MG 24 hr tablet Take 2 tablets (120 mg) by mouth 2 times daily     nitroglycerin (NITROSTAT) 0.4 MG sublingual tablet Place 0.4 mg under the tongue every 5 minutes as needed for chest pain For chest pain place 1 tablet under the tongue every 5 minutes for 3 doses. If symptoms persist 5 minutes after 1st dose call 911.     pioglitazone (ACTOS) 30 MG tablet Take 30 mg by mouth daily     potassium aminobenzoate 500 MG CAPS capsule      sotalol (BETAPACE) 80 MG tablet Take 80 mg by mouth 2 times daily     zolpidem (AMBIEN) 5 MG tablet Take 1 tablet (5 mg) by mouth nightly as needed for sleep     warfarin (COUMADIN) 2.5 MG tablet Take 2 tablets (5 mg) by mouth daily Take 5 mg by mouth on Mondays and 2.5 mg 6x/week or as directed based on INR     No current facility-administered medications for this visit.      Facility-Administered Medications Ordered in Other Visits   Medication     sodium chloride (PF) 0.9% PF flush 10 mL       PHYSICAL EXAMINATION:      He was comfortable.  He was awake, alert, oriented x3.  He was in no apparent distress.  He had no jugular venous distention.  He had no lower extremity edema.  He had no pallor, cyanosis or jaundice.  CV: appears warm and well-perfused   PULM: easy work of breathing   NEURO: alert, conversant   PSYCH: affect normal  SKIN: no rash or lesion on visualized skin    Echocardiogram (11/2019):    His left ventricular systolic function appeared to be mildly reduced when compared to his previous echocardiogram.  His EF was  approximately 35%-40%.  He had inferior and lateral wall motion abnormality.  His anterior wall was viable and rox.  He had no other significant valvular abnormalities.  His right ventricle was normal in size and function.     CBC RESULTS:   Recent Labs   Lab Test 11/25/19  1212   WBC 7.3   RBC 4.82   HGB 12.3*   HCT 39.5*   MCV 82   MCH 25.5*   MCHC 31.1*   RDW 15.6*        Recent Labs   Lab Test 11/25/19  1212 08/19/19  1133    135   POTASSIUM 4.5 4.4   CHLORIDE 104 103   CO2 23 27   ANIONGAP 6 5   * 349*   BUN 27 24   CR 1.45* 1.14   KEIRA 8.8 8.3*     Liver Function Studies -   Recent Labs   Lab Test 11/06/18  0816   PROTTOTAL 8.2   ALBUMIN 3.8   BILITOTAL 0.8   ALKPHOS 80   AST 19   ALT 19       ASSESSMENT AND PLAN:      Mr. Wesley Cooper is a very pleasant, 75-year-old male with severe 3 vessel coronary artery disease, ischemic cardiomyopathy and moderate LV systolic dysfunction who returns today for followup.     HFrEF - Appears to be doing ok with no evidence of fluid overload. FC II. He is only on Carvedilol 6.25 mg bid. He couldn't tolerate low dose Cozaar and Aldactone due to symptomatic hypotension. He has CRT-D. He will continue on Lasix 40 mg daily.     CAD - Stable. Anginal controlled on Coreg and Imdur 120 mg bid. He is on plavix, coumadin, and atorvastatin.    Atrial Fibrillation - In sinus rhythm. On Sotalol 80 mg bid. He is on coumadin for his long-term anticoagulation.      RTC in 3 months with CORE and with me in 6 months with labs and echocardiogram.     This is a telephone visit of 25 minutes duration with more than 50% of the time spent in counseling the patient.             Sincerely,   Claudy Loco MD   [facility]ension  Heart Failure, Transplant, and Mechanical Circulatory Support Cardiology   Cardiovascular Division  [facility]   [phone]

## 2020-06-22 ENCOUNTER — PATIENT OUTREACH (OUTPATIENT)
Dept: CARDIOLOGY | Facility: CLINIC | Age: 75
End: 2020-06-22

## 2020-06-22 NOTE — TELEPHONE ENCOUNTER
Called jose to review medication doses. He reports he doesn't have the list handy. He will get the list together and call us back with his doses.

## 2020-07-14 DIAGNOSIS — I50.22 CHRONIC SYSTOLIC HEART FAILURE (H): ICD-10-CM

## 2020-07-14 RX ORDER — ISOSORBIDE MONONITRATE 30 MG/1
30 TABLET, EXTENDED RELEASE ORAL 2 TIMES DAILY
Status: ON HOLD | COMMUNITY
Start: 2020-07-14 | End: 2020-01-01

## 2020-08-04 ENCOUNTER — ANCILLARY PROCEDURE (OUTPATIENT)
Dept: CARDIOLOGY | Facility: CLINIC | Age: 75
End: 2020-08-04
Attending: INTERNAL MEDICINE
Payer: COMMERCIAL

## 2020-08-04 DIAGNOSIS — I42.9 CARDIOMYOPATHY (H): ICD-10-CM

## 2020-08-04 LAB
MDC_IDC_EPISODE_DTM: NORMAL
MDC_IDC_EPISODE_DURATION: 12 S
MDC_IDC_EPISODE_DURATION: 8 S
MDC_IDC_EPISODE_ID: NORMAL
MDC_IDC_EPISODE_TYPE: NORMAL
MDC_IDC_EPISODE_VENDOR_TYPE: NORMAL
MDC_IDC_LEAD_IMPLANT_DT: NORMAL
MDC_IDC_LEAD_LOCATION: NORMAL
MDC_IDC_LEAD_LOCATION_DETAIL_1: NORMAL
MDC_IDC_LEAD_MFG: NORMAL
MDC_IDC_LEAD_MODEL: NORMAL
MDC_IDC_LEAD_POLARITY_TYPE: NORMAL
MDC_IDC_LEAD_SERIAL: NORMAL
MDC_IDC_MSMT_BATTERY_DTM: NORMAL
MDC_IDC_MSMT_BATTERY_REMAINING_LONGEVITY: 46 MO
MDC_IDC_MSMT_BATTERY_REMAINING_PERCENTAGE: 51 %
MDC_IDC_MSMT_BATTERY_RRT_TRIGGER: NORMAL
MDC_IDC_MSMT_BATTERY_STATUS: NORMAL
MDC_IDC_MSMT_BATTERY_VOLTAGE: 2.93 V
MDC_IDC_MSMT_CAP_CHARGE_DTM: NORMAL
MDC_IDC_MSMT_CAP_CHARGE_ENERGY: 40 J
MDC_IDC_MSMT_CAP_CHARGE_TIME: 8.9 S
MDC_IDC_MSMT_CAP_CHARGE_TYPE: NORMAL
MDC_IDC_MSMT_LEADCHNL_LV_IMPEDANCE_VALUE: 750 OHM
MDC_IDC_MSMT_LEADCHNL_LV_LEAD_CHANNEL_STATUS: NORMAL
MDC_IDC_MSMT_LEADCHNL_LV_PACING_THRESHOLD_AMPLITUDE: 1.75 V
MDC_IDC_MSMT_LEADCHNL_LV_PACING_THRESHOLD_PULSEWIDTH: 0.5 MS
MDC_IDC_MSMT_LEADCHNL_RA_IMPEDANCE_VALUE: 380 OHM
MDC_IDC_MSMT_LEADCHNL_RA_LEAD_CHANNEL_STATUS: NORMAL
MDC_IDC_MSMT_LEADCHNL_RA_PACING_THRESHOLD_AMPLITUDE: 0.62 V
MDC_IDC_MSMT_LEADCHNL_RA_PACING_THRESHOLD_PULSEWIDTH: 0.5 MS
MDC_IDC_MSMT_LEADCHNL_RA_SENSING_INTR_AMPL: 4.8 MV
MDC_IDC_MSMT_LEADCHNL_RV_IMPEDANCE_VALUE: 460 OHM
MDC_IDC_MSMT_LEADCHNL_RV_LEAD_CHANNEL_STATUS: NORMAL
MDC_IDC_MSMT_LEADCHNL_RV_PACING_THRESHOLD_AMPLITUDE: 0.5 V
MDC_IDC_MSMT_LEADCHNL_RV_PACING_THRESHOLD_PULSEWIDTH: 0.5 MS
MDC_IDC_MSMT_LEADCHNL_RV_SENSING_INTR_AMPL: 0.5 MV
MDC_IDC_PG_IMPLANT_DTM: NORMAL
MDC_IDC_PG_MFG: NORMAL
MDC_IDC_PG_MODEL: NORMAL
MDC_IDC_PG_SERIAL: NORMAL
MDC_IDC_PG_TYPE: NORMAL
MDC_IDC_SESS_CLINIC_NAME: NORMAL
MDC_IDC_SESS_DTM: NORMAL
MDC_IDC_SESS_REPROGRAMMED: NO
MDC_IDC_SESS_TYPE: NORMAL
MDC_IDC_SET_BRADY_AT_MODE_SWITCH_MODE: NORMAL
MDC_IDC_SET_BRADY_AT_MODE_SWITCH_RATE: 180 {BEATS}/MIN
MDC_IDC_SET_BRADY_LOWRATE: 60 {BEATS}/MIN
MDC_IDC_SET_BRADY_MAX_SENSOR_RATE: 110 {BEATS}/MIN
MDC_IDC_SET_BRADY_MAX_TRACKING_RATE: 110 {BEATS}/MIN
MDC_IDC_SET_BRADY_MODE: NORMAL
MDC_IDC_SET_BRADY_PAV_DELAY_LOW: 150 MS
MDC_IDC_SET_BRADY_SAV_DELAY_LOW: 100 MS
MDC_IDC_SET_CRT_LVRV_DELAY: 20 MS
MDC_IDC_SET_CRT_PACED_CHAMBERS: NORMAL
MDC_IDC_SET_LEADCHNL_LV_PACING_AMPLITUDE: 2.25 V
MDC_IDC_SET_LEADCHNL_LV_PACING_ANODE_ELECTRODE_1: NORMAL
MDC_IDC_SET_LEADCHNL_LV_PACING_ANODE_LOCATION_1: NORMAL
MDC_IDC_SET_LEADCHNL_LV_PACING_CAPTURE_MODE: NORMAL
MDC_IDC_SET_LEADCHNL_LV_PACING_CATHODE_ELECTRODE_1: NORMAL
MDC_IDC_SET_LEADCHNL_LV_PACING_CATHODE_LOCATION_1: NORMAL
MDC_IDC_SET_LEADCHNL_LV_PACING_POLARITY: NORMAL
MDC_IDC_SET_LEADCHNL_LV_PACING_PULSEWIDTH: 0.5 MS
MDC_IDC_SET_LEADCHNL_RA_PACING_AMPLITUDE: 1.5 V
MDC_IDC_SET_LEADCHNL_RA_PACING_ANODE_ELECTRODE_1: NORMAL
MDC_IDC_SET_LEADCHNL_RA_PACING_ANODE_LOCATION_1: NORMAL
MDC_IDC_SET_LEADCHNL_RA_PACING_CAPTURE_MODE: NORMAL
MDC_IDC_SET_LEADCHNL_RA_PACING_CATHODE_ELECTRODE_1: NORMAL
MDC_IDC_SET_LEADCHNL_RA_PACING_CATHODE_LOCATION_1: NORMAL
MDC_IDC_SET_LEADCHNL_RA_PACING_POLARITY: NORMAL
MDC_IDC_SET_LEADCHNL_RA_PACING_PULSEWIDTH: 0.5 MS
MDC_IDC_SET_LEADCHNL_RA_SENSING_ADAPTATION_MODE: NORMAL
MDC_IDC_SET_LEADCHNL_RA_SENSING_ANODE_ELECTRODE_1: NORMAL
MDC_IDC_SET_LEADCHNL_RA_SENSING_ANODE_LOCATION_1: NORMAL
MDC_IDC_SET_LEADCHNL_RA_SENSING_CATHODE_ELECTRODE_1: NORMAL
MDC_IDC_SET_LEADCHNL_RA_SENSING_CATHODE_LOCATION_1: NORMAL
MDC_IDC_SET_LEADCHNL_RA_SENSING_POLARITY: NORMAL
MDC_IDC_SET_LEADCHNL_RA_SENSING_SENSITIVITY: 0.3 MV
MDC_IDC_SET_LEADCHNL_RV_PACING_AMPLITUDE: 2 V
MDC_IDC_SET_LEADCHNL_RV_PACING_ANODE_ELECTRODE_1: NORMAL
MDC_IDC_SET_LEADCHNL_RV_PACING_ANODE_LOCATION_1: NORMAL
MDC_IDC_SET_LEADCHNL_RV_PACING_CAPTURE_MODE: NORMAL
MDC_IDC_SET_LEADCHNL_RV_PACING_CATHODE_ELECTRODE_1: NORMAL
MDC_IDC_SET_LEADCHNL_RV_PACING_CATHODE_LOCATION_1: NORMAL
MDC_IDC_SET_LEADCHNL_RV_PACING_POLARITY: NORMAL
MDC_IDC_SET_LEADCHNL_RV_PACING_PULSEWIDTH: 0.5 MS
MDC_IDC_SET_LEADCHNL_RV_SENSING_ADAPTATION_MODE: NORMAL
MDC_IDC_SET_LEADCHNL_RV_SENSING_ANODE_ELECTRODE_1: NORMAL
MDC_IDC_SET_LEADCHNL_RV_SENSING_ANODE_LOCATION_1: NORMAL
MDC_IDC_SET_LEADCHNL_RV_SENSING_CATHODE_ELECTRODE_1: NORMAL
MDC_IDC_SET_LEADCHNL_RV_SENSING_CATHODE_LOCATION_1: NORMAL
MDC_IDC_SET_LEADCHNL_RV_SENSING_POLARITY: NORMAL
MDC_IDC_SET_LEADCHNL_RV_SENSING_SENSITIVITY: 0.5 MV
MDC_IDC_SET_ZONE_DETECTION_INTERVAL: 320 MS
MDC_IDC_SET_ZONE_DETECTION_INTERVAL: 360 MS
MDC_IDC_SET_ZONE_DETECTION_INTERVAL: 460 MS
MDC_IDC_SET_ZONE_TYPE: NORMAL
MDC_IDC_SET_ZONE_VENDOR_TYPE: NORMAL
MDC_IDC_STAT_AT_BURDEN_PERCENT: 1 %
MDC_IDC_STAT_AT_DTM_END: NORMAL
MDC_IDC_STAT_AT_DTM_START: NORMAL
MDC_IDC_STAT_AT_MODE_SW_COUNT: 5
MDC_IDC_STAT_AT_MODE_SW_COUNT_PER_DAY: 0
MDC_IDC_STAT_AT_MODE_SW_MAX_DURATION: 892 S
MDC_IDC_STAT_AT_MODE_SW_PERCENT_TIME: 1 %
MDC_IDC_STAT_BRADY_AP_VP_PERCENT: 18 %
MDC_IDC_STAT_BRADY_AP_VS_PERCENT: 1 %
MDC_IDC_STAT_BRADY_AS_VP_PERCENT: 81 %
MDC_IDC_STAT_BRADY_AS_VS_PERCENT: 1 %
MDC_IDC_STAT_BRADY_DTM_END: NORMAL
MDC_IDC_STAT_BRADY_DTM_START: NORMAL
MDC_IDC_STAT_BRADY_RA_PERCENT_PACED: 18 %
MDC_IDC_STAT_CRT_DTM_END: NORMAL
MDC_IDC_STAT_CRT_DTM_START: NORMAL
MDC_IDC_STAT_CRT_PERCENT_PACED: 99 %
MDC_IDC_STAT_EPISODE_RECENT_COUNT: 0
MDC_IDC_STAT_EPISODE_RECENT_COUNT: 4
MDC_IDC_STAT_EPISODE_RECENT_COUNT_DTM_END: NORMAL
MDC_IDC_STAT_EPISODE_RECENT_COUNT_DTM_START: NORMAL
MDC_IDC_STAT_EPISODE_TYPE: NORMAL
MDC_IDC_STAT_EPISODE_VENDOR_TYPE: NORMAL
MDC_IDC_STAT_HEART_RATE_ATRIAL_MAX: 330 {BEATS}/MIN
MDC_IDC_STAT_HEART_RATE_ATRIAL_MEAN: 74 {BEATS}/MIN
MDC_IDC_STAT_HEART_RATE_ATRIAL_MIN: 40 {BEATS}/MIN
MDC_IDC_STAT_HEART_RATE_DTM_END: NORMAL
MDC_IDC_STAT_HEART_RATE_DTM_START: NORMAL
MDC_IDC_STAT_HEART_RATE_VENTRICULAR_MAX: 200 {BEATS}/MIN
MDC_IDC_STAT_HEART_RATE_VENTRICULAR_MEAN: 74 {BEATS}/MIN
MDC_IDC_STAT_HEART_RATE_VENTRICULAR_MIN: 50 {BEATS}/MIN
MDC_IDC_STAT_TACHYTHERAPY_ATP_DELIVERED_RECENT: 0
MDC_IDC_STAT_TACHYTHERAPY_RECENT_DTM_END: NORMAL
MDC_IDC_STAT_TACHYTHERAPY_RECENT_DTM_START: NORMAL
MDC_IDC_STAT_TACHYTHERAPY_SHOCKS_ABORTED_RECENT: 0
MDC_IDC_STAT_TACHYTHERAPY_SHOCKS_DELIVERED_RECENT: 0

## 2020-08-04 PROCEDURE — 93296 REM INTERROG EVL PM/IDS: CPT | Mod: ZF

## 2020-08-04 PROCEDURE — 99207 CARDIAC DEVICE CHECK - REMOTE: CPT | Performed by: INTERNAL MEDICINE

## 2020-08-31 ENCOUNTER — ANCILLARY PROCEDURE (OUTPATIENT)
Dept: CARDIOLOGY | Facility: CLINIC | Age: 75
End: 2020-08-31
Attending: INTERNAL MEDICINE
Payer: COMMERCIAL

## 2020-08-31 DIAGNOSIS — Z95.810 AUTOMATIC IMPLANTABLE CARDIOVERTER-DEFIBRILLATOR IN SITU: ICD-10-CM

## 2020-08-31 LAB
MDC_IDC_EPISODE_DTM: NORMAL
MDC_IDC_EPISODE_ID: NORMAL
MDC_IDC_EPISODE_TYPE: NORMAL
MDC_IDC_LEAD_IMPLANT_DT: NORMAL
MDC_IDC_LEAD_LOCATION: NORMAL
MDC_IDC_LEAD_LOCATION_DETAIL_1: NORMAL
MDC_IDC_LEAD_MFG: NORMAL
MDC_IDC_LEAD_MODEL: NORMAL
MDC_IDC_LEAD_POLARITY_TYPE: NORMAL
MDC_IDC_LEAD_SERIAL: NORMAL
MDC_IDC_MSMT_BATTERY_DTM: NORMAL
MDC_IDC_MSMT_BATTERY_REMAINING_LONGEVITY: 47 MO
MDC_IDC_MSMT_BATTERY_REMAINING_PERCENTAGE: 51 %
MDC_IDC_MSMT_BATTERY_RRT_TRIGGER: NORMAL
MDC_IDC_MSMT_BATTERY_STATUS: NORMAL
MDC_IDC_MSMT_BATTERY_VOLTAGE: 2.93 V
MDC_IDC_MSMT_CAP_CHARGE_DTM: NORMAL
MDC_IDC_MSMT_CAP_CHARGE_ENERGY: 40 J
MDC_IDC_MSMT_CAP_CHARGE_TIME: 8.9 S
MDC_IDC_MSMT_CAP_CHARGE_TYPE: NORMAL
MDC_IDC_MSMT_LEADCHNL_LV_IMPEDANCE_VALUE: 830 OHM
MDC_IDC_MSMT_LEADCHNL_LV_LEAD_CHANNEL_STATUS: NORMAL
MDC_IDC_MSMT_LEADCHNL_LV_PACING_THRESHOLD_AMPLITUDE: 1.75 V
MDC_IDC_MSMT_LEADCHNL_LV_PACING_THRESHOLD_PULSEWIDTH: 0.5 MS
MDC_IDC_MSMT_LEADCHNL_RA_IMPEDANCE_VALUE: 430 OHM
MDC_IDC_MSMT_LEADCHNL_RA_LEAD_CHANNEL_STATUS: NORMAL
MDC_IDC_MSMT_LEADCHNL_RA_PACING_THRESHOLD_AMPLITUDE: 0.62 V
MDC_IDC_MSMT_LEADCHNL_RA_PACING_THRESHOLD_PULSEWIDTH: 0.5 MS
MDC_IDC_MSMT_LEADCHNL_RA_SENSING_INTR_AMPL: 5 MV
MDC_IDC_MSMT_LEADCHNL_RV_IMPEDANCE_VALUE: 580 OHM
MDC_IDC_MSMT_LEADCHNL_RV_LEAD_CHANNEL_STATUS: NORMAL
MDC_IDC_MSMT_LEADCHNL_RV_PACING_THRESHOLD_AMPLITUDE: 0.5 V
MDC_IDC_MSMT_LEADCHNL_RV_PACING_THRESHOLD_PULSEWIDTH: 0.5 MS
MDC_IDC_MSMT_LEADCHNL_RV_SENSING_INTR_AMPL: 0.5 MV
MDC_IDC_PG_IMPLANT_DTM: NORMAL
MDC_IDC_PG_MFG: NORMAL
MDC_IDC_PG_MODEL: NORMAL
MDC_IDC_PG_SERIAL: NORMAL
MDC_IDC_PG_TYPE: NORMAL
MDC_IDC_SESS_CLINIC_NAME: NORMAL
MDC_IDC_SESS_DTM: NORMAL
MDC_IDC_SESS_REPROGRAMMED: NO
MDC_IDC_SESS_TYPE: NORMAL
MDC_IDC_SET_BRADY_AT_MODE_SWITCH_MODE: NORMAL
MDC_IDC_SET_BRADY_AT_MODE_SWITCH_RATE: 180 {BEATS}/MIN
MDC_IDC_SET_BRADY_LOWRATE: 60 {BEATS}/MIN
MDC_IDC_SET_BRADY_MAX_SENSOR_RATE: 110 {BEATS}/MIN
MDC_IDC_SET_BRADY_MAX_TRACKING_RATE: 110 {BEATS}/MIN
MDC_IDC_SET_BRADY_MODE: NORMAL
MDC_IDC_SET_BRADY_PAV_DELAY_LOW: 150 MS
MDC_IDC_SET_BRADY_SAV_DELAY_LOW: 100 MS
MDC_IDC_SET_CRT_LVRV_DELAY: 20 MS
MDC_IDC_SET_CRT_PACED_CHAMBERS: NORMAL
MDC_IDC_SET_LEADCHNL_LV_PACING_AMPLITUDE: 2.25 V
MDC_IDC_SET_LEADCHNL_LV_PACING_ANODE_ELECTRODE_1: NORMAL
MDC_IDC_SET_LEADCHNL_LV_PACING_ANODE_LOCATION_1: NORMAL
MDC_IDC_SET_LEADCHNL_LV_PACING_CAPTURE_MODE: NORMAL
MDC_IDC_SET_LEADCHNL_LV_PACING_CATHODE_ELECTRODE_1: NORMAL
MDC_IDC_SET_LEADCHNL_LV_PACING_CATHODE_LOCATION_1: NORMAL
MDC_IDC_SET_LEADCHNL_LV_PACING_POLARITY: NORMAL
MDC_IDC_SET_LEADCHNL_LV_PACING_PULSEWIDTH: 0.5 MS
MDC_IDC_SET_LEADCHNL_RA_PACING_AMPLITUDE: 1.5 V
MDC_IDC_SET_LEADCHNL_RA_PACING_ANODE_ELECTRODE_1: NORMAL
MDC_IDC_SET_LEADCHNL_RA_PACING_ANODE_LOCATION_1: NORMAL
MDC_IDC_SET_LEADCHNL_RA_PACING_CAPTURE_MODE: NORMAL
MDC_IDC_SET_LEADCHNL_RA_PACING_CATHODE_ELECTRODE_1: NORMAL
MDC_IDC_SET_LEADCHNL_RA_PACING_CATHODE_LOCATION_1: NORMAL
MDC_IDC_SET_LEADCHNL_RA_PACING_POLARITY: NORMAL
MDC_IDC_SET_LEADCHNL_RA_PACING_PULSEWIDTH: 0.5 MS
MDC_IDC_SET_LEADCHNL_RA_SENSING_ADAPTATION_MODE: NORMAL
MDC_IDC_SET_LEADCHNL_RA_SENSING_ANODE_ELECTRODE_1: NORMAL
MDC_IDC_SET_LEADCHNL_RA_SENSING_ANODE_LOCATION_1: NORMAL
MDC_IDC_SET_LEADCHNL_RA_SENSING_CATHODE_ELECTRODE_1: NORMAL
MDC_IDC_SET_LEADCHNL_RA_SENSING_CATHODE_LOCATION_1: NORMAL
MDC_IDC_SET_LEADCHNL_RA_SENSING_POLARITY: NORMAL
MDC_IDC_SET_LEADCHNL_RA_SENSING_SENSITIVITY: 0.3 MV
MDC_IDC_SET_LEADCHNL_RV_PACING_AMPLITUDE: 2 V
MDC_IDC_SET_LEADCHNL_RV_PACING_ANODE_ELECTRODE_1: NORMAL
MDC_IDC_SET_LEADCHNL_RV_PACING_ANODE_LOCATION_1: NORMAL
MDC_IDC_SET_LEADCHNL_RV_PACING_CAPTURE_MODE: NORMAL
MDC_IDC_SET_LEADCHNL_RV_PACING_CATHODE_ELECTRODE_1: NORMAL
MDC_IDC_SET_LEADCHNL_RV_PACING_CATHODE_LOCATION_1: NORMAL
MDC_IDC_SET_LEADCHNL_RV_PACING_POLARITY: NORMAL
MDC_IDC_SET_LEADCHNL_RV_PACING_PULSEWIDTH: 0.5 MS
MDC_IDC_SET_LEADCHNL_RV_SENSING_ADAPTATION_MODE: NORMAL
MDC_IDC_SET_LEADCHNL_RV_SENSING_ANODE_ELECTRODE_1: NORMAL
MDC_IDC_SET_LEADCHNL_RV_SENSING_ANODE_LOCATION_1: NORMAL
MDC_IDC_SET_LEADCHNL_RV_SENSING_CATHODE_ELECTRODE_1: NORMAL
MDC_IDC_SET_LEADCHNL_RV_SENSING_CATHODE_LOCATION_1: NORMAL
MDC_IDC_SET_LEADCHNL_RV_SENSING_POLARITY: NORMAL
MDC_IDC_SET_LEADCHNL_RV_SENSING_SENSITIVITY: 0.5 MV
MDC_IDC_SET_ZONE_DETECTION_INTERVAL: 320 MS
MDC_IDC_SET_ZONE_DETECTION_INTERVAL: 360 MS
MDC_IDC_SET_ZONE_DETECTION_INTERVAL: 460 MS
MDC_IDC_SET_ZONE_TYPE: NORMAL
MDC_IDC_SET_ZONE_VENDOR_TYPE: NORMAL
MDC_IDC_STAT_AT_BURDEN_PERCENT: 0 %
MDC_IDC_STAT_AT_DTM_END: NORMAL
MDC_IDC_STAT_AT_DTM_START: NORMAL
MDC_IDC_STAT_AT_MODE_SW_COUNT: 1
MDC_IDC_STAT_AT_MODE_SW_COUNT_PER_DAY: 0
MDC_IDC_STAT_AT_MODE_SW_MAX_DURATION: 14 S
MDC_IDC_STAT_AT_MODE_SW_PERCENT_TIME: 1 %
MDC_IDC_STAT_BRADY_AP_VP_PERCENT: 16 %
MDC_IDC_STAT_BRADY_AP_VS_PERCENT: 1 %
MDC_IDC_STAT_BRADY_AS_VP_PERCENT: 83 %
MDC_IDC_STAT_BRADY_AS_VS_PERCENT: 1 %
MDC_IDC_STAT_BRADY_DTM_END: NORMAL
MDC_IDC_STAT_BRADY_DTM_START: NORMAL
MDC_IDC_STAT_BRADY_RA_PERCENT_PACED: 16 %
MDC_IDC_STAT_CRT_DTM_END: NORMAL
MDC_IDC_STAT_CRT_DTM_START: NORMAL
MDC_IDC_STAT_CRT_PERCENT_PACED: 99 %
MDC_IDC_STAT_HEART_RATE_ATRIAL_MAX: 330 {BEATS}/MIN
MDC_IDC_STAT_HEART_RATE_ATRIAL_MEAN: 69 {BEATS}/MIN
MDC_IDC_STAT_HEART_RATE_ATRIAL_MIN: 50 {BEATS}/MIN
MDC_IDC_STAT_HEART_RATE_DTM_END: NORMAL
MDC_IDC_STAT_HEART_RATE_DTM_START: NORMAL
MDC_IDC_STAT_HEART_RATE_VENTRICULAR_MAX: 230 {BEATS}/MIN
MDC_IDC_STAT_HEART_RATE_VENTRICULAR_MEAN: 69 {BEATS}/MIN
MDC_IDC_STAT_HEART_RATE_VENTRICULAR_MIN: 50 {BEATS}/MIN
MDC_IDC_STAT_TACHYTHERAPY_ATP_DELIVERED_RECENT: 0
MDC_IDC_STAT_TACHYTHERAPY_RECENT_DTM_END: NORMAL
MDC_IDC_STAT_TACHYTHERAPY_RECENT_DTM_START: NORMAL
MDC_IDC_STAT_TACHYTHERAPY_SHOCKS_ABORTED_RECENT: 0
MDC_IDC_STAT_TACHYTHERAPY_SHOCKS_DELIVERED_RECENT: 0

## 2020-08-31 PROCEDURE — 99207 CARDIAC DEVICE CHECK - REMOTE: CPT | Performed by: INTERNAL MEDICINE

## 2020-08-31 PROCEDURE — 93296 REM INTERROG EVL PM/IDS: CPT | Mod: ZF

## 2020-09-03 DIAGNOSIS — I50.22 CHRONIC SYSTOLIC HEART FAILURE (H): Primary | ICD-10-CM

## 2020-09-09 ENCOUNTER — TRANSFERRED RECORDS (OUTPATIENT)
Dept: HEALTH INFORMATION MANAGEMENT | Facility: CLINIC | Age: 75
End: 2020-09-09

## 2020-09-09 ENCOUNTER — ANCILLARY PROCEDURE (OUTPATIENT)
Dept: CARDIOLOGY | Facility: CLINIC | Age: 75
End: 2020-09-09
Attending: INTERNAL MEDICINE
Payer: COMMERCIAL

## 2020-09-09 VITALS
WEIGHT: 149.5 LBS | BODY MASS INDEX: 24.91 KG/M2 | DIASTOLIC BLOOD PRESSURE: 64 MMHG | HEIGHT: 65 IN | SYSTOLIC BLOOD PRESSURE: 95 MMHG | HEART RATE: 63 BPM | OXYGEN SATURATION: 97 %

## 2020-09-09 DIAGNOSIS — I47.20 VT (VENTRICULAR TACHYCARDIA) (H): Primary | ICD-10-CM

## 2020-09-09 DIAGNOSIS — I50.22 CHRONIC SYSTOLIC HEART FAILURE (H): ICD-10-CM

## 2020-09-09 DIAGNOSIS — I47.20 VT (VENTRICULAR TACHYCARDIA) (H): ICD-10-CM

## 2020-09-09 DIAGNOSIS — I42.9 CARDIOMYOPATHY (H): ICD-10-CM

## 2020-09-09 DIAGNOSIS — Z95.810 AUTOMATIC IMPLANTABLE CARDIOVERTER-DEFIBRILLATOR IN SITU: ICD-10-CM

## 2020-09-09 LAB
ALBUMIN SERPL-MCNC: 3.7 G/DL (ref 3.4–5)
ALP SERPL-CCNC: 104 U/L (ref 40–150)
ALT SERPL W P-5'-P-CCNC: 18 U/L (ref 0–70)
ANION GAP SERPL CALCULATED.3IONS-SCNC: 8 MMOL/L (ref 3–14)
AST SERPL W P-5'-P-CCNC: 22 U/L (ref 0–45)
BILIRUB SERPL-MCNC: 1 MG/DL (ref 0.2–1.3)
BUN SERPL-MCNC: 21 MG/DL (ref 7–30)
CALCIUM SERPL-MCNC: 9.3 MG/DL (ref 8.5–10.1)
CHLORIDE SERPL-SCNC: 105 MMOL/L (ref 94–109)
CO2 SERPL-SCNC: 26 MMOL/L (ref 20–32)
CREAT SERPL-MCNC: 1.62 MG/DL (ref 0.66–1.25)
ERYTHROCYTE [DISTWIDTH] IN BLOOD BY AUTOMATED COUNT: 14.5 % (ref 10–15)
GFR SERPL CREATININE-BSD FRML MDRD: 40 ML/MIN/{1.73_M2}
GLUCOSE SERPL-MCNC: 222 MG/DL (ref 70–99)
HCT VFR BLD AUTO: 49.9 % (ref 40–53)
HGB BLD-MCNC: 16.9 G/DL (ref 13.3–17.7)
MCH RBC QN AUTO: 29.9 PG (ref 26.5–33)
MCHC RBC AUTO-ENTMCNC: 33.9 G/DL (ref 31.5–36.5)
MCV RBC AUTO: 88 FL (ref 78–100)
NT-PROBNP SERPL-MCNC: 1145 PG/ML (ref 0–450)
PLATELET # BLD AUTO: 214 10E9/L (ref 150–450)
POTASSIUM SERPL-SCNC: 4.7 MMOL/L (ref 3.4–5.3)
PROT SERPL-MCNC: 8.3 G/DL (ref 6.8–8.8)
RBC # BLD AUTO: 5.65 10E12/L (ref 4.4–5.9)
SODIUM SERPL-SCNC: 138 MMOL/L (ref 133–144)
WBC # BLD AUTO: 9.6 10E9/L (ref 4–11)

## 2020-09-09 PROCEDURE — 83880 ASSAY OF NATRIURETIC PEPTIDE: CPT | Performed by: INTERNAL MEDICINE

## 2020-09-09 PROCEDURE — G0463 HOSPITAL OUTPT CLINIC VISIT: HCPCS | Mod: 25,ZF

## 2020-09-09 PROCEDURE — 80053 COMPREHEN METABOLIC PANEL: CPT | Performed by: INTERNAL MEDICINE

## 2020-09-09 PROCEDURE — 99204 OFFICE O/P NEW MOD 45 MIN: CPT | Mod: 25 | Performed by: INTERNAL MEDICINE

## 2020-09-09 PROCEDURE — 0298T LEADLESS EKG MONITOR 3 TO 14 DAYS: CPT | Mod: ZP | Performed by: INTERNAL MEDICINE

## 2020-09-09 PROCEDURE — 0296T LEADLESS EKG MONITOR 3 TO 14 DAYS: CPT | Mod: ZF

## 2020-09-09 PROCEDURE — 36415 COLL VENOUS BLD VENIPUNCTURE: CPT | Performed by: INTERNAL MEDICINE

## 2020-09-09 PROCEDURE — 85027 COMPLETE CBC AUTOMATED: CPT | Performed by: INTERNAL MEDICINE

## 2020-09-09 RX ORDER — SOTALOL HYDROCHLORIDE 120 MG/1
120 TABLET ORAL 2 TIMES DAILY
Qty: 180 TABLET | Refills: 3 | Status: SHIPPED | OUTPATIENT
Start: 2020-09-09 | End: 2020-01-01

## 2020-09-09 RX ORDER — POTASSIUM CHLORIDE 1500 MG/1
20 TABLET, EXTENDED RELEASE ORAL DAILY
Status: ON HOLD | COMMUNITY
Start: 2020-09-03 | End: 2020-01-01

## 2020-09-09 RX ORDER — PHYTONADIONE (VIT K1) 100 MCG
100 TABLET ORAL DAILY
Status: ON HOLD | COMMUNITY
Start: 2020-09-03 | End: 2020-01-01

## 2020-09-09 RX ORDER — LOSARTAN POTASSIUM 25 MG/1
25 TABLET ORAL DAILY
Status: ON HOLD | COMMUNITY
Start: 2020-09-03 | End: 2020-01-01

## 2020-09-09 RX ORDER — FERROUS SULFATE 325(65) MG
325 TABLET ORAL DAILY
COMMUNITY
Start: 2020-09-03 | End: 2021-01-01

## 2020-09-09 RX ORDER — FINASTERIDE 5 MG/1
5 TABLET, FILM COATED ORAL DAILY
COMMUNITY
Start: 2020-09-03 | End: 2021-01-01

## 2020-09-09 RX ORDER — FAMOTIDINE 20 MG/1
20 TABLET, FILM COATED ORAL 2 TIMES DAILY
COMMUNITY
Start: 2020-09-03 | End: 2021-01-01

## 2020-09-09 ASSESSMENT — MIFFLIN-ST. JEOR: SCORE: 1330.01

## 2020-09-09 ASSESSMENT — PAIN SCALES - GENERAL: PAINLEVEL: MODERATE PAIN (4)

## 2020-09-09 NOTE — PROGRESS NOTES
Electrophysiology Clinic Note  HPI:   Mr. Cooper is a 75 year old male who has a past medical history significant for CAD s/p CABGX3 (LIMA-LAD, SVG-RCA, SVG- LCx) 1998, NSTEMI s/p balloon angioplasty to ISR mLAD 2016, refractory angina occluded SVG-RCA and SVG-LCx s/p complex PCI LCx 3/2019, ICM LVEF 35-40%, s/p CRTD 2011 with gen change 2016, DM2, AFL (CHADSVASC 5 on Warfarin), s/p ileostomy.     He has a longstanding cardiac history of CAD and subsequent ICM. He had CABGX3 in 1998 and has had NSTEMI and was found to have some ISR of mLAD stent and underwent balloon angioplasty. He continued to have refractory angina and was later found to have occluded SVG-RCA and SVG-LCx and underwent a complex PCI to LCx in 3/2019. He has no viability of his inferior and lateral wall. He had known ICM most recent LVEF 35-40%. He had CRTD implanted in 2011 and a generator change in 2011. He has had appropriate ICD shocks last in 2017. He has also had AFL and has been on Sotalol and chronically anticoagulated. He had previously followed with [facility] and then established here in 2017 with Dr. Forrest.     He presents today to establish EP care. He reports feeling at baseline.  He has some chronic angina issues but under adequate control now. He is having episodes of dizziness with near syncope, most recently this morning while putting on his T-Shirt. He denies any chest pain/pressures, worsening shortness of breath, leg/ankle swelling, PND, orthopnea, or palpitations. A recent device interrogation showed 6 Noise reversion episodes and 4 NSVT episodes recorded. All of the EGMs show VENICE with pacing inhibition up to 7 seconds. EGMs were reviewed with [employer] technical services. They were able to increase the EGM size to determine true VENICE.  He was brought into clinic for further evaluation. He reports he has used some power tools which may have been cause of VENICE. Device interrogation today shows his intrinsic rhythm is Sinus 54  with CHB-  @ 30 bpm. No further episodes have been recorded. Isometrics and pocket manipulation preformed without reproducing any noise or pacing inhibition. AP= 22% and BVP= 100%. Lead trends appear stable. Battery estimates 3.9 years to TAYLA. Current cardiac medications include: Lipitor, Losartan, Coreg, Plavix, Lasix, Imdur, Sotalol, and Warfarin.       PAST MEDICAL& SURGICAL HISTORY:  CAD s/p CABGX3 (LIMA-LAD, SVG-RCA, SVG- LCx) 1998  NSTEMI s/p balloon angioplasty to ISR mLAD 2016  refractory angina occluded SVG-RCA and SVG-LCx s/p complex PCI LCx 3/2019  ICM LVEF 35-40%  s/p CRTD 2011 with gen change 2016  DM2  AFL (CHADSVASC 5 on Warfarin)  s/p ileostomy    CURRENT MEDICATIONS:  Current Outpatient Medications   Medication Sig Dispense Refill     atorvastatin (LIPITOR) 80 MG tablet Take 80 mg by mouth daily       carvedilol (COREG) 12.5 MG tablet Take 0.5 tablets (6.25 mg) by mouth 2 times daily (with meals) 90 tablet 1     clopidogrel (PLAVIX) 75 MG tablet Take 75 mg by mouth daily       furosemide (LASIX) 40 MG tablet Take 40 mg by mouth daily       gabapentin (NEURONTIN) 300 MG capsule Take 300 mg by mouth 3 times daily        glipiZIDE (GLUCOTROL) 5 MG tablet Take 5 mg by mouth 2 times daily (before meals)       isosorbide mononitrate (IMDUR) 30 MG 24 hr tablet Take 1 tablet (30 mg) by mouth 2 times daily       nitroglycerin (NITROSTAT) 0.4 MG sublingual tablet Place 0.4 mg under the tongue every 5 minutes as needed for chest pain For chest pain place 1 tablet under the tongue every 5 minutes for 3 doses. If symptoms persist 5 minutes after 1st dose call 911.       pioglitazone (ACTOS) 30 MG tablet Take 30 mg by mouth daily       potassium aminobenzoate 500 MG CAPS capsule        sotalol (BETAPACE) 80 MG tablet Take 80 mg by mouth 2 times daily       warfarin (COUMADIN) 2.5 MG tablet Take 2 tablets (5 mg) by mouth daily Take 5 mg by mouth on Mondays and 2.5 mg 6x/week or as directed based on INR 60 tablet  "0     zolpidem (AMBIEN) 5 MG tablet Take 1 tablet (5 mg) by mouth nightly as needed for sleep 15 tablet 0       PAST SURGICAL HISTORY:  Past Surgical History:   Procedure Laterality Date     CARDIAC SURGERY      multiple stents and pacer/defibrillator     CV CORONARY STENT WITH DISTAL PROTECTION DEVICE N/A 3/12/2019    Procedure: Coronary Stent w Distal Protection Device;  Surgeon: Eliseo Field MD;  Location: [facility] CARDIAC CATH LAB     CV HEART CATHETERIZATION WITH POSSIBLE INTERVENTION N/A 1/24/2019    Procedure: PLANNED PCI;  Surgeon: Bill Marquez MD;  Location: [facility] CARDIAC CATH LAB     CV HEART CATHETERIZATION WITH POSSIBLE INTERVENTION N/A 3/12/2019    Procedure: BILL BROWN PLANNED PCI WITH CSI;  Surgeon: Eliseo Field MD;  Location: [facility] CARDIAC CATH LAB     CV PCI ATHERECTOMY ORBITAL N/A 3/12/2019    Procedure: Atherectomy;  Surgeon: Eliseo Field MD;  Location: [facility] CARDIAC CATH LAB     GI SURGERY  2009     ORTHOPEDIC SURGERY      back surgery     VASCULAR SURGERY  1999    CABG X4       ALLERGIES:   No Known Allergies    FAMILY HISTORY:  No family history on file.    SOCIAL HISTORY:  Social History     Tobacco Use     Smoking status: Former Smoker     Smokeless tobacco: Never Used     Tobacco comment: quit 2010   Substance Use Topics     Alcohol use: No     Drug use: No       ROS:   A comprehensive 10 point review of systems negative other than as mentioned in HPI.  Exam:  BP 95/64 (BP Location: Left arm)   Pulse 63   Ht 1.651 m (5' 5\")   Wt 67.8 kg (149 lb 8 oz)   SpO2 97%   BMI 24.88 kg/m    GENERAL APPEARANCE: alert and no distress  HEENT: no icterus, no xanthelasmas, normal pupil size and reaction, normal palate, mucosa moist, no central cyanosis  NECK: no adenopathy, no asymmetry, masses, or scars, thyroid normal to palpation and no bruits, JVP not elevated  RESPIRATORY: lungs clear to auscultation - no rales, rhonchi or wheezes, no use of " accessory muscles, no retractions, respirations are unlabored, normal respiratory rate  CARDIOVASCULAR: regular rhythm, normal S1 with physiologic split S2, no S3 or S4 and no murmur, click or rub, precordium quiet with normal PMI.  ABDOMEN: soft, non tender, bowel sounds normal, non-distended  EXTREMITIES: peripheral pulses normal, no edema  NEURO: alert and oriented to person/place/time, normal speech, gait and affect  SKIN: no ecchymoses, no rashes  PSYCH: normal affect, cooperative    Labs:  Reviewed.     Testing/Procedures:  11/2019 ECHOCARDIOGRAM:   Interpretation Summary  Moderately (EF 35-40%, traced 36%) reduced left ventricular function is  present. There is mild diffuse hypokinesis with superimposed severe  hypokinesis involving the iqaks-de-zrg inferior, dwsfl-ln-kda inferoseptal,  and kqfhh-bn-vfm inferolateral myocardial segments.  Global right ventricular function is mildly reduced.  Rheumatic mitral valve without significant mitral stenosis and mild mitral  regurgitation.  This study was compared with the study from 11/15/18: Comparison is limited  due to lack of contrast on the prior study, however LVEF and regional wall  motion abnormalities are probably unchanged.      Assessment and Plan:   Mr. Cooper is a 75 year old male who has a past medical history significant for CAD s/p CABGX3 (LIMA-LAD, SVG-RCA, SVG- LCx) 1998, NSTEMI s/p balloon angioplasty to ISR mLAD 2016, refractory angina occluded SVG-RCA and SVG-LCx s/p complex PCI LCx 3/2019, ICM LVEF 35-40%, s/p CRTD 2011 with gen change 2016, DM2, AFL (CHADSVASC 5 on Warfarin), s/p ileostomy. He presents today to establish EP care. He reports feeling at baseline.  He has some chronic angina issues but under adequate control now. He is having episodes of dizziness with near syncope, most recently this morning while putting on his T-Shirt. He denies any chest pain/pressures, worsening shortness of breath, leg/ankle swelling, PND, orthopnea, or  "palpitations. A recent device interrogation showed 6 Noise reversion episodes and 4 NSVT episodes recorded. All of the EGMs show VENICE with pacing inhibition up to 7 seconds. EGMs were reviewed with [employer]. They were able to increase the EGM size to determine true VENICE.  He was brought into clinic for further evaluation. He reports he has used some power tools which may have been cause of VENICE. Device interrogation today shows his intrinsic rhythm is Sinus 54 with CHB-  @ 30 bpm. No further episodes have been recorded. Isometrics and pocket manipulation preformed without reproducing any noise or pacing inhibition. AP= 22% and BVP= 100%. Lead trends appear stable. Battery estimates 3.9 years to TAYLA. Current cardiac medications include: Lipitor, Losartan, Coreg, Plavix, Lasix, Imdur, Sotalol, and Warfarin.      ICM LVEF 35-40%, NYHA II:  1. ACEi/ARB: Continue Losartan.  2. BB: Continue Coreg   3. Aldosterone antagonist: not currently on.  4. SCD prophylaxis: s/p CRTD  last gen change   5. Fluid status: Continue Lasix.     Complete Heart Block with Recent Noise on Device Inhibiting Pacin. Noise appears to VENICE which lead to pacing inhibition up to 7 seconds.   2. VENICE occurred on a couple different days. He is unclear of specifics of those days. He does use power tools, so perhaps could be one of those.   3. Pacing sensitivity was changed from \"same as D-Fib\" to 2 mV. Also the stored EGM was changed from V Bipolar to V Sense Amp. This will allow [employer] have better diagnostics with any future episodes.   4. Continue follow up with device clinic per routine.     Presyncope/Syncope:   1. Unclear etiology, could be related to pacing inhibition, vasovagal, orthostatic in nature.   2. Will do 2 week zio patch monitor   3. Encouraged monitoring BP    Paroxsymal Atrial Fibrillation/Flutter:   1. Stroke Prophylaxis:  CHADSVASC=++age, +HF, +CAD, +DM  5, corresponding to a 6.7% annual stroke / " systemic emolism event rate. indicating need for long term oral anticoagulation.  He is appropriately on Warfarin. No bleeding issues. Follows with Coumadin Clinic.   2. Rate Control: Has CHB intrinsically well rate controlled.   3. Rhythm Control: He has been on Sotalol at higher doses. No significant burden on recent device interrogations. We will have him decrease Sotalol to 120 mg BID and can consider decreasing further.    4. Risk Factor Management: Statin, BP control, and MARIPOSA evaluation.    We will have him get updated labs today.   He will follow up with Dr. Loco per routine.   Follow up in 3 months to be coordinated with Dr. Loco visit.   The patient states understanding and is agreeable with plan.   This patient was seen and evaluated with Dr. Modi. The above note reflects our joint assessment and plan.   MAYA Rivas CNP  Pager: [phone]    EP STAFF NOTE  I have seen and examined the patient as part of a shared visit with AJ Rivas NP.  I agree with the note above. I reviewed today's vital signs and medications. I have reviewed and discussed with the advanced practice provider their physical examination, assessment, and plan   Briefly, PAF, very low burden, VENICE on device  My key history/exam findings are: RRR.   The key management decisions made by me: decrease sotalol to 120 BID, f/u in 3 months    Nicolas Modi MD [facility]  Cardiology - Electrophysiology

## 2020-09-09 NOTE — LETTER
9/9/2020      RE: Wesley Cooper  [address]  [facility] [address]       Dear Colleague,    Thank you for the opportunity to participate in the care of your patient, Wesley Cooper, at the [facility]. Please see a copy of my visit note below.    Electrophysiology Clinic Note  HPI:   Mr. Cooper is a 75 year old male who has a past medical history significant for CAD s/p CABGX3 (LIMA-LAD, SVG-RCA, SVG- LCx) 1998, NSTEMI s/p balloon angioplasty to ISR mLAD 2016, refractory angina occluded SVG-RCA and SVG-LCx s/p complex PCI LCx 3/2019, ICM LVEF 35-40%, s/p CRTD 2011 with gen change 2016, DM2, AFL (CHADSVASC 5 on Warfarin), s/p ileostomy.     He has a longstanding cardiac history of CAD and subsequent ICM. He had CABGX3 in 1998 and has had NSTEMI and was found to have some ISR of mLAD stent and underwent balloon angioplasty. He continued to have refractory angina and was later found to have occluded SVG-RCA and SVG-LCx and underwent a complex PCI to LCx in 3/2019. He has no viability of his inferior and lateral wall. He had known ICM most recent LVEF 35-40%. He had CRTD implanted in 2011 and a generator change in 2011. He has had appropriate ICD shocks last in 2017. He has also had AFL and has been on Sotalol and chronically anticoagulated. He had previously followed with [facility] and then established here in 2017 with Dr. Forrest.     He presents today to establish EP care. He reports feeling at baseline.  He has some chronic angina issues but under adequate control now. He is having episodes of dizziness with near syncope, most recently this morning while putting on his T-Shirt. He denies any chest pain/pressures, worsening shortness of breath, leg/ankle swelling, PND, orthopnea, or palpitations. A recent device interrogation showed 6 Noise reversion episodes and 4 NSVT episodes recorded. All of the EGMs show VENICE with pacing inhibition up to 7 seconds.  EGMs were reviewed with [employer]. They were able to increase the EGM size to determine true VENICE.  He was brought into clinic for further evaluation. He reports he has used some power tools which may have been cause of VENICE. Device interrogation today shows his intrinsic rhythm is Sinus 54 with CHB-  @ 30 bpm. No further episodes have been recorded. Isometrics and pocket manipulation preformed without reproducing any noise or pacing inhibition. AP= 22% and BVP= 100%. Lead trends appear stable. Battery estimates 3.9 years to TAYLA. Current cardiac medications include: Lipitor, Losartan, Coreg, Plavix, Lasix, Imdur, Sotalol, and Warfarin.       PAST MEDICAL& SURGICAL HISTORY:  CAD s/p CABGX3 (LIMA-LAD, SVG-RCA, SVG- LCx) 1998  NSTEMI s/p balloon angioplasty to ISR mLAD 2016  refractory angina occluded SVG-RCA and SVG-LCx s/p complex PCI LCx 3/2019  ICM LVEF 35-40%  s/p CRTD 2011 with gen change 2016  DM2  AFL (CHADSVASC 5 on Warfarin)  s/p ileostomy    CURRENT MEDICATIONS:  Current Outpatient Medications   Medication Sig Dispense Refill     atorvastatin (LIPITOR) 80 MG tablet Take 80 mg by mouth daily       carvedilol (COREG) 12.5 MG tablet Take 0.5 tablets (6.25 mg) by mouth 2 times daily (with meals) 90 tablet 1     clopidogrel (PLAVIX) 75 MG tablet Take 75 mg by mouth daily       furosemide (LASIX) 40 MG tablet Take 40 mg by mouth daily       gabapentin (NEURONTIN) 300 MG capsule Take 300 mg by mouth 3 times daily        glipiZIDE (GLUCOTROL) 5 MG tablet Take 5 mg by mouth 2 times daily (before meals)       isosorbide mononitrate (IMDUR) 30 MG 24 hr tablet Take 1 tablet (30 mg) by mouth 2 times daily       nitroglycerin (NITROSTAT) 0.4 MG sublingual tablet Place 0.4 mg under the tongue every 5 minutes as needed for chest pain For chest pain place 1 tablet under the tongue every 5 minutes for 3 doses. If symptoms persist 5 minutes after 1st dose call 911.       pioglitazone (ACTOS) 30 MG tablet Take  "30 mg by mouth daily       potassium aminobenzoate 500 MG CAPS capsule        sotalol (BETAPACE) 80 MG tablet Take 80 mg by mouth 2 times daily       warfarin (COUMADIN) 2.5 MG tablet Take 2 tablets (5 mg) by mouth daily Take 5 mg by mouth on Mondays and 2.5 mg 6x/week or as directed based on INR 60 tablet 0     zolpidem (AMBIEN) 5 MG tablet Take 1 tablet (5 mg) by mouth nightly as needed for sleep 15 tablet 0       PAST SURGICAL HISTORY:  Past Surgical History:   Procedure Laterality Date     CARDIAC SURGERY      multiple stents and pacer/defibrillator     CV CORONARY STENT WITH DISTAL PROTECTION DEVICE N/A 3/12/2019    Procedure: Coronary Stent w Distal Protection Device;  Surgeon: Eliseo Field MD;  Location: [facility] CARDIAC CATH LAB     CV HEART CATHETERIZATION WITH POSSIBLE INTERVENTION N/A 1/24/2019    Procedure: PLANNED PCI;  Surgeon: Bill Marquez MD;  Location: [facility] CARDIAC CATH LAB     CV HEART CATHETERIZATION WITH POSSIBLE INTERVENTION N/A 3/12/2019    Procedure: BILL BROWN PLANNED PCI WITH CSI;  Surgeon: Eliseo Field MD;  Location: [facility] CARDIAC CATH LAB     CV PCI ATHERECTOMY ORBITAL N/A 3/12/2019    Procedure: Atherectomy;  Surgeon: Eliseo Field MD;  Location: [facility] CARDIAC CATH LAB     GI SURGERY  2009     ORTHOPEDIC SURGERY      back surgery     VASCULAR SURGERY  1999    CABG X4       ALLERGIES:   No Known Allergies    FAMILY HISTORY:  No family history on file.    SOCIAL HISTORY:  Social History     Tobacco Use     Smoking status: Former Smoker     Smokeless tobacco: Never Used     Tobacco comment: quit 2010   Substance Use Topics     Alcohol use: No     Drug use: No       ROS:   A comprehensive 10 point review of systems negative other than as mentioned in HPI.  Exam:  BP 95/64 (BP Location: Left arm)   Pulse 63   Ht 1.651 m (5' 5\")   Wt 67.8 kg (149 lb 8 oz)   SpO2 97%   BMI 24.88 kg/m    GENERAL APPEARANCE: alert and no distress  HEENT: no " icterus, no xanthelasmas, normal pupil size and reaction, normal palate, mucosa moist, no central cyanosis  NECK: no adenopathy, no asymmetry, masses, or scars, thyroid normal to palpation and no bruits, JVP not elevated  RESPIRATORY: lungs clear to auscultation - no rales, rhonchi or wheezes, no use of accessory muscles, no retractions, respirations are unlabored, normal respiratory rate  CARDIOVASCULAR: regular rhythm, normal S1 with physiologic split S2, no S3 or S4 and no murmur, click or rub, precordium quiet with normal PMI.  ABDOMEN: soft, non tender, bowel sounds normal, non-distended  EXTREMITIES: peripheral pulses normal, no edema  NEURO: alert and oriented to person/place/time, normal speech, gait and affect  SKIN: no ecchymoses, no rashes  PSYCH: normal affect, cooperative    Labs:  Reviewed.     Testing/Procedures:  11/2019 ECHOCARDIOGRAM:   Interpretation Summary  Moderately (EF 35-40%, traced 36%) reduced left ventricular function is  present. There is mild diffuse hypokinesis with superimposed severe  hypokinesis involving the ujhqs-le-lke inferior, ivpcy-vv-znn inferoseptal,  and bpnbf-kq-ivx inferolateral myocardial segments.  Global right ventricular function is mildly reduced.  Rheumatic mitral valve without significant mitral stenosis and mild mitral  regurgitation.  This study was compared with the study from 11/15/18: Comparison is limited  due to lack of contrast on the prior study, however LVEF and regional wall  motion abnormalities are probably unchanged.      Assessment and Plan:   Mr. Cooper is a 75 year old male who has a past medical history significant for CAD s/p CABGX3 (LIMA-LAD, SVG-RCA, SVG- LCx) 1998, NSTEMI s/p balloon angioplasty to ISR mLAD 2016, refractory angina occluded SVG-RCA and SVG-LCx s/p complex PCI LCx 3/2019, ICM LVEF 35-40%, s/p CRTD 2011 with gen change 2016, DM2, AFL (CHADSVASC 5 on Warfarin), s/p ileostomy. He presents today to establish EP care. He reports  "feeling at baseline.  He has some chronic angina issues but under adequate control now. He is having episodes of dizziness with near syncope, most recently this morning while putting on his T-Shirt. He denies any chest pain/pressures, worsening shortness of breath, leg/ankle swelling, PND, orthopnea, or palpitations. A recent device interrogation showed 6 Noise reversion episodes and 4 NSVT episodes recorded. All of the EGMs show VENICE with pacing inhibition up to 7 seconds. EGMs were reviewed with [employer] technical [employer]. They were able to increase the EGM size to determine true VENICE.  He was brought into clinic for further evaluation. He reports he has used some power tools which may have been cause of VENICE. Device interrogation today shows his intrinsic rhythm is Sinus 54 with CHB-  @ 30 bpm. No further episodes have been recorded. Isometrics and pocket manipulation preformed without reproducing any noise or pacing inhibition. AP= 22% and BVP= 100%. Lead trends appear stable. Battery estimates 3.9 years to TAYLA. Current cardiac medications include: Lipitor, Losartan, Coreg, Plavix, Lasix, Imdur, Sotalol, and Warfarin.      ICM LVEF 35-40%, NYHA II:  1. ACEi/ARB: Continue Losartan.  2. BB: Continue Coreg   3. Aldosterone antagonist: not currently on.  4. SCD prophylaxis: s/p CRTD  last gen change   5. Fluid status: Continue Lasix.     Complete Heart Block with Recent Noise on Device Inhibiting Pacin. Noise appears to VENICE which lead to pacing inhibition up to 7 seconds.   2. VENICE occurred on a couple different days. He is unclear of specifics of those days. He does use power tools, so perhaps could be one of those.   3. Pacing sensitivity was changed from \"same as D-Fib\" to 2 mV. Also the stored EGM was changed from V Bipolar to V Sense Amp. This will allow [employer] have better diagnostics with any future episodes.   4. Continue follow up with device clinic per routine.     Presyncope/Syncope:   1. " Unclear etiology, could be related to pacing inhibition, vasovagal, orthostatic in nature.   2. Will do 2 week zio patch monitor   3. Encouraged monitoring BP    Paroxsymal Atrial Fibrillation/Flutter:   1. Stroke Prophylaxis:  CHADSVASC=++age, +HF, +CAD, +DM  5, corresponding to a 6.7% annual stroke / systemic emolism event rate. indicating need for long term oral anticoagulation.  He is appropriately on Warfarin. No bleeding issues. Follows with Coumadin Clinic.   2. Rate Control: Has CHB intrinsically well rate controlled.   3. Rhythm Control: He has been on Sotalol at higher doses. No significant burden on recent device interrogations. We will have him decrease Sotalol to 120 mg BID and can consider decreasing further.    4. Risk Factor Management: Statin, BP control, and MARIPOSA evaluation.    We will have him get updated labs today.   He will follow up with Dr. Loco per routine.   Follow up in 3 months to be coordinated with Dr. Loco visit.   The patient states understanding and is agreeable with plan.   This patient was seen and evaluated with Dr. Modi. The above note reflects our joint assessment and plan.   MAYA Rivas CNP  Pager: [phone]    EP STAFF NOTE  I have seen and examined the patient as part of a shared visit with AJ Rivas NP.  I agree with the note above. I reviewed today's vital signs and medications. I have reviewed and discussed with the advanced practice provider their physical examination, assessment, and plan   Briefly, PAF, very low burden, VENICE on device  My key history/exam findings are: RRR.   The key management decisions made by me: decrease sotalol to 120 BID, f/u in 3 months    Nicolas Modi MD [facility]  Cardiology - Electrophysiology      Please do not hesitate to contact me if you have any questions/concerns.     Sincerely,     Nicolas Modi MD

## 2020-09-09 NOTE — PROGRESS NOTES
Per Dr. Modi, patient to have 14 day Zio Patch monitor placed.  Diagnosis: VT, I47.2  Monitor placed: Yes  Patient Instructed: Yes  Patient verbalized understanding: Yes  Holter # [id number]     Monitor placed by Hilario Hidalgo CMA  4:24 PM

## 2020-09-09 NOTE — PATIENT INSTRUCTIONS
You were seen in Electrophysiology today by: Dr Modi    Plan:     Medication Changes:     Decrease sotalol to 120mg twice a day    Labs/Tests Needed:    Labs today (BMP)    14 day ziopatch placed today    Follow up visit: 3 months around same time as Dr Loco        Your Care Team:  EP Cardiology   Telephone Number     Aisha Alicea RN [phone]     For scheduling appts or procedures:    Neha Corcoran   [phone]   For the Device Clinic (Pacemakers, ICDs, Loop Recorders)    During business hours: [phone]  After business hours:   [phone]- select option 4 and ask for job code [phone].       Cardiovascular Clinic:   [address] [place] [address]. [place] [address]      As always, Thank you for trusting us with your health care needs!

## 2020-09-11 LAB
MDC_IDC_LEAD_IMPLANT_DT: NORMAL
MDC_IDC_LEAD_LOCATION: NORMAL
MDC_IDC_LEAD_LOCATION_DETAIL_1: NORMAL
MDC_IDC_LEAD_MFG: NORMAL
MDC_IDC_LEAD_MODEL: NORMAL
MDC_IDC_LEAD_POLARITY_TYPE: NORMAL
MDC_IDC_LEAD_SERIAL: NORMAL
MDC_IDC_MSMT_BATTERY_REMAINING_LONGEVITY: 46 MO
MDC_IDC_MSMT_CAP_CHARGE_DTM: NORMAL
MDC_IDC_MSMT_CAP_CHARGE_TIME: 8.88
MDC_IDC_MSMT_CAP_CHARGE_TYPE: NORMAL
MDC_IDC_MSMT_CAP_CHARGE_TYPE: NORMAL
MDC_IDC_MSMT_LEADCHNL_LV_IMPEDANCE_VALUE: 887.5 OHM
MDC_IDC_MSMT_LEADCHNL_LV_PACING_THRESHOLD_AMPLITUDE: 1.25 V
MDC_IDC_MSMT_LEADCHNL_LV_PACING_THRESHOLD_AMPLITUDE: 1.25 V
MDC_IDC_MSMT_LEADCHNL_LV_PACING_THRESHOLD_PULSEWIDTH: 0.5 MS
MDC_IDC_MSMT_LEADCHNL_LV_PACING_THRESHOLD_PULSEWIDTH: 0.5 MS
MDC_IDC_MSMT_LEADCHNL_RA_IMPEDANCE_VALUE: 450 OHM
MDC_IDC_MSMT_LEADCHNL_RA_PACING_THRESHOLD_AMPLITUDE: 0.62 V
MDC_IDC_MSMT_LEADCHNL_RA_PACING_THRESHOLD_PULSEWIDTH: 0.5 MS
MDC_IDC_MSMT_LEADCHNL_RA_SENSING_INTR_AMPL: 5 MV
MDC_IDC_MSMT_LEADCHNL_RV_IMPEDANCE_VALUE: 625 OHM
MDC_IDC_MSMT_LEADCHNL_RV_PACING_THRESHOLD_AMPLITUDE: 0.5 V
MDC_IDC_MSMT_LEADCHNL_RV_PACING_THRESHOLD_PULSEWIDTH: 0.5 MS
MDC_IDC_PG_IMPLANT_DTM: NORMAL
MDC_IDC_PG_MFG: NORMAL
MDC_IDC_PG_MODEL: NORMAL
MDC_IDC_PG_SERIAL: NORMAL
MDC_IDC_PG_TYPE: NORMAL
MDC_IDC_SESS_CLINIC_NAME: NORMAL
MDC_IDC_SESS_DTM: NORMAL
MDC_IDC_SESS_TYPE: NORMAL
MDC_IDC_SET_BRADY_AT_MODE_SWITCH_MODE: NORMAL
MDC_IDC_SET_BRADY_AT_MODE_SWITCH_RATE: 180 {BEATS}/MIN
MDC_IDC_SET_BRADY_HYSTRATE: NORMAL
MDC_IDC_SET_BRADY_LOWRATE: 60 {BEATS}/MIN
MDC_IDC_SET_BRADY_MAX_SENSOR_RATE: 110 {BEATS}/MIN
MDC_IDC_SET_BRADY_MAX_TRACKING_RATE: 110 {BEATS}/MIN
MDC_IDC_SET_BRADY_MODE: NORMAL
MDC_IDC_SET_BRADY_NIGHT_RATE: NORMAL
MDC_IDC_SET_BRADY_PAV_DELAY_LOW: 150 MS
MDC_IDC_SET_BRADY_SAV_DELAY_LOW: 100 MS
MDC_IDC_SET_CRT_LVRV_DELAY: 20 MS
MDC_IDC_SET_CRT_PACED_CHAMBERS: NORMAL
MDC_IDC_SET_LEADCHNL_LV_PACING_AMPLITUDE: 2.25 V
MDC_IDC_SET_LEADCHNL_LV_PACING_ANODE_ELECTRODE_1: NORMAL
MDC_IDC_SET_LEADCHNL_LV_PACING_ANODE_LOCATION_1: NORMAL
MDC_IDC_SET_LEADCHNL_LV_PACING_CAPTURE_MODE: NORMAL
MDC_IDC_SET_LEADCHNL_LV_PACING_CATHODE_ELECTRODE_1: NORMAL
MDC_IDC_SET_LEADCHNL_LV_PACING_CATHODE_LOCATION_1: NORMAL
MDC_IDC_SET_LEADCHNL_LV_PACING_POLARITY: NORMAL
MDC_IDC_SET_LEADCHNL_LV_PACING_PULSEWIDTH: 0.5 MS
MDC_IDC_SET_LEADCHNL_RA_PACING_AMPLITUDE: 1.5 V
MDC_IDC_SET_LEADCHNL_RA_PACING_CAPTURE_MODE: NORMAL
MDC_IDC_SET_LEADCHNL_RA_PACING_POLARITY: NORMAL
MDC_IDC_SET_LEADCHNL_RA_PACING_PULSEWIDTH: 0.5 MS
MDC_IDC_SET_LEADCHNL_RA_SENSING_ADAPTATION_MODE: NORMAL
MDC_IDC_SET_LEADCHNL_RA_SENSING_POLARITY: NORMAL
MDC_IDC_SET_LEADCHNL_RA_SENSING_SENSITIVITY: 0.3 MV
MDC_IDC_SET_LEADCHNL_RV_PACING_AMPLITUDE: 2 V
MDC_IDC_SET_LEADCHNL_RV_PACING_CAPTURE_MODE: NORMAL
MDC_IDC_SET_LEADCHNL_RV_PACING_POLARITY: NORMAL
MDC_IDC_SET_LEADCHNL_RV_PACING_PULSEWIDTH: 0.5 MS
MDC_IDC_SET_LEADCHNL_RV_SENSING_POLARITY: NORMAL
MDC_IDC_SET_LEADCHNL_RV_SENSING_SENSITIVITY: 2 MV
MDC_IDC_SET_ZONE_DETECTION_INTERVAL: 320 MS
MDC_IDC_SET_ZONE_DETECTION_INTERVAL: 360 MS
MDC_IDC_SET_ZONE_DETECTION_INTERVAL: 460 MS
MDC_IDC_SET_ZONE_TYPE: NORMAL
MDC_IDC_SET_ZONE_VENDOR_TYPE: NORMAL
MDC_IDC_STAT_AT_DTM_END: NORMAL
MDC_IDC_STAT_AT_DTM_START: NORMAL
MDC_IDC_STAT_AT_MODE_SW_COUNT: 0
MDC_IDC_STAT_BRADY_DTM_END: NORMAL
MDC_IDC_STAT_BRADY_DTM_START: NORMAL
MDC_IDC_STAT_BRADY_RA_PERCENT_PACED: 22 %
MDC_IDC_STAT_BRADY_RV_PERCENT_PACED: 99.91 %
MDC_IDC_STAT_EPISODE_RECENT_COUNT: 0
MDC_IDC_STAT_EPISODE_RECENT_COUNT: 0
MDC_IDC_STAT_EPISODE_RECENT_COUNT_DTM_END: NORMAL
MDC_IDC_STAT_EPISODE_RECENT_COUNT_DTM_END: NORMAL
MDC_IDC_STAT_EPISODE_RECENT_COUNT_DTM_START: NORMAL
MDC_IDC_STAT_EPISODE_RECENT_COUNT_DTM_START: NORMAL
MDC_IDC_STAT_EPISODE_TYPE: NORMAL
MDC_IDC_STAT_EPISODE_TYPE: NORMAL
MDC_IDC_STAT_EPISODE_VENDOR_TYPE: NORMAL
MDC_IDC_STAT_HEART_RATE_DTM_END: NORMAL
MDC_IDC_STAT_HEART_RATE_DTM_START: NORMAL
MDC_IDC_STAT_TACHYTHERAPY_ATP_DELIVERED_RECENT: 0
MDC_IDC_STAT_TACHYTHERAPY_RECENT_DTM_END: NORMAL
MDC_IDC_STAT_TACHYTHERAPY_RECENT_DTM_START: NORMAL
MDC_IDC_STAT_TACHYTHERAPY_SHOCKS_ABORTED_RECENT: 0
MDC_IDC_STAT_TACHYTHERAPY_SHOCKS_DELIVERED_RECENT: 0

## 2020-09-14 ENCOUNTER — TELEPHONE (OUTPATIENT)
Dept: CARDIOLOGY | Facility: CLINIC | Age: 75
End: 2020-09-14

## 2020-09-14 NOTE — TELEPHONE ENCOUNTER
I called the pharmacy and she was able to get his medications reconciled.  I asked for her to fax over the updated med rec so that we can also update out system.  She is going to fax it tomorrow.  Zandra Galan RN on 9/14/2020 at 2:25 PM    I received a message from the pharmacy with the pts updated medical records.  I have updated the patients medication record.  Zandra Galan RN on 9/22/2020 at 4:09 PM

## 2020-09-14 NOTE — TELEPHONE ENCOUNTER
[facility] Call Center    Phone Message    May a detailed message be left on voicemail: yes     Reason for Call: Other: Pharmacy would like an update on medication pt is taking as pt is confused on what he is taking and not taking     Action Taken: Message routed to:  [facility] ([facility]): Cardio    Travel Screening: Not Applicable

## 2020-09-22 RX ORDER — IPRATROPIUM BROMIDE 21 UG/1
2 SPRAY, METERED NASAL 4 TIMES DAILY PRN
COMMUNITY
End: 2020-01-01

## 2020-10-27 PROBLEM — C78.7 METASTATIC CANCER TO LIVER (H): Status: ACTIVE | Noted: 2020-01-01

## 2020-10-27 NOTE — PROGRESS NOTES
[facility]  Transfer Triage Note    Date of call: 10/27/20  Time of call: 2:05 PM    Is pandemic COVID-19 a concern? NO    Reason for transfer: Further diagnostic work up, management, and consultation for specialized care   Diagnosis: Malignancy work up for new diagnosis, per imaging question of possible primary hepatocellular versus colon carcinoma, also with portal vein thrombosis    Outside Records: Past medical history is not available at today's visit. Patient currently in [place] ED  Additional records requested to be faxed to [phone].    Stability of Patient: Patient is vitally stable, with no critical labs, and will likely remain stable throughout the transfer process  ICU: No   Floor: Medicine Surgery   Telemetry needed: Yes    Expected Time of Arrival for Transfer: greater than 24 hours    Arrival Location:  [place] [address] Phone: [phone]     Recommendations for Management and Stabilization: Given; Outside provider instructed to trend INR, hold coumadin at this time due to supratherapeutic level of 4.2. Diet liquid as tolerated.     Additional Comments:  76 y/o male with PMH of diverticulitis s/p colostomy d/t diverticulitis and extensive cardiac history at [facility] s/p CABG x3 and atrial fibrillation on coumadin presenting with one week of nausea, vomiting, weakness and confusion. Has had loose stools over one week without blood in colostomy bag. Denies weight loss, chest pain or SOB. Wife states patient has been confused and has fallen, head and neck CT scan were completed in ED and did not reveal acute findings. No N/V, no constipation/obstipation. Vitals stable. CT abdomen notable for colonic wall thickening near hepatic flexure, mass in liver and extensive clot burden in portal vein despite patient on coumadin with supratherapeutic INR. Imaging findings concerning for possible new diagnosis malignancy of  unknown primary source (per Radiology, question colon vs HCC primary). Labs: INR 4.8, Hgb 13.6, Creatinine 1.42, GFR 47. No electrolyte abnormalities.    Transfer to [facility] requested due to extensive cardiac history and if needed surgical intervention then cardiac clearance/optimization best done where patient follows with Cards. Pt will need further evaluation for malignancy diagnosis and also anticoagulation plan.     Patient accepted to [facility] bed with tele given extensive cardiac history that includes arrhythmia history. Wait time discussed with ED provider who plans to admit patient to hospital affiliated with ED for now with transfer to [facility] when bed available (may not be until tomorrow).    Deny Pratt, MS4  [employer]    I have edited the above note.   Nia Mendoza MD

## 2020-10-28 NOTE — CONSULTS
GASTROENTEROLOGY CONSULTATION      Date of Admission:  10/27/2020          ASSESSMENT AND RECOMMENDATIONS:   Assessment:    Liver Mass  Occlusive Portal vein Tumour thrombus  75 year old male with a history of diverticulitis s/p colostomy d/t diverticulitis in 2005 and extensive cardiac history at [facility] s/p CABG x3 and atrial fibrillation on coumadin presenting with one week of nausea, increased colonic stool output, and weakness .  Evaluated at OSH CTAP revealed hepatic mass with occlusive tumor thrombus in the left main portal veins concerning for primary malignancy vs metastatic disease , also also with colonic wall thickening at hepatic flexure, concerning for malignancy vs inflammation. Per chart review pt has been documented to have anti 1-antitrypsin phenotype, hemochromatosis, HBV and HCV but this is questionable as most recent workup including Hemochromatosis genetic testing and hepatitis serologies in 2016 were negative, otherwise no known risk factors for HCC. No prior EGDs or colonoscopy. Given extent of mass with symptoms recommend diagnostic EGD and colonoscopy once cardiac clearance is granted, would require INR reversal prior to procedures with goal INR <2. Furthermore, recommend additional workup for Hepatitis, hemochromatosis, cdiff , gi viral panel and tumor markers given above findings.      Recommendations   -Cardiac clearance for EGD and colonoscopy on Friday 10/30  -NPO at midnight on 10/29 for EGD and colonoscopy  -Clear liquid diet starting tonight, initiate Golytely prep in tomorrow afternoon  -Tap water Enema prior to scope on Friday am 10/30  -Continue holding plavix and warfarin    -daily INR checks (goal INR <2), consider INR reversal with FFP if not at goal on am of 10/30  -order C.difficile and stool panel  -order Hep B surface Ag, Hep B surface Lisa, Hep C Lisa, Alpha feto protein, CEA, CA 19-9  -iron studies,   -Abdominal US with dopplers  -Please get PACs images of OSH CT scan  to our system, if unable to get these, reorder CTAP w/ contrast  -Avoid NSAIDs      Thank you for involving us in this patient's care. Please do not hesitate to contact the GI service with any questions or concerns.     Pt care plan discussed with Dr. Crow Rush, GI staff physician.    Marissa Simms MD  [phone]  -------------------------------------------------------------------------------------------------------------------          Chief Complaint:   We were asked by Gladis Feliz to evaluate this patient with new hepatic mass, abdominal colon on CT findings concerning for malignancy    History is obtained from the patient and the medical record.          History of Present Illness:   Wesley Cooper is a 75 year old male with a history of 75 year old male with a history of T2DM,  diverticulitis s/p colostomy  and extensive cardiac history at [facility], CAD s/p CABG x3, AICD, HFrEF (35-40%)  and atrial fibrillation on coumadin presenting with one week of nausea, vomiting, weakness and confusion  Presenedt to OSH ED with one week of lower abdominal pain progresively worsening over a week, also with weakness and falls.  CT with [facility] new colon with liver mass, occlussive thrombus in Left and main protal veins concerning for mets, given high cardiac risk was transferred to [place] where he follows for  cardiology and GI.             Past Medical History:   Reviewed and edited as appropriate  No past medical history on file.         Past Surgical History:   Reviewed and edited as appropriate   Past Surgical History:   Procedure Laterality Date     CARDIAC SURGERY      multiple stents and pacer/defibrillator     CV CORONARY STENT WITH DISTAL PROTECTION DEVICE N/A 3/12/2019    Procedure: Coronary Stent w Distal Protection Device;  Surgeon: Eliseo Field MD;  Location:  HEART CARDIAC CATH LAB     CV HEART CATHETERIZATION WITH POSSIBLE INTERVENTION N/A 1/24/2019    Procedure: PLANNED PCI;  Surgeon: Jimmy  MD Bill;  Location: [facility] CARDIAC CATH LAB     CV HEART CATHETERIZATION WITH POSSIBLE INTERVENTION N/A 3/12/2019    Procedure: RAVLUCHODRAN MUST PLANNED PCI WITH CSI;  Surgeon: Eliseo Field MD;  Location: [facility] CARDIAC CATH LAB     CV PCI ATHERECTOMY ORBITAL N/A 3/12/2019    Procedure: Atherectomy;  Surgeon: Eliseo Field MD;  Location: [facility] CARDIAC CATH LAB     GI SURGERY  2009     ORTHOPEDIC SURGERY      back surgery     VASCULAR SURGERY  1999    CABG X4            Previous Endoscopy:   No results found for this or any previous visit.         Social History:   Reviewed and edited as appropriate  Social History     Socioeconomic History     Marital status:      Spouse name: Not on file     Number of children: Not on file     Years of education: Not on file     Highest education level: Not on file   Occupational History     Not on file   Social Needs     Financial resource strain: Not on file     Food insecurity     Worry: Not on file     Inability: Not on file     Transportation needs     Medical: Not on file     Non-medical: Not on file   Tobacco Use     Smoking status: Former Smoker     Smokeless tobacco: Never Used     Tobacco comment: quit 2010   Substance and Sexual Activity     Alcohol use: No     Drug use: No     Sexual activity: Not on file   Lifestyle     Physical activity     Days per week: Not on file     Minutes per session: Not on file     Stress: Not on file   Relationships     Social connections     Talks on phone: Not on file     Gets together: Not on file     Attends Restorationism service: Not on file     Active member of club or organization: Not on file     Attends meetings of clubs or organizations: Not on file     Relationship status: Not on file     Intimate partner violence     Fear of current or ex partner: Not on file     Emotionally abused: Not on file     Physically abused: Not on file     Forced sexual activity: Not on file   Other Topics Concern      Parent/sibling w/ CABG, MI or angioplasty before 65F 55M? Yes   Social History Narrative     Not on file            Family History:   Reviewed and edited as appropriate  No family history on file.         Allergies:   Reviewed and edited as appropriate   No Known Allergies         Medications:     Current Facility-Administered Medications   Medication     acetaminophen (TYLENOL) tablet 650 mg     atorvastatin (LIPITOR) tablet 80 mg     carvedilol (COREG) tablet 6.25 mg     glucose gel 15-30 g    Or     dextrose 50 % injection 25-50 mL    Or     glucagon injection 1 mg     famotidine (PEPCID) tablet 10 mg     ferrous sulfate (FEROSUL) tablet 325 mg     finasteride (PROSCAR) tablet 5 mg     furosemide (LASIX) tablet 20 mg     gabapentin (NEURONTIN) capsule 300 mg     insulin aspart (NovoLOG) injection (RAPID ACTING)     ipratropium (ATROVENT) 0.03 % spray 2 spray     isosorbide mononitrate (IMDUR) 24 hr tablet 30 mg     lidocaine (LMX4) cream     lidocaine 1 % 0.1-1 mL     losartan (COZAAR) tablet 25 mg     melatonin tablet 1 mg     naloxone (NARCAN) injection 0.1-0.4 mg     ondansetron (ZOFRAN-ODT) ODT tab 4 mg    Or     ondansetron (ZOFRAN) injection 4 mg     polyethylene glycol (MIRALAX) Packet 17 g     potassium chloride ER (KLOR-CON M) CR tablet 20 mEq     prochlorperazine (COMPAZINE) injection 5 mg    Or     prochlorperazine (COMPAZINE) tablet 5 mg    Or     prochlorperazine (COMPAZINE) suppository 12.5 mg     senna-docusate (SENOKOT-S/PERICOLACE) 8.6-50 MG per tablet 1 tablet    Or     senna-docusate (SENOKOT-S/PERICOLACE) 8.6-50 MG per tablet 2 tablet     sodium chloride (PF) 0.9% PF flush 3 mL     sodium chloride (PF) 0.9% PF flush 3 mL     sodium chloride 0.9% infusion     sotalol (BETAPACE) tablet 120 mg             Review of Systems:     A complete review of systems was performed and is negative except as noted in the HPI           Physical Exam:   BP (!) 84/46 (BP Location: Left arm)   Pulse 61   Temp  "97.7  F (36.5  C) (Oral)   Resp 16   Ht 1.651 m (5' 5\")   Wt 66.9 kg (147 lb 8 oz)   SpO2 99%   BMI 24.55 kg/m    Wt:   Wt Readings from Last 2 Encounters:   10/27/20 66.9 kg (147 lb 8 oz)   09/09/20 67.8 kg (149 lb 8 oz)      Constitutional: cooperative, pleasant, not dyspneic/diaphoretic, no acute distress  Eyes: Sclera anicteric/injected  Ears/nose/mouth/throat: Normal oropharynx without ulcers or exudate, mucus membranes moist, hearing intact  Neck: supple, thyroid normal size  CV: No edema  Respiratory: Unlabored breathing  Lymph: No axillary, submandibular, supraclavicular or inguinal lymphadenopathy  Abd: Colostomy bag in place left lower quadrant pain   Skin: warm, perfused, no jaundice  Neuro: AAO x 3, No asterixis  Psych: Normal affect  MSK: No gross deformities         Data:   Labs and imaging below were independently reviewed and interpreted    BMPNo lab results found in last 7 days.  CBC  Recent Labs   Lab 10/28/20  0659   WBC 4.9   RBC 3.95*   HGB 11.8*   HCT 36.9*   MCV 93   MCH 29.9   MCHC 32.0   RDW 15.0   *     INR  Recent Labs   Lab 10/28/20  0659   INR 3.12*     LFTsNo lab results found in last 7 days.   PANCNo lab results found in last 7 days.    Imaging:  XR AP 10/27/2020    FINDINGS:  ABDOMEN    Liver: There is an ill-defined heterogeneous mass centered in the middle/left  hepatic lobe involving segment II and HOLLY measuring approximately 5.1 x 4.6 x  4.2 cm.  There is extension of the mass in the left and main portal vein  resulting in complete occlusion.  The right portal vein is patent.  Hepatic  veins are patent.  Additional small circumscribed hypodense lesions in the  middle and left hepatic lobe are noted.    Gallbladder: Numerous calcified gallstones are present in the gallbladder neck.  Gallbladder slightly distended without focal wall thickening.  No bile duct  dilatation.    Spleen: Borderline splenomegaly.    Adrenal Glands: Normal.    Pancreas: Normal parenchyma, no " adjacent stranding. No dilated duct.    Kidneys: No hydronephrosis or calculus.No mass.    Aorta: Circumferential atherosclerotic disease in the aortoiliac arteries.  Possible moderate stenosis of SMA and bilateral renal arteries.    IVC: Normal.    Retroperitoneum: No lymphadenopathy.    Stomach and esophagus: Mild ill-defined circumferential thickening of the  pylorus is nonspecific and not well evaluated due to underdistention.        PELVIS    Reproductive organs: No pelvic mass.  Bladder: No calculus.  Bowel: Status post descending colostomy.  Colon and small bowel is  decompressed.  Possible circumferential thickening of the Paddock flexure  concerning for primary malignancy.  Unremarkable appendix.  Sigmoid colon and  rectum is decompressed.  Mesentery and peritoneum: No lymphadenopathy. No free air or free fluid.  Body wall: Colostomy in the left anterior abdominal wall.  Bones: No destructive lesions.    Lower Chest: unremarkableIMPRESSION:  1. Colonic wall thickening at the hepatic flexure concerning for primary colon  cancer. Recommend correlating with colonoscopy.  2. Liver mass with occlusive tumor thrombus in the left and main portal hepatic  veins concerning for metastatic disease.  Primary hepatocellular or  cholangiocarcinoma is in the differential.  3. Cholelithiasis.  4. Prior descending colostomy.

## 2020-10-28 NOTE — PLAN OF CARE
[id number]    VSS on RA, afebrile, A&Ox4, Pt forgetful. Denies pain, SOB and N/V. Bed alarm on throughout night due to increase in falls and forgetfulness. Tele: NSR, HR in 60s. Pt NPO, Q4h blood sugars. BG @ 0500 132, no insulin given per MAR. NS running at 100ml/hr through PIV. Pt using urinal at the bedside, adequate UOP. Pt has ostomy with ostomy pouch in place. Pt stated he would let us know when it needs to be changed. Continue to monitor.

## 2020-10-28 NOTE — PROGRESS NOTES
[facility]     Progress Note - Robyn 2 Service        Date of Admission:  10/27/2020    Assessment & Plan       Wesley Cooper is a 75 year old male with PMH of diverticulitis s/p descending colostomy, HFrEF 2/2 ICM (EF 35-40% by TTE 10/28), CAD (s/p CABGx3, multiple PCIs), and atrial flutter on warfarin, DM2, tobacco use, and RLS admitted as transfer from OSH on 10/27/2019 for evaluation of suspected metastatic colorectal malignancy as demonstrated on CT AP at OSH on 10/27 following initial presentation with abdominal pain and diarrhea. GI consulted with colonoscopy and EGD anticipated 10/30.    Changes Today:  - GI consulted (appreciate recommendations) with plans for workup of possible metastatic malignancy as below  - TTE obtained with stable EF (35-40%), otherwise unchanged  - NPO after midnight in advance of abdominal US with doppler-- ok for diet following completion of study      #Colorectal mass, as demonstrated on CT AP 10/27  #Hepatic mass  Initially presented with one week abdominal and diarrhea with CT AP obtained 10/27 prior to transfer with colonic thickening of the hepatic flexure and hepatic mass with associated occlusive tumor thrombus concerning for metastatic disease, although cannot rule out primary malignancy. Transferred to [facility] for further evaluation and management. GI consulted with plans for EGD and colonoscopy, anticipated 10/30.  - GI consult 10/28 - anticipated need for colonoscopy with biopsy for further characterization   -Upper endoscopy and colonoscopy scheduled for 10/30    -NPO 10/29 PM, tap water and enema prior to procedure   -Check daily INR; requires INR<2 for procedure (if not <2 give FFP prior)   -Stool panel, C.dif   -Alpha feto protein, CEA, CA 19-9   -Liver workup    - Daily INR    - Hepatitis serologies (Hep B surface Ag, Hep B surface Lisa, Hep C Lisa), iron studies    - US abd with dopplers    #Supportive  -NS at  100mls/hr  -zofran PRN for nausea  -miralax and senna PRN for constipation     #Portal vein tumor thrombosis  #Supratherapeutic INR  Discovered on CT as above, occurred despite patient being anticoagulated on warfarin. Suspected provoked in setting of malignancy. INR on presentation 4.8, downtrending to 3.12 with warfarin held. May require warfarin reversal prior to procedures, to be determined. No signs or symptoms of bleed.  -Hold Plavix for time being (INR <2 for procedures 10/30)  -Hold warfarin given procedures on 10/30 and supratherapeutic INR  -Will consider transition to alternative anticoagulation regimen such as lovenox at AC restart     Chronic Issues:  #DMII  - Holding PTA glipizide  - Medium-dose sliding scale insulin  #Hyperlipidemia  - Resume PTA atorvastatin  #Hypertension  #CAD  #HFrEF (EF 35-40%) 2/2 ischemic cardiomyopathy  TTE 10/28 obtained in advance of potential procedures or with anticipation of potential future chemotherapies stable from prior (EF 35-40%).  - Resume PTA carvedilol  - Resume PTA Imdur  - Resume PTA losartan  - Continue PTA atorvastatin  #History of atrial flutter  - Resume PTA sotalol  #GERD  - Resume PTA famotidine  #Neuropathic Pain  - Gabapentin 300mg TID  - Tylenol PRN  #Iron deficiency anemia  - Continue PTA ferrous sulfate       Diet: Regular Diet Adult    Fluids: NS at 100mL/hr  Lines: peripheral IV L, Colostomy L abdomen  DVT Prophylaxis: Pneumatic Compression Devices  Souza Catheter: not present  Code Status:  Full     Disposition Plan   Expected discharge: > 7 days, recommended to prior living arrangement once adequate workup of abdominal pain completed and patient is safe/independent for discharge.  Entered: Alis Brian 10/28/2020, 3:16 PM     The patient's care was discussed with the Attending Physician, Dr. Veronica.    Alis Brian  Medical Student  [address]   Please see sign in/sign  out for up to date coverage information      Resident/Fellow Attestation   I, Radha Ricketts, was present with the medical student who participated in the service and in the documentation of the note.  I have verified the history and personally performed the physical exam and medical decision making.  I agree with the assessment and plan of care as documented in the note.      Radha Ricketts MD  PGY3  Date of Service (when I saw the patient): 10/28/20  ______________________________________________________________________    Interval History   Admitted yesterday. He has been doing overall well with no acute events overnight. He continues to have bilateral lower abdominal tenderness that is stated as milder than before. He denies fever/chills, chest pain, dyspnea, nausea/vomiting, diarrhea/constipation, or hematochezia. His stool output is via colostomy and is black, reported normal for him after initiation of ferrous sulfate. He is aware of the CT findings with hepatic flexure mass and liver nodules. Received an echo this afternoon with EF 35-40%. U/S abdomen with doppler pending.     Data reviewed today: I reviewed all medications, new labs and imaging results over the last 24 hours.   I personally reviewed echo completed today (10/28) with result showing EF 35-40%, LV size normal, RV function/size/wall motion/thickness normal, pulm artery systolic pressure normal, and no evidence of pericardial effusion.   - A/P CT completed (10/27) shows ill-defined heterogeneous mass in middle/left hepatic lobe of segment II and HOLLY (5.1x4.6x4.2cm) with extension to the left and main portal vein with complete occlusion. R.portal vein and hepatic vein are patent. Small circumscribed hypodense lesions lesions in middle and left hepatic lobe additionally. Numerous calcified gallstones present with GB distension (no wall thickening). S/p descending colostomy with circumferential thickening of splenic flexure  c/f primary malignancy. Appendix unremarkable.     Physical Exam   Vital Signs: Temp: 97.4  F (36.3  C) Temp src: Oral BP: 134/68 Pulse: 63   Resp: 18 SpO2: 99 % O2 Device: None (Room air)    Weight: 147 lbs 8 oz  Constitutional: awake, alert, cooperative, no apparent distress, and appears stated age  HEENT: Normocephalic, atraumatic.   Respiratory: Lungs clear to auscultation bilaterally, no wheezes/crackles  Cardiovascular: HRRR, no murmers  GI: Soft, mildly tender in lower quadrants upon palpation without tenderness elsewhere, colostomy back in place (L abdomen) with dark output  MSK: No gross deformities, extremities warm and well-perfused without edema  Skin: No rashes   Neurologic: AAOx3  Neuropsychiatric: appropriate affect    Data   Recent Labs   Lab 10/28/20  1346 10/28/20  0659 10/28/20  0647   WBC 4.5 4.9  --    HGB 12.3* 11.8*  --    MCV 94 93  --    * 133*  --    INR  --  3.12*  --    NA  --   --  144   POTASSIUM  --   --  3.8   CHLORIDE  --   --  114*   CO2  --   --  23   BUN  --   --  12   CR  --   --  1.23   ANIONGAP  --   --  7   KEIRA  --   --  8.3*   GLC  --   --  95   ALBUMIN  --   --  2.8*   PROTTOTAL  --   --  6.4*   BILITOTAL  --   --  0.9   ALKPHOS  --   --  123   ALT  --   --  14   AST  --   --  25

## 2020-10-28 NOTE — PLAN OF CARE
1500 - 2330: C-diff came back negative, waiting for enteric bacteria & virus panel result. A&O x 4, AVSS ex SBP in 140's, denies pain ex negligible pain at abdomen, decline tylenol or heat pack since its not bothering him too much. BG 83, 265, gave insulin per sliding scale.  Colostomy bag emptied by pt w/ moderate output, voiding spontaneously w/ adequate UOP. NPO after midnight in advance of abdominal US and ok to be on clears tomorrow per GI.    Plan to do EGD/colonoscopy on Friday 10/30 for biopsy.

## 2020-10-28 NOTE — PROGRESS NOTES
Nursing Focus: Admission  D: Arrived at 2200 from EMT. Patient accompanied by paramedic. Admitted for diagnostic workup for cancer. Complains of none at the moment.      I: Admission process began.  Patient oriented to room, enviroment, call light.  Md. notified of patients arrival on unit.     A: Vital signs stable, afebrile.  Patient stable at this time.  Complaining of none.     P: Implement plan of care when available. Continue to monitor patient. Nursing interventions as appropriate. Notify md with changes in pt status.

## 2020-10-28 NOTE — UTILIZATION REVIEW
"    Admission Status; Secondary Review Determination         Under the authority of the Utilization Management Committee, the utilization review process indicated a secondary review on the above patient.  The review outcome is based on review of the medical records, discussions with staff, and applying clinical experience noted on the date of the review.        (x)      Inpatient Status Appropriate - This patient's medical care is consistent with medical management for inpatient care and reasonable inpatient medical practice.      () Observation Status Appropriate - This patient does not meet hospital inpatient criteria and is placed in observation status. If this patient's primary payer is Medicare and was admitted as an inpatient, Condition Code 44 should be used and patient status changed to \"observation\".   () Admission Status NOT Appropriate - This patient's medical care is not consistent with medical management for Inpatient or Observation Status.          RATIONALE FOR DETERMINATION     \"Wesley Cooper is a 75 year old male with a relatively complex medical history including CABG x3, multiple PCI's, DM2, diverticulitis, tobacco use, ischemic cardiomyopathy, restless leg syndrome, atrial flutter, who initially presented to outside ED for abdominal pain and had CT abdomen findings concerning for colorectal cancer versus primary hepatic cancer and portal vein thrombosis.\"    Pt is on IVF's and GI consulted. Given concern for either HCC or colorectal ca pt will need inpatient w/u. Also pt with extensive clot burden in portal vein despite AC.    Hence, pt will need further w/u with GI to eval the malignancy and hence, is likely to stay > 2 MN and inpatient status is appropriate.      The severity of illness, intensity of service provided, expected LOS and risk for adverse outcome make the care complex, high risk and appropriate for hospital admission.        The information on this document is developed by the " utilization review team in order for the business office to ensure compliance.  This only denotes the appropriateness of proper admission status and does not reflect the quality of care rendered.         The definitions of Inpatient Status and Observation Status used in making the determination above are those provided in the CMS Coverage Manual, Chapter 1 and Chapter 6, section 70.4.      Sincerely,     ABA CUELLO MD    Physician Advisor  Utilization Review/ Case Management  [facility].

## 2020-10-28 NOTE — PLAN OF CARE
1399-1201: Soft BPs this morning, 80s/40s; provider notified. BP meds held this AM with improvement in BP. Denies pain or nausea. BG 95 and 99, no sliding scale insulin given. PIV infusing NS at 100 ml/hr. Telemetry monitoring shows normal paced rhythm, discussed with telemetry technician, no issues noted on their end. Voiding adequately. Up with 1 assist, but no OOB activity today, but offered walk. Bed alarm remains on for safety. ECHO completed at bedside, EF of 35-40%. Per pended GI note, possible EGD/colonoscopy on Fri (10/30) for biopsy. Spoke with pt and wife about new (unconfirmed) diagnosis, pt very sad to hear the news, but hopes there are treatment options available. Provided emotional support and encouragement. Continue with POC.

## 2020-10-28 NOTE — PROGRESS NOTES
"SPIRITUAL HEALTH SERVICES  SPIRITUAL ASSESSMENT Progress Note  [facility] 7D     REFERRAL SOURCE: Request for 'clergy person' at admission    I introduced Wesley to [facility]. He let me know that he was just diagnosed with cancer and that he's \"not right in the head about it\". When I asked him what this meant he explained that he's still processing everything that's going on and \"death\". He named that he's not ready to explore these themes now but \"probably in a day or two\".    PLAN: Will visit again on Friday.    Minerva Murdock  Chaplain Resident  Pager: [phone]    "

## 2020-10-28 NOTE — H&P
[facility]     History and Physical - [facility] Night Service        Date of Admission:  10/27/2020    Assessment & Plan   Wesley Cooper is a 75 year old male with a relatively complex medical history including CABG x3, multiple PCI's, DM2, diverticulitis, tobacco use, ischemic cardiomyopathy, restless leg syndrome, atrial flutter, who initially presented to outside ED for abdominal pain and had CT abdomen findings concerning for colorectal cancer versus primary hepatic cancer and portal vein thrombosis.     #Concern for colorectal cancer  #Concern for hepatic cancer  Discovered on abdominal CT for workup of abdominal pain discussed below - colonic wall thickening near the hepatic flexure, mass in liver concerning for metastatic spread of colonic process vs. Primary hepatic process. High suspicion for malignancy.  - GI consult - anticipate need for colonoscopy with biopsy for further characterization  - Could consider hematology/oncology consult in future for recommendations regarding treatment and manage of PVT.    #Portal vein thrombosis  Discovered on CT as above, occurred despite patient being anticoagulated on warfarin. Concern that etiology could be related to metastatic process partially obstructing blood flow. Patient currently has a supratherapeutic INR at 4.8 on admission.  - Hold Plavix for time being,   - Defer to pharmacy for warfarin dosing with goal INR 2-3.  - Will consider GI or Heme/Onc input for further management if needed.      Chronic Issues:  #DMII  - Holding PTA glipizide  - Medium-dose sliding scale insulin    #Hyperlipidemia  - Resume PTA atorvastatin    #Hypertension  #CAD  #Ischemic cardiomyopathy  - Resume PTA carvedilol  - Resume PTA Imdur  - Resume PTA losartan    #History of atrial flutter  - Resume PTA sotalol    #GERD  - Resume PTA famotidine    #Neuropathic Pain  - Gabapentin 300mg TID       Diet: NPO for Medical/Clinical Reasons Except  for: Meds, Ice Chips    Fluids: NS @ 100mL/hr  DVT Prophylaxis: Pneumatic Compression Devices  Souza Catheter: not present  Code Status:   FULL         Disposition Plan   Expected discharge: 4 - 7 days, recommended to prior living arrangement once Diagnosis and treatment plan established.  Entered: Tray Griffith MD 10/28/2020, 6:11 AM       The patient's care was discussed with the Attending Physician, Dr. Mingo Ng.    Tray Griffith MD  [facility]   Contact information available via [facility] Paging/Directory  Please see sign in/sign out for up to date coverage information  ______________________________________________________________________    Chief Complaint   Abdominal Pain    History is obtained from the patient    History of Present Illness   Wesley Cooper is a 75 year old male with a relatively complex medical history including CABG x3, multiple PCI's, DM2, diverticulitis, tobacco use, ischemic cardiomyopathy, restless leg syndrome, atrial flutter, who initially presented to outside ED for abdominal pain and had CT abdomen findings concerning for colorectal cancer versus primary hepatic cancer and portal vein thrombosis.    Thuan states that his abdominal pain started about 2 weeks ago.  The pain lasted for about 1 week, resolve spontaneously for 1 day, then reoccurred for several more days after which he sought care at his local emergency department.  He is unable to characterize it as sharp or dull.  It wraps around his lower abdomen onto either side like a belt.  He is unable to note any exacerbating or relieving factors, including food.  The pain otherwise does not radiate.  He does not note any particular inciting factors.  He notably has a colostomy bag.  He notes no change in his colostomy output.  He further denies fever/chills, chest pain, shortness of breath, nausea/vomiting, diarrhea, hematochezia, constipation.     Review of  Systems    The 10 point Review of Systems is negative other than noted in the HPI.    Past Medical History    I have reviewed this patient's medical history and updated it with pertinent information if needed.     Past Surgical History   I have reviewed this patient's surgical history and updated it with pertinent information if needed.  Past Surgical History:   Procedure Laterality Date     CARDIAC SURGERY      multiple stents and pacer/defibrillator     CV CORONARY STENT WITH DISTAL PROTECTION DEVICE N/A 3/12/2019    Procedure: Coronary Stent w Distal Protection Device;  Surgeon: Eliseo Field MD;  Location: [facility] CARDIAC CATH LAB     CV HEART CATHETERIZATION WITH POSSIBLE INTERVENTION N/A 1/24/2019    Procedure: PLANNED PCI;  Surgeon: Bill Marquez MD;  Location: [facility] CARDIAC CATH LAB     CV HEART CATHETERIZATION WITH POSSIBLE INTERVENTION N/A 3/12/2019    Procedure: BILL BROWN PLANNED PCI WITH CSI;  Surgeon: Eliseo Field MD;  Location: [facility] CARDIAC CATH LAB     CV PCI ATHERECTOMY ORBITAL N/A 3/12/2019    Procedure: Atherectomy;  Surgeon: Eliseo Field MD;  Location: [facility] CARDIAC CATH LAB     GI SURGERY  2009     ORTHOPEDIC SURGERY      back surgery     VASCULAR SURGERY  1999    CABG X4       Social History   I have reviewed this patient's social history and updated it with pertinent information if needed. Wesley Cooper  reports that he has quit smoking. He has never used smokeless tobacco. He reports that he does not drink alcohol or use drugs.    Family History         Prior to Admission Medications   Prior to Admission Medications   Prescriptions Last Dose Informant Patient Reported? Taking?   Vitamin K, Phytonadione, 100 MCG TABS 10/27/2020 at Unknown time  Yes Yes   Sig: Take 100 mcg by mouth daily   atorvastatin (LIPITOR) 80 MG tablet 10/27/2020 at Unknown time Self Yes Yes   Sig: Take 80 mg by mouth daily   carvedilol (COREG) 12.5 MG tablet 10/27/2020 at  Unknown time  No Yes   Sig: Take 0.5 tablets (6.25 mg) by mouth 2 times daily (with meals)   clopidogrel (PLAVIX) 75 MG tablet 10/27/2020 at Unknown time Self Yes Yes   Sig: Take 75 mg by mouth daily   famotidine (PEPCID) 10 MG tablet 10/27/2020 at Unknown time  Yes Yes   Sig: Take 10 mg by mouth 2 times daily   ferrous sulfate (FEROSUL) 325 (65 Fe) MG tablet 10/27/2020 at Unknown time  Yes Yes   Sig: Take 325 mg by mouth daily   finasteride (PROSCAR) 5 MG tablet 10/27/2020 at Unknown time  Yes Yes   Sig: Take 5 mg by mouth daily   furosemide (LASIX) 40 MG tablet 10/27/2020 at Unknown time  Yes Yes   Sig: Take 40 mg by mouth daily   gabapentin (NEURONTIN) 300 MG capsule 10/27/2020 at Unknown time Self Yes Yes   Sig: Take 300 mg by mouth 3 times daily    glipiZIDE (GLUCOTROL) 5 MG tablet 10/27/2020 at Unknown time Self Yes Yes   Sig: Take 10 mg by mouth 2 times daily (before meals)    insulin detemir (LEVEMIR FLEXTOUCH) 100 UNIT/ML pen   Yes No   Sig: Inject 44 Units Subcutaneous At Bedtime   ipratropium (ATROVENT) 0.03 % nasal spray Unknown at Unknown time  Yes No   Sig: Spray 2 sprays into both nostrils 4 times daily as needed for rhinitis   isosorbide mononitrate (IMDUR) 30 MG 24 hr tablet   Yes No   Sig: Take 1 tablet (30 mg) by mouth 2 times daily   losartan (COZAAR) 25 MG tablet 10/27/2020 at Unknown time  Yes Yes   Sig: Take 25 mg by mouth daily   nitroglycerin (NITROSTAT) 0.4 MG sublingual tablet  Self Yes No   Sig: Place 0.4 mg under the tongue every 5 minutes as needed for chest pain For chest pain place 1 tablet under the tongue every 5 minutes for 3 doses. If symptoms persist 5 minutes after 1st dose call 911.   potassium chloride ER (KLOR-CON M) 20 MEQ CR tablet 10/27/2020 at Unknown time  Yes Yes   Sig: Take 20 mEq by mouth daily   sotalol (BETAPACE) 120 MG tablet 10/27/2020 at Unknown time  No Yes   Sig: Take 1 tablet (120 mg) by mouth 2 times daily   warfarin (COUMADIN) 2.5 MG tablet   No No   Sig:  Take 2 tablets (5 mg) by mouth daily Take 5 mg by mouth on Mondays and 2.5 mg 6x/week or as directed based on INR      Facility-Administered Medications: None     Allergies   No Known Allergies    Physical Exam   Vital Signs: Temp: 97.9  F (36.6  C) Temp src: Oral BP: 107/58 Pulse: 63   Resp: 16 SpO2: 99 % O2 Device: None (Room air)    Weight: 147 lbs 8 oz    General Appearance: Alert, conversational, no apparent distress.  Eyes: EOM grossly intact, sclera anicteric.  HEENT: Normocephalic  Respiratory: Lungs clear to auscultation bilaterally, no wheezes or crackles  Cardiovascular: RRR, no m/r/g  GI: Colostomy bag in place with dark output, abdomen otherwise soft nontender nondistended.  Skin: No rash on limited exam  Musculoskeletal: Moving all extremities appropriately  Neurologic: Alert and oriented x3  Psychiatric: Appropriate affect    Data   Data reviewed today: I reviewed all medications, new labs and imaging results over the last 24 hours. I personally reviewed no images or EKG's today.    No lab results found in last 7 days.

## 2020-10-28 NOTE — PROVIDER NOTIFICATION
0812: Notified MD of BP of 84/46, will hold all BP meds.    0815: MD returned page, ok to hold BP meds, recheck BP in 15 mins and notify of result.

## 2020-10-29 NOTE — CONSULTS
Social Work Services Progress Note     Consult placed for financial/insurance issues.      met with patient and spouse in the patient's room; introduced self and explained role. Discussed reason for visit; patient confirmed.    Patient stated that he currently has [employer] Medicare Advantage but is aware that as of March 2021, [facility] will no longer be accepting of [employer] insurance. Patient expressed concerns as he is closely followed by a cardiologist at the [facility] and will end up needing close follow-up with an oncologist following this admission.  offered to reach out to the Financial Counseling Department to determine if any assistance is available for patients with [employer]; patient and spouse accepted offer and thanked this writer.    No further questions or concerns reported at this time.  provided contact information if any further needs arise.  will continue to be available for any discharge planning and needs.    ANANDA Rdz  7D Hematology/Oncology Social Worker  Phone: [phone]  Pager: [phone]  [email or web address]

## 2020-10-29 NOTE — PLAN OF CARE
Alert and oriented X 4. Denies SOB, pain, nausea or abdominal discomfort. Denies dizziness. Voiding spontaneously with good urine output. Sleeping intermittently. NPO for abd US.

## 2020-10-29 NOTE — PROGRESS NOTES
[facility]     Progress Note - Robyn 2 Service        Date of Admission:  10/27/2020    Assessment & Plan       Wesley Cooper is a 75 year old male with PMH of diverticulitis s/p descending colostomy, chronic systolic heart failure with decompensation, CAD (s/p CABGx3, multiple PCIs), and atrial flutter on warfarin, DM2, tobacco use, and RLS admitted as transfer from OSH on 10/27/2019 for evaluation of suspected metastatic colorectal malignancy as demonstrated on CT AP at OSH on 10/27 following initial presentation with abdominal pain and diarrhea. GI consulted with colonoscopy and EGD anticipated 10/30.    Changes Today:  - discontinue IVF   - f/u on abdominal U/S from this AM   - plan initiated to obtain TONY for daughter to view medical records  - TTE obtained with stable EF (35-40%), otherwise unchanged; medically optimized for procedure 10/30  - provide vitamin K tonight for coumadin reversal , INR<2 advised prior to procedure   - consented for FFP today , to be used if INR>2 tomorrow AM  - NPO after midnight in advance of upper endoscopy and colonoscopy scheduled on 10/30  - golytely prep started this pm  - tap water enema scheduled for 10/30 at 8AM prior to procedure    #Colorectal mass, as demonstrated on CT AP 10/27  #Hepatic mass  Initially presented with one week abdominal and diarrhea with CT AP obtained 10/27 prior to transfer with colonic thickening of the hepatic flexure and hepatic mass with associated occlusive tumor thrombus concerning for metastatic disease, although cannot rule out primary malignancy. Transferred to [facility] for further evaluation and management. GI consulted with plans for EGD and colonoscopy, anticipated 10/30.  - GI consult 10/28 - anticipated need for colonoscopy with biopsy for further characterization   -Upper endoscopy and colonoscopy scheduled for 10/30    -Golytely prep 10/29 PM   -NPO 10/29 PM, tap water and enema prior to  procedure at 8AM 10/30   -Check daily INR; requires INR<2 for procedure (if not <2 give FFP prior)    -INR 2.84 (10/29 AM), consent for FFP completed, providing vitamin K for reversal 10/29 PM   -Stool panel (negative), C.dif (negative)   -Alpha feto protein (high at 2727), CEA (high end of normal at 2.5), CA 19-9 pending   -Liver workup    - Daily INR    - Hepatitis serologies (Hep B surface Ag, Hep B surface Lisa, Hep C Lisa), iron studies    - US abd with dopplers - result align with prior findings on CT     #Supportive  -discontinued NS at 100mls/hr 10/29  -zofran PRN for nausea  -miralax and senna PRN for constipation     #Portal vein tumor thrombosis  #Supratherapeutic INR  Discovered on CT as above, occurred despite patient being anticoagulated on warfarin. Suspected provoked in setting of malignancy. INR on presentation 4.8, downtrending to 3.12 with warfarin held. May require warfarin reversal prior to procedures, to be determined. No signs or symptoms of bleed.  -Hold Plavix for time being (INR <2 for procedures 10/30)  -Hold warfarin given procedures on 10/30 and supratherapeutic INR  -Will consider transition to alternative anticoagulation regimen such as lovenox at AC restart     Chronic Issues:  #DMII  - Holding PTA glipizide  - High-dose sliding scale insulin  #Hyperlipidemia  - Resume PTA atorvastatin  #Hypertension  #CAD  #Chronic Systolic Heart Failure with Decompensation  TTE 10/28 obtained in advance of potential procedures or with anticipation of potential future chemotherapies stable from prior (EF 35-40%), HFrEF (EF 35-40%) 2/2 ischemic cardiomyopathy; optimized medically for procedure (10/30)  - Resume PTA carvedilol  - Resume PTA Imdur  - Resume PTA losartan  - Continue PTA atorvastatin  #History of atrial flutter  - Resume PTA sotalol  #GERD  - Resume PTA famotidine  #Neuropathic Pain  - Gabapentin 300mg TID  - Tylenol PRN  #Iron deficiency anemia  - Continue PTA ferrous sulfate       Diet:  Combination Diet 2 gm NA Diet    Fluids: PO intake, NPO past midnight 10/29 for procedure 10/30  Lines: peripheral IV L, Colostomy L abdomen  DVT Prophylaxis: Pneumatic Compression Devices  Souza Catheter: not present  Code Status:  DNR/DNI - spoke with patient to clarify code status today (prefers DNR/DNI)    Disposition Plan   Expected discharge: > 7 days, recommended to prior living arrangement once adequate workup of abdominal pain completed and patient is safe/independent for discharge.  Entered: Alis Brian 10/29/2020, 11:06 AM     The patient's care was discussed with the Attending Physician, Dr. Veronica.    Alis Brian  Medical Student  [address]   Please see sign in/sign out for up to date coverage information    Resident/Fellow Attestation   I, Alis Brian, was present with the medical student who participated in the service and in the documentation of the note.  I have verified the history and personally performed the physical exam and medical decision making.  I agree with the assessment and plan of care as documented in the note.      Rupert Veronica MD   of Medicine  Med-Peds Hospitalist  Pager [phone]    Date of Service (when I saw the patient): 10/28/20  ______________________________________________________________________    Interval History   Mr. Cooper is AAOx3-4 and doing overall well today. He denies abdominal pain, nausea, SOB, and dizziness. He has good UOP and regular BMs via colostomy. He had  occurrences of low BP yesterday AM in the 80s/40s, prompting holding of furosemide, carvedilol, losartan, and sotalol 1x. This was followed by a few BP measures in the 140s/80s, which have intermittently occurred 10/29 as well despite use of BP meds.    Data reviewed today: I reviewed all medications, new labs and imaging results over the last 24 hours.   I personally reviewed echo completed today  (10/28) with result showing EF 35-40%, LV size normal, RV function/size/wall motion/thickness normal, pulm artery systolic pressure normal, and no evidence of pericardial effusion.   - A/P CT completed (10/27) shows ill-defined heterogeneous mass in middle/left hepatic lobe of segment II and HOLLY (5.1x4.6x4.2cm) with extension to the left and main portal vein with complete occlusion. R.portal vein and hepatic vein are patent. Small circumscribed hypodense lesions lesions in middle and left hepatic lobe additionally. Numerous calcified gallstones present with GB distension (no wall thickening). S/p descending colostomy with circumferential thickening of splenic flexure c/f primary malignancy. Appendix unremarkable.   -Abdominal U/S results consistent with that found on CT    Physical Exam   Vital Signs: Temp: 96.6  F (35.9  C) Temp src: Oral BP: 113/68 Pulse: 69   Resp: 16 SpO2: 96 % O2 Device: None (Room air)    Weight: 147 lbs 8 oz  Constitutional: awake, alert, cooperative, no apparent distress, and appears stated age  HEENT: Normocephalic, atraumatic.   Respiratory: Lungs clear to auscultation bilaterally, no wheezes/crackles  Cardiovascular: HRRR, no murmers  GI: Soft, mildly tender in lower quadrants upon palpation without tenderness elsewhere, colostomy back in place (L abdomen) with dark output  MSK: No gross deformities, extremities warm and well-perfused without edema  Skin: No rashes   Neurologic: AAOx3  Neuropsychiatric: appropriate affect    Data   Recent Labs   Lab 10/29/20  0609 10/28/20  1346 10/28/20  0659 10/28/20  0647   WBC 3.8* 4.5 4.9  --    HGB 11.3* 12.3* 11.8*  --    MCV 92 94 93  --    * 120* 133*  --    INR 2.84*  --  3.12*  --      --   --  144   POTASSIUM 3.5  --   --  3.8   CHLORIDE 112*  --   --  114*   CO2 20  --   --  23   BUN 11  --   --  12   CR 1.11  --   --  1.23   ANIONGAP 8  --   --  7   KEIRA 8.2*  --   --  8.3*   *  --   --  95   ALBUMIN  --   --   --  2.8*    PROTTOTAL  --   --   --  6.4*   BILITOTAL  --   --   --  0.9   ALKPHOS  --   --   --  123   ALT  --   --   --  14   AST  --   --   --  25     Other labs: AFP (2727), CEA (2.5),   Pending labs: CEA, viral hep serologies

## 2020-10-29 NOTE — PLAN OF CARE
4106-7217: AVSS on RA. Denies pain. Zofran given x1 for nausea with good relief.  and 166, corrected per sliding scale. NS infusing via PIV at 100 ml/hr. Voiding adequately. Up with SBA. Plan for vitamin K infusion this evening to further decrease INR; if not <2 with AM labs, will need FFP (consent signed). Will start Golytely prep this evening, and get tap water enema tomorrow morning (0800) in preparation for colonoscopy/EGD tomorrow with GI. Continue with POC.

## 2020-10-30 NOTE — PROGRESS NOTES
[facility]     Progress Note - Marcarolyn 2 Service        Date of Admission:  10/27/2020    Assessment & Plan       Wesley Cooper is a 75 year old male with PMH of diverticulitis s/p descending colostomy, chronic systolic heart failure with decompensation, CAD (s/p CABGx3, multiple PCIs), and atrial flutter on warfarin, DM2, tobacco use, and RLS admitted as transfer from OSH on 10/27/2019 for evaluation of suspected metastatic colorectal malignancy as demonstrated on CT AP at OSH on 10/27 following initial presentation with abdominal pain and diarrhea. GI consulted with colonoscopy and EGD anticipated 10/30.    Changes Today:  - tap water enema scheduled for 10/30 at 8AM prior to procedure  - upper endoscopy and colonoscopy today 10/30  - oncology consulted  - resume anticoagulation post-procedure, date pending GI recs     #Colorectal mass, as demonstrated on CT AP 10/27  #Hepatic mass  Initially presented with one week abdominal and diarrhea with CT AP obtained 10/27 prior to transfer with colonic thickening of the hepatic flexure and hepatic mass with associated occlusive tumor thrombus concerning for metastatic disease, although cannot rule out primary malignancy. Transferred to [facility] for further evaluation and management. GI consulted with plans for EGD and colonoscopy, anticipated 10/30.  -GI consult 10/28 - anticipated need for colonoscopy with biopsy for further characterization  -Upper endoscopy and colonoscopy scheduled for 10/30    -Golytely prep 10/29 PM   -NPO 10/29 PM, tap water and enema prior to procedure at 8AM 10/30   -Check daily INR; requires INR<2 for procedure (if not <2 give FFP prior)    -INR 2.84 (10/29 AM), consent for FFP completed, providing vitamin K for reversal 10/29 PM   -Stool panel (negative), C.dif (negative)   -Alpha feto protein (high at 2727), CEA (high end of normal at 2.5), CA 19-9 pending  -Liver workup   - Daily INR   - Hepatitis  serologies (Hep B surface Ag, Hep B surface Lisa, Hep C Lisa), iron studies   - US abd with dopplers - result align with prior findings on CT   -Oncology consult as schedules permit    #Supportive  -discontinued NS at 100mls/hr 10/29  -zofran PRN for nausea  -miralax and senna PRN for constipation     #Portal vein tumor thrombosis  #Supratherapeutic INR  Discovered on CT as above, occurred despite patient being anticoagulated on warfarin. Suspected provoked in setting of malignancy. INR on presentation 4.8, downtrending to 3.12 with warfarin held. May require warfarin reversal prior to procedures, to be determined. No signs or symptoms of bleed. Last   -Hold Plavix for time being (INR <2 for procedures 10/30)  -Hold warfarin given procedures on 10/30 and supratherapeutic INR  -Will consider transition to alternative anticoagulation regimen such as lovenox at AC restart     Chronic Issues:  #DMII  - Holding PTA glipizide  - High-dose sliding scale insulin  #Hyperlipidemia  - Resume PTA atorvastatin  #Hypertension  #CAD  #Chronic Systolic Heart Failure with Decompensation  TTE 10/28 obtained in advance of potential procedures or with anticipation of potential future chemotherapies stable from prior (EF 35-40%), HFrEF (EF 35-40%) 2/2 ischemic cardiomyopathy; optimized medically for procedure (10/30)  - Resume PTA carvedilol  - Resume PTA Imdur  - Resume PTA losartan  - Continue PTA atorvastatin  #History of atrial flutter  - Resume PTA sotalol  #GERD  - Resume PTA famotidine  #Neuropathic Pain  - Gabapentin 300mg TID  - Tylenol PRN  #Iron deficiency anemia  - Continue PTA ferrous sulfate       Diet: NPO per Anesthesia Guidelines for Procedure/Surgery Except for: Meds    Fluids: PO intake, NPO past midnight 10/29 for procedure 10/30  Lines: peripheral IV L, Colostomy L abdomen  DVT Prophylaxis: Pneumatic Compression Devices  Souza Catheter: not present  Code Status: No CPR- Do NOT IntubateDNR/DNI - spoke with patient to  clarify code status today (prefers DNR/DNI)    Disposition Plan   Expected discharge: > 7 days, recommended to prior living arrangement once adequate workup of abdominal pain completed and patient is safe/independent for discharge.  Entered: Alis Brian 10/30/2020, 6:47 AM     The patient's care was discussed with the Attending Physician, Dr. Veronica.    Alis Brian  Medical Student  [address]   Please see sign in/sign out for up to date coverage information    Resident/Fellow Attestation   I, Radha Ricketts, was present with the medical student who participated in the service and in the documentation of the note.  I have verified the history and personally performed the physical exam and medical decision making.  I agree with the assessment and plan of care as documented in the note.      Radha Ricketts  PGY-3, Internal Medicine  P: [id number]      Date of Service (when I saw the patient): 10/28/20  ______________________________________________________________________    Interval History   He met with social work 10/29 for assistance in TONY/insurance.   Mr. Cooper is AAOx3-4 and doing overall well today. He had 6/10 abdominal pain this morning, declined tylenol PRN. He continues to have some nausea (with prep) relieved by zofran (x4).  He also has had some unsteadiness, walking with assistance. NPO since midnight. Good UOP and BM output in colostomy clear/yellow. Vitamin K infusion adequately reduced INR to 1.65 (<2) for procedure this AM. Tap water enema done at 8AM prior to colonoscopy/EGD this AM.     Data reviewed today: I reviewed all medications, new labs and imaging results over the last 24 hours.   I personally reviewed echo completed today (10/28) with result showing EF 35-40%, LV size normal, RV function/size/wall motion/thickness normal, pulm artery systolic pressure normal, and no evidence of pericardial effusion.    - A/P CT completed (10/27) shows ill-defined heterogeneous mass in middle/left hepatic lobe of segment II and HOLLY (5.1x4.6x4.2cm) with extension to the left and main portal vein with complete occlusion. R.portal vein and hepatic vein are patent. Small circumscribed hypodense lesions lesions in middle and left hepatic lobe additionally. Numerous calcified gallstones present with GB distension (no wall thickening). S/p descending colostomy with circumferential thickening of splenic flexure c/f primary malignancy. Appendix unremarkable.   -Abdominal U/S results consistent with that found on CT    Physical Exam   Vital Signs: Temp: 95.6  F (35.3  C) Temp src: Oral BP: (!) 151/76 Pulse: 59   Resp: 16 SpO2: 98 % O2 Device: None (Room air)    Weight: 147 lbs 8 oz  Constitutional: awake, alert, cooperative, no apparent distress, and appears stated age  HEENT: Normocephalic, atraumatic.   Respiratory: Lungs clear to auscultation bilaterally, no wheezes/crackles  Cardiovascular: HRRR, no murmers  GI: Soft, no tenderness on palpation this afternoon, colostomy back in place (L abdomen) with no output, very mild erythema underneath tape for colostomy   MSK: No gross deformities, extremities warm and well-perfused without edema  Skin: No rashes   Neurologic: AAOx3  Neuropsychiatric: appropriate affect    I/O last 3 completed shifts:  In: 4560 [P.O.:3360; I.V.:1200]  Out: 3450 [Urine:2800; Stool:650]    Data   Recent Labs   Lab 10/30/20  0445 10/29/20  0609 10/28/20  1346 10/28/20  0659 10/28/20  0647 10/28/20  0647   WBC 4.4 3.8* 4.5 4.9   < >  --    HGB 11.9* 11.3* 12.3* 11.8*   < >  --    MCV 91 92 94 93   < >  --    * 123* 120* 133*   < >  --    INR 1.65* 2.84*  --  3.12*  --   --     140  --   --   --  144   POTASSIUM 3.5 3.5  --   --   --  3.8   CHLORIDE 109 112*  --   --   --  114*   CO2 22 20  --   --   --  23   BUN 10 11  --   --   --  12   CR 1.12 1.11  --   --   --  1.23   ANIONGAP 9 8  --   --   --  7    KEIRA 8.5 8.2*  --   --   --  8.3*   * 183*  --   --   --  95   ALBUMIN  --   --   --   --   --  2.8*   PROTTOTAL  --   --   --   --   --  6.4*   BILITOTAL  --   --   --   --   --  0.9   ALKPHOS  --   --   --   --   --  123   ALT  --   --   --   --   --  14   AST  --   --   --   --   --  25    < > = values in this interval not displayed.     Other labs: AFP (2727), CEA (2.5),  (35), COVID negative, INR 1.65, viral hep serologies negative

## 2020-10-30 NOTE — PLAN OF CARE
VSS. T max 101.2; see provider notification note. O2 saturations >92% on RA. Telemetry discontinued. C/o nausea; given Zofran x1 with relief. Denied pain, dizziness, lightheadedness. Colostomy c/d/i. BG @ 1700: 179; given sliding scale Novolog. BG @ 2200: 241. Up SBA + GB. Adequate urine output.

## 2020-10-30 NOTE — PLAN OF CARE
[id number]    Pt was afebrile with VSS on RA. PRN Zofran given x1 for mild stomach upset. Declined pain meds. GoLYTELY completed and ostomy output is clear and yellow, pt was well-educated on this. Adequate urine output this shift, very light/pale. Remind pt to save in graduated containers. SBA with gait belt, unsteady on feet and reaching for walls with staff present. A/Ox4. NPO except for meds for colonoscopy today. INR level needs to be less than 2 for procedure today, INR was 1.65. BG checks Q4H and at 0200, 160 this shift. Removed OSH PIV in right AC this shift. PIV is saline locked.

## 2020-10-30 NOTE — OR NURSING
Procedure: Colonoscopy through stoma with biopsies of hepatic flexure mass and tattooing; Colonoscopy through rectum without intervention  Sedation: Conscious sedation  Specimens: x1 jar to path STAT  O2: 2L NC  Other: Hypotensive intraprocedure - NS infusion started (see intra-op meds) with subsequent normotensive pressures    Patient tolerated procedure well. Patient stable on transfer to floor by transport. Report called to SHANELL Rees.

## 2020-10-30 NOTE — PROCEDURES
Brief Colonoscopy Note:    Impression:  - Preparation of the colon was poor.                                    Scope of the Guerirer's pouch:                        - Anal stricture found on digital rectal exam.                        - Diverticulosis in the sigmoid colon with diverticular                        stricture at 20 cm from anal verge. Unable to pass even                        with upper endoscope. This was likely near the proximal                        end of the Guerrier's pouch.                        - Congested, erythematous and friable (with contact                        bleeding) mucosa in the rectum and in the sigmoid colon                        consistent with diversion colitis.                          Colonoscopy through the sigmoid colostomy:                        - Stool throughout colon                        - Likely malignant tumor at the hepatic flexure.                        Biopsied. Tattooed.                        - Multiple 4 to 15 mm polyps in the ascending colon and                        in the cecum. Resection not attempted given large mass                        seen.                        - Multiple 3 to 15 mm polyps in the transverse colon.                        Resection not attempted given large mass seen.                        - Normal terminal ileum   Recommendation:      - Return patient to hospital sidhu for ongoing care.                        - Await pathology results.                        - If biopsies confirm malignancy would then consult                        colorectal surgery and oncology. Given mass in liver                        there is concern for metastasis. Polyps not resected                        given anticoagultion and concern for metastatic                        malignancy.     Carmine Blackmon MD

## 2020-10-30 NOTE — PROGRESS NOTES
"SPIRITUAL HEALTH SERVICES  SPIRITUAL ASSESSMENT Progress Note  [facility] ([place]) 7D     PRIMARY FOCUS:     Goals of care    Symptom/pain management    Emotional/spiritual/Lutheran distress    Support for coping    REFERRAL SOURCE: Follow up from Wednesday    ILLNESS CIRCUMSTANCES:   Reviewed documentation. Reflective conversation shared with Al's wife Bianca which integrated elements of illness and family narratives.     Context of Serious Illness/Symptom(s) - Al has a recent cancer diagnosis and is still having tests done with many aspects of the future unknown.    Resources for Support - Bianca shared that they have 4 children together and Al has 3 living children from a previous marriage (1 son )    DISTRESS:     Emotional/Spiritual/Existential Distress - Bianca was tearful at times throughout the visit as we explored feelings of \"overwhelmed\" and \"unknowns\".    Mandaeism Distress - Not discussed.    Social/Cultural/Economic Distress - Not discussed    SPIRITUAL/Mu-ism COPING:     Latter-day/Citlali - Al was raised Che[employer]ism and Bianca was raised [employer] and they now attend a [employer].    Spiritual Practice(s) - Prayer    Emotional/Relational/Existential Connections - Bianca shared that she and Al have \"already been through a lot together\" and we explored themes of resiliency, strength and tenderness. She shared that the cancer diagnosis has been overwhelming for both of them but they've been able to \"talk a little about it\".     PLAN: I will follow up with Al on Monday as when I met him on Wednesday he expressed wanting to talk about \"death\".     Minerva Murdock  Chaplain Resident  Pager: [phone]    "

## 2020-10-30 NOTE — CONSULTS
Oncology  Consult Note   Date of Service: 10/30/2020    Patient: Wesley Cooper  MRN: [id number]  Admission Date: 10/27/2020  Hospital Day # 3  Cancer Diagnosis: Pending biopsy (colon vs HCC)  Primary Outpatient Oncologist: None yet  Current Treatment Plan: None yet    Reason for Consult: New liver mass and hepatic flexure colon thickening    History of Present Illness:    Wesley Cooper is a 75 year old man with a history of diverticulitis s/p sub-total descending colectomy w/ostomy, HFrEF w/ICD, CAD, atrial fib/flutter previously on warfarin, and T2DM who was transferred from an OSH after CT A/P showed new liver mass and colonic thickening concerning for malignancy.    Mr. Cooper initially presented to an outside ED with about 2 weeks of abdominal pain, along with about on week of nausea and changes in stool output from ostomy. Subsequent CT A/P at the OSH showed a 5.1 x 4.6 x 4.2 cm mass in the mid/left hepatic lobes (seg II and [place]) with extension of the mass in to the left and main portal vein causing complete occlusion, along with several smaller hypodense lesions in the same lobes, all concerning for malignancy. There was also circumferential thickening at the hepatic flexure of the colon, which was concerning for malignancy as well. Based on the patient's cardiac history, for which he follows at [facility], he was transferred to [facility] for further evaluation of his possible malignancy.     He underwent colonoscopy by GI on 10/30, with identification of a likely malignant lesion at the hepatic flexure. At present, biopsy results are pending.     Today, the patient's abdominal pain is improved, and he generally feels okay.    Review of Systems: Pertinent positive and negative systems described in HPI; the remainder of the 14 systems are negative    Past Medical History:  - CAD  - A flutter  - HFrEF with ICD  - T2DM  - RLS    Past Surgical History:  Past Surgical History:   Procedure Laterality Date     CARDIAC SURGERY       multiple stents and pacer/defibrillator     CV CORONARY STENT WITH DISTAL PROTECTION DEVICE N/A 3/12/2019    Procedure: Coronary Stent w Distal Protection Device;  Surgeon: Eliseo Field MD;  Location:  HEART CARDIAC CATH LAB     CV HEART CATHETERIZATION WITH POSSIBLE INTERVENTION N/A 1/24/2019    Procedure: PLANNED PCI;  Surgeon: Bill Marquez MD;  Location: [facility] CARDIAC CATH LAB     CV HEART CATHETERIZATION WITH POSSIBLE INTERVENTION N/A 3/12/2019    Procedure: BILL BROWN PLANNED PCI WITH CSI;  Surgeon: Eliseo Field MD;  Location: [facility] CARDIAC CATH LAB     CV PCI ATHERECTOMY ORBITAL N/A 3/12/2019    Procedure: Atherectomy;  Surgeon: Eliseo Field MD;  Location: [facility] CARDIAC CATH LAB     GI SURGERY  2009     ORTHOPEDIC SURGERY      back surgery     VASCULAR SURGERY  1999    CABG X4       Social History:  Social History     Socioeconomic History     Marital status:      Spouse name: Not on file     Number of children: Not on file     Years of education: Not on file     Highest education level: Not on file   Occupational History     Not on file   Social Needs     Financial resource strain: Not on file     Food insecurity     Worry: Not on file     Inability: Not on file     Transportation needs     Medical: Not on file     Non-medical: Not on file   Tobacco Use     Smoking status: Former Smoker     Smokeless tobacco: Never Used     Tobacco comment: quit 2010   Substance and Sexual Activity     Alcohol use: No     Drug use: No     Sexual activity: Not on file   Lifestyle     Physical activity     Days per week: Not on file     Minutes per session: Not on file     Stress: Not on file   Relationships     Social connections     Talks on phone: Not on file     Gets together: Not on file     Attends Uatsdin service: Not on file     Active member of club or organization: Not on file     Attends meetings of clubs or organizations: Not on file     Relationship  status: Not on file     Intimate partner violence     Fear of current or ex partner: Not on file     Emotionally abused: Not on file     Physically abused: Not on file     Forced sexual activity: Not on file   Other Topics Concern     Parent/sibling w/ CABG, MI or angioplasty before 65F 55M? Yes   Social History Narrative     Not on file        Family History  Father with colon cancer  Brother with ?lung cancer w/liver/brain mets    Outpatient Medications:  No current facility-administered medications on file prior to encounter.        atorvastatin (LIPITOR) 80 MG tablet, Take 80 mg by mouth daily       carvedilol (COREG) 12.5 MG tablet, Take 0.5 tablets (6.25 mg) by mouth 2 times daily (with meals)       clopidogrel (PLAVIX) 75 MG tablet, Take 75 mg by mouth daily       famotidine (PEPCID) 10 MG tablet, Take 10 mg by mouth 2 times daily       ferrous sulfate (FEROSUL) 325 (65 Fe) MG tablet, Take 325 mg by mouth daily       finasteride (PROSCAR) 5 MG tablet, Take 5 mg by mouth daily       furosemide (LASIX) 40 MG tablet, Take 40 mg by mouth daily       gabapentin (NEURONTIN) 300 MG capsule, Take 300 mg by mouth 3 times daily        glipiZIDE (GLUCOTROL) 5 MG tablet, Take 10 mg by mouth 2 times daily (before meals)        losartan (COZAAR) 25 MG tablet, Take 25 mg by mouth daily       potassium chloride ER (KLOR-CON M) 20 MEQ CR tablet, Take 20 mEq by mouth daily       sotalol (BETAPACE) 120 MG tablet, Take 1 tablet (120 mg) by mouth 2 times daily       Vitamin K, Phytonadione, 100 MCG TABS, Take 100 mcg by mouth daily       insulin detemir (LEVEMIR FLEXTOUCH) 100 UNIT/ML pen, Inject 44 Units Subcutaneous At Bedtime       ipratropium (ATROVENT) 0.03 % nasal spray, Spray 2 sprays into both nostrils 4 times daily as needed for rhinitis       isosorbide mononitrate (IMDUR) 30 MG 24 hr tablet, Take 1 tablet (30 mg) by mouth 2 times daily       nitroglycerin (NITROSTAT) 0.4 MG sublingual tablet, Place 0.4 mg under the  "tongue every 5 minutes as needed for chest pain For chest pain place 1 tablet under the tongue every 5 minutes for 3 doses. If symptoms persist 5 minutes after 1st dose call 911.       warfarin (COUMADIN) 2.5 MG tablet, Take 2 tablets (5 mg) by mouth daily Take 5 mg by mouth on Mondays and 2.5 mg 6x/week or as directed based on INR         Physical Exam:    /66 (BP Location: Right arm)   Pulse 63   Temp 95.9  F (35.5  C) (Oral)   Resp 16   Ht 1.651 m (5' 5\")   Wt 66.9 kg (147 lb 8 oz)   SpO2 98%   BMI 24.55 kg/m    Gen: Well appearing, laying in bed in NAD  HEENT: EOMI, PERRL,   CV: Normal rate, regular rhythm. No m/r/g  Pulm: CTAB, no wheezing, normal work of breathing  Abd: Soft, tender to palpation over the RUQ/RLQ. Ostomy stoma healthy pink/red, with no significant output in ostomy bag  Ext: Warm and well perfused. No lower extremity edema  Skin: No rash, cyanosis or petechial lesion  Neuro: Alert and answering questions appropriately. CNII-XII grossly intact. Moving all extremities without issue or focal neurologic deficits.    Labs & Studies: I personally reviewed the following studies:  ROUTINE LABS (Last four results):  CMP  Recent Labs   Lab 10/30/20  0445 10/29/20  0609 10/28/20  0647    140 144   POTASSIUM 3.5 3.5 3.8   CHLORIDE 109 112* 114*   CO2 22 20 23   ANIONGAP 9 8 7   * 183* 95   BUN 10 11 12   CR 1.12 1.11 1.23   GFRESTIMATED 63 63 56*   GFRESTBLACK 73 73 65   KEIRA 8.5 8.2* 8.3*   PROTTOTAL  --   --  6.4*   ALBUMIN  --   --  2.8*   BILITOTAL  --   --  0.9   ALKPHOS  --   --  123   AST  --   --  25   ALT  --   --  14     CBC  Recent Labs   Lab 10/30/20  0445 10/29/20  0609 10/28/20  1346 10/28/20  0659   WBC 4.4 3.8* 4.5 4.9   RBC 3.98* 3.80* 4.03* 3.95*   HGB 11.9* 11.3* 12.3* 11.8*   HCT 36.3* 35.0* 37.8* 36.9*   MCV 91 92 94 93   MCH 29.9 29.7 30.5 29.9   MCHC 32.8 32.3 32.5 32.0   RDW 14.5 14.6 14.8 15.0   * 123* 120* 133*     INR  Recent Labs   Lab " 10/30/20  0445 10/29/20  0609 10/28/20  0659   INR 1.65* 2.84* 3.12*     Imaging  CT A/P 10/27/20  1. Colonic wall thickening at the hepatic flexure concerning for primary colon cancer. Recommend correlating with colonoscopy.  2. Liver mass with occlusive tumor thrombus in the left and main portal hepatic veins concerning for metastatic disease.  Primary hepatocellular or cholangiocarcinoma is in the differential.  3. Cholelithiasis.  4. Prior descending colostomy.    Assessment & Plan:   Wesley Cooper is a 75 year old man with a history of diverticulitis s/p sub-total descending colectomy w/ostomy, HFrEF w/ICD, CAD, atrial fib/flutter previously on warfarin, and T2DM who was transferred from an OSH after CT A/P showed new liver mass and colonic thickening concerning for malignancy.    #Colonic lesion  #Liver mass with portal vein tumor invasion  Patient presenting with abdominal symptoms and imaging showing colonic thickening at the hepatic flexure, along with a  5 x 4 x 4 mass in the middle/left hepatic lobes. AFP is elevated at about 2700, with grossly normal CEA; CA 19-9 is also normal. Given the liver mass, which has extension in to the portal vasculature, along with the significantly elevated AFP, this may be HCC. However, he has essentially no risk factors for HCC, and with the colon lesion, this may also be the primary. Biopsy results are pending at this time, and will be the most helpful. Fortunately, CT chest does not show any distant metastatic disease at this time. Depending on the pathology results, it might be useful to obtain a liver MRI, and we may request additional staining ([place], BRAF and MMR/MSI) to assist with determining therapeutic options. We will wait on these additional steps until biopsy is back though.    Recommendations:   - Await pathology results to determine next steps  - We will determine additional next steps based on results    We will continue to follow this patient. Please do not  hesitate to page with any questions or concerns.    Patient was seen and plan of care was discussed with attending physician Dr. Jung.    Jaret Morgan MD PhD  Heme/Onc/Transplant Fellow  [facility] [phone]

## 2020-10-30 NOTE — PLAN OF CARE
[id number]: AVSS on RA. Denies pain or nausea. Colonoscopy completed this morning, pathology sent and results pending. Tap water enema given prior to procedure per orders.  and 135; not corrected per sliding scale. Telemetry shows sinus paced rhythm. Colostomy C/D/I, whole device changed during procedure. Voiding adequately. Up with SBA. Continue with POC.

## 2020-10-30 NOTE — PLAN OF CARE
[id number]    VSS on RA, afebrile, A&Ox4. C/o pain in abdomen rating 6/10, declined any pain intervention. C/o nausea intermittently, increased with increased fluid intake with golytely prep, PRN zofran given x4 with relief. Denies SOB at rest. Using urinal at bedside with adequate UOP. Pt has a colostomy, up to bathroom with SBA to empty ostomy bag. Pt started golytely bowl prep with good output in ostomy bag. BG this shift were 201 and 233, insulin given per MAR. Continues on tele, paced: NSR. On clear liquid diet, NPO at midnight for EGD and colonoscopy. Continue to monitor.

## 2020-10-30 NOTE — PROGRESS NOTES
Gastroenterology Progress Note  [facility]             Reason for Follow Up:   Liver mass, possible colonic defect on CT         ASSESSMENT AND RECOMMENDATIONS:   Assessment:  75 year old male with a history of abdominal pain now resolved initial imaging showed liver mass  Just over 5cm in Left lobe, displacing vessels, invading portal veing on left, cw HCC, but has a possible colon leison.  AFP > 2000     Recommendations:  Preparation for colonoscopy tonight  If negative will need care for HCC           History of Present Illness:   Wesley Cooper is a 75 year old male with a history of liver mass, query ascending colon lesion.  Doing ok without much complaint now.           Past Medical and Surgical History, Social History, Family History - per Epic EMR: REVIEWED    Current Facility-Administered Medications   Medication     acetaminophen (TYLENOL) tablet 650 mg     atorvastatin (LIPITOR) tablet 80 mg     carvedilol (COREG) tablet 6.25 mg     glucose gel 15-30 g    Or     dextrose 50 % injection 25-50 mL    Or     glucagon injection 1 mg     famotidine (PEPCID) tablet 10 mg     ferrous sulfate (FEROSUL) tablet 325 mg     finasteride (PROSCAR) tablet 5 mg     [Held by provider] furosemide (LASIX) tablet 20 mg     gabapentin (NEURONTIN) capsule 300 mg     insulin aspart (NovoLOG) injection (RAPID ACTING)     ipratropium (ATROVENT) 0.03 % spray 2 spray     isosorbide mononitrate (IMDUR) 24 hr tablet 30 mg     lidocaine (LMX4) cream     lidocaine 1 % 0.1-1 mL     losartan (COZAAR) tablet 25 mg     melatonin tablet 1 mg     naloxone (NARCAN) injection 0.1-0.4 mg     ondansetron (ZOFRAN-ODT) ODT tab 4 mg    Or     ondansetron (ZOFRAN) injection 4 mg     polyethylene glycol (MIRALAX) Packet 17 g     potassium chloride ER (KLOR-CON M) CR tablet 20 mEq     prochlorperazine (COMPAZINE) injection 5 mg    Or     prochlorperazine (COMPAZINE) tablet 5 mg    Or     prochlorperazine (COMPAZINE) suppository 12.5 mg     senna-docusate  "(SENOKOT-S/PERICOLACE) 8.6-50 MG per tablet 1 tablet    Or     senna-docusate (SENOKOT-S/PERICOLACE) 8.6-50 MG per tablet 2 tablet     sodium chloride (PF) 0.9% PF flush 3 mL     sodium chloride (PF) 0.9% PF flush 3 mL     sotalol (BETAPACE) tablet 120 mg              Review of Systems:     Per HPI           Physical Exam:   /71   Pulse 66   Temp 96.5  F (35.8  C) (Oral)   Resp 18   Ht 1.651 m (5' 5\")   Wt 66.9 kg (147 lb 8 oz)   SpO2 98%   BMI 24.55 kg/m    Wt:   Wt Readings from Last 2 Encounters:   10/27/20 66.9 kg (147 lb 8 oz)   09/09/20 67.8 kg (149 lb 8 oz)      Constitutional: cooperative, pleasant, not dyspneic/diaphoretic, no acute distress  Eyes: Sclera anicteric/injected  Ears/nose/mouth/throat: Normal oropharynx without ulcers or exudate, mucus membranes moist, hearing intact  Neck: supple, thyroid normal size  CV: No edema  Respiratory: Unlabored breathing  Lymph: No axillary, submandibular, supraclavicular or inguinal lymphadenopathy  Abd: Nondistended, +bs, no hepatosplenomegaly, nontender, no peritoneal signs  Skin: warm, perfused, no jaundice  Neuro: AAO x 3, No asterixis  Psych: Normal affect  MSK: No gross deformities          Crow Rush MD  Associate Professor of Medicine  Division of Gastroenterology, Hepatology, and Nutrition  [facility]    "

## 2020-10-31 NOTE — PROGRESS NOTES
[facility]     Progress Note - Robyn 2 Service        Date of Admission:  10/27/2020    Assessment & Plan       Wesley Cooper is a 75 year old male with PMH of diverticulitis s/p descending colostomy, chronic systolic heart failure with decompensation, CAD (s/p CABGx3, multiple PCIs), and atrial flutter on warfarin, DM2, tobacco use, and RLS admitted as transfer from OSH on 10/27/2019 for evaluation of suspected metastatic colorectal malignancy as demonstrated on CT AP at OSH on 10/27 following initial presentation with abdominal pain and diarrhea. GI consulted with colonoscopy and EGD anticipated 10/30.    Changes Today:  - Continue broad spectrum antibiotics with vancomycin/zosyn with concern for sepsis  - Trend lactate, follow blood cultures  - Hold imdur and losartan  - Continue carvedilol-- to be held for SBP less than 90  - Trend creatinine in setting of DANITA  - Oncology consulted, appreciate input  - CT AP obtained 10/30 without evidence of metastatic disease in chest  - Await pathology results from biopsy 10/30    #Sepsis, concern for   Febrile overnight on 10/31 post-procedurally. Covered    #Colorectal mass, as demonstrated on CT AP 10/27  #Hepatic mass  Initially presented with one week abdominal and diarrhea with CT AP obtained 10/27 prior to transfer with colonic thickening of the hepatic flexure and hepatic mass with associated occlusive tumor thrombus concerning for metastatic disease, although cannot rule out primary malignancy. Transferred to [facility] for further evaluation and management. GI consulted with plans for EGD and colonoscopy, anticipated 10/30.  -GI consult 10/28 - anticipated need for colonoscopy with biopsy for further characterization  -Upper endoscopy and colonoscopy 10/30, path pending   -Stool panel (negative), C.dif (negative)   -Alpha feto protein (high at 2727), CEA (high end of normal at 2.5), CA 19-9 pending  -Liver workup   - Daily  INR   - Hepatitis serologies (Hep B surface Ag, Hep B surface Lisa, Hep C Lisa), iron studies   - US abd with dopplers - result align with prior findings on CT   - Oncology consulted, appreciate recommendations   - CT chest obtained without evidence of intrathoracic metastatic disease   - Follow pathology from biopsy 10/30   - Further recommendations pending    #Portal vein tumor thrombosis  #Supratherapeutic INR  Discovered on CT as above, occurred despite patient being anticoagulated on warfarin. Suspected provoked in setting of malignancy. INR on presentation 4.8, downtrending to 3.12 with warfarin held. May require warfarin reversal prior to procedures, to be determined. No signs or symptoms of bleed.   -Hold Plavix for time being (INR <2 for procedures 10/30)-- consideration of resumption of plavix pending  -Hold warfarin given procedures on 10/30 and supratherapeutic INR  -Lovenox restarted following completion of procedures    #Supportive  -zofran PRN for nausea  -miralax and senna PRN for constipation     Chronic Issues:  #DMII  - Holding PTA glipizide  - High-dose sliding scale insulin  #Hyperlipidemia  - Resume PTA atorvastatin  #Hypertension  #CAD  #Chronic Systolic Heart Failure with Decompensation  TTE 10/28 obtained in advance of potential procedures or with anticipation of potential future chemotherapies stable from prior (EF 35-40%), HFrEF (EF 35-40%) 2/2 ischemic cardiomyopathy; optimized medically for procedure (10/30)  - Continue PTA carvedilol-- to be held if BP soft  - Hold PTA Imdur, PTA losartan  - Continue PTA atorvastatin  #History of atrial flutter  - Resume PTA sotalol  #GERD  - Resume PTA famotidine  #Neuropathic Pain  - Gabapentin 300mg TID  - Tylenol PRN  #Iron deficiency anemia  - Continue PTA ferrous sulfate       Diet: Combination Diet 2 gm NA Diet; Low Saturated Fat Na <2400mg Diet    Fluids: PO intake, NPO past midnight 10/29 for procedure 10/30  Lines: peripheral IV L, Colostomy L  abdomen  DVT Prophylaxis: Pneumatic Compression Devices  Souza Catheter: not present  Code Status: No CPR- Do NOT IntubateDNR/DNI - spoke with patient to clarify code status today (prefers DNR/DNI)    Disposition Plan   Expected discharge: > 7 days, recommended to prior living arrangement once adequate workup of abdominal pain completed and patient is safe/independent for discharge.  Entered: Radha Ricketts MD 10/31/2020, 12:23 PM     The patient's care was discussed with the Attending Physician, Dr. Veronica.    Radha Ricketts  PGY-3, Internal Medicine  P: [id number]      Date of Service (when I saw the patient): 10/28/20  ______________________________________________________________________    Interval History     Nursing notes reviewed. Febrile overnight and cultured with initiation of vanc/zosyn. Blood cultures negative thus far. Patient asymptomatic at time of fever. No fevers, chills. Some ongoing abdominal pain but otherwise denies any changes.     4 point ROS otherwise negative.     Physical Exam   Vital Signs: Temp: 98.6  F (37  C) Temp src: Oral BP: 96/51 Pulse: 63   Resp: 16 SpO2: 94 % O2 Device: None (Room air)    Weight: 147 lbs 8 oz  Constitutional: awake, alert, cooperative, no apparent distress, and appears stated age  HEENT: Normocephalic, atraumatic. EOMI  Respiratory: Lungs clear to auscultation bilaterally, no wheezes/crackles  Cardiovascular: RRR, no murmers  GI: Soft, no tenderness on palpation this afternoon, colostomy bag in place (L abdomen)  MSK: No gross deformities, extremities warm and well-perfused without edema  Skin: No rashes   Neurologic: AAOx3  Neuropsychiatric: appropriate affect    I/O last 3 completed shifts:  In: 690 [P.O.:240; I.V.:450]  Out: 275 [Urine:275]    Data   Recent Labs   Lab 10/31/20  0614 10/30/20  2310 10/30/20  0445 10/29/20  0609 10/28/20  0647 10/28/20  0647   WBC 7.1 6.6 4.4 3.8*   < >  --    HGB 11.4* 11.5* 11.9* 11.3*   < >  --    MCV 94 93 91 92    < >  --    * 131* 127* 123*   < >  --    INR 1.43*  --  1.65* 2.84*   < >  --      --  140 140  --  144   POTASSIUM 3.7  --  3.5 3.5  --  3.8   CHLORIDE 109  --  109 112*  --  114*   CO2 23  --  22 20  --  23   BUN 13  --  10 11  --  12   CR 1.62*  --  1.12 1.11  --  1.23   ANIONGAP 7  --  9 8  --  7   KEIRA 8.4*  --  8.5 8.2*  --  8.3*   *  --  159* 183*  --  95   ALBUMIN  --   --   --   --   --  2.8*   PROTTOTAL  --   --   --   --   --  6.4*   BILITOTAL  --   --   --   --   --  0.9   ALKPHOS  --   --   --   --   --  123   ALT  --   --   --   --   --  14   AST  --   --   --   --   --  25    < > = values in this interval not displayed.     Other labs: AFP (2727), CEA (2.5),  (35), COVID negative, INR 1.65, viral hep serologies negative

## 2020-10-31 NOTE — PLAN OF CARE
VSS. T max 99.3. O2 saturations >92% on RA. C/o abdominal pain and shooting pain up LLE; given Tylenol x1 with slight improvement. Denied nausea, dizziness, lightheadedness, SOB. L PIV; infusing NS @ TKO. Continued POC with scheduled IV Zosyn. BG @ 1700: 312; given sliding scale insulin. BG @ 2200: 159; given scheduled Levemir. Adequate urine output. Small amount of soft green stool output from ostomy.    See provider notification note about low BP. 1 L bolus LR @ 500 ml/hr initiated. BP increased to 100/51; HR 60; patient continued to deny dizziness and lightheadedness. Will continue to monitor closely.

## 2020-10-31 NOTE — PHARMACY-VANCOMYCIN DOSING SERVICE
Pharmacy Vancomycin Initial Note  Date of Service 2020  Patient's  1945  75 year old, male    Indication: Fever of unknown origin    Current estimated CrCl = Estimated Creatinine Clearance: 52.3 mL/min (based on SCr of 1.12 mg/dL).    Creatinine for last 3 days  10/28/2020:  6:47 AM Creatinine 1.23 mg/dL  10/29/2020:  6:09 AM Creatinine 1.11 mg/dL  10/30/2020:  4:45 AM Creatinine 1.12 mg/dL    Recent Vancomycin Level(s) for last 3 days  No results found for requested labs within last 72 hours.      Vancomycin IV Administrations (past 72 hours)      No vancomycin orders with administrations in past 72 hours.                Nephrotoxins and other renal medications (From now, onward)    Start     Dose/Rate Route Frequency Ordered Stop    10/30/20 2330  vancomycin 1250 mg in 0.9% NaCl 250 mL intermittent infusion 1,250 mg      1,250 mg  over 90 Minutes Intravenous EVERY 18 HOURS 10/30/20 2302      10/30/20 2300  piperacillin-tazobactam (ZOSYN) 3.375 g vial to attach to  mL bag      3.375 g  over 30 Minutes Intravenous EVERY 6 HOURS 10/30/20 2252      10/28/20 0800  [Held by provider]  furosemide (LASIX) tablet 20 mg     (Held by provider since Wed 10/28/2020 at 0813 by Britta Terry MD.Hold Reason: Other.)    20 mg Oral DAILY 10/28/20 0023            Contrast Orders - past 72 hours (72h ago, onward)    Start     Dose/Rate Route Frequency Ordered Stop    10/28/20 1400  perflutren diluted 1mL to 2mL with saline (OPTISON) diluted injection 5 mL      5 mL Intravenous ONCE 10/28/20 1330 10/28/20 1400                Plan:  1.  Start vancomycin  1250 mg IV q18h.   2.  Goal Trough Level: 15-20 mg/L   3.  Pharmacy will check trough levels as appropriate in 1-3 Days.    4. Serum creatinine levels will be ordered daily for the first week of therapy and at least twice weekly for subsequent weeks.    5. [place] method utilized to dose vancomycin therapy: Method 2    Alan Martinez [facility]

## 2020-10-31 NOTE — PLAN OF CARE
00:00-07:00 am  Temp spiked 101.2 yesterday at bedtime  Blood cx was done  Started on IV ABx,Vanco and Zosyn.  Received Tylenol and remains afebrile during the night  OVSS   Pt A&O x 4 but can be forgetful intermittently.  Bed alarm on for safety and weakness.  No nausea/vomiting  Denied pain      Colostomy intact, +flatus but no BM.   Incontinent of urine x 1  Still need UA   Up with assist of 1 using gait belt.  Pt is otherwise resting/sleeping well and no new acute events overnight    Continue w/POC

## 2020-10-31 NOTE — PLAN OF CARE
"Al's temp max was 98.2 with IVAB.  BC done last 10/30 at 2315.  Was sleeping very hard this am and woke up confused.  Wife here later am and said he was still \" a little off\"  Able to answer orientation questions.  Eating fair.  AM bg 194 and after 392.  Afternoon Bg was check while pt eating - he did not call for sugar check before he started to eat.  Covered with 10 units and MD paged regarding elevated Bg.  C/O abd pain and requested Zofran.  Said the Zofran helps with his abd pain.  + BS but no gas or stool in colostomy.  Is s/p bowel prep for colostomy yesterday - monitor bowel status closely.  UA sent to lab.   " Attempted to reach out to patient to discuss scheduling surgery after 4/10/20. Left detailed message that first available would be 4/21/20. Left best call back number of [phone]

## 2020-10-31 NOTE — PROVIDER NOTIFICATION
Paged #[phone]: pt has temp of 101.3. Denies chills. Do you want blood cultures or interventions?     Provider came to bedside to evaluate the patient. Ordered UA, lactic acid, CBC, blood cultures, Vancomycin q18h and IV Zosyn q8h.

## 2020-10-31 NOTE — CONSULTS
Dental Hygiene Consult Service        Wesley Cooper MRN# [id number]   YOB: 1945 Age: 75 year old     Date of Admission: 10/27/2020  PCP is Jarod De La Rosa  Date of Service: 10/30/2020           Reason for Consult:   Referring MD & Reason for Visit: I was asked by Laina Owens MD, to see Wesley Cooper for poor dentition, oral cares request.            History of Present Illness:   This patient is a 75 year old male with a history of CABG x3, multiple PCI's, DM2, diverticulitis, tobacco use, ischemic cardiomyopathy, restless leg syndrome, atrial flutter, who initially presented to outside ED for abdominal pain and had CT abdomen findings concerning for colorectal cancer versus primary hepatic cancer and portal vein thrombosis.  The patient reports No oral discomfort.     The patient  does not have a history of aspiration.    The patient  does  have a history of dentures or dental appliances.  Current use includes FUD, no lower denture.     Current oral products and cares performed by the patient and/or nursing include: takes out denture and soaks at night.               Past Medical History:   No past medical history on file.            Social History:     Social History     Tobacco Use     Smoking status: Former Smoker     Smokeless tobacco: Never Used     Tobacco comment: quit 2010   Substance Use Topics     Alcohol use: No     Drug use: No              Medications:   No current facility-administered medications on file prior to encounter.        atorvastatin (LIPITOR) 80 MG tablet, Take 80 mg by mouth daily       carvedilol (COREG) 12.5 MG tablet, Take 0.5 tablets (6.25 mg) by mouth 2 times daily (with meals)       clopidogrel (PLAVIX) 75 MG tablet, Take 75 mg by mouth daily       famotidine (PEPCID) 10 MG tablet, Take 10 mg by mouth 2 times daily       ferrous sulfate (FEROSUL) 325 (65 Fe) MG tablet, Take 325 mg by mouth daily       finasteride (PROSCAR) 5 MG tablet, Take 5 mg by mouth daily        furosemide (LASIX) 40 MG tablet, Take 40 mg by mouth daily       gabapentin (NEURONTIN) 300 MG capsule, Take 300 mg by mouth 3 times daily        glipiZIDE (GLUCOTROL) 5 MG tablet, Take 10 mg by mouth 2 times daily (before meals)        losartan (COZAAR) 25 MG tablet, Take 25 mg by mouth daily       potassium chloride ER (KLOR-CON M) 20 MEQ CR tablet, Take 20 mEq by mouth daily       sotalol (BETAPACE) 120 MG tablet, Take 1 tablet (120 mg) by mouth 2 times daily       Vitamin K, Phytonadione, 100 MCG TABS, Take 100 mcg by mouth daily       insulin detemir (LEVEMIR FLEXTOUCH) 100 UNIT/ML pen, Inject 44 Units Subcutaneous At Bedtime       ipratropium (ATROVENT) 0.03 % nasal spray, Spray 2 sprays into both nostrils 4 times daily as needed for rhinitis       isosorbide mononitrate (IMDUR) 30 MG 24 hr tablet, Take 1 tablet (30 mg) by mouth 2 times daily       nitroglycerin (NITROSTAT) 0.4 MG sublingual tablet, Place 0.4 mg under the tongue every 5 minutes as needed for chest pain For chest pain place 1 tablet under the tongue every 5 minutes for 3 doses. If symptoms persist 5 minutes after 1st dose call 911.       warfarin (COUMADIN) 2.5 MG tablet, Take 2 tablets (5 mg) by mouth daily Take 5 mg by mouth on Mondays and 2.5 mg 6x/week or as directed based on INR              Physical Exam:   Head and neck exam:   WNL    Soft Tissue exam: WNL    Hard Tissue exam: WNL           Assessment and Plan:   Assessment:  This patient is a 75 year old male with a history ofCABG x3, multiple PCI's, DM2, diverticulitis, tobacco use, ischemic cardiomyopathy, restless leg syndrome, atrial flutter, who initially presented to outside ED for abdominal pain and had CT abdomen findings concerning for colorectal cancer versus primary hepatic cancer and portal vein thrombosis., oral exam concerning for FUD, no lower denture. Patient reports no oral discomfort.      Assessment and Plan:  Full upper denture, no lower denture. Patient seems to be  able to eat adequately.  -  Recommended workup includes None  -  General Practice Dentistry referral recommended?  No  -  Oral and Maxillofacial Surgery referral recommended?   No    Oral Care Plan:    -  Continue to remove full upper denture at night and soak with a denture tablet. Brush with denture brush daily to reduce oral biofilm      Victoria Haro  [phone]

## 2020-11-01 PROBLEM — N17.9 ACUTE KIDNEY FAILURE, UNSPECIFIED (H): Status: ACTIVE | Noted: 2020-01-01

## 2020-11-01 NOTE — PLAN OF CARE
VSS. Afebrile. O2 saturations >92% on RA. C/o abdominal and LLE pain; given Tylenol x1 and Zofran x1 with mild improvement. Denied dizziness, lightheadedness, SOB. R PIV: saline locked. Given Zosyn per POC. BG @ 1730: 274; given sliding scale coverage. BG @ 2200: 224. Up SBA + GB. Adequate urine output. Small amount of stool output from ostomy. Good appetite.

## 2020-11-01 NOTE — PHARMACY-VANCOMYCIN DOSING SERVICE
Pharmacy Vancomycin Note  Date of Service 2020  Patient's  1945   75 year old, male    Indication: Fever of unknown origin  Goal Trough Level: 15-20 mg/L  Day of Therapy: 2  Current Vancomycin regimen:  1250 mg IV q18h  Weight: 67 kg     Current estimated CrCl = Estimated Creatinine Clearance: 33.5 mL/min (A) (based on SCr of 1.75 mg/dL (H)).    Creatinine for last 3 days  10/30/2020:  4:45 AM Creatinine 1.12 mg/dL  10/31/2020:  6:14 AM Creatinine 1.62 mg/dL  2020:  6:07 AM Creatinine 1.75 mg/dL    Recent Vancomycin Levels (past 3 days)  2020:  6:07 AM Vancomycin Level 9.1 mg/L    Vancomycin IV Administrations (past 72 hours)                   vancomycin 1250 mg in 0.9% NaCl 250 mL intermittent infusion 1,250 mg (mg) 1,250 mg Given 10/31/20 0122                Nephrotoxins and other renal medications (From now, onward)    Start     Dose/Rate Route Frequency Ordered Stop    20 0730  vancomycin (VANCOCIN) 750 mg in sodium chloride 0.9 % 250 mL intermittent infusion      750 mg  over 90 Minutes Intravenous EVERY 24 HOURS 20 0711      10/31/20 1800  piperacillin-tazobactam (ZOSYN) 2.25 g vial to attach to  ml bag      2.25 g  over 30 Minutes Intravenous EVERY 6 HOURS 10/31/20 1538      10/28/20 0800  [Held by provider]  furosemide (LASIX) tablet 20 mg     (Held by provider since Wed 10/28/2020 at 0813 by Britta Terry MD.Hold Reason: Other.)    20 mg Oral DAILY 10/28/20 0023               Contrast Orders - past 72 hours (72h ago, onward)    None          Interpretation of levels and current regimen:  Trough level is  Subtherapeutic    Has serum creatinine changed > 50% in last 72 hours: Yes     Urine output:  0.52ml/kg/h    Renal Function: Worsening    Plan:   Due to worsening renal function empirically dose reduced from 1250 mg q18h to 750 mg q24h on 10/31/20. Dose was supposed to be 0100 but was missed and retimed at 0730 on 20.   1.  Decrease Dose to 750 mg q24h   2.   Pharmacy will check trough levels as appropriate in 3-5 Days.    3. Serum creatinine levels will be ordered daily for the first week of therapy and at least twice weekly for subsequent weeks.      Melissa Hill RPH        .

## 2020-11-01 NOTE — PLAN OF CARE
Wesley is oriented X4 and more alert today.  At 0600 am Bg 69 and had apple juice.  Bg at 0730 was 108.  MD notified of low Bg and Levemir insulin dose adjusted.  Eating well and no nausea.  Bg at 1215 was 260 - covered by sliding scale.   B/P 140 / 45-70.  Has h/o low B/P   BC NGTD and CR up to 1.75 - Vanco stopped today and continues with Zosyn.  K+ 3.4 and started on scheduled KCL.  CT scan earlier this week showed air pocket adjacent to colon - repeat scan today neg for air.  Al continues with lower abd pain.  +BS and + stool via colostomy.  Still waiting bx results from masses. Up with SBA of 1. Wife here this afternoon.

## 2020-11-01 NOTE — PROGRESS NOTES
"GI progress note:    S: feels ok today. Stable lower/left sided abdominal pain similar to what brought him to the hospital - not significantly changed. Again having ostomy output.    Noted to be hypotensive overnight - largely asymptomatic though some confusion. Responded to fluids.    Nurse notes he is thinking more clearly today.    Febrile two nights ago.    O:  BP (!) 140/45 (BP Location: Left arm)   Pulse 66   Temp 96.8  F (36  C) (Oral)   Resp 18   Ht 1.651 m (5' 5\")   Wt 67 kg (147 lb 12.8 oz)   SpO2 97%   BMI 24.60 kg/m     GEN: NAD  ABD: soft, mild tenderness in lower abdomen and left abdomen. Not distended. No rebound or guarding. Ostomy bag with brown stool.    LABS: WBC 6.6, hgb stable in 11s  Blood cx NGTD x 2    CT chest reviewed personally with radiology.  1. No evidence for metastatic disease within the lungs.  2. Known mass within the liver as well as the right hepatic flexure is  poorly demonstrated on this noncontrast exam. Small foci of air  adjacent to the colon in the right upper quadrant mesentery presumably  sequelae from same day colonoscopy with biopsy of the mass in this  region. No gross pneumoperitoneum.   3. Cholelithiasis. Other chronic findings as detailed in the body of  the report.    A/P:    1. Colon mass at hepatic flexure - concern for primary colon cancer. Await biopsies. If malignancy confirmed will need oncology consult and colorectal surgery consult.     2. Small foci of extraluminal air in mesentery seen on CT after colonoscopy. Likely related to biopsies with insufflation (microperforation) and should resolve. Pt is clinically stable with largely unchanged pain than what was present prior to procedure. WBC normal. However, given fevers 2 nights ago and hypotension last night recommend getting non-contrast CT abd/pelvis to evaluate for progression/improvement/resolution of air. If resolving ok to continue abx and follow exam. If unchanged or worse then would consult " colorectal surgery to evaluate the patient. Agree with continued broad spectrum abx.    3. Hepatic mass with tumor thrombus - given elevated AFP (2700s) HCC is likely. However, given mass in colon metastatic disease is also considered. Will need oncology follow up and likely follow up in liver clinic.    Today's plan:  -CT abd/pelvis (non-contrast is ok)  -if extra-luminal air is not resolving consult colorectal surgery  -continue broad spectrum antibiotics  -await biopsy results (likely return on Monday)    Plan discussed with the patient and the primary team.    Carmine Blackmon MD  [facility]  Division of Gastroenterology, Hepatology and Nutrition

## 2020-11-01 NOTE — PROGRESS NOTES
[facility]     Progress Note - Robyn 2 Service        Date of Admission:  10/27/2020    Assessment & Plan       Wesley Cooper is a 75 year old male with PMH of diverticulitis s/p descending colostomy, chronic systolic heart failure with decompensation, CAD (s/p CABGx3, multiple PCIs), and atrial flutter on warfarin, DM2, tobacco use, and RLS admitted as transfer from OSH on 10/27/2019 for evaluation of suspected metastatic colorectal malignancy as demonstrated on CT AP at OSH on 10/27 following initial presentation with abdominal pain and diarrhea. GI consulted with colonoscopy and EGD anticipated 10/30.    Changes Today:  - Abdominal CT performed, negative for free air  - If repeat hypotension, consider repeat echo  - discontinue vancomycin  - continue zosyn  - continue to hold antihypertensives  - monitor UOP and Cr closely  - Await pathology results from biopsy 10/30    #Hypotension  #Recent fever  #Concern for septic shock  Febrile early on 10/31 post-procedurally. Had some hypotension during colonosopy and has had intermittent hypotension since which has been fluid responsive. Started on broad spectrum antibiotics early am 10/31 (see below). UA bland. Blood cultures NGTD. Initial screening chest CT (done post procedurally) noted small mesenteric air at biopsy site. Repeat abdominal CT 11/1 with no mesenteric air and no other evidence of infection/abscess/ischemia. No leukocytosis. Minimal abdominal pain.   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 Cr 1.1 on admission. Subsequent rise starting on 10/31 following colonoscopy, intermittent hypotension, fever, and recent CT with contrast. Differential includes volume depletion, prerenal or ATN from hypotension/sepsis (in setting of losartan), contrast  induced nephropathy (though timing doesn't quite match). Less likely antibiotic related due to timing. Suspect prerenal/ATN with hypotension and ARB as most likely etiology. Rehydrating with episodes of hypotension but avoiding over-hydration with history of heart failure.   - CTM UOP and Cr    #Colorectal mass, as demonstrated on CT AP 10/27  #Hepatic mass  Initially presented with one week abdominal and diarrhea with CT AP obtained 10/27 prior to transfer with colonic thickening of the hepatic flexure and hepatic mass with associated occlusive tumor thrombus concerning for metastatic disease, although cannot rule out primary malignancy. Transferred to [facility] for further evaluation and management. Underwent EGD and colonoscopy 10/30. Path pending. Hepatitis serologies negative (not hep B immune), US abd with dopplers with PV thrombosis and redemonstration of mass. CT chest with no lung mets.   - Oncology consulted. Awaiting recs based on pathology results     #Portal vein thrombosis  #Supratherapeutic INR  Discovered on CT and confirmed on US, occurred despite patient being anticoagulated on warfarin (and supratherapeutic) and on plavix. Suspected provoked in setting of malignancy.  - Anticoagulation with lovenox for now (started 10/31) due to malignancy-induced thrombosis  - Continue holding plavix for now in anticipation of possible further procedures.    #Chronic Systolic Heart Failure with Decompensation  TTE 10/28 obtained in advance of potential procedures or with anticipation of potential future chemotherapies stable from prior (EF 35-40%), HFrEF (EF 35-40%) 2/2 ischemic cardiomyopathy; optimized medically for procedure (10/30)  - Holding PTA Imdur, losartan, and carvedilol due to hypotension as above. Will restart as able.   - Continue PTA atorvastatin    #Supportive  -zofran PRN for nausea  -miralax and senna PRN for constipation     Chronic Issues:  #DMII  - Holding PTA glipizide  - High-dose sliding scale  insulin  #Hyperlipidemia  - Resume PTA atorvastatin  #Hypertension  #CAD  #History of atrial flutter  - Resume PTA sotalol  - anticoagulation with enoxaparin as above  #GERD  - Resume PTA famotidine  #Neuropathic Pain  - Gabapentin 300mg TID  - Tylenol PRN  #Iron deficiency anemia  - Continue PTA ferrous sulfate       Diet: Combination Diet 2 gm NA Diet; Low Saturated Fat Na <2400mg Diet    Fluids: PO intake, NPO past midnight 10/29 for procedure 10/30  Lines: peripheral IV L, Colostomy L abdomen  DVT Prophylaxis: Pneumatic Compression Devices  Souza Catheter: not present  Code Status: No CPR- Do NOT IntubateDNR/DNI - spoke with patient to clarify code status today (prefers DNR/DNI)    Disposition Plan   Expected discharge: > 7 days, recommended to prior living arrangement once adequate workup of abdominal pain completed and patient is safe/independent for discharge.  Entered: Xander Veronica MD 11/01/2020, 11:48 AM      Rupert Veronica MD   of Medicine  [facility] Hospitalist  Pager [phone]  ______________________________________________________________________    Interval History   Nursing notes reviewed.  Again developed hypotension overnight.  This improved with gentle hydration.  No further fever. Minimal abdominal pain. No CP or SOB. No edema.     4 point ROS otherwise negative.     Physical Exam   Vital Signs: Temp: 96.8  F (36  C) Temp src: Oral BP: (!) 140/45 Pulse: 66   Resp: 18 SpO2: 97 % O2 Device: None (Room air)    Weight: 150 lbs 12.8 oz  Constitutional: awake, alert, cooperative, NAD  HEENT: Normocephalic, atraumatic. EOMI  Respiratory: Lungs clear to auscultation bilaterally, no wheezes/crackles  Cardiovascular: RRR, no MRG, no LE edema noted  GI: Soft, no tenderness to palpation, colostomy bag in place (L abdomen)    I/O last 3 completed shifts:  In: 300 [I.V.:300]  Out: 1450 [Urine:1450]    Data   Recent Labs   Lab 11/01/20  0607 10/31/20  0614 10/30/20  2310 10/30/20  0447  10/28/20  0647 10/28/20  0647   WBC 6.6 7.1 6.6 4.4   < >  --    HGB 11.5* 11.4* 11.5* 11.9*   < >  --    MCV 92 94 93 91   < >  --    * 137* 131* 127*   < >  --    INR 1.46* 1.43*  --  1.65*   < >  --     139  --  140   < > 144   POTASSIUM 3.4 3.7  --  3.5   < > 3.8   CHLORIDE 112* 109  --  109   < > 114*   CO2 22 23  --  22   < > 23   BUN 12 13  --  10   < > 12   CR 1.75* 1.62*  --  1.12   < > 1.23   ANIONGAP 8 7  --  9   < > 7   KEIRA 8.0* 8.4*  --  8.5   < > 8.3*   GLC 69* 194*  --  159*   < > 95   ALBUMIN  --   --   --   --   --  2.8*   PROTTOTAL  --   --   --   --   --  6.4*   BILITOTAL  --   --   --   --   --  0.9   ALKPHOS  --   --   --   --   --  123   ALT  --   --   --   --   --  14   AST  --   --   --   --   --  25    < > = values in this interval not displayed.     Other labs: AFP (2727), CEA (2.5),  (35), COVID negative, INR 1.65, viral hep serologies negative

## 2020-11-01 NOTE — PLAN OF CARE
00:00-07:00 am   AF Hypotensive on evening with 70's/40's.  Hypotensive resolved after IVF  ml/hr x 2 hrs with /81.  Pt is more alert and oriented x 4   No episode of confusion overnight  Bed alarm on for safety.    Denied pain  No nausea/vomiting  Voiding spontaneously well and also improving in urine output  Colostomy intact with small soft stool.  Resting/sleeping well  Continue w/POC and offer support.    BG 69 with am lab  Apple juice given and will rechecked BG   Pt is stable A&O

## 2020-11-01 NOTE — PROVIDER NOTIFICATION
Paged #[phone]: lying BP 85/47 R arm, 90/56 L arm; asymptomatic when lying. Sitting BP 60/36 L arm; + dizziness. Repeat lying BP after sitting 88/50 L arm; dizziness resolves. Do you want interventions?    At 2145 charge RN notified #[id number] regarding BP of 71/37. Pt denied dizziness lightheadedness.  Provider returned page and ordered 1 L bolus of LR @ 500 ml/hr to run over 2 hours. LR bolus initiated at 2200. Pt BP @ 2215: 89/49; HR 60; denied dizziness/lightheadedness. Pt BP @ 2231: 100/51; HR 60; denied dizziness/lightheadedness.

## 2020-11-02 NOTE — CONSULTS
WOC consulted for ostomy consult. Pt has established ostomy with no skin concerns or pouching issues. Pt brought home supply of convatec pouches and wanting to change pouch himself. No need got WOC follow up.     WOC will sign off.

## 2020-11-02 NOTE — PLAN OF CARE
[id number]    No acute event, Afebrile, VSS.       NEURO: Alert and oriented x4.      RESPIRATORY: Room Air, denies dizziness, and SOB. Lungs sound, clear, equal bilateral.     CARDIO: VSS, denies dizziness, and extremity pain.      GI/:  BS active, No BM, AUOP in the urinal at bedside.      SKIN: Intact.      ACTIVITY: Stand by assist.      PAIN: Denies pain.    DLA: PIV, NS locked.     BG: Yes.      PLAN: Continue monitoring.

## 2020-11-02 NOTE — PROGRESS NOTES
"SPIRITUAL HEALTH SERVICES  SPIRITUAL ASSESSMENT Progress Note  [facility] 7D     REFERRAL SOURCE:  Initiated Follow Up    Al and Bianca were both present at the time of my visit today. Al let me know that they haven't heard the results of the biopsy. He expressed that he feels \"pain\" in the unknown waiting time.     PLAN: Will follow up.     Minerva Murdock  Chaplain Resident  Pager: [phone]    "

## 2020-11-02 NOTE — PLAN OF CARE
00:00-07:00 am  AF BP stable at 130-140's/75  OVSS   Continue with IV Abx   A&Ox 4   Tylenol given x 1 for abdominal pain with good relief   Denied nausea/vomiting  Slight CHURCHILL but denied SOB or chest pain  Good O2 sat on room air   at 02:00 am  UP with SBA using gait belt  Voiding spontaneously well, adequate UOP   Colostomy intact with small BM  Pt is otherwise stable without acute events overnight.   Bed alarm on during the night for safety due to weakness and can be intermittently forgetful.  No confusion episode noted    Awaiting for biopsy result, possibly today, from coloscopy done on 10/30. Continue w/POC and offer support

## 2020-11-02 NOTE — PROGRESS NOTES
Hematology / Oncology  Daily Progress Note   Date of Service: 11/02/2020  Patient: Wesley Cooper  MRN: [id number]  Admission Date: 10/27/2020  Hospital Day # 6  Cancer Diagnosis: To be determined  Primary Outpatient Oncologist: To be determined  Current Treatment Plan: To be determined     Assessment & Plan:   Wesley Cooper is a 75 year old man with a history of diverticulitis s/p sub-total descending colectomy w/ostomy, HFrEF w/ICD, CAD, atrial fib/flutter previously on warfarin, and T2DM who was transferred from an OSH after CT A/P showed new liver mass and colonic thickening concerning for malignancy. Status post colonoscopy 10/30, biopsy of a lesion at the hepatic flexure is consistent with at least intramucosal carcinoma.    Recommendations:   - Patient likely has colon cancer, discussed with patient and his spouse at bedside  - Recommend liver MRI to better view the portal vein tumor extension, ordered for you.   - Will consider colorectal surgery consult 11/3, will discuss results with Pathology prior  - Please consult SWS- patient requesting information for which insurances cover care at [facility]. He has [employer] but wants to switch as most of his providers are through the [employer] and [employer] and [employer] is dropping both insurances.     HEME/ONCOLOGY  #Colonic lesion  #Liver mass with portal vein tumor invasion  He initially presented to an outside ED with about 2 weeks of abdominal pain, along with about on week of nausea and changes in stool output from ostomy. Subsequent CT A/P at the OSH showed a 5.1 x 4.6 x 4.2 cm mass in the mid/left hepatic lobes (seg II and [place]) with extension of the mass in to the left and main portal vein causing complete occlusion, along with several smaller hypodense lesions in the same lobes, all concerning for malignancy. There was also circumferential thickening at the hepatic flexure of the colon, which was concerning for malignancy as well. Based on the patient's cardiac  history, for which he follows at [facility], he was transferred to [facility] for further evaluation of his possible malignancy.   - AFP is elevated at about 2700, with grossly normal CEA; CA 19-9 is also normal  - He underwent colonoscopy by GI on 10/30, biopsy at the hepatic flexure consistent with at least intramucosal carcinoma. Due to the superficial nature of the biopsy invasion cannot be accurately determined. Likely colon cancer  - CT chest 10/30/20- No metastatic disease. Small foci of air adjacent to the colon in the right upper quadrant mesentery presumably sequelae from same day colonoscopy. CT A/P 11/1 with no evidence of free air, no metastases outside of the liver.   - MMR ordered and in process  - Recommend liver MRI to better view the portal vein tumor extension, ordered for you.   - Will consider colorectal surgery 11/3, will discuss results with Pathology prior    # Portal vein thrombosis  # Supratherapeutic INR  Discovered on CT and confirmed on US, occurred despite patient being anticoagulated on warfarin (and supratherapeutic) and on plavix. Suspect tumor thrombus.   - Anticoagulation with lovenox for now (started 10/31)   - Patient does not necessarily need to be on anticoagulation for the tumor thrombus, but would need anticoagulation for his cardiac history. Will defer anticoagulation to Medicine service and Cardiology    #History of Iron deficiency anemia  Ferritin 119 on 10/28/20, drawn while on PTA ferrous sulfate. Previous iron studies dating back to 12/2015 are not consistent with TANNER (transferrin 254, ferritin 725, TIBC 314, %sat 32, total iron 109), but he may have been taking iron at the time- it is unclear on chart review.   - PTA ferrous sulfate     ID  #Hypotension  #Recent fever  #Concern for septic shock  Post-procedural fever 10/31/20 with intermittent hypotension.   - Discontinued vancomycin (10/31 - 11/1)  - Continue zosyn (10/31 - present) per Medicine Service    CV  #Chronic Systolic  "Heart Failure with Decompensation  TTE 10/28 obtained in advance of potential procedures or with anticipation of potential future chemotherapies stable from prior (EF 35-40%), HFrEF (EF 35-40%) 2/2 ischemic cardiomyopathy; optimized medically for procedure (10/30)  - Mgmt per Medicine Service    Patient was seen and plan of care was discussed with attending physician Dr. Jung.    Thank you for the opportunity to partake in this patients plan of care. Please do not hesitate to page with questions. We will continue to follow.     Malena Leonard PA-C  Hematology/Oncology  Pager # [phone]  Phone # [phone]   ___________________________________________________________________    Subjective & Interval History:    No acute events noted overnight. He is feeling well today, no concerns were mentioned. We discussed the results of his biopsy- he is scared to hear that he has cancer but isn't surprised by the diagnosis. He would want to try chemotherapy if it could help him and give him more time.       Physical Exam:    Blood pressure (!) 143/69, pulse 65, temperature 98.4  F (36.9  C), temperature source Oral, resp. rate 18, height 1.651 m (5' 5\"), weight 67.4 kg (148 lb 11.2 oz), SpO2 98 %.    A full Physical exam not performed today. On brief exam, he is alert and oriented x 3. No rashes on exposed surfaces. Breathing is regular without increased effort.     Labs & Studies: I personally reviewed the following studies:  ROUTINE LABS (Last four results):  CMP  Recent Labs   Lab 11/02/20  0559 11/01/20  0607 10/31/20  0614 10/30/20  0445 10/28/20  0647 10/28/20  0647    142 139 140   < > 144   POTASSIUM 4.0 3.4 3.7 3.5   < > 3.8   CHLORIDE 112* 112* 109 109   < > 114*   CO2 22 22 23 22   < > 23   ANIONGAP 6 8 7 9   < > 7   * 69* 194* 159*   < > 95   BUN 7 12 13 10   < > 12   CR 1.57* 1.75* 1.62* 1.12   < > 1.23   GFRESTIMATED 42* 37* 40* 63   < > 56*   GFRESTBLACK 48* 42* 47* 73   < > 65   KEIRA " 8.1* 8.0* 8.4* 8.5   < > 8.3*   PROTTOTAL  --   --   --   --   --  6.4*   ALBUMIN  --   --   --   --   --  2.8*   BILITOTAL  --   --   --   --   --  0.9   ALKPHOS  --   --   --   --   --  123   AST  --   --   --   --   --  25   ALT  --   --   --   --   --  14    < > = values in this interval not displayed.     CBC  Recent Labs   Lab 11/02/20 0559 11/01/20  0607 10/31/20  0614 10/30/20  2310   WBC 3.8* 6.6 7.1 6.6   RBC 4.05* 3.80* 3.84* 3.81*   HGB 12.3* 11.5* 11.4* 11.5*   HCT 37.8* 35.1* 35.9* 35.3*   MCV 93 92 94 93   MCH 30.4 30.3 29.7 30.2   MCHC 32.5 32.8 31.8 32.6   RDW 14.5 14.6 14.8 14.6   * 138* 137* 131*     INR  Recent Labs   Lab 11/02/20 0559 11/01/20  0607 10/31/20  0614 10/30/20  0445   INR 1.29* 1.46* 1.43* 1.65*       Medications list for reference:  Current Facility-Administered Medications   Medication     acetaminophen (TYLENOL) tablet 650 mg     atorvastatin (LIPITOR) tablet 80 mg     carvedilol (COREG) tablet 6.25 mg     ciprofloxacin (CIPRO) tablet 500 mg     glucose gel 15-30 g    Or     dextrose 50 % injection 25-50 mL    Or     glucagon injection 1 mg     enoxaparin ANTICOAGULANT (LOVENOX) injection 70 mg     famotidine (PEPCID) tablet 10 mg     ferrous sulfate (FEROSUL) tablet 325 mg     finasteride (PROSCAR) tablet 5 mg     [Held by provider] furosemide (LASIX) tablet 20 mg     gabapentin (NEURONTIN) capsule 300 mg     insulin aspart (NovoLOG) injection (RAPID ACTING)     insulin detemir (LEVEMIR PEN) injection 15 Units     ipratropium (ATROVENT) 0.03 % spray 2 spray     [Held by provider] isosorbide mononitrate (IMDUR) 24 hr tablet 30 mg     lidocaine (LMX4) cream     lidocaine 1 % 0.1-1 mL     [Held by provider] losartan (COZAAR) tablet 25 mg     melatonin tablet 1 mg     metroNIDAZOLE (FLAGYL) tablet 500 mg     naloxone (NARCAN) injection 0.1-0.4 mg     ondansetron (ZOFRAN-ODT) ODT tab 4 mg    Or     ondansetron (ZOFRAN) injection 4 mg     polyethylene glycol (MIRALAX) Packet  17 g     prochlorperazine (COMPAZINE) injection 5 mg    Or     prochlorperazine (COMPAZINE) tablet 5 mg    Or     prochlorperazine (COMPAZINE) suppository 12.5 mg     senna-docusate (SENOKOT-S/PERICOLACE) 8.6-50 MG per tablet 1 tablet    Or     senna-docusate (SENOKOT-S/PERICOLACE) 8.6-50 MG per tablet 2 tablet     sodium chloride (PF) 0.9% PF flush 3 mL     sodium chloride (PF) 0.9% PF flush 3 mL     sotalol (BETAPACE) tablet 120 mg

## 2020-11-03 NOTE — PLAN OF CARE
[id number]  Pt. Afebrile, AVSS on room air. A&Ox4. Denies nausea or sob. Does have pain in abdomen, gave prn tylenol with adequate relief. Good urine output using urinal at bedside. No bms. No acute events. Plan to have MRI of liver today. Will continue with poc.

## 2020-11-03 NOTE — PROGRESS NOTES
"GI note:    S: knows the results of the biopsies. Hard for him to believe it is cancer. Otherwise feels ok    O:  BP (!) 146/74 (BP Location: Right arm)   Pulse 60   Temp 98  F (36.7  C) (Oral)   Resp 18   Ht 1.651 m (5' 5\")   Wt 67.4 kg (148 lb 11.2 oz)   SpO2 97%   BMI 24.74 kg/m      Pathology: HEPATIC FLEXURE BIOPSY:   - At least intramucosal carcinoma     A/P:    1. Colon cancer - appreciate oncology input. Discussed results with patient.     2. Hepatic mass - metastatic colon cancer vs HCC. Elevated AFP. Agree with MRI liver.    GI will sign off. Please call with questions.    Carmine Blackmon MD  [facility]  Division of Gastroenterology, Hepatology and Nutrition    "

## 2020-11-03 NOTE — PROGRESS NOTES
[facility]     Progress Note - Robyn 2 Service        Date of Admission:  10/27/2020    Assessment & Plan       Wesley Cooper is a 75 year old male with PMH of diverticulitis s/p descending colostomy, chronic systolic heart failure with decompensation, CAD (s/p CABGx3, multiple PCIs), and atrial flutter on warfarin, DM2, tobacco use, and RLS admitted as transfer from OSH on 10/27/2019 for evaluation of suspected metastatic colorectal malignancy as demonstrated on CT AP at OSH on 10/27 following initial presentation with abdominal pain and diarrhea. GI consulted with colonoscopy and EGD performed 10/30 with biopsy concerning for colorectal malignancy.    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 Had some hypotension during colonosopy and has had intermittent hypotension since which has been fluid responsive. Started on broad spectrum antibiotics early am 10/31 (see below). UA bland. Blood cultures NGTD. Initial screening chest CT (done post procedurally) noted small mesenteric air at biopsy site. Repeat abdominal CT 11/1 with no mesenteric air and no other evidence of infection/abscess/ischemia. No leukocytosis. Minimal abdominal pain.   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 Cr 1.1 on admission. Subsequent rise starting on 10/31 following colonoscopy, intermittent hypotension, fever, and recent CT with contrast. Differential includes volume depletion, prerenal or ATN from hypotension/sepsis (in setting of losartan), contrast induced nephropathy with timing of risk ~3 days following CT. Less likely antibiotic related due to timing. Suspect prerenal/ATN with hypotension and ARB as most likely etiology.    - Daily BMP  - Monitor UOP    #Colorectal mass, as demonstrated on CT AP 10/27  #Hepatic mass  Initially presented with one week abdominal and diarrhea with CT AP obtained 10/27 prior to transfer with colonic thickening of the hepatic flexure and hepatic mass with associated occlusive tumor thrombus concerning for metastatic disease, although cannot rule out primary malignancy. Transferred to [facility] for further evaluation and management. Underwent EGD and colonoscopy 10/30 with histopathology consistent with tubulovillous adenoma without analysis of . Hepatitis serologies negative (not hep B immune), US abd with dopplers with PV thrombosis and redemonstration of mass. CT chest with no lung mets.   Colorectal surgery consulted with no plans for surgical intervention currently in favor of chemotherapy as first line.  - Oncology consulted    - Colorectal surgery consult placed per oncology  - Consult SW for insurance per Heme/Onc recs  - Pathology stains pending    #Portal vein thrombosis  #Supratherapeutic INR  Discovered on CT and confirmed on US, occurred despite patient being anticoagulated on warfarin (and supratherapeutic) and on plavix. Suspected provoked in setting of malignancy.  - Anticoagulation with lovenox for now (started 10/31) due to  malignancy-induced thrombosis  - Initiate plavix today  - heme onc recs state no PVT anticoag needed, anticoag per cardiologic factors    #Chronic Systolic Heart Failure with Decompensation  TTE 10/28 obtained in advance of potential procedures or with anticipation of potential future chemotherapies stable from prior (EF 35-40%), HFrEF (EF 35-40%) 2/2 ischemic cardiomyopathy; optimized medically for procedure (10/30)  - Restart PTA carvedilol and Imdur, continue holding losartan due to DANITA   - Continue PTA atorvastatin    #Supportive  -zofran PRN for nausea  -miralax and senna PRN for constipation     Chronic Issues:  #DMII  - Holding PTA glipizide  - High-dose sliding scale insulin  #Hyperlipidemia  - Resume PTA atorvastatin  #Hypertension  #CAD  #History of atrial flutter  - Resume PTA sotalol  - anticoagulation with enoxaparin as above  #GERD  - Resume PTA famotidine  #Neuropathic Pain  - Gabapentin 300mg TID  - Tylenol PRN  #Iron deficiency anemia  - Continue PTA ferrous sulfate     Diet: Combination Diet 2 gm NA Diet; Low Saturated Fat Na <2400mg Diet    Fluids: PO intake  Lines: peripheral IV L, Colostomy L abdomen  DVT Prophylaxis: Pneumatic Compression Devices  Souza Catheter: not present  Code Status: No CPR- Do NOT Intubate DNR/DNI    Disposition Plan   Expected discharge: > 7 days, recommended to prior living arrangement once adequate workup of abdominal pain completed and patient is safe/independent for discharge.  Entered: Alis Brian 11/03/2020, 9:04 AM        Resident/Fellow Attestation   I, Radha Ricketts MD, was present with the medical student who participated in the service and in the documentation of the note.  I have verified the history and personally performed the physical exam and medical decision making.  I agree with the assessment and plan of care as documented in the note.      Radha Ricketts MD  PGY3  Date of Service (when I saw the patient):  11/2/20      Radha Ricketts  PGY-3, Internal Medicine  P: [id number]  ______________________________________________________________________    Interval History   Continued abdominal tenderness relieved by tylenol (x2) overnight. Notes has had poorer appetite over this past day. Denies dizziness, lightheadedness, SOB, dyspnea on exertion, nausea/vomiting this AM. Adequate UOP without changes in frequency/color. Seen by ostomy wound care team 11/2, as well as GI and Oncology.      4 point ROS otherwise negative.     Physical Exam   Vital Signs: Temp: 95.3  F (35.2  C) Temp src: Oral BP: 116/63 Pulse: 59   Resp: 16 SpO2: 99 % O2 Device: None (Room air)    Weight: 147 lbs 6.4 oz  Constitutional: awake, alert, cooperative, NAD  HEENT: Normocephalic, atraumatic. EOMI  Respiratory: Lungs clear to auscultation bilaterally, no wheezes/crackles  Cardiovascular: RRR, no MRG, no LE edema noted  GI: Soft, tender to palpation in right lumbar quadrant, no guarding, no rebound, no distension, colostomy bag in place (L abdomen)  Extremities: No swelling or tenderness or skin changes.     I/O last 3 completed shifts:  In: 100 [I.V.:100]  Out: 1750 [Urine:1750]    Data   Recent Labs   Lab 11/03/20  0502 11/02/20  0559 11/01/20  0607 10/28/20  0647 10/28/20  0647   WBC 4.4 3.8* 6.6   < >  --    HGB 12.1* 12.3* 11.5*   < >  --    MCV 94 93 92   < >  --    * 121* 138*   < >  --    INR 1.29* 1.29* 1.46*   < >  --     141 142   < > 144   POTASSIUM 4.2 4.0 3.4   < > 3.8   CHLORIDE 109 112* 112*   < > 114*   CO2 23 22 22   < > 23   BUN 8 7 12   < > 12   CR 1.43* 1.57* 1.75*   < > 1.23   ANIONGAP 7 6 8   < > 7   KEIRA 8.3* 8.1* 8.0*   < > 8.3*   * 104* 69*   < > 95   ALBUMIN  --   --   --   --  2.8*   PROTTOTAL  --   --   --   --  6.4*   BILITOTAL  --   --   --   --  0.9   ALKPHOS  --   --   --   --  123   ALT  --   --   --   --  14   AST  --   --   --   --  25    < > = values in this interval not displayed.     Other labs: AFP  (2727), CEA (2.5),  (35), COVID negative, INR 1.29, viral hep serologies negative    CT negative for free air.    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

## 2020-11-03 NOTE — CONSULTS
Colon and Rectal Surgery Consultation Note  [facility]    Wesley Cooper MRN# [id number]   Age: 75 year old YOB: 1945     Date of Admission:  10/27/2020    Reason for consult: Likely colon cancer with metastasis to liver with tumor-thrombus and left main PV occlusion       Requesting physician: Dr. Duffy       Level of consult: One-time consult to assist in determining a diagnosis and to recommend an appropriate treatment plan           Assessment:   76 y/o male with complex PMH ischemic cardiomyopathy, CABG x3, multiple PCI on plavix, atrial fibrillation on coumadin, chronic systolic heart failure (EF 35-40%), BiV ICD in place, DM2, tobacco use, h/o diverticulitis s/p Hartmanns procedure in [place] (maybe 2005), colostomy not reversed due to various heart episodes, admitted OSH for 1-2 weeks of abdominal pain, nausea, diarrhea, weakness.  Found to have both colonic mass and hepatic tumor-thrombus with occlusion of left main portal vein in setting of supra-therapeutic INR concerning for HCC vs colon cancer.  Transferred to [facility] for further evaluation.  Colonoscopy 10/30 showed likely malignant tumor at hepatic flexure with multiple scattered polyps.  Pathology reports at least intramucosal carcinoma that is concerning for colon cancer.  AFP: 2727. CEA: 2.5.  CA 19-9: 35.           Recommendations:   - No indication for acute surgical intervention.  Patient is not clinically obstructed.  Monitor stoma output to ensure no developing obstruction.   - Appreciate medical oncology management.  If this is colon cancer, would agree with chemotherapy as first line treatment at this time.  - MRI Liver pending - we will review.    - Pathology stains for colon cancer panel also pending  - f/u with Dr. Diamond of CRS and Surgical Oncology in clinic           History of Present Illness:   CC: likely colon cancer     75 year old male with complex PMH, ischemic cardiomyopathy, CABG x3, multiple  "PCI on plavix, atrial fibrillation on coumadin, chronic systolic heart failure (EF 35-40%), BiV ICD in place, follows at [facility] for cardiac care, DM2, tobacco use, h/o diverticulitis is s/p prior colostomy (2005?) in [place].  Colostomy was not taken down as pt had various heart issues when discussing colostomy take down in the past.  Pt presented to OSH for 1-2 weeks of abdominal pain, nausea, diarrhea (pretty watery) via his colostomy.  Pt reports that he does frequently have looser stools at baseline.  OSH CT showed colonic wall thickening at hepatic flexure, concerning for colonic malignancy vs inflammation, along with hepatic mass with completely occlusive tumor-thrombus in the left main portal vein concerning for malignancy with metastatic disease.  Portal vein thrombus occurred despite being supra-therapeutic on coumadin (4.8).  Pt was transferred to [facility] for further evaluation.  Pt denies vomiting.  Appetite was OK but per wife, pt had lost appetite for any meat which is usually his preference.  Pt denies any weight loss prior to admission.  Ostomy has been productive this entire time with stool and gas. Abdomen mildly tender, more so in the RUQ.    Colonoscopy 10/30/20:   Colonoscopy of Hartmanns Pouch:  Anal stricture on MAUDE. Diverticulosis in sigmoid with diverticular stricture at 20cm from anal verge, unable to pass scope, not even upper endoscope.  Likely was near proximal end of hartmanns.  Diversion colitis seen.   Colonoscopy via the sigmoid colostomy:  Stool thru out.  Likely malignant tumor at hepatic flexure, biopsied. Tattooed.  Multiple polyps in ascending and cecum.  Multiple polyps in transverse colon.  Normal appearing TI.         AFP: 2727. CEA: 2.5.  CA 19-9: 35.  Chest CT without evidence of intra-thoracic metastatic disease. Colonoscopy Biopsy results show: \"at least intramucosal carcinoma.\"  Started on therapeutic lovenox.  Home plavix also restarted.  Has been by Oncology.  Is a " candidate for palliative chemo (FOLFOX) per Oncology.     MRI liver pending for today.         Past Medical History:   I have reviewed this patient's medical history           Past Surgical History:     Past Surgical History:   Procedure Laterality Date     CARDIAC SURGERY      multiple stents and pacer/defibrillator     COLONOSCOPY N/A 10/30/2020    Procedure: Colonoscopy;  Surgeon: Carmine Blackmon MD;  Location:  GI     CV CORONARY STENT WITH DISTAL PROTECTION DEVICE N/A 3/12/2019    Procedure: Coronary Stent w Distal Protection Device;  Surgeon: Eliseo Field MD;  Location:  HEART CARDIAC CATH LAB     CV HEART CATHETERIZATION WITH POSSIBLE INTERVENTION N/A 1/24/2019    Procedure: PLANNED PCI;  Surgeon: Bill Marquez MD;  Location:  HEART CARDIAC CATH LAB     CV HEART CATHETERIZATION WITH POSSIBLE INTERVENTION N/A 3/12/2019    Procedure: BILL BROWN PLANNED PCI WITH CSI;  Surgeon: Eliseo Field MD;  Location: [facility] CARDIAC CATH LAB     CV PCI ATHERECTOMY ORBITAL N/A 3/12/2019    Procedure: Atherectomy;  Surgeon: Eliseo Field MD;  Location:  HEART CARDIAC CATH LAB     GI SURGERY  2009     ORTHOPEDIC SURGERY      back surgery     VASCULAR SURGERY  1999    CABG X4             Social History:     Social History     Socioeconomic History     Marital status:      Spouse name: Not on file     Number of children: Not on file     Years of education: Not on file     Highest education level: Not on file   Occupational History     Not on file   Social Needs     Financial resource strain: Not on file     Food insecurity     Worry: Not on file     Inability: Not on file     Transportation needs     Medical: Not on file     Non-medical: Not on file   Tobacco Use     Smoking status: Former Smoker     Smokeless tobacco: Never Used     Tobacco comment: quit 2010   Substance and Sexual Activity     Alcohol use: No     Drug use: No     Sexual activity: Not on file    Lifestyle     Physical activity     Days per week: Not on file     Minutes per session: Not on file     Stress: Not on file   Relationships     Social connections     Talks on phone: Not on file     Gets together: Not on file     Attends [employer]ism service: Not on file     Active member of club or organization: Not on file     Attends meetings of clubs or organizations: Not on file     Relationship status: Not on file     Intimate partner violence     Fear of current or ex partner: Not on file     Emotionally abused: Not on file     Physically abused: Not on file     Forced sexual activity: Not on file   Other Topics Concern     Parent/sibling w/ CABG, MI or angioplasty before 65F 55M? Yes   Social History Narrative     Not on file             Family History:   I have reviewed this patients family history.      Father had colon cancer  Brother with ?lung cancer with liver and brain mets       Allergies:    No Known Allergies          Medications:   No current facility-administered medications on file prior to encounter.        atorvastatin (LIPITOR) 80 MG tablet, Take 80 mg by mouth daily       carvedilol (COREG) 12.5 MG tablet, Take 0.5 tablets (6.25 mg) by mouth 2 times daily (with meals)       clopidogrel (PLAVIX) 75 MG tablet, Take 75 mg by mouth daily       famotidine (PEPCID) 10 MG tablet, Take 10 mg by mouth 2 times daily       ferrous sulfate (FEROSUL) 325 (65 Fe) MG tablet, Take 325 mg by mouth daily       finasteride (PROSCAR) 5 MG tablet, Take 5 mg by mouth daily       furosemide (LASIX) 40 MG tablet, Take 40 mg by mouth daily       gabapentin (NEURONTIN) 300 MG capsule, Take 300 mg by mouth 3 times daily        glipiZIDE (GLUCOTROL) 5 MG tablet, Take 10 mg by mouth 2 times daily (before meals)        losartan (COZAAR) 25 MG tablet, Take 25 mg by mouth daily       potassium chloride ER (KLOR-CON M) 20 MEQ CR tablet, Take 20 mEq by mouth daily       sotalol (BETAPACE) 120 MG tablet, Take 1 tablet (120 mg)  "by mouth 2 times daily       Vitamin K, Phytonadione, 100 MCG TABS, Take 100 mcg by mouth daily       insulin detemir (LEVEMIR FLEXTOUCH) 100 UNIT/ML pen, Inject 44 Units Subcutaneous At Bedtime       ipratropium (ATROVENT) 0.03 % nasal spray, Spray 2 sprays into both nostrils 4 times daily as needed for rhinitis       isosorbide mononitrate (IMDUR) 30 MG 24 hr tablet, Take 1 tablet (30 mg) by mouth 2 times daily       nitroglycerin (NITROSTAT) 0.4 MG sublingual tablet, Place 0.4 mg under the tongue every 5 minutes as needed for chest pain For chest pain place 1 tablet under the tongue every 5 minutes for 3 doses. If symptoms persist 5 minutes after 1st dose call 911.       warfarin (COUMADIN) 2.5 MG tablet, Take 2 tablets (5 mg) by mouth daily Take 5 mg by mouth on Mondays and 2.5 mg 6x/week or as directed based on INR              Review of Systems:      All other review of systems negative, except for what is mentioned above        Physical Exam:   /63 (BP Location: Left arm)   Pulse 59   Temp 95.3  F (35.2  C) (Oral)   Resp 16   Ht 1.651 m (5' 5\")   Wt 66.9 kg (147 lb 6.4 oz)   SpO2 99%   BMI 24.53 kg/m    General: Alert, interactive, NAD, wife at bedside  Resp: CTAB, normal resp effort  Cardiac: NS1,S2,  Abdomen: Well healed vertical midline scar.  Soft, mildly tender in RUQ with deep palpation, nondistended. No rebound or guarding.  Stoma pink and viable with thickish green stool.   Extremities: No LE edema or obvious joint abnormalities  Skin: Warm and dry, no jaundice or rash  Neuro: A&Ox3, CN 2-12 intact, VITALIY Mcdonald PAFATOU   Colon and Rectal Surgery    Pt seen and discussed with Fellow, Dr. Elaine.          "

## 2020-11-03 NOTE — PROGRESS NOTES
Hematology / Oncology  Daily Progress Note   Date of Service: 11/03/2020  Patient: Wesley Cooper  MRN: [id number]  Admission Date: 10/27/2020  Hospital Day # 7  Cancer Diagnosis: To be determined. 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   - Consulted colorectal surgery to assess if he is a candidate for resection  - Please consult SWS- patient requesting information for which insurances cover care at [facility]. He has [employer] but wants to switch as most of his providers are through the [employer] and [employer] and [employer] is dropping both insurances.     HEME/ONCOLOGY  #Colonic lesion  #Liver mass with portal vein tumor invasion  He initially presented to an outside ED with about 2 weeks of abdominal pain, along with about on week of nausea and changes in stool output from ostomy. Subsequent CT A/P at the OSH showed a 5.1 x 4.6 x 4.2 cm mass in the mid/left hepatic lobes (seg II and [place]) with extension of the mass in to the left and main portal vein causing complete occlusion, along with several smaller hypodense lesions in the same lobes, all concerning for malignancy. There was also circumferential thickening at the hepatic flexure of the colon, which was concerning for malignancy as well. Based on the patient's cardiac history,  for which he follows at [facility], he was transferred to [facility] for further evaluation of his possible malignancy.   - AFP is elevated at about 2700, with grossly normal CEA; CA 19-9 is also normal  - He underwent colonoscopy by GI on 10/30, biopsy at the hepatic flexure consistent with at least intramucosal carcinoma. Due to the superficial nature of the biopsy invasion cannot be accurately determined. Likely adenocarcinoma  - CT chest 10/30/20- No metastatic disease. Small foci of air adjacent to the colon in the right upper quadrant mesentery presumably sequelae from same day colonoscopy. CT A/P 11/1 with no evidence of free air, no metastases outside of the liver.   - Recommend liver MRI to better view the portal vein tumor extension and assess if this is HCC vs colorectal, ordered for you  - This is likely oligometastatic colon adenocarcinoma.  Patient is a candidate for palliative chemotherapy such as FOLFOX. Consulted Colorectal surgery 11/3 to assess if he is a candidate for resection.  - MMR and NGS Colorectal (BRAF/CLEVELAND) ordered and in process  - Patient would like to follow up closer to home, will request Oncology consultation at [facility] in [place], [place].     # Portal vein thrombosis  # Supratherapeutic INR  Discovered on CT and confirmed on US, occurred despite patient being anticoagulated on warfarin (and supratherapeutic) and on plavix. Suspect tumor thrombus.   - Anticoagulation with lovenox for now (started 10/31)   - Patient does not necessarily need to be on anticoagulation for the tumor thrombus, but would need anticoagulation for his cardiac history. Will defer anticoagulation to Medicine service and Cardiology    #History of Iron deficiency anemia  Ferritin 119 on 10/28/20, drawn while on PTA ferrous sulfate. Previous iron studies dating back to 12/2015 are not consistent with TANNER (transferrin 254, ferritin 725, TIBC 314, %sat 32, total iron 109), but he may have been  "taking iron at the time- it is unclear on chart review.   - PTA ferrous sulfate     ID  #Hypotension  #Recent fever  #Concern for septic shock  Post-procedural fever 10/31/20 with intermittent hypotension.   - Discontinued vancomycin (10/31 - 11/1)  - Continue zosyn (10/31 - present) per Medicine Service    CV  #Chronic Systolic Heart Failure with Decompensation  TTE 10/28 obtained in advance of potential procedures or with anticipation of potential future chemotherapies stable from prior (EF 35-40%), HFrEF (EF 35-40%) 2/2 ischemic cardiomyopathy; optimized medically for procedure (10/30)  - Mgmt per Medicine Service    Patient was seen and plan of care was discussed with attending physician Dr. Jung.    Thank you for the opportunity to partake in this patients plan of care. Please do not hesitate to page with questions. We will continue to follow.     Malena Leonard PA-C    Hematology/Oncology  Pager # [phone]  Phone # [phone]   ___________________________________________________________________    Subjective & Interval History:    Feeling well today, no concerns or acute events. Afebrile and normotensive overnight. His appetite is still good but he doesn't feel hungry for meat which is unusual for him. We discussed that he can drink protein shakes if not getting enough protein in his diet. A comprehensive review of systems was reviewed with the patient and the pertinent positives are listed in the HPI above.        Physical Exam:    Blood pressure 136/68, pulse 60, temperature 97.2  F (36.2  C), temperature source Oral, resp. rate 16, height 1.651 m (5' 5\"), weight 66.9 kg (147 lb 6.4 oz), SpO2 99 %.    Temp:  [95.3  F (35.2  C)-97.4  F (36.3  C)] 97.2  F (36.2  C)  Pulse:  [59-60] 60  Resp:  [16] 16  BP: (109-136)/(58-68) 136/68  SpO2:  [96 %-99 %] 99 %  General: Alert and interactive. Lying in bed in no acute distress.  HEENT: PERRLA, EOMI, anicteric sclera, oropharynx is pink and moist " without erythema/exudates/lesions/thrush.  Neck: Supple, full ROM.  Lymphatic: No palpable mandibular/cervical/supra/infraclavicular lymph nodes.  Cardiovascular: RRR. Normal S1/S2. No murmurs. Peripheral pulses 2+ in bilateral upper and lower extremities.   Respiratory: CTAB. No wheezes appreciated. Normal respiratory effort on ambient air.  Gastrointestinal: ostomy with good output. No abdominal tenderness or palpable masses.   Musculoskeletal: No joint swelling or muscle tenderness.   Extremities: No extremity edema.   Skin: Warm and intact. No concerning lesions or rashes on exposed skin surfaces. No jaundice.  Neurologic: Alert and fully oriented, CN II-XII grossly intact, appropriately responsive during interview.      Labs & Studies: I personally reviewed the following studies:  ROUTINE LABS (Last four results):  CMP  Recent Labs   Lab 11/03/20  0502 11/02/20 0559 11/01/20  0607 10/31/20  0614 10/28/20  0647 10/28/20  0647    141 142 139   < > 144   POTASSIUM 4.2 4.0 3.4 3.7   < > 3.8   CHLORIDE 109 112* 112* 109   < > 114*   CO2 23 22 22 23   < > 23   ANIONGAP 7 6 8 7   < > 7   * 104* 69* 194*   < > 95   BUN 8 7 12 13   < > 12   CR 1.43* 1.57* 1.75* 1.62*   < > 1.23   GFRESTIMATED 47* 42* 37* 40*   < > 56*   GFRESTBLACK 54* 48* 42* 47*   < > 65   KEIRA 8.3* 8.1* 8.0* 8.4*   < > 8.3*   PROTTOTAL  --   --   --   --   --  6.4*   ALBUMIN  --   --   --   --   --  2.8*   BILITOTAL  --   --   --   --   --  0.9   ALKPHOS  --   --   --   --   --  123   AST  --   --   --   --   --  25   ALT  --   --   --   --   --  14    < > = values in this interval not displayed.     CBC  Recent Labs   Lab 11/03/20  0502 11/02/20  0559 11/01/20  0607 10/31/20  0614   WBC 4.4 3.8* 6.6 7.1   RBC 4.08* 4.05* 3.80* 3.84*   HGB 12.1* 12.3* 11.5* 11.4*   HCT 38.3* 37.8* 35.1* 35.9*   MCV 94 93 92 94   MCH 29.7 30.4 30.3 29.7   MCHC 31.6 32.5 32.8 31.8   RDW 14.4 14.5 14.6 14.8   * 121* 138* 137*     INR  Recent Labs    Lab 11/03/20  0502 11/02/20  0559 11/01/20  0607 10/31/20  0614   INR 1.29* 1.29* 1.46* 1.43*       Medications list for reference:  Current Facility-Administered Medications   Medication     acetaminophen (TYLENOL) tablet 650 mg     atorvastatin (LIPITOR) tablet 80 mg     carvedilol (COREG) tablet 6.25 mg     ciprofloxacin (CIPRO) tablet 500 mg     clopidogrel (PLAVIX) tablet 75 mg     glucose gel 15-30 g    Or     dextrose 50 % injection 25-50 mL    Or     glucagon injection 1 mg     enoxaparin ANTICOAGULANT (LOVENOX) injection 70 mg     famotidine (PEPCID) tablet 10 mg     ferrous sulfate (FEROSUL) tablet 325 mg     finasteride (PROSCAR) tablet 5 mg     [Held by provider] furosemide (LASIX) tablet 20 mg     gabapentin (NEURONTIN) capsule 300 mg     insulin aspart (NovoLOG) injection (RAPID ACTING)     insulin detemir (LEVEMIR PEN) injection 15 Units     ipratropium (ATROVENT) 0.03 % spray 2 spray     isosorbide mononitrate (IMDUR) 24 hr tablet 30 mg     lidocaine (LMX4) cream     lidocaine 1 % 0.1-1 mL     [Held by provider] losartan (COZAAR) tablet 25 mg     melatonin tablet 1 mg     metroNIDAZOLE (FLAGYL) tablet 500 mg     naloxone (NARCAN) injection 0.1-0.4 mg     ondansetron (ZOFRAN-ODT) ODT tab 4 mg    Or     ondansetron (ZOFRAN) injection 4 mg     polyethylene glycol (MIRALAX) Packet 17 g     prochlorperazine (COMPAZINE) injection 5 mg    Or     prochlorperazine (COMPAZINE) tablet 5 mg    Or     prochlorperazine (COMPAZINE) suppository 12.5 mg     senna-docusate (SENOKOT-S/PERICOLACE) 8.6-50 MG per tablet 1 tablet    Or     senna-docusate (SENOKOT-S/PERICOLACE) 8.6-50 MG per tablet 2 tablet     sodium chloride (PF) 0.9% PF flush 3 mL     sodium chloride (PF) 0.9% PF flush 3 mL     sotalol (BETAPACE) tablet 120 mg

## 2020-11-03 NOTE — PHARMACY-PHARMACOTHERAPY NOTE
Anti-Xa Monitoring   Current Dose: Enoxaparin 70 mg SC every 12 hours  Day of Therapy: 5  Indication: Portal vein thrombosis, Hx of atrial flutter      Patient initiated on enoxaparin at a dose of 70 mg every 12 hours (~1 mg/kg) on 10/30/2020. Patient also has a history of atrial flutter and has previously been on warfarin. As a result of the patient's fluctuating renal function and DANITA, an anti-Xa level was drawn to assess for potential dose adjustment of enoxaparin.     Serum Creatine Level:     1.43 mg/dL - 11/03/2020 @0502     1.57 mg/dL - 11/02/2020 @0559     1.57 mg/dL - 11/01/2020 @0607     1.62 mg/dL - 10/31/2020 @0614     1.12 mg/dL - 10/30/2020 @0445     1.11 mg/dL - 10/29/2020 @0609     1.23 mg/dL - 10/28/2020 @0647    Level:      1.51 international unit(s)/mL - 11/03/2020 12:49 PM (~4.67 hr post dose)    Assessment: Patient's anti-Xa level of 1.51 is above the goal range of 0.6-1.    Plan: Will decrease patient's dose of enoxaparin to 50 mg every 12 hours. This is a ~20% dose reduction. Will plan on keeping patient's dosing schedule at 0800 and 2000 daily. Will plan on drawing a repeat anti-Xa level in 2-3 days to assess for further dose adjustments, especially with patient's fluctuating renal function. Pharmacy will continue to monitor.     William Rodríguez, PharmD Resident   Phone: [phone]

## 2020-11-03 NOTE — PLAN OF CARE
[id number]    No acute event, AVSS. Wife at bedside, MRI done, result pending. Multiple providers consulted with pt about next step treatment plan.     NEURO: Alert and oriented x4.      RESPIRATORY: Room Air, denies dizziness, and SOB. Lungs sound, clear, equal bilateral.     CARDIO: VSS, denies dizziness, and extremity pain.      GI/:  Denies N/V, BS active, BM x , void urine spontaneously without saving.       SKIN: Intact.      ACTIVITY: Stand by assist, walked in the hallway.      PAIN: Denies pain.    DLA: PIV, NS locked.     BG: Yes.      PLAN: Continue monitoring.

## 2020-11-03 NOTE — PLAN OF CARE
[id number]. Pt vital signs stable and afebrile. Pt denies pain and nausea. Pt will have MRI tomorrow and the time is not known. Pt blood sugar runs around 154 and 188.   Pt self empty the ostomy, have good output and voided couple times for the shift.  Continue to monitor care.

## 2020-11-04 NOTE — PROGRESS NOTES
Hematology / Oncology  Daily Progress Note   Date of Service: 11/04/2020  Patient: Wesley Cooper  MRN: [id number]  Admission Date: 10/27/2020  Hospital Day # 8  Cancer Diagnosis: To be determined. 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 Liver MRI concerning for HCC. Scheduled for a biopsy of the liver mass 11/5.    Recommendations:   - Given concern for HCC on liver MRI but also with adenocarcinoma of the colon, recommend biopsy of the liver lesion with IR. Scheduled 11/5   - Surgical Onc consulted for consideration of hepatic mass resection    HEME/ONCOLOGY  #Colonic lesion  #Liver mass with portal vein tumor invasion  He initially presented to an outside ED with about 2 weeks of abdominal pain, along with about on week of nausea and changes in stool output from ostomy. Subsequent CT A/P at the OSH showed a 5.1 x 4.6 x 4.2 cm mass in the mid/left hepatic lobes (seg II and Id[place]) with extension of the mass in to the left and main portal vein causing complete occlusion, along with several smaller hypodense lesions in the same lobes, all concerning for malignancy. There was also circumferential thickening at the hepatic flexure of the colon, which was concerning for malignancy as well. Based on the patient's cardiac history, for which he follows at [facility], he was transferred to [facility] for further evaluation of his possible malignancy.   -  AFP is elevated at about 2700, with grossly normal CEA; CA 19-9 is also normal  - Hepatitis virologies 10/28/20 are unremarkable  - He underwent colonoscopy by GI on 10/30, biopsy at the hepatic flexure consistent with at least intramucosal carcinoma. Due to the superficial nature of the biopsy invasion cannot be accurately determined. Likely adenocarcinoma   - MMR and NGS Colorectal (BRAF/C[place]AND) ordered and in process  - CT chest 10/30/20- No metastatic disease. Small foci of air adjacent to the colon in the right upper quadrant mesentery presumably sequelae from same day colonoscopy. CT A/P 11/1 with no evidence of free air, no metastases outside of the liver.   - Given concern for oligometastatic colon adenocarcinoma (vs 2 primary malignancies, HCC and colon cancer), we consulted Colorectal surgery 11/3 to assess if he is a candidate for resection. They recommended chemotherapy as first line of treatment  - Liver MRI 11/3 with a mass in the left lobe of liver involving segment 2/3 and 4 measuring up to 6.3 cm with associated tumor thrombus invading the portal vein extending to the main portal vein, most suspicious for hepatocellular carcinoma  - Given concern for HCC on liver MRI but also with adenocarcinoma of the colon, recommend biopsy of the liver lesion with IR for clarification. Scheduled 11/5   - Surgical Onc consulted for consideration of hepatic mass resection, they will eval pt on 11/5  - Patient agreeable to follow up at the [facility] with GI Oncology- all of his other cares including his Cardiologist are at the [facility] so he would prefer to at least establish a plan through the [facility]. Perhaps at some point he will transition care to [facility] Oncology clinic in [place], [place]    # Portal vein thrombosis  # Supratherapeutic INR  Discovered on CT and confirmed on US, occurred despite patient being anticoagulated on warfarin (and supratherapeutic) and on plavix. Suspect tumor thrombus.   - Anticoagulation with  lovenox for now (started 10/31)   - Patient does not necessarily need to be on anticoagulation for the tumor thrombus, but would need anticoagulation for his cardiac history. Will defer anticoagulation to Medicine service and Cardiology    #History of Iron deficiency anemia  Ferritin 119 on 10/28/20, drawn while on PTA ferrous sulfate. Previous iron studies dating back to 12/2015 are not consistent with TANNER (transferrin 254, ferritin 725, TIBC 314, %sat 32, total iron 109), but he may have been taking iron at the time- it is unclear on chart review.   - PTA ferrous sulfate     ID  #Hypotension  #Recent fever  #Concern for septic shock  Post-procedural fever 10/31/20 with intermittent hypotension.   - Discontinued vancomycin (10/31 - 11/1)  - Zosyn (10/31 - 11/3), Ceftrixone/Flagyl (11/4- current) per Medicine Service    CV  #Chronic Systolic Heart Failure with Decompensation  TTE 10/28 obtained in advance of potential procedures or with anticipation of potential future chemotherapies stable from prior (EF 35-40%), HFrEF (EF 35-40%) 2/2 ischemic cardiomyopathy; optimized medically for procedure (10/30)  - Mgmt per Medicine Service    Patient was seen and plan of care was discussed with attending physician Dr. Aquino    Thank you for the opportunity to partake in this patients plan of care. Please do not hesitate to page with questions. We will continue to follow.     Malena Leonard PA-C    Hematology/Oncology  Pager # [phone]  Phone # [phone]   ___________________________________________________________________    Subjective & Interval History:    Feeling well today, no concerns or acute events. BP soft overnight, he is asymptomatic. His appetite is still good but he doesn't feel hungry for meat which is unusual for him. We discussed that he can drink protein shakes if not getting enough protein in his diet. A comprehensive review of systems was reviewed with the patient and the pertinent positives are  "listed in the HPI above. Discussed plan and answered questions with patient and his wife at bedside.       Physical Exam:    Blood pressure 106/62, pulse 60, temperature 96.5  F (35.8  C), temperature source Oral, resp. rate 16, height 1.651 m (5' 5\"), weight 66.2 kg (145 lb 14.4 oz), SpO2 100 %.    Temp:  [96.5  F (35.8  C)-97.9  F (36.6  C)] 96.5  F (35.8  C)  Pulse:  [56-64] 60  Resp:  [16] 16  BP: ()/(34-67) 106/62  SpO2:  [98 %-100 %] 100 %  General: Alert and interactive. Lying in bed in no acute distress.  HEENT: PERRLA, EOMI, anicteric sclera, oropharynx is pink and moist without erythema/exudates/lesions/thrush.  Neck: Supple, full ROM.  Lymphatic: No palpable mandibular/cervical/supra/infraclavicular lymph nodes.  Cardiovascular: RRR. Normal S1/S2. No murmurs. Peripheral pulses 2+ in bilateral upper and lower extremities.   Respiratory: CTAB. No wheezes appreciated. Normal respiratory effort on ambient air.  Gastrointestinal: ostomy with good output. No abdominal tenderness or palpable masses.   Musculoskeletal: No joint swelling or muscle tenderness.   Extremities: No extremity edema.   Skin: Warm and intact. No concerning lesions or rashes on exposed skin surfaces. No jaundice.  Neurologic: Alert and fully oriented, CN II-XII grossly intact, appropriately responsive during interview.      Labs & Studies: I personally reviewed the following studies:  ROUTINE LABS (Last four results):  CMP  Recent Labs   Lab 11/04/20  0539 11/03/20  0502 11/02/20  0559 11/01/20  0607    139 141 142   POTASSIUM 3.7 4.2 4.0 3.4   CHLORIDE 106 109 112* 112*   CO2 22 23 22 22   ANIONGAP 9 7 6 8   * 137* 104* 69*   BUN 10 8 7 12   CR 1.33* 1.43* 1.57* 1.75*   GFRESTIMATED 51* 47* 42* 37*   GFRESTBLACK 59* 54* 48* 42*   KEIRA 8.4* 8.3* 8.1* 8.0*     CBC  Recent Labs   Lab 11/04/20  0539 11/03/20  0502 11/02/20  0559 11/01/20  0607   WBC 3.6* 4.4 3.8* 6.6   RBC 4.08* 4.08* 4.05* 3.80*   HGB 12.1* 12.1* 12.3* " 11.5*   HCT 38.3* 38.3* 37.8* 35.1*   MCV 94 94 93 92   MCH 29.7 29.7 30.4 30.3   MCHC 31.6 31.6 32.5 32.8   RDW 14.4 14.4 14.5 14.6   * 126* 121* 138*     INR  Recent Labs   Lab 11/03/20  0502 11/02/20  0559 11/01/20  0607 10/31/20  0614   INR 1.29* 1.29* 1.46* 1.43*       Medications list for reference:  Current Facility-Administered Medications   Medication     acetaminophen (TYLENOL) tablet 650 mg     atorvastatin (LIPITOR) tablet 80 mg     carvedilol (COREG) tablet 6.25 mg     cefTRIAXone (ROCEPHIN) 2 g vial to attach to  ml bag for ADULTS or NS 50 ml bag for PEDS     [Held by provider] clopidogrel (PLAVIX) tablet 75 mg     glucose gel 15-30 g    Or     dextrose 50 % injection 25-50 mL    Or     glucagon injection 1 mg     [Held by provider] enoxaparin ANTICOAGULANT (LOVENOX) injection 50 mg     famotidine (PEPCID) tablet 10 mg     ferrous sulfate (FEROSUL) tablet 325 mg     finasteride (PROSCAR) tablet 5 mg     [Held by provider] furosemide (LASIX) tablet 20 mg     gabapentin (NEURONTIN) capsule 300 mg     HOLD MEDICATION (one time)     HOLD MEDICATION (one time)     insulin aspart (NovoLOG) injection (RAPID ACTING)     [Held by provider] insulin detemir (LEVEMIR PEN) injection 15 Units     ipratropium (ATROVENT) 0.03 % spray 2 spray     isosorbide mononitrate (IMDUR) 24 hr half-tab 15 mg     lidocaine (LMX4) cream     lidocaine 1 % 0.1-1 mL     [Held by provider] losartan (COZAAR) tablet 25 mg     melatonin tablet 1 mg     metroNIDAZOLE (FLAGYL) tablet 500 mg     naloxone (NARCAN) injection 0.1-0.4 mg     ondansetron (ZOFRAN-ODT) ODT tab 4 mg    Or     ondansetron (ZOFRAN) injection 4 mg     polyethylene glycol (MIRALAX) Packet 17 g     prochlorperazine (COMPAZINE) injection 5 mg    Or     prochlorperazine (COMPAZINE) tablet 5 mg    Or     prochlorperazine (COMPAZINE) suppository 12.5 mg     senna-docusate (SENOKOT-S/PERICOLACE) 8.6-50 MG per tablet 1 tablet    Or     senna-docusate  (SENOKOT-S/PERICOLACE) 8.6-50 MG per tablet 2 tablet     sodium chloride (PF) 0.9% PF flush 3 mL     sodium chloride (PF) 0.9% PF flush 3 mL     sotalol (BETAPACE) tablet 120 mg

## 2020-11-04 NOTE — CONSULTS
Social Work Services Progress Note    Social Work Consult placed to assist patient with insurance/financial concerns.     previously met with patient and spouse regarding issue. Please review Social Work Progress Note dated 10/29/20 for more information.    Patient and spouse were encouraged to call the number on the back of his insurance card to get complete details about coverage and/or, visit [email or web address] to find a list of in-network plans that [facility] providers will be accepting as of 2021.    Paperwork provided as reference from the Financial Counseling Department.     ANANDA Rdz Hematology/Oncology Social Worker  Phone: [phone]  Pager: [phone]  [email or web address]

## 2020-11-04 NOTE — PLAN OF CARE
[id number]    VSS. Pt denies pain or nausea. Dinner bg 302, 7 units of insulin given. Bedtime glucose 313. Up with stand by assist. Using urinal at bedside, voiding spontaneously. Has colostomy bag, independent with cares. Continue plan of care.

## 2020-11-04 NOTE — PLAN OF CARE
Status: Patient admitted for abdominal pain concerning for colorectal cancer.   VS: VSS on RA. SBPs soft 90-100s. Asymptomatic.   Neuros: A&Ox4. N/T to fingers at baseline.   GI/: Tolerating low fat, 2 g Na+ diet. Denies nausea. Colostomy in place. Voiding spontaneously.   IV: L PIV SL.  Activity: Up w/ Ax1.   Pain: Incisional pain controlled w/ PRN Tylenol. C/o CP. EKG showing A paced, trop negative. Nitroglycerin ordered, pharmacy to verify.   Respiratory/Trach: No issues.   Skin: Intact.   Plan of Care: Continue to monitor and follow POC.

## 2020-11-04 NOTE — PROGRESS NOTES
[facility]     Progress Note - Robyn 2 Service        Date of Admission:  10/27/2020    Assessment & Plan       Wesley Cooper is a 75 year old male with PMH of diverticulitis s/p descending colostomy, chronic systolic heart failure with decompensation, CAD (s/p CABGx3, multiple PCIs), and atrial flutter on warfarin, DM2, tobacco use, and RLS admitted as transfer from OSH on 10/27/2019 for evaluation of suspected metastatic colorectal malignancy as demonstrated on CT AP at OSH on 10/27 following initial presentation with abdominal pain and diarrhea. GI consulted with colonoscopy and EGD performed 10/30 with biopsy concerning for colorectal malignancy.    Changes Today:  -continue carvedilol, hold Imdur for procedure 11/5, continue holding losartan  -flagyl and ciprofloxacin (started 11/2) switched to flagyl and ceftriaxone (11/4) d/t EKG findings of QTc prolongation (566)  -liver biopsy tentatively scheduled for 11/5 , NPO at midnight and holding plavix/lovenox today   -oncology and IR consulted, appreciating recs     #Hypotension  #Recent fever  #Concern for septic shock  Febrile early on 10/31 post-procedurally. Had some hypotension during colonosopy and has had intermittent hypotension since which has been fluid responsive. Started on broad spectrum antibiotics early am 10/31 (see below). UA bland. Blood cultures NGTD. Initial screening chest CT (done post procedurally) noted small mesenteric air at biopsy site. Repeat abdominal CT 11/1 with no mesenteric air and no other evidence of infection/abscess/ischemia. No leukocytosis. Minimal abdominal pain.   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 Cr 1.1 on admission. Subsequent rise starting on 10/31 following colonoscopy, intermittent hypotension, fever, and recent CT with contrast. Differential includes volume depletion, prerenal or ATN from hypotension/sepsis (in setting of losartan), contrast induced nephropathy with timing of risk ~3 days following CT. Less likely antibiotic related due to timing. Suspect prerenal/ATN with hypotension and ARB as most likely etiology.    - Daily BMP  - Monitor UOP    #Colorectal mass, as demonstrated on CT AP 10/27  #Hepatic mass  Initially presented with one week abdominal and diarrhea with CT AP obtained 10/27 prior to transfer with colonic thickening of the hepatic flexure and hepatic mass with associated occlusive tumor thrombus concerning for metastatic disease, although cannot rule out primary malignancy. Transferred to [facility] for further evaluation and management. Underwent EGD and colonoscopy 10/30 with histopathology consistent with tubulovillous adenoma without analysis of . Hepatitis serologies negative (not hep B immune), US abd with dopplers with PV thrombosis and redemonstration of mass. CT chest with no lung mets.   Colorectal surgery consulted with no plans for surgical intervention currently in favor of chemotherapy as first line.  - Oncology consulted    - Colorectal surgery consult placed per oncology  - Consult SW for insurance per Heme/Onc recs  - IR consulted for Liver biopsy tentatively for 11/5  - Pathology stains pending    #Portal vein thrombosis  #Supratherapeutic INR  Discovered on CT and confirmed on US, occurred despite patient being anticoagulated on warfarin (and supratherapeutic) and on plavix. Suspected provoked in setting of malignancy.  - Anticoagulation with lovenox for now (started 10/31) due to malignancy-induced thrombosis, holding 11/4 for tentative procedure  11/5  - Hold plavix today for procedure 11/5  - heme onc recs state no PVT anticoag needed, anticoag per cardiologic factors    #Chronic Systolic Heart Failure with Decompensation  TTE 10/28 obtained in advance of potential procedures or with anticipation of potential future chemotherapies stable from prior (EF 35-40%), HFrEF (EF 35-40%) 2/2 ischemic cardiomyopathy; optimized medically for procedure (10/30)  - Continue PTA carvedilol  - Hold PTA Imdur for procedure 11/5  - Continue holding losartan due to DANITA   - Continue PTA atorvastatin    #Supportive  -zofran PRN for nausea  -miralax and senna PRN for constipation     Chronic Issues:  #DMII  - Holding PTA glipizide  - High-dose sliding scale insulin  #Hyperlipidemia  - Resume PTA atorvastatin  #Hypertension  #CAD  #History of atrial flutter  - Resume PTA sotalol  - anticoagulation with enoxaparin as above  #GERD  - Resume PTA famotidine  #Neuropathic Pain  - Gabapentin 300mg TID  - Tylenol PRN  #Iron deficiency anemia  - Continue PTA ferrous sulfate     Diet: Combination Diet 2 gm NA Diet  Fluid restriction 2000 ML FLUID    Fluids: PO intake  Lines: peripheral IV L, Colostomy L abdomen  DVT Prophylaxis: Pneumatic Compression Devices  Souza Catheter: not present  Code Status: No CPR- Do NOT Intubate DNR/DNI    Disposition Plan   Expected discharge: > 7 days, recommended to prior living arrangement once adequate workup of abdominal pain completed and patient is safe/independent for discharge.  Entered: Alis Brian 11/04/2020, 12:01 PM        Resident/Fellow Attestation   I, Radha Ricketts MD, was present with the medical student who participated in the service and in the documentation of the note.  I have verified the history and personally performed the physical exam and medical decision making.  I agree with the assessment and plan of care as documented in the note.      Radha Ricketts MD  PGY3  Date of Service (when I saw the  patient): 11/2/20      Radha Ricketts  PGY-3, Internal Medicine  P: [id number]  ______________________________________________________________________    Interval History   Wesley had chest pain radiating to his L arm and shoulder overnight lasting 1-2h until nitroglycerin was provided, with sustained relief. An EKG and troponin were ordered and came back stable/WNL. His antihypertensive meds were held d/t BP in the 80s/34-49 during this episode. He states this happens to him normally while at home roughly 1x/wk and he typically takes 1-2tabs of nitroglycerin which resolves his pain.     He denies chest/arm/shoulder pain this AM; denies pain with breathing, SOB, abdominal pain. He has continued abdominal tenderness relieved by tylenol (x1) overnight. He has good appetite. Denies dizziness, lightheadedness, SOB, dyspnea on exertion, nausea/vomiting. Adequate UOP without changes in frequency/color.     4 point ROS otherwise negative.     Physical Exam   Vital Signs: Temp: 96.8  F (36  C) Temp src: Oral BP: 124/67 Pulse: 61   Resp: 16 SpO2: 100 % O2 Device: None (Room air)    Weight: 145 lbs 14.4 oz  Constitutional: awake, alert, cooperative, NAD  HEENT: Normocephalic, atraumatic. EOMI  Respiratory: Lungs clear to auscultation bilaterally, no wheezes/crackles  Cardiovascular: RRR, no MRG, no LE edema noted  GI: Soft, tender to palpation in right lumbar quadrant, no guarding, no rebound, no distension, colostomy bag in place (L abdomen)  Extremities: No swelling or tenderness or skin changes.     I/O last 3 completed shifts:  In: 120 [P.O.:120]  Out: 550 [Urine:550]    Data   Recent Labs   Lab 11/04/20  0539 11/03/20  0502 11/02/20  0559 11/01/20  0607   WBC 3.6* 4.4 3.8* 6.6   HGB 12.1* 12.1* 12.3* 11.5*   MCV 94 94 93 92   * 126* 121* 138*   INR  --  1.29* 1.29* 1.46*    139 141 142   POTASSIUM 3.7 4.2 4.0 3.4   CHLORIDE 106 109 112* 112*   CO2 22 23 22 22   BUN 10 8 7 12   CR 1.33* 1.43* 1.57* 1.75*    ANIONGAP 9 7 6 8   KEIRA 8.4* 8.3* 8.1* 8.0*   * 137* 104* 69*   TROPI <0.015  --   --   --      Other labs: AFP (2727), CEA (2.5),  (35), COVID negative, INR 1.29, viral hep serologies negative    CT negative for free air.    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

## 2020-11-04 NOTE — PROVIDER NOTIFICATION
Robyn cross cover notified - Pt c/o CP that wraps around L shoulder. VSS on RA. No c/o SOB. EKG ordered. States he takes PRN Nitro to relieve. Do you want to order trop?     MD at bedside to assess. Trop ordered. Will continue to monitor.

## 2020-11-04 NOTE — PROGRESS NOTES
COLON & RECTAL SURGERY  PROGRESS NOTE    November 4, 2020    SUBJECTIVE:  MRI yesterday compatible with HCC. Currently tolerating diet with +stoma function.     OBJECTIVE:  Temp:  [96.5  F (35.8  C)-97.8  F (36.6  C)] 97.6  F (36.4  C)  Pulse:  [56-76] 76  Resp:  [16-18] 18  BP: ()/(34-67) 96/56  SpO2:  [98 %-100 %] 98 %    Intake/Output Summary (Last 24 hours) at 11/4/2020 1621  Last data filed at 11/4/2020 1000  Gross per 24 hour   Intake 120 ml   Output 450 ml   Net -330 ml       GENERAL:  Awake, alert, no acute distress  HEAD: Nomocephalic atraumatic  RESPIRATORY: unlabored breathing on room air   EXTREMITIES: warm and well perfused  ABDOMEN:  Soft, nontender, non-distended, stoma pink with stool in bag      LABS:  Lab Results   Component Value Date    WBC 3.6 11/04/2020     Lab Results   Component Value Date    HGB 12.1 11/04/2020     Lab Results   Component Value Date    HCT 38.3 11/04/2020     Lab Results   Component Value Date     11/04/2020     Last Basic Metabolic Panel:  Lab Results   Component Value Date     11/04/2020      Lab Results   Component Value Date    POTASSIUM 3.7 11/04/2020     Lab Results   Component Value Date    CHLORIDE 106 11/04/2020     Lab Results   Component Value Date    KEIRA 8.4 11/04/2020     Lab Results   Component Value Date    CO2 22 11/04/2020     Lab Results   Component Value Date    BUN 10 11/04/2020     Lab Results   Component Value Date    CR 1.33 11/04/2020     Lab Results   Component Value Date     11/04/2020       ASSESSMENT/PLAN: 75M with significant cardiac history and recent imaging/endoscopic findings concerning for a hepatic flexure colon cancer as well as primary HCC of liver. Since has no risk factors for HCC, current plan is to undergo percutaneous liver biopsy tomorrow to determine etiology of liver mass. If found to be metastatic colon cancer, would favor upfront chemotherapy rather than surgery to assess for response. If found to be  primary HCC, would also favor treating the HCC first to assess response prior to addressing colon cancer. Explained this to patient, and he is understanding of the plan. Will plan to follow up as an outpatient. Discussed with Dr. Diamond.

## 2020-11-04 NOTE — PROVIDER NOTIFICATION
Paged provider via web at [phone]    pt bp 81/34 and 87/49 on recheck on left arm, 99/55 on right arm. Pt asymptomatic.    Provider returned page: No plan for intervention. Continue to monitor pt and notify if there are any changes.

## 2020-11-04 NOTE — PROVIDER NOTIFICATION
After getting consented for liver biopsy in IR tomorrow, patient has decided he wants to be full code.  Paged Dr Ricketts [phone] to come speak to patient.

## 2020-11-04 NOTE — CONSULTS
Patient is on IR schedule 11/5/2020 for a image guided liver lesion biopsy. Patient has a complicated medical history with recent metastatic colorectal malignancy and imaging concerning for a second primary HCC. Team would like a liver lesion biopsy to get a tissue diagnosis.   Labs WNL for procedure.    Orders have been entered.   Medications to be held include: lovenox and plavix - patient received a single non-loading dose of plavix on 11/3/2020 - approved for biopsy by Dr. Pierre   Consent will be done prior to procedure.      Please contact the IR charge RN at [phone] for estimated time of procedure.     Case discussed with requesting physician Dr. Ricketts and IR staff Dr. Washington.    Jaswant Bain PA-C  Interventional Radiology  Phone: [phone]  Pager: [phone]

## 2020-11-04 NOTE — PLAN OF CARE
Patient feeling well today; no complaints of pain or nausea.  Patient will be NPO after midnight for liver mass biopsy in IR; Lovenox will be held.  Liver mass biopsy results will determine care plan (colon ca w/liver mets vs liver ca w/colon mets vs both primary liver & colon cancers).   & 208; covered per sliding scale.  Patient independent with colostomy cares.

## 2020-11-05 NOTE — CONSULTS
"SURGICAL ONCOLOGY CONSULT NOTE    Consult for: Liver mass    HPI    Mr. Cooper is a 74 yo M with PMH of CABG x3, multiple PCI's, DM2, diverticulitis, tobacco use, ischemic cardiomyopathy, restless leg syndrome, atrial flutter and PSH of sigmoid resection? with end colostomy (15 years ago as per notes) with a newly diagnosed liver mass and hepatic flexure mass.     He underwent a colonoscopy on 10/30 which showed a hepatic flexure mass (biopsy showed intramucosal carcinoma). Additionally CT abdomen showed is an ill-defined heterogeneous mass centered in the middle/left hepatic lobe involving segment II and HOLLY measuring approximately 5.1 x 4.6 x  4.2 cm.  There is extension of the mass in the left and main portal vein resulting in complete occlusion . MRI confirmed mass in the left lobe of liver involving segment 2/3 and 4 measuring up to 6.3 cm with associated tumor thrombus invading the portal vein extending to the main portal vein, most suspicious for hepatocellular carcinoma. AFP>2000 with normal CEA and CA 19-9 most consistent with HCC. He is undergoing a biopsy with IR today. Labs show INR 1.29, WNL Bili , albumin 2.8, Plts 125.     ROS: Negative unless noted in HPI  PMH and PSH: CABG x3, multiple PCI's, DM2, diverticulitis, tobacco use, ischemic cardiomyopathy, restless leg syndrome, atrial flutter and PSH of sigmoid resection with end colostomy (15 years ago as per notes)   Allergies: NKDA  Meds: See Epic  FH : No h/o HCC (in [facility])    O/E  /68 (BP Location: Left arm)   Pulse 58   Temp 97.6  F (36.4  C) (Oral)   Resp 16   Ht 1.651 m (5' 5\")   Wt 66.2 kg (145 lb 14.4 oz)   SpO2 99%   BMI 24.28 kg/m    Gen: AO x 3  Resp: NLB  Abd: soft, non distended    A/P     74 yo M with PMH of CABG x3, multiple PCI's, DM2, diverticulitis, tobacco use, ischemic cardiomyopathy, restless leg syndrome, atrial flutter and PSH of sigmoid resection? with end colostomy (15 years ago as per notes) with a newly diagnosed " liver mass and hepatic flexure mass.    - His liver mass (likely HCC) is anatomically unresectable given main portal vein involvement/tumor thrombus  - Recommend medical oncology involvement after tissue diagnosis  - Will plan to discuss in Liver conference regarding other options (chemo vs TACE vs TARE)  - Surgical oncology will sign off  - Recommend colorectal surgery outpatient referral for hepatic flexure mass    Brennen Mccann MD  PGY 5

## 2020-11-05 NOTE — PROGRESS NOTES
[facility]     Progress Note - Robyn 2 Service        Date of Admission:  10/27/2020    Assessment & Plan       Wesley Cooper is a 75 year old male with PMH of diverticulitis s/p descending colostomy, chronic systolic heart failure with decompensation, CAD (s/p CABGx3, multiple PCIs), and atrial flutter on warfarin, DM2, tobacco use, and RLS admitted as transfer from OSH on 10/27/2019 for evaluation of suspected metastatic colorectal malignancy as demonstrated on CT AP at OSH on 10/27 following initial presentation with abdominal pain and diarrhea. GI consulted with colonoscopy and EGD performed 10/30 with biopsy concerning for colorectal malignancy.    Changes Today:  -IR liver bx today  -holding plavix/lovenox/Imdur/losartan, continue carvedilol PTA  -continue flagyl and ceftriaxone   -NPO since midnight until procedure  -oncology and IR consulted, appreciating recs     #Hypotension  #Recent fever  #Concern for septic shock  Febrile early on 10/31 post-procedurally. Had some hypotension during colonosopy and has had intermittent hypotension since which has been fluid responsive. Started on broad spectrum antibiotics early am 10/31 (see below). UA bland. Blood cultures NGTD. Initial screening chest CT (done post procedurally) noted small mesenteric air at biopsy site. Repeat abdominal CT 11/1 with no mesenteric air and no other evidence of infection/abscess/ischemia. No leukocytosis. Minimal abdominal pain.   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 Cr 1.1 on  admission. Subsequent rise starting on 10/31 following colonoscopy, intermittent hypotension, fever, and recent CT with contrast. Differential includes volume depletion, prerenal or ATN from hypotension/sepsis (in setting of losartan), contrast induced nephropathy with timing of risk ~3 days following CT. Less likely antibiotic related due to timing. Suspect prerenal/ATN with hypotension and ARB as most likely etiology.    - Daily BMP   -11/5: creatinine 1.21, BUN 12   - Monitor UOP    #Colorectal mass, as demonstrated on CT AP 10/27  #Hepatic mass  Initially presented with one week abdominal and diarrhea with CT AP obtained 10/27 prior to transfer with colonic thickening of the hepatic flexure and hepatic mass with associated occlusive tumor thrombus concerning for metastatic disease, although cannot rule out primary malignancy. Transferred to [facility] for further evaluation and management. Underwent EGD and colonoscopy 10/30 with histopathology consistent with tubulovillous adenoma without analysis of . Hepatitis serologies negative (not hep B immune), US abd with dopplers with PV thrombosis and redemonstration of mass. CT chest with no lung mets.   Colorectal surgery consulted with no plans for surgical intervention currently in favor of chemotherapy as first line.  - Oncology consulted    - Colorectal surgery consult placed per oncology  - Consult SW for insurance per Heme/Onc recs  - IR consulted for Liver biopsy 11/5  - Pathology stains pending    #Portal vein thrombosis  #Supratherapeutic INR  Discovered on CT and confirmed on US, occurred despite patient being anticoagulated on warfarin (and supratherapeutic) and on plavix. Suspected provoked in setting of malignancy.  - Anticoagulation with lovenox for now (started 10/31) due to malignancy-induced thrombosis, holding 11/4 for tentative procedure 11/5  - Hold plavix today for procedure 11/5  - heme onc recs state no PVT anticoag needed, anticoag per cardiologic  factors    #Chronic Systolic Heart Failure with Decompensation  TTE 10/28 obtained in advance of potential procedures or with anticipation of potential future chemotherapies stable from prior (EF 35-40%), HFrEF (EF 35-40%) 2/2 ischemic cardiomyopathy; optimized medically for procedure (10/30)  - Continue PTA carvedilol  - Hold PTA Imdur for procedure 11/5  - Continue holding losartan due to DANITA   - Continue PTA atorvastatin    #Supportive  -zofran PRN for nausea  -miralax and senna PRN for constipation     Chronic Issues:  #DMII  - Holding PTA glipizide  - High-dose sliding scale insulin  #Hyperlipidemia  - Resume PTA atorvastatin  #Hypertension  #CAD  #History of atrial flutter  - Resume PTA sotalol  - anticoagulation with enoxaparin as above  #GERD  - Resume PTA famotidine  #Neuropathic Pain  - Gabapentin 300mg TID  - Tylenol PRN  #Iron deficiency anemia, RLS   - Continue PTA ferrous sulfate     Diet: NPO per Anesthesia Guidelines for Procedure/Surgery Except for: Meds    Fluids: PO intake  Lines: peripheral IV L, Colostomy L abdomen  DVT Prophylaxis: Pneumatic Compression Devices  Souza Catheter: not present  Code Status:   DNR/DNI    Disposition Plan   Expected discharge: > 7 days, recommended to prior living arrangement once adequate workup of abdominal pain completed and patient is safe/independent for discharge.  Entered: Alis Brian 11/05/2020, 9:37 AM        Resident/Fellow Attestation   I, Britta Terry MD, was present with the medical student who participated in the service and in the documentation of the note.  I have verified the history and personally performed the physical exam and medical decision making.  I agree with the assessment and plan of care as documented in the note.      Britta Terry MD  PGY1  Date of Service (when I saw the patient): 11/2/20  ______________________________________________________________________    Interval History   Wesley triggered the sepsis protocol due to lactate  1.7 and low BP (80s/50s), BP returned to baseline on recheck and he continues to do well this AM. He continues to have abdominal tenderness (unchanged from admission), denies other pain, n/v, c/d, dizziness, chest pain, or LE swelling. He states he didn't sleep very well last night and that he usually does not sleep well even at home, with difficulty both falling and staying asleep. He does not take anything at home for sleep concerns. Notably, he has a hx of RLS.     4 point ROS otherwise negative.     Physical Exam   Vital Signs: Temp: 97.6  F (36.4  C) Temp src: Oral BP: 129/68 Pulse: 58   Resp: 16 SpO2: 99 % O2 Device: None (Room air)    Weight: 145 lbs 14.4 oz  Constitutional: awake, alert, cooperative, NAD  HEENT: Normocephalic, atraumatic. EOMI  Respiratory: Lungs clear to auscultation bilaterally, no wheezes/crackles  Cardiovascular: RRR, no MRG, no LE edema   GI: Soft, tender to palpation in right lumbar quadrant, no guarding, no rebound, no distension, colostomy bag in place (L abdomen) with green/dark brown output consistent with prior days  Extremities: No swelling or tenderness or skin changes.     I/O last 3 completed shifts:  In: -   Out: 700 [Urine:700]    Data   Recent Labs   Lab 11/05/20  0621 11/04/20  0539 11/03/20  0502 11/02/20  0559 11/01/20  0607   WBC 4.6 3.6* 4.4 3.8* 6.6   HGB 12.3* 12.1* 12.1* 12.3* 11.5*   MCV 93 94 94 93 92   * 129* 126* 121* 138*   INR  --   --  1.29* 1.29* 1.46*    137 139 141 142   POTASSIUM 3.6 3.7 4.2 4.0 3.4   CHLORIDE 107 106 109 112* 112*   CO2 22 22 23 22 22   BUN 12 10 8 7 12   CR 1.21 1.33* 1.43* 1.57* 1.75*   ANIONGAP 7 9 7 6 8   KEIRA 8.8 8.4* 8.3* 8.1* 8.0*   * 258* 137* 104* 69*   TROPI  --  <0.015  --   --   --      Other labs: AFP (2727), CEA (2.5),  (35), COVID negative, INR 1.29, viral hep serologies negative, lactate 1.7    CT negative for free air.    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

## 2020-11-05 NOTE — PLAN OF CARE
[id number]    Vital signs:  Temp: 97.9  F (36.6  C) Temp src: Oral BP: 109/59 Pulse: 64   Resp: 16 SpO2: 98 % O2 Device: None (Room air)     Denies pain or nausea. Pt triggered sepsis due to low bp, lactate 1.7, provider notified. Bedtime glucose 201, pt's insulin detemir held by provider. Pt independent with colostomy cares. NPO at midnight for liver biopsy tomorrow, pt aware.

## 2020-11-05 NOTE — PROGRESS NOTES
"CLINICAL NUTRITION SERVICES - ASSESSMENT NOTE     Nutrition Prescription    RECOMMENDATIONS FOR MDs/PROVIDERS TO ORDER:  - Recommend checking Mg and PO4 for review, d/t risk for increase losses secondary to loose stools x 1 week PTA  - Once diet resumes post procedure, recommend Moderate Consistent CHO diet to help with BS management.    Malnutrition Status:    Unable to determine at this time    Recommendations already ordered by Registered Dietitian (RD):  None at this time     Future/Additional Recommendations:  Monitor for diet adv, tolerance, intakes and need for further intervention if consuming < 75% of meals.     REASON FOR ASSESSMENT  Wesley Cooper is a/an 75 year old male assessed by the dietitian for [facility]    NUTRITION HISTORY  Unable to obtain secondary to pt off the floor for procedure. Per chart review, pt admitted with a one week h/o N/V, weakness and confusion. Has also had one week h/o loose stools via colostomy bag. Abd pain has also been a problem.     CURRENT NUTRITION ORDERS  Diet: NPO since MN. Previously on a Regular diet/2 gm Sodium diet   Intake/Tolerance: Per RN/flowsheet (pt off floor at time of visit), pt consuming on average > 75% of meal intakes.     LABS  No Mg or PO4 ordered yet this admission  BS in low to mid 200's over past 24 hrs - on insulin (high resistance dosing) before meals and Levemir at bedtime per review of MAR.  Hemoglobin A1C 8.6 (high) on 10/28    MEDICATIONS  Medications reviewed    ANTHROPOMETRICS  Height: 165.1 cm (5' 5\")  Most Recent Weight: 66.2 kg (145 lb 14.4 oz) - yesterday's, Admit wt = 66.9 kg (10/27)  IBW: 56.8 kg  BMI: Normal BMI  Weight History: Wt fairly stable since admit. Per review of EMR, wt loss of >2% over past 2 mos. Over the past year, wt loss of 12 lbs (>7% loss).  Wt Readings from Last 10 Encounters:   11/04/20 66.2 kg (145 lb 14.4 oz)   09/09/20 67.8 kg (149 lb 8 oz)   11/25/19 71.2 kg (157 lb)   08/19/19 76.3 kg (168 lb 3.2 oz)   05/20/19 77.1 kg " (169 lb 14.4 oz)   03/28/19 74.6 kg (164 lb 6.4 oz)   12/17/18 70.8 kg (156 lb)   11/07/18 66.9 kg (147 lb 6.4 oz)   11/05/18 69.9 kg (154 lb)   07/30/18 70 kg (154 lb 6.4 oz)       Dosing Weight: 66 kg (based on lowest wt of 66.2 kg on 11/4)    ASSESSED NUTRITION NEEDS  Estimated Energy Needs:7842-9417 kcals/day (25 - 30 kcals/kg)  Justification: Maintenance  Estimated Protein Needs:  grams protein/day (1.2 - 1.5 grams of pro/kg)  Justification: Repletion  Estimated Fluid Needs: (1 mL/kcal)   Justification: Maintenance    PHYSICAL FINDINGS  See malnutrition section below.  Unable to assess at this time    MALNUTRITION  % Intake: Decreased intake does not meet criteria since admission  % Weight Loss: Up to 20% in 1 year (non-severe)  Subcutaneous Fat Loss: Unable to assess  Muscle Loss: Unable to assess  Fluid Accumulation/Edema: None noted  Malnutrition Diagnosis: Unable to determine due to unable to complete all parameters of nutrition assessment    NUTRITION DIAGNOSIS  Unintended weight loss related to question inconsistent nutritional intakes d/t h/o N/V, confusion, abd pain and loose stools, PTA vs hypercatabolism with illness as evidenced by recent wt loss of > 2% over past 2 mos with wt loss of 12 lbs (>7% loss) over past year.    INTERVENTIONS  Implementation  Nutrition Education: No education needs assessed at this time     Goals  1. Wt to remain > 66 kg  2. Patient to consume % of nutritionally adequate meal trays TID, or the equivalent with supplements/snacks.     Monitoring/Evaluation  Progress toward goals will be monitored and evaluated per protocol.    Karen Singletary RD,LD  7D pager [phone]

## 2020-11-05 NOTE — PRE-PROCEDURE
GENERAL PRE-PROCEDURE:   Procedure:  Biopsy    Written consent obtained?: Yes    Risks and benefits: Risks, benefits and alternatives were discussed    Consent given by:  Patient  Patient states understanding of procedure being performed: Yes    Patient's understanding of procedure matches consent: Yes    Procedure consent matches procedure scheduled: Yes    Expected level of sedation:  Moderate  Appropriately NPO:  Yes  ASA Class:  Class 2- mild systemic disease, no acute problems, no functional limitations  Mallampati  :  Grade 2- soft palate, base of uvula, tonsillar pillars, and portion of posterior pharyngeal wall visible  Lungs:  Lungs clear with good breath sounds bilaterally  Heart:  Normal heart sounds and rate  History & Physical reviewed:  History and physical reviewed and no updates needed  Statement of review:  I have reviewed the lab findings, diagnostic data, medications, and the plan for sedation

## 2020-11-05 NOTE — TELEPHONE ENCOUNTER
ONCOLOGY INTAKE: Records Information      APPT INFORMATION:  Referring provider:  Dr. David Singh  Referring provider s clinic:  [facility]  Reason for visit/diagnosis:  colon cancer  Has patient been notified of appointment date and time?: Yes    RECORDS INFORMATION:  Were the records received with the referral (via [employer])? no    Has patient been seen for any external appt for this diagnosis? no    If yes, where? n/a    Has patient had any imaging or procedures outside of [place] for this condition? no      If Yes, where? n/a    ADDITIONAL INFORMATION:  none

## 2020-11-05 NOTE — PLAN OF CARE
Patient here with two suspicious masses.One in colon and one on the liver.He is from OSH to have a liver biopsy today in IR    Has been NPO since midnight.Has had a colostomy for diverticulitis and is independent with cares.Denied pain and nausea.Tiggered sepsis protocol with hypotension.On-call doctor aware.0200 blood sugar=257.Afeb OVSS.SBA.Continue to monitor

## 2020-11-05 NOTE — IR NOTE
Patient Name: Wesley Cooper  Medical Record Number: [id number]  Today's Date: 11/5/2020    Procedure: liver lesion biopsy  Proceduralist: Dr. Pierre    Procedure Start: 1110  Procedure end: 1125  Sedation medications administered  Versed  2 Mg., fentanyl  100 Mcg.    Report given to: Beth LINN RN    Other Notes: Pt arrived to IR room 7  from 7D. Consent reviewed. Pt denies any questions or concerns regarding procedure. Pt positioned supine  and monitored per protocol. Pt tolerated procedure without any noted complications. Pt transferred back to 7D.  4 Cores placed in formalin and sent to pathology.

## 2020-11-05 NOTE — PLAN OF CARE
Patient had liver biopsy in IR this shift.  Patient a bit hypotensive post procedure; rebounded without intervention.  Per Surg Onc, liver mass is anatomically unresectable, so this team signed off.  Deferring chemo plans to Oncology once biopsy has been resulted (colon ca w/mets vs. liver ca w/colon mets vs both colon & liver primary cancers).  Patient ambulated halls with wife this shift.   (not covered while NPO) & 139 (below parameters for coverage) this shift.

## 2020-11-05 NOTE — PROCEDURES
[facility]     Procedure: IR Procedure Note    Date/Time: 11/5/2020 11:26 AM  Performed by: Alexis Pierre MD  Authorized by: Alexis Pierre MD     UNIVERSAL PROTOCOL   Site Marked: NA  Prior Images Obtained and Reviewed:  Yes  Required items: Required blood products, implants, devices and special equipment available    Patient identity confirmed:  Verbally with patient, arm band, provided demographic data and hospital-assigned identification number  Patient was reevaluated immediately before administering moderate or deep sedation or anesthesia  Confirmation Checklist:  Patient's identity using two indicators, relevant allergies, procedure was appropriate and matched the consent or emergent situation and correct equipment/implants were available  Time out: Immediately prior to the procedure a time out was called    Universal Protocol: the Joint Commission Universal Protocol was followed    Preparation: Patient was prepped and draped in usual sterile fashion           ANESTHESIA    Anesthesia: Local infiltration  Local Anesthetic:  Lidocaine 1% without epinephrine      SEDATION    Patient Sedated: Yes    Sedation Type:  Moderate (conscious) sedation  Vital signs: Vital signs monitored during sedation    See dictated procedure note for full details.  Findings: -Successful liver lesion biopsy.     Specimens: none    Complications: None    Condition: Stable    PROCEDURE   Patient Tolerance:  Patient tolerated the procedure well with no immediate complications    Length of time physician/provider present for 1:1 monitoring during sedation: 15

## 2020-11-06 NOTE — CONSULTS
"Care Management Follow Up    Length of Stay (days): 10    Expected Discharge Date: 11/07/20     Concerns to be Addressed: IMM needed.    Patient plan of care discussed at interdisciplinary rounds: Yes    Anticipated Discharge Disposition:  Home     Anticipated Discharge Services:  None    Anticipated Discharge DME:  None    Patient/family educated on Medicare website which has current facility and service quality ratings:  N/A    Education Provided on the Discharge Plan:  Yes    Patient/Family in Agreement with the Plan:  Yes    Referrals Placed by CM/SW:  N/A    Private pay costs discussed: Not applicable    Additional Information:    Patient was  transferred from an OSH after CT A/P showed new liver mass and colonic thickening concerning for malignancy. Per team, patient will likely discharge tomorrow, with OP Oncology follow-up.     Writer met with the patient to introduce the role of the care coordinator and assess discharge needs. Patient does not currently received any in-home supports or services at this time. Writer presented patient with the \"Important Message from Medicare\" document, obtained patient's signature and copy placed in patients chart. Patient could not anticipate any discharge needs at this time and had no questions or concerns.     Bita Leung, RN, BSN, PHN  Care Coordinator   P: [phone], [facility]                 "

## 2020-11-06 NOTE — PLAN OF CARE
[id number]    Afebrile, VSS. No acute event.   Liver lesion biopsy done at IR, no complication.  Regular diet.  No pain, nausea or vomiting. Pt. Comfortably resting.       NEURO: Alert and oriented x4.      RESPIRATORY: Room Air, denies dizziness, and SOB.     CARDIO: VSS, denies dizziness, and extremity pain.      GI/:  Denies N/V, BS active, BM x1 (colostomy), AUOP in the urinal at bedside.      SKIN: Intact.      ACTIVITY: Assist x1 or Stand by assist.      PAIN: Denies pain.    DLA: PIV, NS locked.     BG: Yes.      PLAN: Continue monitoring.

## 2020-11-06 NOTE — PROGRESS NOTES
Labs and Transfusion orders:  Date: 2020    Patient: Wesley Cooper  : 1945    LABS:  [ x ] Check CBC with differential and CMP once (fax labs to [facility] at [phone])      Please call the [facility] at [phone] for any questions, and ask to speak with the care coordinator for Dr. Hernandez (patient's primary oncologist).    Thank you,         Malena Leonard PA-C  Hematology/Oncology  Pager # [phone]  Phone # [phone]

## 2020-11-06 NOTE — TELEPHONE ENCOUNTER
RECORDS STATUS - ALL OTHER DIAGNOSIS      RECORDS RECEIVED FROM: [facility] (Imaging previously resolved, Internal Referral)   DATE RECEIVED: 11/6/20   NOTES STATUS DETAILS   OFFICE NOTE from referring provider Malena Mercado PA-C in [facility]   OFFICE NOTE from medical oncologist     DISCHARGE SUMMARY from hospital [facility] 10/27/20   DISCHARGE REPORT from the ER  - [facility] 10/27/20   OPERATIVE REPORT     MEDICATION LIST [facility]    CLINICAL TRIAL TREATMENTS TO DATE     LABS     PATHOLOGY REPORTS [facility] 11/5/20, 10/30/20: Surg Path   ANYTHING RELATED TO DIAGNOSIS Epic 11/6/20   GENONOMIC TESTING     TYPE:     IMAGING (NEED IMAGES & REPORT)     CT SCANS PACS [facility] - 11/3/20 - 10/27/20: [facility] in [facility] - 11/3/20 - 10/27/20: [facility] Reports in    MRI PACS    MAMMO     ULTRASOUND     PET

## 2020-11-06 NOTE — PLAN OF CARE
Vitals stable. Abdominal pain. PRN tylenol given without effect. PRN oxy ordered and given, with better relief. BG management with sliding scale insulin, 191 and 272 today. Good oral intake. Dumping ostomy independently. Will likely discharge tomorrow.

## 2020-11-06 NOTE — PROGRESS NOTES
Patient presented with abdominal pain, CT and MRI were conducted. Found 2 masses, suspected hepatocellular carcinoma. Liver biopsy done 11/5 result pending. Pt. On regular diet, good appetite. Blood glucose check before meals, sliding scale insulin orders. Pt. Has abdominal pain, described as sore and tender around abdomen (belt like), 5/10 pain, received tylenol which pt said did not help very much with the pain. MD notified, exploring other pain management. During saline flush of peripheral IV in left forearm pt. complained of pain, possible leaking, may need to be replaced. Pt. Reported numbness and tingling in feet and toes bilaterally, this is normal for the patient. VS stable. SPO2 97% on room air.

## 2020-11-06 NOTE — PROGRESS NOTES
"SPIRITUAL HEALTH SERVICES  SPIRITUAL ASSESSMENT Progress Note  [facility] 7D     REFERRAL SOURCE:  Initiated Follow Up    I visited with Al and his wife to talk about his results and diagnosis. They shared some frustrations that \"it feels like we hurry up and wait\" and that the information keeps changing from the care team regarding his diagnosis. Bianca shared that Al will have some decisions to make next week and Al named \"I just have to keep going\". I affirmed their supportive relationship and encouraged self-care.    PLAN: No follow up anticipated due to discharge.     Minerva Murdock  Chaplain Resident  Pager: [phone]    "

## 2020-11-06 NOTE — PROVIDER NOTIFICATION
MD paged:  Pt abd pain continues despite Tylenol. Can you discuss when rounding about more options for here/home?  Also do you want held meds (Lasix, lovenox, Plavix, losartan) unheld?

## 2020-11-06 NOTE — PROGRESS NOTES
[facility]     Progress Note - Robyn 2 Service        Date of Admission:  10/27/2020    Assessment & Plan       Wesley Cooper is a 75 year old male with PMH of diverticulitis s/p descending colostomy, chronic systolic heart failure with decompensation, CAD (s/p CABGx3, multiple PCIs), and atrial flutter on warfarin, DM2, tobacco use, and RLS admitted as transfer from OSH on 10/27/2019 for evaluation of suspected metastatic colorectal malignancy as demonstrated on CT AP at OSH on 10/27 following initial presentation with abdominal pain and diarrhea. GI consulted with colonoscopy and EGD performed 10/30 with biopsy concerning for colorectal malignancy.    Changes Today:  -restart plavix   -Started apixaban 5 mg twice daily instead of warfarin  -holding Imdur PTA given hypotension  -Holding losartan given DANITA  -continue carvedilol PTA   -The patient completed 7 days course of antibiotics using flagyl and ceftriaxone   -back on regular diet   -oncology tumor board scheduled for 11/9   -discharge likely tomorrow with desired f/u BMP in 1wk and restarting of losartan at a later date per outpatient PCP  -oxycodone 5mg PRN added for abdominal tenderness  -Given episodic abdominal pain and hypotension in the setting of recent liver biopsy and resumption of antiplatelets and anticoagulation, I am concerned for hepatic hematoma.  We will have low threshold to obtain CT abdomen and pelvis.  #Hypotension  #Recent fever  #Concern for septic shock  Febrile early on 10/31 post-procedurally. Had some hypotension during colonosopy and has had intermittent hypotension since which has been fluid responsive. Started on broad spectrum antibiotics early am 10/31 (see below). UA bland. Blood cultures NGTD. Initial screening chest CT (done post procedurally) noted small mesenteric air concerning for perforation. Repeat abdominal CT 11/1 with no mesenteric air and no other evidence of  infection/abscess/ischemia. No leukocytosis. Minimal abdominal pain.   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 Cr 1.1 on admission. Subsequent rise starting on 10/31 following colonoscopy, intermittent hypotension, fever, and recent CT with contrast. Differential includes volume depletion, prerenal or ATN from hypotension/sepsis (in setting of losartan), contrast induced nephropathy less likely given it started almost >48 hours following CT contrast. Suspect prerenal/ATN with hypotension and ARB as most likely etiology.    - Daily BMP  - Monitor UOP    #Colorectal mass, as demonstrated on CT AP 10/27  #Hepatic mass  Initially presented with one week abdominal and diarrhea with CT AP obtained 10/27 prior to transfer with colonic thickening of the hepatic flexure and hepatic mass with associated occlusive tumor thrombus concerning for metastatic disease, although cannot rule out primary malignancy. Transferred to [facility] for further evaluation and management. Underwent EGD and colonoscopy 10/30 with histopathology consistent with tubulovillous adenoma without analysis of . Hepatitis serologies negative (not hep B immune), US abd with dopplers with PV thrombosis and redemonstration of mass. CT chest with no lung mets.   Colorectal surgery consulted with no plans for surgical intervention currently in favor of chemotherapy as first line.  - Oncology consulted    - Colorectal surgery consulted  - Surgical oncology consulted   -  consulted for insurance per Heme/Onc recs  - S/p IR procedure, Pathology stains pending    #Portal vein thrombosis  #Supratherapeutic INR  Discovered on CT and  confirmed on US, occurred despite patient being anticoagulated on warfarin (and supratherapeutic) and on plavix. Suspected provoked in setting of malignancy.  - transition anticoagulation from lovenox to apixaban for long-term management , consult pharm per dosing   - Resume PTA plavix  - heme onc recs state no PVT anticoag needed, anticoag per cardiologic factors    #Chronic Systolic Heart Failure with Decompensation  TTE 10/28 obtained in advance of potential procedures or with anticipation of potential future chemotherapies stable from prior (EF 35-40%), HFrEF (EF 35-40%) 2/2 ischemic cardiomyopathy; optimized medically for procedure (10/30)  - Continue PTA carvedilol  - Resume PTA Imdur   - Continue holding losartan due to recent DANITA, to be restarted    - Continue PTA atorvastatin    #Supportive  -zofran PRN for nausea  -miralax and senna PRN for constipation  -oxycodone and tylenol prn for abdominal tenderness     Chronic Issues:  #DMII  - PTA glipizide and insulin detemir   - Transition off high-dose sliding scale insulin to home regimen per above  #Hyperlipidemia  - Resume PTA atorvastatin  #Hypertension  #CAD  #History of atrial flutter  - Resume PTA sotalol  - anticoagulation with Plavix, adding apixaban per pharm dosing   #GERD  - Resume PTA famotidine  #Neuropathic Pain  - Gabapentin 300mg TID  - Tylenol PRN  #Iron deficiency anemia, RLS   - Continue PTA ferrous sulfate     Diet: Regular Diet Adult    Fluids: PO intake  Lines: peripheral IV L, Colostomy L abdomen  DVT Prophylaxis: Pneumatic Compression Devices  Souza Catheter: not present  Code Status:   DNR/DNI    Disposition Plan   Expected discharge: > 7 days, recommended to prior living arrangement once adequate workup of abdominal pain completed and patient is safe/independent for discharge.  Entered: Alis Brian 11/06/2020, 9:32 AM        Resident/Fellow Attestation   I, Britta Terry MD, was present with the medical student who participated in  the service and in the documentation of the note.  I have verified the history and personally performed the physical exam and medical decision making.  I agree with the assessment and plan of care as documented in the note.      Britta Terry MD  PGY1  Date of Service (when I saw the patient): 11/2/20  ______________________________________________________________________    Interval History   Wesley received his liver biopsy yesterday with IR and he was seen by surgical oncology who said d/t PV infiltration the tumor will not be able to be surgically removed. He reports continued stable LRQ abdominal pain with minimal nausea and no vomiting this AM. He has no dizziness or SOB. He has been having regular brown/solid BMs by colostomy. He has been ambulating with assist.     4 point ROS otherwise negative.     Physical Exam   Vital Signs: Temp: 97.6  F (36.4  C) Temp src: Oral BP: (!) 141/77 Pulse: 66   Resp: 16 SpO2: 100 % O2 Device: None (Room air) Oxygen Delivery: 2 LPM  Weight: 145 lbs 14.4 oz  Constitutional: awake, alert, cooperative, NAD  HEENT: Normocephalic, atraumatic. EOMI  Respiratory: Lungs clear to auscultation bilaterally, no wheezes/crackles  Cardiovascular: RRR, no MRG, no LE edema   GI: Soft, tender to palpation in right lumbar quadrant, no guarding, no rebound, no distension, colostomy bag in place (L abdomen) with brown output consistent with prior days  Extremities: No swelling or tenderness or skin changes.     I/O last 3 completed shifts:  In: 200 [P.O.:200]  Out: 800 [Urine:800]    Data   Recent Labs   Lab 11/06/20  0623 11/05/20  0621 11/04/20  0539 11/03/20  0502 11/02/20  0559 11/01/20  0607   WBC 5.1 4.6 3.6* 4.4 3.8* 6.6   HGB 12.3* 12.3* 12.1* 12.1* 12.3* 11.5*   MCV 94 93 94 94 93 92   * 125* 129* 126* 121* 138*   INR  --   --   --  1.29* 1.29* 1.46*    137 137 139 141 142   POTASSIUM 3.8 3.6 3.7 4.2 4.0 3.4   CHLORIDE 108 107 106 109 112* 112*   CO2 24 22 22 23 22 22   BUN 11  12 10 8 7 12   CR 1.22 1.21 1.33* 1.43* 1.57* 1.75*   ANIONGAP 7 7 9 7 6 8   KEIRA 8.9 8.8 8.4* 8.3* 8.1* 8.0*   * 200* 258* 137* 104* 69*   TROPI  --   --  <0.015  --   --   --      Other labs: AFP (2727), CEA (2.5),  (35), COVID negative, INR 1.29, viral hep serologies negative, lactate 1.7    CT negative for free air.    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

## 2020-11-06 NOTE — PROGRESS NOTES
Hematology / Oncology  Daily Progress Note   Date of Service: 11/06/2020  Patient: Wesley Cooper  MRN: [id number]  Admission Date: 10/27/2020  Hospital Day # 10  Cancer Diagnosis: To be determined. 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 Liver MRI concerning for HCC. S/p biopsy of the liver mass 11/5. He will follow up with Oncology outpatient next week to review diagnosis and treatment options.     Recommendations:   - S/p liver biopsy 11/5. Results pending.   - Surgical Onc consulted for consideration of hepatic mass resection- His liver mass (likely HCC) is anatomically unresectable given main portal vein involvement/tumor thrombus  - Arranged consultation with Medical Oncology, Dr. Hernandez phone visit on 11/12. Will discuss diagnosis and treatment options at that time. Requested local labs 11/11  - No further Oncologic recommendations, ok to discharge when medically appropriate per the Medicine team    No further Oncology recommendations, will sign off.     HEME/ONCOLOGY  #Colonic lesion  #Liver mass with portal vein tumor invasion  He initially presented to an outside ED with about 2 weeks of abdominal pain, along with about on week of nausea and changes in stool output from ostomy. Subsequent CT A/P at the OSH showed a 5.1 x 4.6 x 4.2 cm mass in the mid/left hepatic lobes (seg II and [place]) with extension  of the mass in to the left and main portal vein causing complete occlusion, along with several smaller hypodense lesions in the same lobes, all concerning for malignancy. There was also circumferential thickening at the hepatic flexure of the colon, which was concerning for malignancy as well. Based on the patient's cardiac history, for which he follows at [facility], he was transferred to [facility] for further evaluation of his possible malignancy.   - AFP is elevated at about 2700, with grossly normal CEA; CA 19-9 is also normal  - Hepatitis virologies 10/28/20 are unremarkable  - He underwent colonoscopy by GI on 10/30, biopsy at the hepatic flexure consistent with at least intramucosal carcinoma. Due to the superficial nature of the biopsy invasion cannot be accurately determined. Likely adenocarcinoma   - MMR and NGS Colorectal (BRAF/[place]) ordered and in process  - CT chest 10/30/20- No metastatic disease. Small foci of air adjacent to the colon in the right upper quadrant mesentery presumably sequelae from same day colonoscopy. CT A/P 11/1 with no evidence of free air, no metastases outside of the liver.   - Given concern for oligometastatic colon adenocarcinoma (vs 2 primary malignancies, HCC and colon cancer), we consulted Colorectal surgery 11/3 to assess if he is a candidate for resection. They recommended chemotherapy as first line of treatment  - Liver MRI 11/3 with a mass in the left lobe of liver involving segment 2/3 and 4 measuring up to 6.3 cm with associated tumor thrombus invading the portal vein extending to the main portal vein, most suspicious for hepatocellular carcinoma  - Given concern for HCC on liver MRI but also with adenocarcinoma of the colon, recommend biopsy of the liver lesion with IR for clarification. Status post liver biopsy 11/5, no complications  - Surgical Onc consulted for consideration of hepatic mass resection. His liver mass (likely HCC) is anatomically unresectable given main portal  vein involvement/tumor thrombus  - Patient agreeable to follow up at the [facility] with GI Oncology- all of his other cares including his Cardiologist are at the [facility] so he would prefer to at least establish a plan through the [facility]. Perhaps at some point he will transition care to [facility] clinic in [place], [place]  - Arranged consultation with Oncology, Dr. Hernandez phone visit on 11/12. Will discuss diagnosis and treatment options at that time. Requested local labs 11/11      # Portal vein thrombosis  # Supratherapeutic INR  Discovered on CT and confirmed on US, occurred despite patient being anticoagulated on warfarin (and supratherapeutic) and on plavix. Suspect tumor thrombus.   - Anticoagulation with lovenox for now (started 10/31)   - Patient does not necessarily need to be on anticoagulation for the tumor thrombus, but would need anticoagulation for his cardiac history. Will defer anticoagulation to Medicine service and Cardiology    #History of Iron deficiency anemia  Ferritin 119 on 10/28/20, drawn while on PTA ferrous sulfate. Previous iron studies dating back to 12/2015 are not consistent with TANNER (transferrin 254, ferritin 725, TIBC 314, %sat 32, total iron 109), but he may have been taking iron at the time- it is unclear on chart review.   - PTA ferrous sulfate     ID  #Hypotension  #Recent fever  #Concern for septic shock  Post-procedural fever 10/31/20 with intermittent hypotension.   - Discontinued vancomycin (10/31 - 11/1)  - Zosyn (10/31 - 11/3), Ceftrixone/Flagyl (11/4- current) per Medicine Service    CV  #Chronic Systolic Heart Failure with Decompensation  TTE 10/28 obtained in advance of potential procedures or with anticipation of potential future chemotherapies stable from prior (EF 35-40%), HFrEF (EF 35-40%) 2/2 ischemic cardiomyopathy; optimized medically for procedure (10/30)  - Mgmt per Medicine Service    Plan of care was discussed with attending physician Dr. Aquino. Seen by JOSE  "only.     Thank you for the opportunity to partake in this patients plan of care. Please do not hesitate to page with questions. We will sign off.     Malena Leonard PA-C    Hematology/Oncology  Pager # [phone]  Phone # [phone]   ___________________________________________________________________    Subjective & Interval History:    He is feeling ok- has some abdominal pain which is the same discomfort he presented with. When he had this pain previously it resolved with tylenol, but tylenol has not been helping him today. Ostomy with adequate output. No other concerns. A comprehensive review of systems was reviewed with the patient and the pertinent positives are listed in the HPI above. Discussed plan and answered questions with patient at bedside.       Physical Exam:    Blood pressure (!) 141/77, pulse 66, temperature 97.6  F (36.4  C), temperature source Oral, resp. rate 16, height 1.651 m (5' 5\"), weight 66.2 kg (145 lb 14.4 oz), SpO2 100 %.    Temp:  [95.7  F (35.4  C)-98.2  F (36.8  C)] 97.6  F (36.4  C)  Pulse:  [60-66] 66  Resp:  [8-18] 16  BP: ()/(56-77) 141/77  SpO2:  [97 %-100 %] 100 %  General: Alert and interactive. Lying in bed in no acute distress.  HEENT: PERRLA, EOMI, anicteric sclera, oropharynx is pink and moist without erythema/exudates/lesions/thrush.  Neck: Supple, full ROM.  Lymphatic: No palpable mandibular/cervical/supra/infraclavicular lymph nodes.  Cardiovascular: RRR. Normal S1/S2. No murmurs. Peripheral pulses 2+ in bilateral upper and lower extremities.   Respiratory: CTAB. No wheezes appreciated. Normal respiratory effort on ambient air.  Gastrointestinal: ostomy with good output. No abdominal tenderness or palpable masses.   Musculoskeletal: No joint swelling or muscle tenderness.   Extremities: No extremity edema.   Skin: Warm and intact. No concerning lesions or rashes on exposed skin surfaces. No jaundice.  Neurologic: Alert and fully oriented, CN II-XII grossly " intact, appropriately responsive during interview.      Labs & Studies: I personally reviewed the following studies:  ROUTINE LABS (Last four results):  CMP  Recent Labs   Lab 11/06/20  0623 11/05/20  0621 11/04/20  0539 11/03/20  0502    137 137 139   POTASSIUM 3.8 3.6 3.7 4.2   CHLORIDE 108 107 106 109   CO2 24 22 22 23   ANIONGAP 7 7 9 7   * 200* 258* 137*   BUN 11 12 10 8   CR 1.22 1.21 1.33* 1.43*   GFRESTIMATED 57* 57* 51* 47*   GFRESTBLACK 66 66 59* 54*   KEIRA 8.9 8.8 8.4* 8.3*     CBC  Recent Labs   Lab 11/06/20  0623 11/05/20  0621 11/04/20  0539 11/03/20  0502   WBC 5.1 4.6 3.6* 4.4   RBC 4.07* 4.13* 4.08* 4.08*   HGB 12.3* 12.3* 12.1* 12.1*   HCT 38.4* 38.2* 38.3* 38.3*   MCV 94 93 94 94   MCH 30.2 29.8 29.7 29.7   MCHC 32.0 32.2 31.6 31.6   RDW 14.7 14.6 14.4 14.4   * 125* 129* 126*     INR  Recent Labs   Lab 11/03/20  0502 11/02/20  0559 11/01/20  0607 10/31/20  0614   INR 1.29* 1.29* 1.46* 1.43*       Medications list for reference:  Current Facility-Administered Medications   Medication     acetaminophen (TYLENOL) tablet 650 mg     atorvastatin (LIPITOR) tablet 80 mg     carvedilol (COREG) tablet 6.25 mg     [Held by provider] clopidogrel (PLAVIX) tablet 75 mg     glucose gel 15-30 g    Or     dextrose 50 % injection 25-50 mL    Or     glucagon injection 1 mg     [Held by provider] enoxaparin ANTICOAGULANT (LOVENOX) injection 50 mg     famotidine (PEPCID) tablet 10 mg     ferrous sulfate (FEROSUL) tablet 325 mg     finasteride (PROSCAR) tablet 5 mg     [Held by provider] furosemide (LASIX) tablet 20 mg     gabapentin (NEURONTIN) capsule 300 mg     insulin aspart (NovoLOG) injection (RAPID ACTING)     insulin detemir (LEVEMIR PEN) injection 15 Units     ipratropium (ATROVENT) 0.03 % spray 2 spray     isosorbide mononitrate (IMDUR) 24 hr tablet 30 mg     lidocaine (LMX4) cream     lidocaine 1 % 0.1-1 mL     [Held by provider] losartan (COZAAR) tablet 25 mg     melatonin tablet 1 mg      naloxone (NARCAN) injection 0.1-0.4 mg     ondansetron (ZOFRAN-ODT) ODT tab 4 mg    Or     ondansetron (ZOFRAN) injection 4 mg     polyethylene glycol (MIRALAX) Packet 17 g     prochlorperazine (COMPAZINE) injection 5 mg    Or     prochlorperazine (COMPAZINE) tablet 5 mg    Or     prochlorperazine (COMPAZINE) suppository 12.5 mg     senna-docusate (SENOKOT-S/PERICOLACE) 8.6-50 MG per tablet 1 tablet    Or     senna-docusate (SENOKOT-S/PERICOLACE) 8.6-50 MG per tablet 2 tablet     sodium chloride (PF) 0.9% PF flush 3 mL     sodium chloride (PF) 0.9% PF flush 3 mL     sotalol (BETAPACE) tablet 120 mg

## 2020-11-06 NOTE — PLAN OF CARE
Patient awaiting results from liver biopsy mass-pt has 2 masses-one on liver and one in colon- Trying  to determine if its one primary with mets or two  primaries.No results as of yet.Oncology/heme consulting.Wife will  be at bedside for results.Continue to monitor

## 2020-11-07 NOTE — PROGRESS NOTES
[facility]     Progress Note - Robyn 2 Service        Date of Admission:  10/27/2020    Assessment & Plan       Wesley Cooper is a 75 year old male with PMH of diverticulitis s/p descending colostomy, chronic systolic heart failure with decompensation, CAD (s/p CABGx3, multiple PCIs), and atrial flutter on warfarin, DM2, tobacco use, and RLS admitted as transfer from OSH on 10/27/2019 for evaluation of suspected metastatic colorectal malignancy as demonstrated on CT AP at OSH on 10/27 following initial presentation with abdominal pain and diarrhea. GI consulted with colonoscopy and EGD performed 10/30 with biopsy concerning for colorectal malignancy.    Changes Today:  -liver biopsy is significant for HCC  - improved BP with a smaller cuff  - Will continue to monitor BP for a day before restarting anti hypertensive medications.  - CT  abdomen and pelvis without any new/acute changes    #Hypotension vs false low BP?  #Recent fever  #Concern for septic shock  Febrile early on 10/31 post-procedurally. Had some hypotension during colonosopy and has had intermittent hypotension since which has been fluid responsive. Started on broad spectrum antibiotics early am 10/31 (see below). UA bland. Blood cultures NGTD. Initial screening chest CT (done post procedurally) noted small mesenteric air concerning for perforation. Repeat abdominal CT 11/1 with no mesenteric air and no other evidence of infection/abscess/ischemia. No leukocytosis. Minimal abdominal pain.     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 Cr 1.1 on admission. Subsequent rise starting on 10/31 following colonoscopy, intermittent hypotension, fever, and recent CT with contrast.  Differential includes volume depletion, prerenal or ATN from hypotension/sepsis (in setting of losartan), contrast induced nephropathy less likely given it started almost >48 hours following CT contrast. Suspect prerenal/ATN with hypotension and ARB as most likely etiology.    - Daily BMP  - Monitor UOP    #Tubulovillous colorectal adenoma  #Hepatocellular Carcinoma  #ongoing abdominal pain   Initially presented with one week abdominal and diarrhea with CT AP obtained 10/27 prior to transfer with colonic thickening of the hepatic flexure and hepatic mass with associated occlusive tumor thrombus concerning for metastatic disease, although cannot rule out primary malignancy. Transferred to [facility] for further evaluation and management. Underwent EGD and colonoscopy 10/30 with histopathology consistent with tubulovillous adenoma without analysis of . Hepatitis serologies negative (not hep B immune), US abd with dopplers with PV thrombosis and redemonstration of mass. CT chest with no lung mets. Ongoing abdominal pain without acute etiology, could be 2/2 cancer related pain.   Colorectal surgery consulted with no plans for surgical intervention currently in favor of chemotherapy as first line.  - Oncology consulted    - Colorectal surgery consulted  - Surgical oncology consulted   -  consulted for insurance per Heme/Onc recs  - S/p IR procedure, Pathology stains pending    #Portal vein thrombosis  #Supratherapeutic INR  Discovered on CT and confirmed on US, occurred despite patient being anticoagulated on warfarin (and supratherapeutic) and on plavix. Suspected provoked in setting of malignancy.  - transition anticoagulation from lovenox to apixaban for long-term management , consult pharm per dosing   - Resume PTA plavix  - heme onc recs state no PVT anticoag needed, anticoag per cardiologic factors    #Chronic Systolic Heart Failure with Decompensation  TTE 10/28 obtained in advance of potential procedures or with  anticipation of potential future chemotherapies stable from prior (EF 35-40%), HFrEF (EF 35-40%) 2/2 ischemic cardiomyopathy; optimized medically for procedure (10/30)  - Continue PTA carvedilol  - Resume PTA Imdur   - Continue holding losartan due to recent DANITA, to be restarted    - Continue PTA atorvastatin    #Supportive  -zofran PRN for nausea  -miralax and senna PRN for constipation  -oxycodone and tylenol prn for abdominal tenderness     Chronic Issues:  #DMII  - PTA glipizide and insulin detemir   - Transition off high-dose sliding scale insulin to home regimen per above  #Hyperlipidemia  - Resume PTA atorvastatin  #Hypertension  #CAD  #History of atrial flutter  - Resume PTA sotalol  - anticoagulation with Plavix, adding apixaban per pharm dosing   #GERD  - Resume PTA famotidine  #Neuropathic Pain  - Gabapentin 300mg TID  - Tylenol PRN  #Iron deficiency anemia, RLS   - Continue PTA ferrous sulfate     Diet: Regular Diet Adult    Fluids: PO intake  Lines: peripheral IV L, Colostomy L abdomen  DVT Prophylaxis: Pneumatic Compression Devices  Souza Catheter: not present  Code Status:   DNR/DNI    Disposition Plan   Expected discharge: > 7 days, recommended to prior living arrangement once adequate workup of abdominal pain completed and patient is safe/independent for discharge.  Entered: Britta Terry MD 11/07/2020, 4:04 PM          Britta Terry MD  PGY1    ______________________________________________________________________    Interval History   Wesley received his liver biopsy yesterday with IR and he was seen by surgical oncology who said d/t PV infiltration the tumor will not be able to be surgically removed. He reports continued stable LRQ abdominal pain with minimal nausea and no vomiting this AM. He has no dizziness or SOB. He has been having regular brown/solid BMs by colostomy. He has been ambulating with assist.     4 point ROS otherwise negative.     Physical Exam   Vital Signs: Temp: 98.2  F (36.8  C)  Temp src: Oral BP: 131/71 Pulse: 82   Resp: 18 SpO2: 98 % O2 Device: None (Room air)    Weight: 145 lbs 1.6 oz  Constitutional: awake, alert, cooperative, NAD  HEENT: Normocephalic, atraumatic. EOMI  Respiratory: Lungs clear to auscultation bilaterally, no wheezes/crackles  Cardiovascular: RRR, no MRG, no LE edema   GI: Soft, tender to palpation in right lumbar quadrant, no guarding, no rebound, no distension, colostomy bag in place (L abdomen) with brown output consistent with prior days  Extremities: No swelling or tenderness or skin changes.     I/O last 3 completed shifts:  In: 1100 [P.O.:1100]  Out: 700 [Urine:700]    Data   Recent Labs   Lab 11/07/20  0637 11/06/20  0623 11/05/20  0621 11/04/20  0539 11/03/20  0502 11/02/20  0559 11/01/20  0607   WBC 4.6 5.1 4.6 3.6* 4.4 3.8* 6.6   HGB 12.3* 12.3* 12.3* 12.1* 12.1* 12.3* 11.5*   MCV 93 94 93 94 94 93 92   * 120* 125* 129* 126* 121* 138*   INR  --   --   --   --  1.29* 1.29* 1.46*    139 137 137 139 141 142   POTASSIUM 3.7 3.8 3.6 3.7 4.2 4.0 3.4   CHLORIDE 108 108 107 106 109 112* 112*   CO2 25 24 22 22 23 22 22   BUN 11 11 12 10 8 7 12   CR 1.34* 1.22 1.21 1.33* 1.43* 1.57* 1.75*   ANIONGAP 6 7 7 9 7 6 8   KEIRA 8.8 8.9 8.8 8.4* 8.3* 8.1* 8.0*   * 206* 200* 258* 137* 104* 69*   ALBUMIN  --  2.8*  --   --   --   --   --    PROTTOTAL  --  6.5*  --   --   --   --   --    BILITOTAL  --  0.3  --   --   --   --   --    ALKPHOS  --  123  --   --   --   --   --    ALT  --  19  --   --   --   --   --    AST  --  52*  --   --   --   --   --    TROPI  --   --   --  <0.015  --   --   --      Other labs: AFP (2727), CEA (2.5),  (35), COVID negative, INR 1.29, viral hep serologies negative, lactate 1.7

## 2020-11-07 NOTE — PLAN OF CARE
[id number]    No acute event, Afebrile, VSS. Pt had low BP yesterday and carvidilol held. Pt slept well, no pain, no N/V.       NEURO: Alert and oriented x4.      RESPIRATORY: Room Air, denies dizziness, and SOB.     CARDIO: VSS, denies dizziness, and extremity pain.      GI/:  Denies N/V, BS active, colostomy, AUOP in the urinal at bedside.      SKIN: Intact.      ACTIVITY: Stand by assist.      PAIN: Denies pain.    DLA: No IV, pt care order in place per provider.     BG: yes      PLAN: Expected discharge 11/7 to home. Continue monitoring.

## 2020-11-07 NOTE — DISCHARGE SUMMARY
[facility]   Discharge Summary - Medicine & Pediatrics       Date of Admission:  10/27/2020  Date of Discharge:  11/8/2020  Discharging Provider: Dr. Duffy  Discharge Service: Robyn 2    Discharge Diagnoses    1. 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 Newly diagnosed moderately differentiated hepatocellular carcinoma complicated by occlusive tumour thrombus of portal vein thrombosis, all are POA  3. Severe sepsis secondary to intraabdominal infection erlin-coloscopy, unclear if present on admission  4.  Acute kidney injury AKIN stage II secondary to sepsis induced acute tubular necrosis and contrast induced nephropathy on top of CKD stage III-B, unclear if present on admission, resolved  5. Portal vein thrombosis secondary to tumor invasion even in the setting of supratherapeutic INR, all are POA  6. Chronic Systolic Heart Failure without Decompensation LVEF 35%, all are POA    Follow-ups Needed After Discharge   Follow up with PCP in 1 week, get repeat BMP/CBC.   Unresulted Labs Ordered in the Past 30 Days of this Admission     Date and Time Order Name Status Description    11/3/2020 1520 NGS Fusion Profile Assay In process     11/2/2020 1523 Solid Tumor Expanded NGS Panel In process       These results will be followed up by your Medical Oncology team and/or PCP.     Discharge Disposition   Discharged to home  Condition at discharge: Stable    Hospital Course   Wesley Cooper is a 75 year old male with PMH of diverticulitis s/p descending colostomy, chronic systolic heart failure with decompensation, CAD (s/p CABGx3, multiple PCIs), and atrial flutter on anticoagulation, DM2, tobacco use, and RLS admitted as transfer from OSH on 10/27/2019 for evaluation of suspected metastatic colorectal malignancy as demonstrated on CT AP at OSH on 10/27 following initial presentation with  abdominal pain and diarrhea. GI consulted with colonoscopy and EGD performed 10/30 with biopsy concerning for colorectal malignancy. Liver biopsy was done on 11/5 due to concern for two primary malignancies. Colorectal surgery, surgical oncology, and medical oncology were consulted.     The following problems were addressed during his hospitalization:    1. 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 Transferred to [facility] for further evaluation and management. Underwent EGD and colonoscopy 10/30 with histopathology consistent with tubulovillous adenoma.    2. Newly diagnosed moderately differentiated hepatocellular carcinoma complicated by occlusive tumour thrombus of portal vein thrombosis, all are POA:  Further work up during this hospitalization included, hepatitis serologies that were negative (not hep B virus immune), US abd with dopplers showed portal vein tumor infiltration. CT chest showed no lung metastases. Colorectal surgery and onco-surgery were consulted with no plans for surgical intervention planned given malignancy infiltration of the portal vein.Intervantional radiology guided percutaneous biopsy was performed on 11/5/20 with no complications. Further discussion on chemotherapy versus radiotherapy is to be discussed in multidisciplinary oncology tumor board meeting scheduled for 11/9/2020. The patient was switched to apixaban from warfarin for tumour associated trombosis.       3. Severe sepsis secondary to intraabdominal infection erlin-coloscopy, unclear if present on admission:  The patient was febrile early on 10/31  post-procedurally. The patient had was hypotensive with systolics in the 80s and tachycardic during colonoscopy,Blood cultures were negative. Initial screening chest CT (done post procedurally) noted small mesenteric air at biopsy site. Repeat abdominal CT 11/1 with no mesenteric air and no other evidence of infection/abscess/ischemia.The patient was fluid responsive.The patient was started on broad spectrum antibiotics using vancomycin and Zosyn, then switched to ciprofloxacin and metronidazole followed by ceftriaxone and metronidazole.  The patient completed a total of 7 days course of antibiotic therapy.     4.  Acute kidney injury AKIN stage II secondary to sepsis induced acute tubular necrosis and contrast induced nephropathy on top of CKD stage III-B, unclear if present on admission, resolved  The patient received volume resuscitation.  Furosemide, nitrates, beta-blockers, and losartan were held.   Prior to discharge, beta-blockers and nitrates were resumed with adequate blood pressure.The patient creatinine returned to baseline prior to discharge.  Upon discharge, furosemide and losartan continue to be held.  The patient will follow up with his primary care provider and cardiologist within a week of discharge who will be able to resume his furosemide and losartan.     5. Portal vein thrombosis secondary to tumor invasion even in the setting of supratherapeutic INR, all are POA  This was discovered on CT and confirmed on abdominal US with Doppler..  This occurred despite patient being anticoagulated on warfarin (and supratherapeutic) and on plavix.  The patient was seen by Oncology will continue to pursue further management as detailed above.  The patient was maintained on lovenox during this admission.  The patient was also maintained on clopidogrel given his known history of ischemic heart disease and peripheral arterial disease.  The patient's warfarin was eventually switched to apixaban, that he tolerated  well. He is discharged on apixaban and plavix.      6. Chronic Systolic Heart Failure without Decompensation LVEF 35%, all are POA  TTE 10/28 obtained in advance of potential procedures or with anticipation of potential future chemotherapies stable from prior (EF 35-40%), HFrEF (EF 35-40%) 2/2 ischemic cardiomyopathy; optimized medically for procedure (10/30). We held his anti-hypertensive medications during his hypotensive episodes; continued him on PTA carvedilol and Imdur for discharge; continued to hold his PTA losartan given recent DANITA, to be restarted outpatient. Continued PTA atorvastatin during stay.         Chronic Issues:  #DMII  - PTA glipizide and insulin detemir on discharge; on high-dose sliding scale insulin while inpatient  #Hyperlipidemia  - Continued PTA atorvastatin  #Hypertension  #CAD  #History of atrial flutter  - Continued PTA sotalol  - anticoagulation with Plavix, switching warfarin to apixaban for discharge   #GERD  - Continued PTA famotidine  #Neuropathic Pain  - Continued Gabapentin 300mg TID and provided Tylenol PRN  #Iron deficiency anemia, RLS   - Continued PTA ferrous sulfate    Consultations This Hospital Stay   GI LUMINAL ADULT IP CONSULT  DENTAL HYGIENE IP ADULT CONSULT - [facility]U  SOCIAL WORK IP CONSULT  VASCULAR ACCESS CARE ADULT IP CONSULT  ONCOLOGY ADULT IP CONSULT  WOUND OSTOMY CONTINENCE NURSE  IP CONSULT  PHARMACY TO DOSE VANCO  VASCULAR ACCESS CARE ADULT IP CONSULT  VASCULAR ACCESS CARE ADULT IP CONSULT  COLORECTAL SURGERY ADULT IP CONSULT  SOCIAL WORK IP CONSULT  INTERVENTIONAL RADIOLOGY ADULT/PEDS IP CONSULT  SURGICAL ONCOLOGY ADULT IP CONSULT  VASCULAR ACCESS CARE ADULT IP CONSULT  CARE MANAGEMENT / SOCIAL WORK IP CONSULT  VASCULAR ACCESS CARE ADULT IP CONSULT    Code Status   No CPR- Do NOT Intubate     The patient was discussed with Dr. Duffy and the Robyn Angela Team.     Alis Carlson 2 [facility] [address]  [address]  Phone: [phone]  ______________________________________________________________________    Physical Exam   Vital Signs: Temp: 98.3  F (36.8  C) Temp src: Oral BP: 97/54 Pulse: 55   Resp: 18 SpO2: 96 % O2 Device: None (Room air)    Weight: 145 lbs 1.6 oz  Constitutional: awake, alert, cooperative, NAD  HEENT: Normocephalic, atraumatic. EOMI  Respiratory: Lungs clear to auscultation bilaterally, no wheezes/crackles  Cardiovascular: RRR, no MRG, no LE edema   GI: Soft, tender to palpation in right lumbar quadrant, no guarding, no rebound, no distension, colostomy bag in place (L abdomen) with brown output consistent with prior days  Extremities: No swelling or tenderness or skin changes.      Primary Care Physician   RAFAELA PHILLIPS    Discharge Orders     Significant Results and Procedures   Most Recent 3 CBC's:  Recent Labs   Lab Test 11/08/20  0617 11/07/20  0637 11/06/20  0623   WBC 5.6 4.6 5.1   HGB 12.5* 12.3* 12.3*   MCV 93 93 94   * 125* 120*     Most Recent 3 BMP's:  Recent Labs   Lab Test 11/08/20  0617 11/07/20  0637 11/06/20  0623    139 139   POTASSIUM 4.2 3.7 3.8   CHLORIDE 110* 108 108   CO2 24 25 24   BUN 14 11 11   CR 1.49* 1.34* 1.22   ANIONGAP 5 6 7   KEIRA 8.9 8.8 8.9   * 163* 206*     Most Recent 3 INR's:  Recent Labs   Lab Test 11/03/20  0502 11/02/20  0559 11/01/20  0607   INR 1.29* 1.29* 1.46*   ,   Results for orders placed or performed during the hospital encounter of 10/27/20   US Abdomen Complete w Doppler Complete     Value    Radiologist flags (Urgent)     Occlusive thrombosis of the main portal vein extending    Narrative    EXAMINATION: US ABDOMEN COMPLETE WITH DOPPLER COMPLETE 10/29/2020 8:53  AM     COMPARISON: Outside CT abdomen and pelvis 10/27/2020    HISTORY: Hx of portal vein thrombosis    TECHNIQUE: The abdomen was scanned in standard fashion with  specialized ultrasound transducer(s) using both gray-scale, color  Doppler, and spectral flow  techniques.    Findings:  Liver: The liver demonstrates normal homogeneous echotexture. Left  hepatic lobe hypoechoic mass with solid internal components which  measures 5.0 x 5.4 x 5.4 cm. No associated flow on Doppler imaging  however this may be in part due to suboptimal window of this region.    Extrahepatic portal vein demonstrates absent flow with distention of  up to 2.0 cm at the joshua hepatis.   Right portal vein flow is patent with to and fro flow.  Left portal vein flow is not visualized, also thrombosed on CT.    Flow in the hepatic artery is towards the liver and:  76 cm/s peak systolic  19 resistive index.     The splenic vein is patent and flow is towards the liver.  The left,  middle, and right hepatic veins are patent with flow towards the IVC.  The IVC is patent with flow towards the heart.   The visualized aorta  is not dilated.    Gallbladder: Cholelithiasis without wall thickening, pericholecystic  fluid, positive sonographic Murry's sign.    Bile Ducts: The intrahepatic biliary system is of normal caliber.  The  common bile duct is not well visualized.    Pancreas: Not visualized.    Kidneys: Both kidneys are of normal echotexture, without mass or  hydronephrosis.   Renal lengths: right- 10.8 cm, left- 10.7 cm.    Spleen: The spleen measures 15.2 cm in length.    Fluid: No evidence of ascites or pleural effusions.      Impression    Impression:   1.  Left hepatic lobe mass measures up to 5.4 cm in longest dimension.  Differential considerations would include metastatic disease versus  primary liver lesion including HCC. Lesion is better visualized on  outside CT from 10/27/2020. Additional ill-defined lesions on CT not  visualized sonographically.  2.  Occlusive thrombosis of the main portal vein. To-and-fro flow of  the right portal vein. Nonvisualized left portal vein also consistent  with thrombus. On CT, the main and left portal veins are distended and  contiguous with the large mass in  the left hepatic lobe suspicious for  tumor thrombus.  3.  Cholelithiasis without evidence for acute cholecystitis.      [Access Center: Occlusive thrombosis of the main portal vein extending  into the left portal vein as seen on CT.]    This report will be copied to the [facility] to ensure a  provider acknowledges the finding. Access Center is available Monday  through Friday 8am-3:30 pm.     I have personally reviewed the examination and initial interpretation  and I agree with the findings.    KYLE LUNSFORD MD   CT Chest w/o Contrast    Narrative    EXAMINATION: CT CHEST W/O CONTRAST  10/30/2020 6:19 PM      CLINICAL HISTORY: Concern for metastatic disease in a patient with a  colon mass.    COMPARISON: CT 10/27/2020.    TECHNIQUE: CT imaging obtained through the chest without intravenous  contrast. Coronal and axial MIP reformatted images obtained.    FINDINGS:  Support devices: Left chest automatic implantable cardiac  defibrillator with leads in the right atrium and right ventricle.  Median sternotomy wires and numerous changes from coronary artery  bypass.    Heart size is normal. No pericardial effusion.  Normal thoracic  vasculature. Scattered slightly numerous mediastinal nodes  demonstrated although individually not enlarged by short axis  criteria. Heavy calcifications of the aortic arch.    There are bibasilar areas of atelectasis. A few scattered areas of  mucus plugging are present such as within the right middle lobe  (series 6, image 175). Partially calcified granuloma in the left lower  lobe (series 6, image 202). Similar calcified granuloma in the  inferior left upper lobe. No concerning pulmonary nodules. Central  tracheobronchial tree is patent. No pleural effusion or pneumothorax.    Bones and soft tissues: No suspicious bone findings.     Partially imaged upper abdomen: The known liver mass in the left lobe  of the liver is not well visualized on this noncontrast  exam.  Cholelithiasis. Remainder of the incompletely visualized solid organs  of the upper abdomen are unremarkable. Small foci of air demonstrated  adjacent to the colon in the right upper quadrant. Some of this is  extraluminal for example series 2 image 50-52 which likely represents  sequelae from same day colonoscopy with biopsy of tumor. No gross  pneumoperitoneum elsewhere.      Impression    IMPRESSION:   1. No evidence for metastatic disease within the lungs.  2. Known mass within the liver as well as the right hepatic flexure is  poorly demonstrated on this noncontrast exam. Small foci of air  adjacent to the colon in the right upper quadrant mesentery presumably  sequelae from same day colonoscopy with biopsy of the mass in this  region. No gross pneumoperitoneum.   3. Cholelithiasis. Other chronic findings as detailed in the body of  the report.    I have personally reviewed the examination and initial interpretation  and I agree with the findings.    KYLE LUNSFORD MD   CT Abdomen Pelvis w/o Contrast    Narrative    CT abdomen and pelvis without contrast    INDICATION: Fever, hypotension and recent colonoscopy with biopsy.  Follow-up mesenteric air seen on recent CT chest    COMPARISON: CT chest 10/30/2020 and outside CT abdomen and pelvis  10/27/2020    FINDINGS: Bibasilar atelectasis noted. Coronary artery calcium and  cardiac rhythm device present.  Within the abdomen and pelvis, multiple gallstones are present.  Abdominal aortic atherosclerosis. Fatty atrophy of the pancreas.  Noncontrast appearance of the spleen is normal. Bilateral adrenal  glands appear normal. Noncontrast appearance of both kidneys is  normal. No enlarged lymph nodes. No evidence of free air. Left hepatic  lobe hypodensity better seen on the outside contrasted CT of the  abdomen from 5 days ago. The lesion is less conspicuous but again  apparent on today's examination measuring approximately 5.9 cm in  greatest dimension,  similar to recent previous. Left lower quadrant  colostomy. Urinary bladder is nicely distended without wall  thickening. Prostate is borderline enlarged. Diffuse iliofemoral  atherosclerosis noted. No enlarged lymph nodes or other masses. No  free air.  Bones show degenerative changes at the hips, bilateral sacroiliac  joints and in the lumbar spine. Surgical hardware at the lower lumbar  levels.      Impression    IMPRESSION: No evidence of free air. Cholelithiasis. Cardiomegaly  device. Diffuse atherosclerosis involving the coronary arteries,  thoracoabdominal aorta and its main abdominal branches as well as the  bilateral iliofemoral territories. Liver mass better seen on recent  outside CT of the abdomen. Left lower quadrant colostomy.    FATMATA KABA MD   MR Liver wo & w Contrast    Narrative    MRI ABDOMEN 11/3/2020    CLINICAL HISTORY:  Liver lesion, history of extrahepatic malignancy.    TECHNIQUE:  Images were acquired with and without intravenous contrast  through the abdomen. The following MR images were acquired: TrueFISP,  multiplanar T2 weighted, axial T1 in/out of phase, axial fat-saturated  T1, diffusion-weighted. Multiplanar T1-weighted images with fat  saturation were before contrast administration and at multiple time  points following the administration of intravenous contrast. Contrast  dose: 10mL Eovist    FINDINGS:    Comparison study: CT abdomen 10/27/2020 and CT abdomen 11/1/2020    Liver: No significant hepatic steatosis or iron deposition. Mass in  the left lobe of liver involving segment 2/3 and 4 measuring 6.3 x 5.7  x 4.4 cm. The mass shows intermediate T2 signal intensity,  isointensity on T1-weighted images with intense nontender in arterial  phase enhancement with delayed phase washout and pseudocapsule  formation. Mass shows increased signal on DWI images. There is  associated expansile thrombus in the left portal vein branch with  extension into the distal main portal vein.  The thrombus also shows  increased signal on DWI images with enhancement consistent with tumor  thrombus. Simple appearing cyst in segment 2 of the liver measuring 7  mm (series 4, image 10).    Gallbladder: Multiple gallstones. No gallbladder wall thickening or  pericholecystic fluid. No choledocholithiasis. No intra or  extrahepatic bile duct dilatation.    Spleen: Spleen is enlarged measuring 14 cm.    Kidneys: No hydronephrosis. No suspicious focal renal lesion.    Adrenal glands: Adrenal glands are unremarkable.    Pancreas: No focal pancreatic lesion.    Bowel: Visualized small and large bowel loops appear within normal  limits    Lymph nodes: No significant lymphadenopathy by size criteria. Few  subcentimeter para-aortic lymph nodes    Blood vessels: Portal vein thrombus as described, otherwise patent  major abdominal vasculature with no abdominal aortic aneurysm.  Recanalized paraumbilical vein.    Lung bases: No significant pleural effusion, mass or consolidation.  Artifacts from median sternotomy wires.    Bones and soft tissues: Partial visualization of lumbar spinal  instrumentation.    Mesentery and abdominal wall: Within normal limits.    Ascites: None      Impression    IMPRESSION:  1. Mass in the left lobe of liver involving segment 2/3 and 4  measuring up to 6.3 cm with associated tumor thrombus invading the  portal vein extending to the main portal vein, most suspicious for  hepatocellular carcinoma. If patient has a history of chronic liver  disease or other clinical risk factors for HCC, would correspond to  LIRADS - 5 - TIV.    2. Cholelithiasis.    3. Splenomegaly.    I have personally reviewed the examination and initial interpretation  and I agree with the findings.    SAMANTHA AGUILAR MD   IR Liver Biopsy Percutaneous    Narrative    PROCEDURES:  1. Ultrasound guided percutaneous liver biopsy.     Clinical History: Liver lesion in a patient with cirrhosis and a  history of colon cancer.  Biopsy requested.    Comparisons: MRI from 11/3/2020.    Staff Radiologist: MERVAT Pierre    Sedation time: 15 minutes.    Medications: The patient was placed on continuous monitoring.  Intravenous sedation was administered. The patient received 2 mg of  Versed and 100 mg of fentanyl intravenously. Vital signs and sedation  monitored by nursing staff under Interventional Radiologists  supervision. The patient remained stable throughout the procedure.     PROCEDURE: The patient understood the limitations, alternatives, and  risks of the procedure and requested the procedure be performed. Both  written and oral consent were obtained.    Limited pre-procedural scan performed demonstrated adequate liver  lesion position for biopsy.    The right upper quadrant was prepped and draped in the usual sterile  fashion. Ten to one volume mixture of 1% lidocaine without epinephrine  buffered with 8.4% bicarbonate solution was used for local anesthesia.      Under ultrasound guidance, a 17 gauge coaxial introducer needle was  advanced into the liver lesion. Permanent copy of the image  documenting the needle position was entered into the patients record.     For 18 gauge core biopsies were obtained with a Temno ACT biopsy gun  and submitted to Pathology in formalin. Pathology presence was  requested, however, they refused to come.    Good hemostasis was achieved with Gelfoam plugs that were deployed as  the needle was removed. Sterile dressing applied.    Limited post-procedural scan demonstrated no immediate complication.       Impression    IMPRESSION:   1. 4 18 gauge core biopsies obtained of the liver lesion under  ultrasound guidance.    PLAN:  Pathology pending.    I, RAHUL PIERRE MD, attest that I was present in the procedure room  for the entire procedure.    RAHUL PIERRE MD   Echo Complete    Narrative    [id number]  [id number]  [id number]  [id number]^GABRIEL^BETEHL^[facility]  [facility]  Echocardiography Laboratory  [address] [place] [id number]     Name: FLOR STEVENSON  MRN: [id number]  : 1945  Study Date: 10/28/2020 01:16 PM  Age: 75 yrs  Gender: Male  Patient Location: [facility]  Reason For Study: CAD  Ordering Physician: BETHEL RIOS  Performed By: Annie Guerra RDCS     BSA: 1.7 m2  Height: 65 in  Weight: 147 lb  HR: 68  BP: 113/61 mmHg  _____________________________________________________________________________  __        Procedure  Echocardiogram with two-dimensional, color and spectral Doppler performed.  Contrast Optison. Optison (NDC #1151-0806-58) given intravenously. Patient was  given 5 ml mixture of 3 ml Optison and 6 ml saline. 4 ml wasted.  _____________________________________________________________________________  __        Interpretation Summary  The Ejection Fraction is estimated at 35-40%.  Left ventricular size is normal.  Right ventricular function, chamber size, wall motion, and thickness are  normal.  Pulmonary artery systolic pressure is normal.  The inferior vena cava cannot be assessed.  No pericardial effusion is present.  There has been no change.  _____________________________________________________________________________  __        Left Ventricle  Left ventricular size is normal. Left ventricular wall thickness is normal.  The Ejection Fraction is estimated at 35-40%. Grade II or moderate diastolic  dysfunction. Inferior wall akinesis is present. Inferolateral (posterior) wall  akinesis is present.     Right Ventricle  Right ventricular function, chamber size, wall motion, and thickness are  normal. A pacemaker lead is noted in the right ventricle.     Atria  The right atria appears normal. Mild left atrial enlargement is present.     Mitral Valve  Mild mitral annular calcification is present. Mild mitral insufficiency is  present.        Aortic Valve  Trileaflet aortic sclerosis without stenosis.     Tricuspid Valve  The  tricuspid valve is normal. Trace to mild tricuspid insufficiency is  present. The right ventricular systolic pressure is approximated at 25.6 mmHg  plus the right atrial pressure. Pulmonary artery systolic pressure is normal.     Pulmonic Valve  The pulmonic valve is normal.     Vessels  The thoracic aorta is normal. The pulmonary artery and bifurcation cannot be  assessed. The inferior vena cava cannot be assessed.     Pericardium  No pericardial effusion is present. Prominent epicardial fat is noted.        Compared to Previous Study  There has been no change.  _____________________________________________________________________________  __  MMode/2D Measurements & Calculations     IVSd: 1.1 cm  LVIDd: 3.8 cm  LVIDs: 3.3 cm  LVPWd: 1.1 cm  FS: 14.6 %  LV mass(C)d: 140.5 grams  LV mass(C)dI: 80.9 grams/m2  asc Aorta Diam: 2.8 cm  LVOT diam: 2.0 cm  LVOT area: 3.1 cm2     EF(MOD-bp): 39.3 %  LA Volume (BP): 60.0 ml  LA Volume Index (BP): 34.5 ml/m2  RWT: 0.57           Doppler Measurements & Calculations  MV E max moiz: 118.0 cm/sec  MV A max moiz: 159.0 cm/sec  MV E/A: 0.74  MV dec slope: 551.0 cm/sec2  MV dec time: 0.22 sec  PA acc time: 0.16 sec  TR max moiz: 253.0 cm/sec  TR max P.6 mmHg  E/E' av.2  Lateral E/e': 17.8  Medial E/e': 28.6     _____________________________________________________________________________  __           Report approved by: uT Nolen 10/28/2020 02:16 PM      Cardiac Device Check - Inpatient     Value    Date Time Interrogation Session [id number]    Implantable Pulse Generator  [employer]    Implantable Pulse Generator Model 3365-40Q Quadra Assura    Implantable Pulse Generator Serial Number [id number]    Type Interrogation Session In Clinic    Clinic Name [facility]    Implantable Pulse Generator Type Cardiac Resynchronization Therapy - Defibrillator    Implantable Pulse Generator Implant Date 2016    Implantable Lead   [employer]    Implantable Lead Model 1458Q Quartet    Implantable Lead Serial Number [id number]    Implantable Lead Implant Date 20111230    Implantable Lead Polarity Type Quadripolar Lead    Implantable Lead Location Detail 1 UNKNOWN    Implantable Lead Special Function 86 cm    Implantable Lead Location Left Ventricle    Implantable Lead  [employer]    Implantable Lead Model 2088TC Tendril STS    Implantable Lead Serial Number [id number]    Implantable Lead Implant Date 20111230    Implantable Lead Polarity Type Bipolar Lead    Implantable Lead Location Detail 1 UNKNOWN    Implantable Lead Special Function 52 cm    Implantable Lead Location Right Atrium    Implantable Lead  [employer]    Implantable Lead Model 7122Q Durata SJ4    Implantable Lead Serial Number [id number]    Implantable Lead Implant Date 20111230    Implantable Lead Polarity Type Tripolar Lead    Implantable Lead Location Detail 1 UNKNOWN    Implantable Lead Special Function 58 cm    Implantable Lead Location Right Ventricle    Thong Setting Mode (NBG Code) DDDR    Thong Setting Lower Rate Limit 60    Thong Setting Maximum Tracking Rate 110    Thong Setting Maximum Sensor Rate 110    Thong Setting Hysterisis Rate Off    Thong Setting Night Rate Off    Thong Setting DA Delay Low 100    Thong Setting PAV Delay Low 150    Thong Setting AT Mode Switch Rate 180    Thong Setting AT Mode Switch Mode DDIR    Lead Channel Setting Sensing Polarity Bipolar    Lead Channel Setting Sensing Sensitivity 0.3    Lead Channel Setting Sensing Adaptation Mode Adaptive    Lead Channel Setting Sensing Polarity Bipolar    Lead Channel Setting Sensing Sensitivity 2.0    Ventricular chambers paced during CRT pacing. BiV    CRT LV-RV Delay 20    Lead Channel Setting Pacing Polarity Bipolar    Lead Channel Setting Pacing Pulse Width 0.5    Lead Channel Setting Pacing Amplitude 1.5    Lead Channel Setting Pacing Capture Mode  Monitor    Lead Channel Setting Pacing Polarity Bipolar    Lead Channel Setting Pacing Pulse Width 0.5    Lead Channel Setting Pacing Amplitude 2.0    Lead Channel Setting Pacing Capture Mode Adaptive    Lead Channel Setting Pacing Polarity Bipolar    Lead Channel Setting Pacing Anode Location Left Ventricle    Lead Channel Setting Pacing Anode Terminal Ring2    Lead Channel Setting Sensing Cathode Location Left Ventricle    Lead Channel Setting Sensing Cathode Terminal Tip    Lead Channel Setting Pacing Pulse Width 0.5    Lead Channel Setting Pacing Amplitude 2.25    Lead Channel Setting Pacing Capture Mode Fixed Pacing    Zone Setting Type Category VF    Zone Setting Vendor Type Category VF    Zone Setting Detection Interval 320    Zone Setting Type Category VT    Zone Setting Vendor Type Category VT2    Zone Setting Detection Interval 360    Zone Setting Type Category VT    Zone Setting Vendor Type Category VT1    Zone Setting Detection Interval 460    Lead Channel Impedance Value 437.5    Lead Channel Sensing Intrinsic Amplitude 5.0    Lead Channel Pacing Threshold Amplitude 0.75    Lead Channel Pacing Threshold Pulse Width 0.5    Lead Channel Impedance Value 575.0    Lead Channel Sensing Intrinsic Amplitude 12.0    Lead Channel Pacing Threshold Amplitude 0.5    Lead Channel Pacing Threshold Pulse Width 0.5    Lead Channel Impedance Value 787.5    Lead Channel Pacing Threshold Amplitude 1.5    Lead Channel Pacing Threshold Pulse Width 0.5    Lead Channel Pacing Threshold Amplitude 1.5    Lead Channel Pacing Threshold Pulse Width 0.5    Battery Remaining Longevity 54    Capacitor Charge Type Shock    Capacitor Charge Time 9.25    Capacitor Charge Type Reformation    Capacitor Last Charge Date Time [id number]    Statistic Heart Rate Date Time Start 44228634403436    Statistic Heart Rate Date Time End [id number]    Thong Statistic Date Time Start 03964404948486    Thong Statistic Date Time End  20201103105810    Thong Statistic RA Percent Paced 0    Thong Statistic RV Percent Paced 0    Atrial Tachy Statistic Date Time Start [id number]    Atrial Tachy Statistic Date Time End 20201103105810    Atrial Tachy Statistic Number Of Mode Switches 0.0    Therapy Statistic Recent Shocks Delivered 0    Therapy Statistic Recent Shocks Aborted 0    Therapy Statistic Recent ATP Delivered 0    Therapy Statistic Recent Date Time Start [id number]    Therapy Statistic Recent Date Time End 20201103105810    Episode Statistic Recent Count 0    Episode Statistic Type Category SVT    Episode Statistic Recent Count 0    Episode Statistic Type Category VF    Episode Statistic Vendor Type Category VF    Episode Statistic Recent Date Time Start [id number]    Episode Statistic Recent Date Time End 20201103105810    Episode Statistic Recent Date Time Start [id number]    Episode Statistic Recent Date Time End 20201103105810    Narrative    Post MRI programming:  Patient monitored by device RN during entire MRI scan. Patient tolerated   procedure without difficulty. MRI protection mode programmed OFF. Mode   programmed from DOO 85 bpm to [id number] bpm. ICD tachy therapies   programmed back on. Full ICD interrogation performed after MRI complete.   Normal ICD function. Intrinsic rhythm = AS @ 60/  @ 30 bpm. No permanent   programming changes made.  ELIZABETH Deng, SHANELL    Multi lead ICD    I have reviewed and interpreted the device interrogation, settings,   programming and nurse's summary. The device is functioning within normal   device parameters. I agree with the current findings, assessment and plan.   Cardiac Device Check - Inpatient     Value    Date Time Interrogation Session [id number]    Implantable Pulse Generator  [employer]    Implantable Pulse Generator Model 3365-40Q Quadra Assura    Implantable Pulse Generator Serial Number [id number]    Type Interrogation Session In Clinic    Clinic Name  [facility]    Implantable Pulse Generator Type Cardiac Resynchronization Therapy - Defibrillator    Implantable Pulse Generator Implant Date 20161206    Implantable Lead  [employer]    Implantable Lead Model 1458Q Quartet    Implantable Lead Serial Number [id number]    Implantable Lead Implant Date 20111230    Implantable Lead Polarity Type Quadripolar Lead    Implantable Lead Location Detail 1 UNKNOWN    Implantable Lead Special Function 86 cm    Implantable Lead Location Left Ventricle    Implantable Lead  [employer]    Implantable Lead Model 2088TC Tendril STS    Implantable Lead Serial Number [id number]    Implantable Lead Implant Date 20111230    Implantable Lead Polarity Type Bipolar Lead    Implantable Lead Location Detail 1 UNKNOWN    Implantable Lead Special Function 52 cm    Implantable Lead Location Right Atrium    Implantable Lead  [employer]    Implantable Lead Model 7122Q Durata SJ4    Implantable Lead Serial Number [id number]    Implantable Lead Implant Date 20111230    Implantable Lead Polarity Type Tripolar Lead    Implantable Lead Location Detail 1 UNKNOWN    Implantable Lead Special Function 58 cm    Implantable Lead Location Right Ventricle    Thong Setting Mode (NBG Code) DDDR    Thong Setting Lower Rate Limit 60    Thong Setting Maximum Tracking Rate 110    Thong Setting Maximum Sensor Rate 110    Thong Setting Hysterisis Rate Off    Thong Setting Night Rate Off    Thong Setting DA Delay Low 100    Thong Setting PAV Delay Low 150    Thong Setting AT Mode Switch Rate 180    Thong Setting AT Mode Switch Mode DDIR    Lead Channel Setting Sensing Polarity Bipolar    Lead Channel Setting Sensing Sensitivity 0.3    Lead Channel Setting Sensing Adaptation Mode Adaptive    Lead Channel Setting Sensing Polarity Bipolar    Lead Channel Setting Sensing Sensitivity 2.0    Ventricular chambers paced during CRT pacing. BiV    CRT LV-RV  Delay 20    Lead Channel Setting Pacing Polarity Bipolar    Lead Channel Setting Pacing Pulse Width 0.5    Lead Channel Setting Pacing Amplitude 1.5    Lead Channel Setting Pacing Capture Mode Monitor    Lead Channel Setting Pacing Polarity Bipolar    Lead Channel Setting Pacing Pulse Width 0.5    Lead Channel Setting Pacing Amplitude 2.0    Lead Channel Setting Pacing Capture Mode Adaptive    Lead Channel Setting Pacing Polarity Bipolar    Lead Channel Setting Pacing Anode Location Left Ventricle    Lead Channel Setting Pacing Anode Terminal Ring2    Lead Channel Setting Sensing Cathode Location Left Ventricle    Lead Channel Setting Sensing Cathode Terminal Tip    Lead Channel Setting Pacing Pulse Width 0.5    Lead Channel Setting Pacing Amplitude 2.25    Lead Channel Setting Pacing Capture Mode Fixed Pacing    Zone Setting Type Category VF    Zone Setting Vendor Type Category VF    Zone Setting Detection Interval 320    Zone Setting Type Category VT    Zone Setting Vendor Type Category VT2    Zone Setting Detection Interval 360    Zone Setting Type Category VT    Zone Setting Vendor Type Category VT1    Zone Setting Detection Interval 460    Lead Channel Impedance Value 437.5    Lead Channel Sensing Intrinsic Amplitude 5.0    Lead Channel Pacing Threshold Amplitude 0.75    Lead Channel Pacing Threshold Pulse Width 0.5    Lead Channel Impedance Value 575.0    Lead Channel Pacing Threshold Amplitude 0.5    Lead Channel Pacing Threshold Pulse Width 0.5    Lead Channel Impedance Value 787.5    Lead Channel Pacing Threshold Amplitude 1.5    Lead Channel Pacing Threshold Pulse Width 0.5    Lead Channel Pacing Threshold Amplitude 1.5    Lead Channel Pacing Threshold Pulse Width 0.5    Battery Remaining Longevity 54    Capacitor Charge Type Shock    Capacitor Charge Time 9.25    Capacitor Charge Type Reformation    Capacitor Last Charge Date Time [id number]    Statistic Heart Rate Date Time Start 06213933099171     Statistic Heart Rate Date Time End 20201103105810    Thong Statistic Date Time Start [id number]    Thong Statistic Date Time End 20201103105810    Thong Statistic RA Percent Paced 0    Thong Statistic RV Percent Paced 0    Atrial Tachy Statistic Date Time Start [id number]    Atrial Tachy Statistic Date Time End [id number]    Atrial Tachy Statistic Number Of Mode Switches 0.0    Therapy Statistic Recent Shocks Delivered 0    Therapy Statistic Recent Shocks Aborted 0    Therapy Statistic Recent ATP Delivered 0    Therapy Statistic Recent Date Time Start [id number]    Therapy Statistic Recent Date Time End 20201103105810    Episode Statistic Recent Count 0    Episode Statistic Type Category SVT    Episode Statistic Recent Count 0    Episode Statistic Type Category VF    Episode Statistic Vendor Type Category VF    Episode Statistic Recent Date Time Start [id number]    Episode Statistic Recent Date Time End [id number]    Episode Statistic Recent Date Time Start [id number]    Episode Statistic Recent Date Time End [id number]    Narrative    Pre-MRI Programming:  Patient seen in MRI for interrogation and iterative programming of their   Abbott, Bi-Ventricular, ICD prior to MRI of the abdomen, per MD orders.   Patient has a Quadra Assura MRI conditional ICD. Normal ICD function.   Intrinsic rhythm = AS @ 60/  @ 30 bpm. No episodes recorded. AP = 17%.   BVP = 100%. Estimated battery longevity to TAYLA = 3.5 years. Lead trends   appear stable. Complete MRI conditional system verified. MRI checklists   completed. MRI protection mode programmed ON. Pacing mode programmed from   DDDR  bpm to DOO @ 85 bpm. ICD tachy therapies programmed off.   Patient to be monitored by device RN during MRI scan. ELIZABETH Deng RN    Multi lead ICD    I have reviewed and interpreted the device interrogation, settings,   programming and nurse's summary. The device is functioning within normal   device  parameters. I agree with the current findings, assessment and plan.       Discharge Medications   Current Discharge Medication List      START taking these medications    Details   apixaban ANTICOAGULANT (ELIQUIS) 5 MG tablet Take 1 tablet (5 mg) by mouth 2 times daily  Qty: 60 tablet, Refills: 0    Associated Diagnoses: Atrial flutter with rapid ventricular response (H)      oxyCODONE (ROXICODONE) 5 MG tablet Take 1 tablet (5 mg) by mouth every 6 hours  Qty: 15 tablet, Refills: 0    Associated Diagnoses: Pain         CONTINUE these medications which have CHANGED    Details   isosorbide mononitrate (IMDUR) 30 MG 24 hr tablet Take 0.5 tablets (15 mg) by mouth 2 times daily  Qty:           CONTINUE these medications which have NOT CHANGED    Details   atorvastatin (LIPITOR) 80 MG tablet Take 80 mg by mouth daily    Associated Diagnoses: Chronic systolic heart failure (H)      carvedilol (COREG) 12.5 MG tablet Take 0.5 tablets (6.25 mg) by mouth 2 times daily (with meals)  Qty: 90 tablet, Refills: 1    Comments: Dosage change. Pt to call for refills as needed.  Associated Diagnoses: Chronic systolic heart failure (H)      clopidogrel (PLAVIX) 75 MG tablet Take 75 mg by mouth daily    Associated Diagnoses: Chronic systolic heart failure (H)      famotidine (PEPCID) 10 MG tablet Take 10 mg by mouth 2 times daily      ferrous sulfate (FEROSUL) 325 (65 Fe) MG tablet Take 325 mg by mouth daily      finasteride (PROSCAR) 5 MG tablet Take 5 mg by mouth daily      gabapentin (NEURONTIN) 300 MG capsule Take 300 mg by mouth 3 times daily       glipiZIDE (GLUCOTROL) 5 MG tablet Take 10 mg by mouth 2 times daily (before meals)       sotalol (BETAPACE) 120 MG tablet Take 1 tablet (120 mg) by mouth 2 times daily  Qty: 180 tablet, Refills: 3    Associated Diagnoses: VT (ventricular tachycardia) (H)      insulin detemir (LEVEMIR FLEXTOUCH) 100 UNIT/ML pen Inject 44 Units Subcutaneous At Bedtime      ipratropium (ATROVENT) 0.03 % nasal  spray Spray 2 sprays into both nostrils 4 times daily as needed for rhinitis      nitroglycerin (NITROSTAT) 0.4 MG sublingual tablet Place 0.4 mg under the tongue every 5 minutes as needed for chest pain For chest pain place 1 tablet under the tongue every 5 minutes for 3 doses. If symptoms persist 5 minutes after 1st dose call 911.    Associated Diagnoses: Chronic systolic heart failure (H)         STOP taking these medications       furosemide (LASIX) 40 MG tablet Comments:   Reason for Stopping:         losartan (COZAAR) 25 MG tablet Comments:   Reason for Stopping:         potassium chloride ER (KLOR-CON M) 20 MEQ CR tablet Comments:   Reason for Stopping:         Vitamin K, Phytonadione, 100 MCG TABS Comments:   Reason for Stopping:         warfarin (COUMADIN) 2.5 MG tablet Comments:   Reason for Stopping:             Allergies   No Known Allergies

## 2020-11-08 NOTE — PLAN OF CARE
00:00-07:00 am   AF  /54 at resting during the night  OVSS  A&Ox4   Denied dizziness or lightheaded   No nausea/vomiting  Continue to report right sided abdominal discomfort but not bothersome and declined need for pain meds.   at 02:30 am  Voiding spontaneously, adequate UOP  No BM  Pt is resting/sleeping well  No new acute events overnight   Anticipate discharge home today pending stable  BP with resuming antihypertensive yesterday.   Continue w/POC

## 2020-11-08 NOTE — PROVIDER NOTIFICATION
MD paged:    BP is 99/55 (L arm) and 90/49 (R arm). Sotalol, Imdur, and coreg held until I hear from you differently. Thank you. Asymptomatic.    Response: Give the coreg and sotalol. Imdur dosage changed to 15 mg from 30 mg.

## 2020-11-08 NOTE — PLAN OF CARE
BP soft down to 90/49. MDs aware. Imdur dosage halved. Blood glucose managed w/sliding scale. Pain managed with PRN oxy x2, with good relief. Colostomy bag changed. Pt denies further questions and concerns at this time.    Discharge  D: Orders for discharge and outpatient medications written.  I: Home medications and return to clinic schedule reviewed with patient. Discharge instructions and parameters for calling Health Care Provider reviewed. Patient left at 13:40 accompanied by wife and son (at door).   A: Patient/family verbalized understanding and was ready for discharge.   P: Patient instructed to  medications in Pharmacy. Follow up as scheduled.

## 2020-11-09 NOTE — PROGRESS NOTES
Tumor Conference Information  Tumor Conference: Colorectal  Specialties Present: Medical oncology, Radiation oncology, Radiology, Surgery  Patient Status: A new patient  Pathology: Not Discussed  Treatment to Date: None  Clinical Trial Eligibility: Not discussed  Recommended Plan: Other (see comment) (Comment: wait for Dr. Hernandez recommendations at visit )  Did the review exceed 30 minutes?: did not           Documentation / Disclaimer Cancer Tumor Board Note  Cancer tumor board recommendations do not override what is determined to be reasonable care and treatment, which is dependent on the circumstances of a patient's case; the patient's medical, social, and personal concerns; and the clinical judgment of the oncologist [physician].

## 2020-11-09 NOTE — PROGRESS NOTES
[facility]: Post-Discharge Note  SITUATION                                                      Admission:    Admission Date: 10/27/20   Reason for Admission: evaluation of suspected metastatic colorectal malignancy  Discharge:   Discharge Date: 11/08/20  Discharge Diagnosis: evaluation of suspected metastatic colorectal malignancy  Discharge Service: pediatrics and medicine  Discharge Plan:  oncology    BACKGROUND                                                      Wesley Cooper is a 75 year old male with PMH of diverticulitis s/p descending colostomy, chronic systolic heart failure with decompensation, CAD (s/p CABGx3, multiple PCIs), and atrial flutter on warfarin, DM2, tobacco use, and RLS admitted as transfer from OSH on 10/27/2019 for evaluation of suspected metastatic colorectal malignancy as demonstrated on CT AP at OSH on 10/27 following initial presentation with abdominal pain and diarrhea. GI consulted with colonoscopy and EGD performed 10/30 with biopsy concerning for colorectal malignancy. Liver biopsy was done on 11/5 due to concern for two primary malignancies. Colorectal surgery, surgical oncology, and medical oncology were consulted.     ASSESSMENT      Discharge Assessment  Patient reports symptoms are: Improved(tired.  waiting to hear from doctors about a plan on Wednesday.)  Does the patient have all of their medications?: Yes  Does patient know what their new medications are for?: Yes  Does patient have a follow-up appointment scheduled?: Yes  Does patient have any other questions or concerns?: No    Post-op  Did the patient have surgery or a procedure: No(had biopsies)  Fever: No  Chills: No  Eating & Drinking: eating and drinking without complaints/concerns  PO Intake: regular diet  Bowel Function: normal  Urinary Status: voiding without complaint/concerns        PLAN                                                      Outpatient Plan:      Future Appointments   Date  Time Provider Department Center   11/9/2020 12:00 PM COLORECTAL TUMOR CONFERENCE [facility]   11/11/2020  2:00 PM Oliva Patel, [facility]   11/12/2020  3:00 PM Bonifacio Hernandez MD [facility]   12/14/2020  1:00 PM UC LAB [facility]   12/14/2020  1:30 PM [id number] UCCVCV [facility]   12/14/2020  2:30 PM  CV DEVICE 1 CVCV [facility]   12/14/2020  3:00 PM Claudy Loco MD [facility]   12/16/2020 11:00 AM Nicolas Modi MD [facility]           Lela Hook

## 2020-11-11 NOTE — PROGRESS NOTES
MTM ENCOUNTER  SUBJECTIVE/OBJECTIVE:                           Wesley Cooper is a 75 year old male called for a transitions of care visit. He was discharged from [facility] on 11/8/2020 for evaluation of suspected cancer. Patient was accompanied by his wife, Bianca.     Reason for visit: Hospital follow-up.    Allergies/ADRs: Reviewed in chart  Tobacco: He reports that he has quit smoking. He has never used smokeless tobacco.  Alcohol: none  Caffeine: 1 cups/day of coffee, 2 cans of pop/day  Activity: limited to day-to-day activity  Past Medical History: Reviewed in chart    Medication Adherence/Access:   Patient uses pill box(es).  Patient takes medications 2 time(s) per day.   Per patient, misses medication 0 times per week.   Medication barriers: none.   The patient fills medications at [facility]: NO, fills medications at [facility].    HFrEF/Afib/Hypertension/CAD:   Patient is currently taking Eliquis 5mg BID for anticoagulation. Patient reports no current concerns of bruising or bleeding. Patient does not have a history of GI bleed.   Patient is also taking atorvastatin 80mg daily, carvedilol 12.5mg taking one-half (6.25mg) two times daily, clopidogrel 75mg daily, isosorbide mononitrate 15mg two times daily, nitrostat 0.4mg place 1 tablet under the tongue every 5 minutes as needed for chest pain for up to 3 doses, and sotalol 120mg two times daily. Patient reports no current medication side effects.   ECHO:  Date 10/27/20, EF 35-40%  Pt is not complaining of HF symptoms.   Patient does self-monitor blood pressure and has not checked it at home in awhile.   BP Readings from Last 3 Encounters:   11/08/20 97/54   09/09/20 95/64   11/25/19 97/60     Type 2 Diabetes: Currently taking glipizide 10mg two times daily, Levemir 34-44 units daily, and gabapentin 300mg three times daily. Patient is not experiencing side effects.  Blood sugar monitoring: per wife he checks, but does not know frequency or ranges.  Symptoms of low blood sugar? none  Symptoms of high blood sugar? none  Eye exam: due  Foot exam: due  Diet/Exercise: activity limited to that of daily activities   Aspirin: Not taking due to on Eliquis  Statin: Yes: atorvastatin   ACEi/ARB: No.   Urine Albumin: No results found for: UMALCR   Lab Results   Component Value Date    A1C 8.6 10/28/2020     GERD: Current medications include: Pepcid (famotidine) 20mg twice daily. Pt reports no current symptoms. The patient does not have a history of GI bleed. Patient feels that current regimen is effective.    BPH: Currently taking finasteride 5mg daily. No current symptoms or side effects from the medication.     Pain: Currently taking oxycodone 5mg every 6 hours as needed for pain. Reports getting this script yesterday evening and has not started taking it yet.     Supplements: Currently taking ferrous sulfate 325mg daily.   Ferritin   Date Value Ref Range Status   10/28/2020 119 26 - 388 ng/mL Final     Iron   Date Value Ref Range Status   10/28/2020 36 35 - 180 ug/dL Final     Iron Binding Cap   Date Value Ref Range Status   10/28/2020 229 (L) 240 - 430 ug/dL Final     Today's Vitals: There were no vitals taken for this visit.    ASSESSMENT:                            Medication Adherence: No issues identified    HFrEF/Afib/Hypertension/CAD: Patient is meeting blood pressure goal of < 130/80mmHg. Patient would benefit from the following changes - continued blood pressure monitoring. Using both carvedilol and sotalol therapies and would recommend consideration of carvedilol therapy alone for HFrEF/Afib management.     Type 2 Diabetes: Stable. Patient is not meeting A1c goal of < 8%. Self monitoring of blood glucose is unknown at goal of fasting  mg/dL and post prandial < 180 mg/dL. Due for annual foot exam. Due for annual eye exam. Aspirin therapy is not indicated in this patient due to anticoagulant/antiplatelet therapy.    GERD: Stable. Current treatment is  effective.    BPH: Stable.     Pain: Stable.     Supplements: Stable.     PLAN:                          Post Discharge Medication Reconciliation Status: discharge medications reconciled, continue medications without change.    1. Follow-up with Dr. Hernandez on 11/12/2020 at 3pm    2. Talk with your primary care provider about an annual eye and foot exam    3. Talk to cardiology regarding continued use of both sotalol and carvedilol therapy.     I spent 30 minutes with this patient today. I offer these suggestions with the understanding that I don't fully understand Wesley's past medical history and the complexity of his health conditions. Wesley should make no changes without the approval of his physician. A copy of the visit note was provided to the patient's cardiology provider.    Will follow up as needed or requested.    The patient was sent via [employer] a summary of these recommendations.     Oliva Patel, PharmD  Medication Therapy Management Pharmacist  [facility]  [phone]      Patient consented to a telehealth visit: yes  Telemedicine Visit Details  Type of service:  Telephone visit  Start Time: 2:03 PM  End Time: 2:33 PM  Originating Location (patient location): Home  Distant Location (provider location):  [facility] MEDICATION THERAPY MANAGEMENT  Mode of Communication:  Telephone

## 2020-11-11 NOTE — Clinical Note
Dear Dr. Loco,   Wesley Cooper was called on November 11, 2020 for a Medication Therapy Management (MTM) appointment. The patient was eligible for MTM because of multiple conditions, medications and the overall medication cost.  The goal of MTM is to help patients get the most out of their drug therapy. By working with patients and their providers directly, I make sure their medications are indicated, effective, safe and convenient for them. A care plan is then developed for each patient, and follow-up evaluation is done to ensure that medication outcomes are being achieved. Please note, I do not have authority to prescribe or change medication without physician approval.  In reviewing Wesley Cooper's medications, I found opportunities that may improve medication outcomes, and possibly save health care costs. Please review attached evaluation comments and recommendations for any drug therapy problems that require attention.    1. Currently taking both sotalol for afib and carvedilol for HFrEF. Both agents are beta-blockers and carvedilol therapy would be reasonable coverage for both afib and HFrEF. Would recommend increasing carvedilol dose and stop sotalol therapy.   Please contact me via inbasket message with any questions regarding Wesley Altamirano medication therapy.  Sincerely,  Oliva Patel, Debbie Medication Therapy Management Pharmacist [facility] [phone]

## 2020-11-12 NOTE — PROGRESS NOTES
Oncology initial visit:  Date on this visit: 11/12/2020    Wesley Cooper  is referred by Dr.Vanessa Jane Leonard for an oncology consultation. He requires evaluation for new diagnosis of colon cancer and liver cancer.    Primary Physician: Jarod De La Rosa     History Of Present Illness:  Mr. Cooper is a 75 year old male who presents with new diagnosis of HCC and colon cancer.     This is a video visit through [facility]. He has a hx of diverticulitis s/p descending colostomy (2004), CHF, CAD (s/p CABGx3, multiple PCIs), and atrial fibrillation on anticoagulation, DM2, started developing pain in abdomen and diarrhea around end of October and went to ED at an OSH on 10/27/2019 and CT showed a liver mass and colon thickening in the hepatic flexure.  Colonoscopy on 10/30 showed with biopsy concerning for colorectal malignancy. Liver biopsy was done on 11/5 due to concern for two primary malignancies.    He continues to have pain on the left side of the abdomen. Ostomy is working well. He has been taking oxycodone 1 per day. It helps but he does not want to take more. No nausea or vomiting. No bleeding/black stools. He has chills but that is chronic. No fevers. He has chronic dyspnea on exertion from underlying heart issues.  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 This has been going on since October 2020. No new swellings. He has chronic neuropathy, tingling/numbness from diabetes. No recent weight loss.    ECOG 1    ROS:  A comprehensive ROS was otherwise neg        Past Medical/Surgical History:  No past medical history on file.  Past Surgical History:   Procedure Laterality Date     CARDIAC SURGERY      multiple stents and pacer/defibrillator     COLONOSCOPY N/A 10/30/2020    Procedure: Colonoscopy;  Surgeon: Carmine Blackmon MD;  Location: [facility]     CV CORONARY STENT WITH DISTAL PROTECTION DEVICE N/A 3/12/2019    Procedure: Coronary Stent w Distal Protection Device;  Surgeon: Eliseo Field MD;   Location:  HEART CARDIAC CATH LAB     CV HEART CATHETERIZATION WITH POSSIBLE INTERVENTION N/A 1/24/2019    Procedure: PLANNED PCI;  Surgeon: Bill Marquez MD;  Location:  HEART CARDIAC CATH LAB     CV HEART CATHETERIZATION WITH POSSIBLE INTERVENTION N/A 3/12/2019    Procedure: BILL MUST PLANNED PCI WITH CSI;  Surgeon: Eliseo Field MD;  Location:  HEART CARDIAC CATH LAB     CV PCI ATHERECTOMY ORBITAL N/A 3/12/2019    Procedure: Atherectomy;  Surgeon: Eliseo Field MD;  Location:  HEART CARDIAC CATH LAB     GI SURGERY  2009     IR LIVER BIOPSY PERCUTANEOUS  11/5/2020     ORTHOPEDIC SURGERY      back surgery     VASCULAR SURGERY  1999    CABG X4   CHF  CAD  Afib  DM  CKD  Diverticulitis s/p colostomy in 2004    Cancer History:   As above    Allergies:  Allergies as of 11/12/2020     (No Known Allergies)     Current Medications:  Current Outpatient Medications   Medication Sig Dispense Refill     apixaban ANTICOAGULANT (ELIQUIS) 5 MG tablet Take 1 tablet (5 mg) by mouth 2 times daily 60 tablet 0     atorvastatin (LIPITOR) 80 MG tablet Take 80 mg by mouth daily       carvedilol (COREG) 12.5 MG tablet Take 0.5 tablets (6.25 mg) by mouth 2 times daily (with meals) 90 tablet 1     clopidogrel (PLAVIX) 75 MG tablet Take 75 mg by mouth daily       famotidine (PEPCID) 20 MG tablet Take 20 mg by mouth 2 times daily        ferrous sulfate (FEROSUL) 325 (65 Fe) MG tablet Take 325 mg by mouth daily       finasteride (PROSCAR) 5 MG tablet Take 5 mg by mouth daily       gabapentin (NEURONTIN) 300 MG capsule Take 300 mg by mouth 3 times daily        glipiZIDE (GLUCOTROL) 10 MG tablet Take 10 mg by mouth 2 times daily (before meals)        insulin detemir (LEVEMIR FLEXTOUCH) 100 UNIT/ML pen Inject 44 Units Subcutaneous At Bedtime       isosorbide mononitrate (IMDUR) 30 MG 24 hr tablet Take 0.5 tablets (15 mg) by mouth 2 times daily       nitroglycerin (NITROSTAT) 0.4 MG sublingual tablet Place  0.4 mg under the tongue every 5 minutes as needed for chest pain For chest pain place 1 tablet under the tongue every 5 minutes for 3 doses. If symptoms persist 5 minutes after 1st dose call 911.       sotalol (BETAPACE) 120 MG tablet Take 1 tablet (120 mg) by mouth 2 times daily 180 tablet 3     oxyCODONE (ROXICODONE) 5 MG tablet Take 1 tablet (5 mg) by mouth every 6 hours (Patient not taking: Reported on 11/11/2020) 15 tablet 0      Family History:  No family history on file.   Dad had prostate cancer    Brother had lung cancer    He has 9 children and they are all healthy.      Social History:  Social History     Socioeconomic History     Marital status:      Spouse name: Not on file     Number of children: Not on file     Years of education: Not on file     Highest education level: Not on file   Occupational History     Not on file   Social Needs     Financial resource strain: Not on file     Food insecurity     Worry: Not on file     Inability: Not on file     Transportation needs     Medical: Not on file     Non-medical: Not on file   Tobacco Use     Smoking status: Former Smoker     Smokeless tobacco: Never Used     Tobacco comment: quit 2010   Substance and Sexual Activity     Alcohol use: No     Drug use: No     Sexual activity: Not on file   Lifestyle     Physical activity     Days per week: Not on file     Minutes per session: Not on file     Stress: Not on file   Relationships     Social connections     Talks on phone: Not on file     Gets together: Not on file     Attends [employer] service: Not on file     Active member of club or organization: Not on file     Attends meetings of clubs or organizations: Not on file     Relationship status: Not on file     Intimate partner violence     Fear of current or ex partner: Not on file     Emotionally abused: Not on file     Physically abused: Not on file     Forced sexual activity: Not on file   Other Topics Concern     Parent/sibling w/ CABG, MI or  angioplasty before 65F 55M? Yes   Social History Narrative     Not on file     Ex smoker- quit in 2011. Rare etoh. Lives with wife. Youngest son also lives with him.    Physical Exam:  There were no vitals taken for this visit.     Wt Readings from Last 4 Encounters:   11/07/20 65.8 kg (145 lb 1.6 oz)   09/09/20 67.8 kg (149 lb 8 oz)   11/25/19 71.2 kg (157 lb)   08/19/19 76.3 kg (168 lb 3.2 oz)         Constitutional.  Does not seem to be in any acute distress.  Eyes.  No redness or discharge noted.  Respiratory.  Speaking in full sentences.  Breathing seems comfortable without any accessory use of muscles.    Skin.  Visualized his skin does not show any obvious rashes.  Musculoskeletal.  Range of motion for visualized areas is intact.  Neurological.  Alert and oriented x3.  Psychiatric.  Mood, mentation and affect are normal.  Decision making capacity is intact.      The rest of a comprehensive physical examination is deferred due to Public Health Emergency video visit restrictions.      Laboratory/Imaging Studies    Reviewed    ASSESSMENT/PLAN:    Colon cancer.  He has a large hepatic flexure mass and the biopsy of which showed at least intramucosal carcinoma and I believe this is actually invasive adenocarcinoma.  MSI intact.  There is no evidence of metastatic disease for this.  CEA is not elevated.      Hepatocellular cancer.    There is a 6.3 x 5.7 x 4.4 cm segment 2/3 and 4 liver lesion which has imaging characteristics of hepatocellular carcinoma and even the biopsy is consistent with moderately differentiated hepatocellular carcinoma.  Baseline alpha-fetoprotein 2727.  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  There is no evidence of metastatic disease although at this time we do not have a bone scan which we will order and he will get it done in the next few days.    We discussed the whole situation in detail.  He has a very complicated  situation because he has significant underlying cardiac comorbidities and he is on Eliquis and now he is diagnosed with 2 separate cancers.  The colon cancer seems to be confined to the hepatic flexure although there were several other polyps seen on the colonoscopy.  There was no good evidence of abnormal lymphadenopathy or other metastatic disease on the CT scan.  We discussed that typically for this type of a cancer, upfront surgery is recommended.  Since he does not have evidence of metastatic disease, I would discuss his case with colorectal surgery.    For the hepatocellular carcinoma, as mentioned above we will check bone scan and it is unlikely that he would be a good surgical candidate but I would still talk to Dr. Arevalo and I would also talk to interventional radiologist to consider liver directed therapy.  He is not a liver transplant candidate.  Even if surgical resection is not possible, potential liver directed therapy can offer him significant benefit.    Pain control.  He has cancer related pain and the pain is basically in the left upper quadrant.  He has been taking oxycodone once a day which helps but he does not want to take more.  I discussed with him that he should take oxycodone more frequently to help with the pain.     He should continue to follow closely with his cardiologist because of complicated cardiac history.    Poor appetite.  We discussed importance of maintaining good nutrition and I advised him to drink high protein shakes like Glucerna.    We will be in touch after discussing his case with the surgeons and interventional radiologist.    I answered all of his questions to his satisfaction.  He is agreeable and comfortable with the plan.    Bonifacio Hernandez MD     Transition Care Management Services  No data recorded  No data recorded  No data recorded  Interactive contact date: 11/8/2020  Face-to-face visit date: 11/12/2020    Medical complexity decision making: High complexity  ([id number])

## 2020-11-12 NOTE — PROGRESS NOTES
"Wesley Cooper is a 75 year old male who is being evaluated via a billable video visit.      The patient has been notified of following:     \"This video visit will be conducted via a call between you and your physician/provider. We have found that certain health care needs can be provided without the need for an in-person physical exam.  This service lets us provide the care you need with a video conversation.  If a prescription is necessary we can send it directly to your pharmacy.  If lab work is needed we can place an order for that and you can then stop by our lab to have the test done at a later time.    Video visits are billed at different rates depending on your insurance coverage.  Please reach out to your insurance provider with any questions.    If during the course of the call the physician/provider feels a video visit is not appropriate, you will not be charged for this service.\"    Patient has given verbal consent for Video visit? Yes  How would you like to obtain your AVS? Mail a copy  If you are dropped from the video visit, the video invite should be resent to: Text to cell phone: [phone]  Will anyone else be joining your video visit? No      Video-Visit Details    Type of service:  Video Visit    Video Start Time: 3:18 PM  Video End Time: 3:49 PM    Originating Location (pt. Location): Home    Distant Location (provider location):  [facility]     Platform used for Video Visit: Elio Hernandez MD          "

## 2020-11-12 NOTE — PATIENT INSTRUCTIONS
Schedule bone scan in the next few days.  I will discuss with both the surgeon and interventional radiologist and we will be in touch with regards to next steps.

## 2020-11-12 NOTE — LETTER
"    11/12/2020         RE: Wesley Cooper  [address]  [facility] [address]        Dear Colleague,    Thank you for referring your patient, Wesley Cooper, to the [facility]. Please see a copy of my visit note below.    Wesley Cooper is a 75 year old male who is being evaluated via a billable video visit.      The patient has been notified of following:     \"This video visit will be conducted via a call between you and your physician/provider. We have found that certain health care needs can be provided without the need for an in-person physical exam.  This service lets us provide the care you need with a video conversation.  If a prescription is necessary we can send it directly to your pharmacy.  If lab work is needed we can place an order for that and you can then stop by our lab to have the test done at a later time.    Video visits are billed at different rates depending on your insurance coverage.  Please reach out to your insurance provider with any questions.    If during the course of the call the physician/provider feels a video visit is not appropriate, you will not be charged for this service.\"    Patient has given verbal consent for Video visit? Yes  How would you like to obtain your AVS? Mail a copy  If you are dropped from the video visit, the video invite should be resent to: Text to cell phone: [phone]  Will anyone else be joining your video visit? No      Video-Visit Details    Type of service:  Video Visit    Video Start Time: 3:18 PM  Video End Time: 3:49 PM    Originating Location (pt. Location): Home    Distant Location (provider location):  [facility]     Platform used for Video Visit: Elio Hernandez MD            Oncology initial visit:  Date on this visit: 11/12/2020    Wesley Cooper  is referred by Dr.Vanessa Jane Leonard for an oncology consultation. He requires evaluation for new diagnosis of colon cancer and liver " cancer.    Primary Physician: Jarod De La Rosa     History Of Present Illness:  Mr. Cooper is a 75 year old male who presents with new diagnosis of HCC and colon cancer.     This is a video visit through [employer]. He has a hx of diverticulitis s/p descending colostomy (2004), CHF, CAD (s/p CABGx3, multiple PCIs), and atrial fibrillation on anticoagulation, DM2, started developing pain in abdomen and diarrhea around end of October and went to ED at an OSH on 10/27/2019 and CT showed a liver mass and colon thickening in the hepatic flexure.  Colonoscopy on 10/30 showed with biopsy concerning for colorectal malignancy. Liver biopsy was done on 11/5 due to concern for two primary malignancies.    He continues to have pain on the left side of the abdomen. Ostomy is working well. He has been taking oxycodone 1 per day. It helps but he does not want to take more. No nausea or vomiting. No bleeding/black stools. He has chills but that is chronic. No fevers. He has chronic dyspnea on exertion from underlying heart issues.  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 This has been going on since October 2020. No new swellings. He has chronic neuropathy, tingling/numbness from diabetes. No recent weight loss.    ECOG 1    ROS:  A comprehensive ROS was otherwise neg        Past Medical/Surgical History:  No past medical history on file.  Past Surgical History:   Procedure Laterality Date     CARDIAC SURGERY      multiple stents and pacer/defibrillator     COLONOSCOPY N/A 10/30/2020    Procedure: Colonoscopy;  Surgeon: Carmine Blackmon MD;  Location:  GI     CV CORONARY STENT WITH DISTAL PROTECTION DEVICE N/A 3/12/2019    Procedure: Coronary Stent w Distal Protection Device;  Surgeon: Eliseo Field MD;  Location: [facility] CARDIAC CATH LAB     CV HEART CATHETERIZATION WITH POSSIBLE INTERVENTION N/A 1/24/2019    Procedure: PLANNED PCI;  Surgeon: Bill Marquez MD;  Location: [facility] CARDIAC CATH LAB      CV HEART CATHETERIZATION WITH POSSIBLE INTERVENTION N/A 3/12/2019    Procedure: BILL BROWN PLANNED PCI WITH CSI;  Surgeon: Eliseo Field MD;  Location:  HEART CARDIAC CATH LAB     CV PCI ATHERECTOMY ORBITAL N/A 3/12/2019    Procedure: Atherectomy;  Surgeon: Eliseo Field MD;  Location:  HEART CARDIAC CATH LAB     GI SURGERY  2009     IR LIVER BIOPSY PERCUTANEOUS  11/5/2020     ORTHOPEDIC SURGERY      back surgery     VASCULAR SURGERY  1999    CABG X4   CHF  CAD  Afib  DM  CKD  Diverticulitis s/p colostomy in 2004    Cancer History:   As above    Allergies:  Allergies as of 11/12/2020     (No Known Allergies)     Current Medications:  Current Outpatient Medications   Medication Sig Dispense Refill     apixaban ANTICOAGULANT (ELIQUIS) 5 MG tablet Take 1 tablet (5 mg) by mouth 2 times daily 60 tablet 0     atorvastatin (LIPITOR) 80 MG tablet Take 80 mg by mouth daily       carvedilol (COREG) 12.5 MG tablet Take 0.5 tablets (6.25 mg) by mouth 2 times daily (with meals) 90 tablet 1     clopidogrel (PLAVIX) 75 MG tablet Take 75 mg by mouth daily       famotidine (PEPCID) 20 MG tablet Take 20 mg by mouth 2 times daily        ferrous sulfate (FEROSUL) 325 (65 Fe) MG tablet Take 325 mg by mouth daily       finasteride (PROSCAR) 5 MG tablet Take 5 mg by mouth daily       gabapentin (NEURONTIN) 300 MG capsule Take 300 mg by mouth 3 times daily        glipiZIDE (GLUCOTROL) 10 MG tablet Take 10 mg by mouth 2 times daily (before meals)        insulin detemir (LEVEMIR FLEXTOUCH) 100 UNIT/ML pen Inject 44 Units Subcutaneous At Bedtime       isosorbide mononitrate (IMDUR) 30 MG 24 hr tablet Take 0.5 tablets (15 mg) by mouth 2 times daily       nitroglycerin (NITROSTAT) 0.4 MG sublingual tablet Place 0.4 mg under the tongue every 5 minutes as needed for chest pain For chest pain place 1 tablet under the tongue every 5 minutes for 3 doses. If symptoms persist 5 minutes after 1st dose call 911.       sotalol  (BETAPACE) 120 MG tablet Take 1 tablet (120 mg) by mouth 2 times daily 180 tablet 3     oxyCODONE (ROXICODONE) 5 MG tablet Take 1 tablet (5 mg) by mouth every 6 hours (Patient not taking: Reported on 11/11/2020) 15 tablet 0      Family History:  No family history on file.   Dad had prostate cancer    Brother had lung cancer    He has 9 children and they are all healthy.      Social History:  Social History     Socioeconomic History     Marital status:      Spouse name: Not on file     Number of children: Not on file     Years of education: Not on file     Highest education level: Not on file   Occupational History     Not on file   Social Needs     Financial resource strain: Not on file     Food insecurity     Worry: Not on file     Inability: Not on file     Transportation needs     Medical: Not on file     Non-medical: Not on file   Tobacco Use     Smoking status: Former Smoker     Smokeless tobacco: Never Used     Tobacco comment: quit 2010   Substance and Sexual Activity     Alcohol use: No     Drug use: No     Sexual activity: Not on file   Lifestyle     Physical activity     Days per week: Not on file     Minutes per session: Not on file     Stress: Not on file   Relationships     Social connections     Talks on phone: Not on file     Gets together: Not on file     Attends Alevism service: Not on file     Active member of club or organization: Not on file     Attends meetings of clubs or organizations: Not on file     Relationship status: Not on file     Intimate partner violence     Fear of current or ex partner: Not on file     Emotionally abused: Not on file     Physically abused: Not on file     Forced sexual activity: Not on file   Other Topics Concern     Parent/sibling w/ CABG, MI or angioplasty before 65F 55M? Yes   Social History Narrative     Not on file     Ex smoker- quit in 2011. Rare etoh. Lives with wife. Youngest son also lives with him.    Physical Exam:  There were no vitals taken  for this visit.     Wt Readings from Last 4 Encounters:   11/07/20 65.8 kg (145 lb 1.6 oz)   09/09/20 67.8 kg (149 lb 8 oz)   11/25/19 71.2 kg (157 lb)   08/19/19 76.3 kg (168 lb 3.2 oz)         Constitutional.  Does not seem to be in any acute distress.  Eyes.  No redness or discharge noted.  Respiratory.  Speaking in full sentences.  Breathing seems comfortable without any accessory use of muscles.    Skin.  Visualized his skin does not show any obvious rashes.  Musculoskeletal.  Range of motion for visualized areas is intact.  Neurological.  Alert and oriented x3.  Psychiatric.  Mood, mentation and affect are normal.  Decision making capacity is intact.      The rest of a comprehensive physical examination is deferred due to Public Health Emergency video visit restrictions.      Laboratory/Imaging Studies    Reviewed    ASSESSMENT/PLAN:    Colon cancer.  He has a large hepatic flexure mass and the biopsy of which showed at least intramucosal carcinoma and I believe this is actually invasive adenocarcinoma.  MSI intact.  There is no evidence of metastatic disease for this.  CEA is not elevated.      Hepatocellular cancer.    There is a 6.3 x 5.7 x 4.4 cm segment 2/3 and 4 liver lesion which has imaging characteristics of hepatocellular carcinoma and even the biopsy is consistent with moderately differentiated hepatocellular carcinoma.  Baseline alpha-fetoprotein 2727.  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  There is no evidence of metastatic disease although at this time we do not have a bone scan which we will order and he will get it done in the next few days.    We discussed the whole situation in detail.  He has a very complicated situation because he has significant underlying cardiac comorbidities and he is on Eliquis and now he is diagnosed with 2 separate cancers.  The colon cancer seems to be confined to the hepatic flexure although there  were several other polyps seen on the colonoscopy.  There was no good evidence of abnormal lymphadenopathy or other metastatic disease on the CT scan.  We discussed that typically for this type of a cancer, upfront surgery is recommended.  Since he does not have evidence of metastatic disease, I would discuss his case with colorectal surgery.    For the hepatocellular carcinoma, as mentioned above we will check bone scan and it is unlikely that he would be a good surgical candidate but I would still talk to Dr. Arevalo and I would also talk to interventional radiologist to consider liver directed therapy.  He is not a liver transplant candidate.  Even if surgical resection is not possible, potential liver directed therapy can offer him significant benefit.    Pain control.  He has cancer related pain and the pain is basically in the left upper quadrant.  He has been taking oxycodone once a day which helps but he does not want to take more.  I discussed with him that he should take oxycodone more frequently to help with the pain.     He should continue to follow closely with his cardiologist because of complicated cardiac history.    Poor appetite.  We discussed importance of maintaining good nutrition and I advised him to drink high protein shakes like Glucerna.    We will be in touch after discussing his case with the surgeons and interventional radiologist.    I answered all of his questions to his satisfaction.  He is agreeable and comfortable with the plan.    Bonifacio Hernandez MD     Transition Care Management Services  No data recorded  No data recorded  No data recorded  Interactive contact date: 11/8/2020  Face-to-face visit date: 11/12/2020    Medical complexity decision making: High complexity ([address])          Again, thank you for allowing me to participate in the care of your patient.        Sincerely,        Bonifacio Hernandez MD

## 2020-11-13 NOTE — PROGRESS NOTES
Set up appt with .      was discussing case with Dr. Hernandez.  recommended treatment first and then we will see pt in about 3-4 months.

## 2020-11-17 NOTE — TELEPHONE ENCOUNTER
Called and left a msg for pt regarding referral from Dr. Hernandez.     Left a msg    Informed him that I'd like to see if I can schedule a consult appt for Tues 11/24 at 1pm with Dr. Pierre.    Asked him to return my call so that we can talk further in detail.       *pt's wife did return my call and agrees to this date/time for Dr. Pierre to consult them.     Pt will be scheduled for Y90 consult.            Angelina NEWBY RN, BSN  Interventional Radiology/Vascular  Nurse Coordinator   Phone: [phone]  Fax: [phone]

## 2020-11-17 NOTE — PROGRESS NOTES
"RN CARE COORDINATION NOTE      Outgoing:  Called patient to get information on where he would like the bone scan orders faxed to locally.    Spoke to wife, Bianca who indicated they would like the scan at [facility].     She shared that patient has been \"confused and out of it\" today. He has not been eating or drinking. Has been in bed most of the time. She is unsure if he is using his insulin, he manages this himself. He continues to only take one Oxycodone a day for pain. She describes the confusion as not understanding questions and answering things inappropriately to questions.  Advised to bring him to local ER for evaluation. Bianca verbalized understanding and agreement with plan. She will call to update writer tomorrow.       Araceli Fenton MSN, RN, OCN  RN Care Coordinator  [facility]  [phone]    "

## 2020-11-19 NOTE — PROGRESS NOTES
Writer placed outgoing fax to [facility]  for bone scan order, visit note, and facesheet per the request of Araceli Fenton RN CC. Screen shot of fax confirmation below.

## 2020-11-19 NOTE — PROGRESS NOTES
RN CARE COORDINATION NOTE      Outgoing:  Spoke to pt's wife, Bianca, to update her on Bone Scan orders being faxed to [place] in [place]. She will call and schedule the appointment.       Araceli Fenton MSN, RN, OCN  RN Care Coordinator  [facility]  [phone]

## 2020-11-23 NOTE — TELEPHONE ENCOUNTER
DIAGNOSIS: NEW consult, HCC and colon CA, referred [facility]   DATE RECEIVED: 11.24.20   NOTES STATUS DETAILS   OFFICE NOTE from referring provider Internal 11.12.20  Dr. Bonifacio Hernandez  [facility]   OFFICE NOTE from other specialist N/A    DISCHARGE SUMMARY from hospital Internal 10.27-11.8.20  Dr. Tal Duffy  [facility]   DISCHARGE REPORT from the ER CE 10.27.20  Dr. Lety Melendrez  [facility]    OPERATIVE REPORT N/A    MEDICATION LIST Internal    XRAYS (IMAGES & REPORTS) Pacs 11.7.20, 11.1.20, 10.27.20  CT Abd/Pelvis    11.5.20  IR Percutaneous Liver Bx    11.3.20  MR Liver    10.30.20  CT Chest    10.29.20  US Abd   PATHOLOGY  Slides & report Internal Path # [id number]- Liver bx

## 2020-11-24 NOTE — PROGRESS NOTES
"Wesley Cooper is a 75 year old male who is being evaluated via a billable telephone visit.      The patient has been notified of following:     \"This telephone visit will be conducted via a call between you and your physician/provider. We have found that certain health care needs can be provided without the need for a physical exam.  This service lets us provide the care you need with a short phone conversation.  If a prescription is necessary we can send it directly to your pharmacy.  If lab work is needed we can place an order for that and you can then stop by our lab to have the test done at a later time.    Telephone visits are billed at different rates depending on your insurance coverage. During this emergency period, for some insurers they may be billed the same as an in-person visit.  Please reach out to your insurance provider with any questions.    If during the course of the call the physician/provider feels a telephone visit is not appropriate, you will not be charged for this service.\"    Patient has given verbal consent for Telephone visit?  Yes    What phone number would you like to be contacted at? [phone]    How would you like to obtain your AVS? MyChart    Phone call duration: 30 minutes        I have reviewed and updated the patient's allergies and medication list. Patient was asked to provide any patient recorded vital signs, height and/or weight.  Please see in \"Patient Reported Vital Signs\" tab information.            Interventional Radiology Clinic Visit    Date of this visit: 11/25/2020    Wesley Cooper  is referred by  for treatment recommendations. The patient requires evaluation for diagnosis of Hepatocellular carcinoma.     Primary Physician: Jarod De La Rosa        History Of Present Illness:    Wesley Cooper is a 75 year old male who presents with diagnosis of HCC, which is biopsy proven without previously known liver disease.  He presented to the hospital with sepsis following " colonoscopy.  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  The biopsy of that mass confirmed HCC.  After multi-dysplinary discussion, it was felt that the HCC should be treated first given it's moderately differentiated pathology and it's evidence of portal vein invasion on MRI.      The patient denies prior history of jaundice, HE, or GI bleed and reports he had no symptoms of his disease.       Review of Systems:    General: Negative for recent fever.  Skin: Negative for jaundice.  Eyes: Negative for jaundice.  Respiratory: Negative for shortness of breath.  Cardiovascular: Negative for chest pain.  Gastrointestinal: Negative for abdominal pain or swelling, nausea, vomiting, or diarrhea.  Musculoskeletal: Negative for ankle swelling.    Past Medical/Surgical History:    No past medical history on file.  Past Surgical History:   Procedure Laterality Date     CARDIAC SURGERY      multiple stents and pacer/defibrillator     COLONOSCOPY N/A 10/30/2020    Procedure: Colonoscopy;  Surgeon: Carmine Blackmon MD;  Location:  GI     CV CORONARY STENT WITH DISTAL PROTECTION DEVICE N/A 3/12/2019    Procedure: Coronary Stent w Distal Protection Device;  Surgeon: Eliseo Field MD;  Location:  HEART CARDIAC CATH LAB     CV HEART CATHETERIZATION WITH POSSIBLE INTERVENTION N/A 1/24/2019    Procedure: PLANNED PCI;  Surgeon: Bill Marquez MD;  Location: [facility] CARDIAC CATH LAB     CV HEART CATHETERIZATION WITH POSSIBLE INTERVENTION N/A 3/12/2019    Procedure: BILL BROWN PLANNED PCI WITH CSI;  Surgeon: Eliseo Field MD;  Location: [facility] CARDIAC CATH LAB     CV PCI ATHERECTOMY ORBITAL N/A 3/12/2019    Procedure: Atherectomy;  Surgeon: Eliseo Field MD;  Location: [facility] CARDIAC CATH LAB     GI SURGERY  2009     IR LIVER BIOPSY PERCUTANEOUS  11/5/2020      ORTHOPEDIC SURGERY      back surgery     VASCULAR SURGERY  1999    CABG X4       Current Medications:    Current Outpatient Medications   Medication Sig Dispense Refill     apixaban ANTICOAGULANT (ELIQUIS) 5 MG tablet Take 1 tablet (5 mg) by mouth 2 times daily 60 tablet 0     atorvastatin (LIPITOR) 80 MG tablet Take 80 mg by mouth daily       azithromycin (ZITHROMAX) 500 MG tablet        carvedilol (COREG) 12.5 MG tablet Take 0.5 tablets (6.25 mg) by mouth 2 times daily (with meals) 90 tablet 1     cefuroxime (CEFTIN) 500 MG tablet        clopidogrel (PLAVIX) 75 MG tablet Take 75 mg by mouth daily       famotidine (PEPCID) 20 MG tablet Take 20 mg by mouth 2 times daily        ferrous sulfate (FEROSUL) 325 (65 Fe) MG tablet Take 325 mg by mouth daily       gabapentin (NEURONTIN) 300 MG capsule Take 300 mg by mouth 3 times daily        glipiZIDE (GLUCOTROL) 10 MG tablet Take 10 mg by mouth 2 times daily (before meals)        insulin detemir (LEVEMIR FLEXTOUCH) 100 UNIT/ML pen Inject 25 Units Subcutaneous At Bedtime        isosorbide mononitrate (IMDUR) 30 MG 24 hr tablet Take 0.5 tablets (15 mg) by mouth 2 times daily       oxyCODONE (ROXICODONE) 5 MG tablet Take 1 tablet (5 mg) by mouth every 6 hours 15 tablet 0     sotalol (BETAPACE) 120 MG tablet Take 1 tablet (120 mg) by mouth 2 times daily 180 tablet 3     finasteride (PROSCAR) 5 MG tablet Take 5 mg by mouth daily       nitroglycerin (NITROSTAT) 0.4 MG sublingual tablet Place 0.4 mg under the tongue every 5 minutes as needed for chest pain For chest pain place 1 tablet under the tongue every 5 minutes for 3 doses. If symptoms persist 5 minutes after 1st dose call 911.         Allergies:    Patient has no known allergies.    Family History:    No family history of HCC.    Social History:    Social History     Socioeconomic History     Marital status:      Spouse name: None     Number of children: None     Years of education: None     Highest education  level: None   Occupational History     None   Social Needs     Financial resource strain: None     Food insecurity     Worry: None     Inability: None     Transportation needs     Medical: None     Non-medical: None   Tobacco Use     Smoking status: Former Smoker     Smokeless tobacco: Never Used     Tobacco comment: quit 2010   Substance and Sexual Activity     Alcohol use: No     Drug use: No     Sexual activity: None   Lifestyle     Physical activity     Days per week: None     Minutes per session: None     Stress: None   Relationships     Social connections     Talks on phone: None     Gets together: None     Attends Pentecostalism service: None     Active member of club or organization: None     Attends meetings of clubs or organizations: None     Relationship status: None     Intimate partner violence     Fear of current or ex partner: None     Emotionally abused: None     Physically abused: None     Forced sexual activity: None   Other Topics Concern     Parent/sibling w/ CABG, MI or angioplasty before 65F 55M? Yes   Social History Narrative     None       Physical Exam:    There were no vitals taken for this visit.     Laboratory Studies:    Lab Results   Component Value Date    HGB 12.5 11/08/2020     Lab Results   Component Value Date     11/08/2020     Lab Results   Component Value Date    WBC 5.6 11/08/2020       Lab Results   Component Value Date    INR 1.29 11/03/2020       Lab Results   Component Value Date    PROTTOTAL 6.5 11/06/2020      Lab Results   Component Value Date    ALBUMIN 2.8 11/06/2020     Lab Results   Component Value Date    BILITOTAL 0.3 11/06/2020     No results found for: BILICONJ   Lab Results   Component Value Date    ALKPHOS 123 11/06/2020     Lab Results   Component Value Date    AST 52 11/06/2020     Lab Results   Component Value Date    ALT 19 11/06/2020       Lab Results   Component Value Date    CR 1.49 11/08/2020     No results found for: GFR    Alpha Fetoprotein   Date  Value Ref Range Status   10/28/2020 2,727.0 (H) 0 - 8 ug/L Final     Comment:     Assay Method:  Chemiluminescence using Siemens Centaur XP       Imaging:     I reviewed the MRI from 10/27 which demonstrated a 6.3 cm left lobe HCC with portal vein invasion.       ASSESSMENT:      Wesley Cooper is a 75 year old male with biopsy proven HCC as well as colorectal cancer.     Child-Mar score: A(6)  Native MELD score: 15  ECOG performance status: 0-1    I believe the patient is a suitable candidate for a radioembolization procedure.    I showed him the imaging and discussed the findings. I discussed with him that the goal of the procedure is disease control with a survival benefit rather than a cure given the nature of the disease, but that sometimes lesions will be cured in this manner. Alternatives were reviewed, including surgery, but the patient is not a surgical candidate.  I explained the radioembolization procedure  - that it is a two step procedure on two different days that involves an angiogram and nuclear medicine study on each day, and that it requires insurance pre-approval. I explained that the first procedure involves mapping the arteries to the liver and embolizing any sidebranches that place the patient at risk of non-target radiation injury, then checking for shunting to the lungs. The second procedure, assuming relevant sidebranches were embolizable and the lung shunting is not too high, involves delivering the radiation beads intra-arterially and imaging the liver afterwards to assess the pattern of radiation distribution.   I explained that both procedures are performed under conscious sedation. We discussed what will happen before, during and after the procedure; what to expect in the post procedure recovery period; and what the follow-up will be. We discussed the risks of the procedure which include, but are not limited to, vascular injury causing bleeding or occlusion of blood vessels, groin  hematoma, infection, biloma, liver failure, non-target radiation injury to structures such as gallbladder/bowel/pancreas/lung (with risks such as bowel ulceration, pancreatitis or pneumonitis), and incomplete treatment. Risks are increased the greater the deviation from normal lab values, especially albumin and total bilirubin, and also the larger the area we must treat. We also discussed the post-radioembolization syndrome which consists of varying degrees of pain, nausea, and lethargy. I also explained that new tumors may develop elsewhere given the nature of the disease. Most patients go home the same day, but occasionally circumstances are such that a longer admission may be required. I also informed the patient that I will be assisted during the procedure by a fellow and/or resident, and that sometimes a medical student may be observing. All of Mr. Cooper's questions were answered and he agreed to proceed.     PLAN:  Pending insurance pre-approval, we will schedule him for the Y90 mapping procedure accordingly.    It was a pleasure seeing the patient.     SignedAlexis M.D.  Department of Interventional Radiology  [facility]  Patient Care Team:  Jarod De La Rosa as PCP - General (Family Practice)  Claudy Loco MD as MD (Cardiology)  Calli Aguayo, RN as Nurse Coordinator (Cardiology)  Aisha Alicea, SHANELL as Specialty Care Coordinator (Cardiology)  Nicolas Modi MD as MD (Clinical Cardiac Electrophysiology)  Claudy Loco MD as Assigned Heart and Vascular Provider  Violet Hernandez MD as MD (Hematology & Oncology)  Kristina Fenton, RN as Specialty Care Coordinator (Hematology & Oncology)  VIOLET HERNANDEZ md

## 2020-11-24 NOTE — LETTER
"11/24/2020     RE: Wesley Cooper  [address]  [place] [address]    Dear Colleague,    Thank you for referring your patient, Wesley Cooper, to the [facility]. Please see a copy of my visit note below.    Wesley Cooper is a 75 year old male who is being evaluated via a billable telephone visit.      The patient has been notified of following:     \"This telephone visit will be conducted via a call between you and your physician/provider. We have found that certain health care needs can be provided without the need for a physical exam.  This service lets us provide the care you need with a short phone conversation.  If a prescription is necessary we can send it directly to your pharmacy.  If lab work is needed we can place an order for that and you can then stop by our lab to have the test done at a later time.    Telephone visits are billed at different rates depending on your insurance coverage. During this emergency period, for some insurers they may be billed the same as an in-person visit.  Please reach out to your insurance provider with any questions.    If during the course of the call the physician/provider feels a telephone visit is not appropriate, you will not be charged for this service.\"    Patient has given verbal consent for Telephone visit?  Yes    What phone number would you like to be contacted at? [phone]    How would you like to obtain your AVS? [facility]    Phone call duration: 30 minutes        I have reviewed and updated the patient's allergies and medication list. Patient was asked to provide any patient recorded vital signs, height and/or weight.  Please see in \"Patient Reported Vital Signs\" tab information.            Interventional Radiology Clinic Visit    Date of this visit: 11/25/2020    Wesley Cooper  is referred by  for treatment recommendations. The patient requires evaluation for diagnosis of Hepatocellular carcinoma.     Primary Physician: " Jarod De La Rosa        History Of Present Illness:    Wesley Cooper is a 75 year old male who presents with diagnosis of HCC, which is biopsy proven without previously known liver disease.  He presented to the hospital with sepsis following colonoscopy.  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  The biopsy of that mass confirmed HCC.  After multi-dysplinary discussion, it was felt that the HCC should be treated first given it's moderately differentiated pathology and it's evidence of portal vein invasion on MRI.      The patient denies prior history of jaundice, HE, or GI bleed and reports he had no symptoms of his disease.       Review of Systems:    General: Negative for recent fever.  Skin: Negative for jaundice.  Eyes: Negative for jaundice.  Respiratory: Negative for shortness of breath.  Cardiovascular: Negative for chest pain.  Gastrointestinal: Negative for abdominal pain or swelling, nausea, vomiting, or diarrhea.  Musculoskeletal: Negative for ankle swelling.    Past Medical/Surgical History:    No past medical history on file.  Past Surgical History:   Procedure Laterality Date     CARDIAC SURGERY      multiple stents and pacer/defibrillator     COLONOSCOPY N/A 10/30/2020    Procedure: Colonoscopy;  Surgeon: Carmine Blackmon MD;  Location:  GI     CV CORONARY STENT WITH DISTAL PROTECTION DEVICE N/A 3/12/2019    Procedure: Coronary Stent w Distal Protection Device;  Surgeon: Eliseo Field MD;  Location: [facility] CARDIAC CATH LAB     CV HEART CATHETERIZATION WITH POSSIBLE INTERVENTION N/A 1/24/2019    Procedure: PLANNED PCI;  Surgeon: Bill Marquez MD;  Location: [facility] CARDIAC CATH LAB     CV HEART CATHETERIZATION WITH POSSIBLE INTERVENTION N/A 3/12/2019    Procedure: BILL BROWN PLANNED PCI WITH CSI;  Surgeon: Eliseo Field MD;  Location:   HEART CARDIAC CATH LAB     CV PCI ATHERECTOMY ORBITAL N/A 3/12/2019    Procedure: Atherectomy;  Surgeon: Eliseo Field MD;  Location:  HEART CARDIAC CATH LAB     GI SURGERY  2009     IR LIVER BIOPSY PERCUTANEOUS  11/5/2020     ORTHOPEDIC SURGERY      back surgery     VASCULAR SURGERY  1999    CABG X4       Current Medications:    Current Outpatient Medications   Medication Sig Dispense Refill     apixaban ANTICOAGULANT (ELIQUIS) 5 MG tablet Take 1 tablet (5 mg) by mouth 2 times daily 60 tablet 0     atorvastatin (LIPITOR) 80 MG tablet Take 80 mg by mouth daily       azithromycin (ZITHROMAX) 500 MG tablet        carvedilol (COREG) 12.5 MG tablet Take 0.5 tablets (6.25 mg) by mouth 2 times daily (with meals) 90 tablet 1     cefuroxime (CEFTIN) 500 MG tablet        clopidogrel (PLAVIX) 75 MG tablet Take 75 mg by mouth daily       famotidine (PEPCID) 20 MG tablet Take 20 mg by mouth 2 times daily        ferrous sulfate (FEROSUL) 325 (65 Fe) MG tablet Take 325 mg by mouth daily       gabapentin (NEURONTIN) 300 MG capsule Take 300 mg by mouth 3 times daily        glipiZIDE (GLUCOTROL) 10 MG tablet Take 10 mg by mouth 2 times daily (before meals)        insulin detemir (LEVEMIR FLEXTOUCH) 100 UNIT/ML pen Inject 25 Units Subcutaneous At Bedtime        isosorbide mononitrate (IMDUR) 30 MG 24 hr tablet Take 0.5 tablets (15 mg) by mouth 2 times daily       oxyCODONE (ROXICODONE) 5 MG tablet Take 1 tablet (5 mg) by mouth every 6 hours 15 tablet 0     sotalol (BETAPACE) 120 MG tablet Take 1 tablet (120 mg) by mouth 2 times daily 180 tablet 3     finasteride (PROSCAR) 5 MG tablet Take 5 mg by mouth daily       nitroglycerin (NITROSTAT) 0.4 MG sublingual tablet Place 0.4 mg under the tongue every 5 minutes as needed for chest pain For chest pain place 1 tablet under the tongue every 5 minutes for 3 doses. If symptoms persist 5 minutes after 1st dose call 911.         Allergies:    Patient has no known  allergies.    Family History:    No family history of HCC.    Social History:    Social History     Socioeconomic History     Marital status:      Spouse name: None     Number of children: None     Years of education: None     Highest education level: None   Occupational History     None   Social Needs     Financial resource strain: None     Food insecurity     Worry: None     Inability: None     Transportation needs     Medical: None     Non-medical: None   Tobacco Use     Smoking status: Former Smoker     Smokeless tobacco: Never Used     Tobacco comment: quit 2010   Substance and Sexual Activity     Alcohol use: No     Drug use: No     Sexual activity: None   Lifestyle     Physical activity     Days per week: None     Minutes per session: None     Stress: None   Relationships     Social connections     Talks on phone: None     Gets together: None     Attends Restorationist service: None     Active member of club or organization: None     Attends meetings of clubs or organizations: None     Relationship status: None     Intimate partner violence     Fear of current or ex partner: None     Emotionally abused: None     Physically abused: None     Forced sexual activity: None   Other Topics Concern     Parent/sibling w/ CABG, MI or angioplasty before 65F 55M? Yes   Social History Narrative     None       Physical Exam:    There were no vitals taken for this visit.     Laboratory Studies:    Lab Results   Component Value Date    HGB 12.5 11/08/2020     Lab Results   Component Value Date     11/08/2020     Lab Results   Component Value Date    WBC 5.6 11/08/2020       Lab Results   Component Value Date    INR 1.29 11/03/2020       Lab Results   Component Value Date    PROTTOTAL 6.5 11/06/2020      Lab Results   Component Value Date    ALBUMIN 2.8 11/06/2020     Lab Results   Component Value Date    BILITOTAL 0.3 11/06/2020     No results found for: BILICONJ   Lab Results   Component Value Date    ALKPHOS 123  11/06/2020     Lab Results   Component Value Date    AST 52 11/06/2020     Lab Results   Component Value Date    ALT 19 11/06/2020       Lab Results   Component Value Date    CR 1.49 11/08/2020     No results found for: GFR    Alpha Fetoprotein   Date Value Ref Range Status   10/28/2020 2,727.0 (H) 0 - 8 ug/L Final     Comment:     Assay Method:  Chemiluminescence using Siemens Centaur XP       Imaging:     I reviewed the MRI from 10/27 which demonstrated a 6.3 cm left lobe HCC with portal vein invasion.       ASSESSMENT:      Wesley Cooper is a 75 year old male with biopsy proven HCC as well as colorectal cancer.     Child-Mar score: A(6)  Native MELD score: 15  ECOG performance status: 0-1    I believe the patient is a suitable candidate for a radioembolization procedure.    I showed him the imaging and discussed the findings. I discussed with him that the goal of the procedure is disease control with a survival benefit rather than a cure given the nature of the disease, but that sometimes lesions will be cured in this manner. Alternatives were reviewed, including surgery, but the patient is not a surgical candidate.  I explained the radioembolization procedure  - that it is a two step procedure on two different days that involves an angiogram and nuclear medicine study on each day, and that it requires insurance pre-approval. I explained that the first procedure involves mapping the arteries to the liver and embolizing any sidebranches that place the patient at risk of non-target radiation injury, then checking for shunting to the lungs. The second procedure, assuming relevant sidebranches were embolizable and the lung shunting is not too high, involves delivering the radiation beads intra-arterially and imaging the liver afterwards to assess the pattern of radiation distribution.   I explained that both procedures are performed under conscious sedation. We discussed what will happen before, during and after the  procedure; what to expect in the post procedure recovery period; and what the follow-up will be. We discussed the risks of the procedure which include, but are not limited to, vascular injury causing bleeding or occlusion of blood vessels, groin hematoma, infection, biloma, liver failure, non-target radiation injury to structures such as gallbladder/bowel/pancreas/lung (with risks such as bowel ulceration, pancreatitis or pneumonitis), and incomplete treatment. Risks are increased the greater the deviation from normal lab values, especially albumin and total bilirubin, and also the larger the area we must treat. We also discussed the post-radioembolization syndrome which consists of varying degrees of pain, nausea, and lethargy. I also explained that new tumors may develop elsewhere given the nature of the disease. Most patients go home the same day, but occasionally circumstances are such that a longer admission may be required. I also informed the patient that I will be assisted during the procedure by a fellow and/or resident, and that sometimes a medical student may be observing. All of Mr. Cooper's questions were answered and he agreed to proceed.     PLAN:  Pending insurance pre-approval, we will schedule him for the Y90 mapping procedure accordingly.    It was a pleasure seeing the patient.     SignedAlexis M.D.  Department of Interventional Radiology  [facility]     CC  Patient Care Team:  Jarod De La Rosa as PCP - General (Family Practice)  Claudy Loco MD as MD (Cardiology)  Calli Aguayo, RN as Nurse Coordinator (Cardiology)  Aisha Alicea, RN as Specialty Care Coordinator (Cardiology)  Nicolas Modi MD as MD (Clinical Cardiac Electrophysiology)  Claudy Loco MD as Assigned Heart and Vascular Provider  Violet Hernandez MD as MD (Hematology & Oncology)  Kristina Fenton, SHANELL as Specialty Care Coordinator (Hematology & Oncology)  VIOLET HERNANEDZ md

## 2020-11-25 NOTE — TELEPHONE ENCOUNTER
Per the request of MD Lety Whitley introduced the patient to the Radioembolization (RE) of Primary and Secondary Liver Malignancies and the Effect on the Immune System: A Prospective Study of Cytokine Modulation and Immune Cell Infiltration. The patient was anavalible at that time, the coordinator will reach out on Monday, November 30 th,  to present the study. The research team will obtain consent before any procedures began.

## 2020-11-25 NOTE — TELEPHONE ENCOUNTER
Called pt and left a msg    Informed him that I'm inquiring on the consult appt from yesterday with Dr. Pierre    Informed him that I am calling to [facility] on treatment plans.     Asked that he return my call so that we can talk further in details.    Angelina NEWBY RN, BSN  Interventional Radiology/Vascular  Nurse Coordinator   Phone: [phone]  Fax: [phone]

## 2020-11-25 NOTE — PATIENT INSTRUCTIONS
"We will call you with the appointment details of your Radioembolizaton procedure.     As discussed with you, I will need to seek insurance approval before scheduling you for your procedure. This may take up to two weeks, however I will keep you updated. Please feel free to call me for updates as well.     Please check into the Gold Waiting room, located on the main level, at [facility], located at [address] [place] [address].       Reminders:    -No solids or milk products 6 hours prior to appointment time.    -No clear liquids 2 hours prior to appointment time.     -Please take your morning medications as indicated with enough water to swallow.      -Please have a  as you will be going home afterwards.        Radioembolization consists of two procedures.     1.  Y90  Mapping- This procedure will help us see the vasculature of your liver and help us determine the shunting from the liver to the lung and gastric area.   Please allow for a total time of 3-5 hours for this day.  You will need to \"pass\" this procedure before the Delivery day. This means that if you're \"shunting\" calculation is higher than a certain percentage (20%) then we may not be able to move forward with treatment due to risk of the radioembolization particles migrating elsewhere besides the tumor itself. If you do pass then we will move forward with the Deliver day.     On the day of the appointment, you will check  in 1.5 hours early to your scheduled procedure.     2. Y90 Delivery procedure.  This procedure is the actual delivery of the radioactive particles into the liver.   This will take approximately 2 hours. Please also plan for at least 3-5 hours for this day as well.     On the day of the appointment you will check in 2 hours early to your scheduled procedure.        -Post follow up: We will call you 2-3 days after you leave to follow up after the procedure.     We will also have you return in 1 " mos with an MRI and follow upappt.       *Please keep in mind that you may have Post Embolization syndrome.  The reason for this is because the tumor is dying.     This includes:    -high fevers 101-102, which may last for a couple of days. We recommend using over the counter medications such as Tylenol or Ibuprofen.     -Nausea, in which we will give you medications after you are discharged home.     -Decreased appetite: We don't expect you to eat 3 full meals. Instead, we prefer that you  snack through out the day.     -Feeling fatigue: this is normal, however we recommend that you get up and walk around.     -Radiation Precaution (please do these things for 5 days)     - we advise that although you are not radiating a large amount of radiation, please refrain from being near small children, pregnant women and those who have a compromised immune system. We advise a 3 feet distance for no more than 30 minutes.     -PLEASE ALSO MAKE SURE THAT YOU FLUSH TWICE WHEN USING THE BATHROOM, and make sure to rinse the sink and tub after each time you use them.      -Wash your hands with soap and water after each visit to the bathroom.      -Use separate eating utensils. Wash them separately from your family's dishes.      -Avoid public transportation (buses, trains, taxis, light rail) and crowded places such as stores, restaurants, theaters and sporting events.      -Sleep alone, Avoid intimate contact; No kissing, or intercourse     **Please keep in mind to stay hydrated after the procedure. At Least 6 glasses of water a day.        *Abnormal symptoms in which to call or seek immediate help:  1. Confusion!!-GO TO THE EMERGENCY ROOM.  2. Swelling of the lower legs  3. Severe abdominal pain that doesn't go away with pain medications.   4. High fevers that do not come down with over the counter medications.      Should ANY of the above symptoms are exhibited, please seek immediate help or call our hospital at [phone] and  ask for the Interventional Radiologist On-call (after hours) or you can contact me (during business hours) 8-4pm.     Should you have any further questions, please call us at anytime.       **Please note that due to Covid-19, EFFECTIVE IMMEDIATELY THERE ARE NO Visitors allowed into the hospital.    Exceptions:  Labor and Delivery and post-partum  Those with limited decision making capacity  Those with disabilities may have a designated support person. Family members, service providers, or other individuals knowledgeable about the needs of the person with a disability may serve as a designated support person    **Hospital visitation hours for adult inpatients are 8 a.m.-6:30 p.m.      Otherwise, Please drop off the patient at the front of the hospital in which they will go to their appropriate check in location.     *IT IS MANDATORY THAT ALL PATIENTS AND ACCOMPANYING FAMILY MEMBERS THAT FALL INTO THE CATEGORY ABOVE, WEAR A MASK WHEN ENTERING THE HOSPITAL AND/OR CLINICS.*    A team Nurse will contact the family member once the patient is ready for discharge.     *My advice is to have the patient give the Nurse a contact information and to make sure that she/he contacts family members for updates.    *During Covid 19-  to reach your family member after they are dropped off for an Interventional Radiology appt: Please call the Pre-Procedure location 2A with any questions  at this number: [phone]    *it is now expected that ALL patients will have a Covid 19 testing 4 days before their procedure. There is a Covid 19 Team that will be contacting you with more information when it gets closer to your procedure.     **If you do not hear from this team within a week before your scheduled procedure please call their scheduling line at [phone].    *If you decide to have Covid testing outside of the [facility] system please make sure that it is completed 4 days prior to procedure and that all results are faxed  directly to:    Attn: Interventional Radiology  Fax: [phone].             Angelina NEWBY RN  Interventional Radiology Nurse Coordinator   Phone: [phone]

## 2020-11-30 NOTE — TELEPHONE ENCOUNTER
Per the request of MD Lety Whitley introduced the patient to the Radioembolization (RE) of Primary and Secondary Liver Malignancies and the Effect on the Immune System: A Prospective Study of Cytokine Modulation and Immune Cell Infiltration. A copy of both the consent, HIPAA, and COVID-19 sheet will be sent to their address, which was confirmed, for review.The patient confirmed the research team could follow up at a later date would via telephone.The research team will obtain consent before any procedures begin

## 2020-11-30 NOTE — TELEPHONE ENCOUNTER
2nd call to pt.     Spoke to Bianca    Inquired if they had any questions and that if I do have their verbal permission to start the prior auth process  She states that they do not have any questions and that I do have their permission to start the Prior Auth process      Consent forms mailed to them to have them mail back to me.     Prep mailed to them as well.     Will f/up once I do get the Prior Auth back.    She agrees to plan.     Angelina NEWBY RN, BSN  Interventional Radiology/Vascular  Nurse Coordinator   Phone: [phone]  Fax: [phone]

## 2020-12-03 PROBLEM — C18.3 MALIGNANT NEOPLASM OF HEPATIC FLEXURE (H): Status: ACTIVE | Noted: 2020-01-01

## 2020-12-03 PROBLEM — C18.5 MALIGNANT NEOPLASM OF SPLENIC FLEXURE (H): Status: ACTIVE | Noted: 2020-01-01

## 2020-12-03 NOTE — PROGRESS NOTES
Oncology follow up visit:  Date on this visit: 12/3/2020     Wesley Cooper  is referred by Dr.Vanessa Jane Leonard for an oncology consultation. He requires evaluation for new diagnosis of colon cancer and liver cancer.    Primary Physician: Jarod De La Rosa     History Of Present Illness:    See my previous note for details.  I have copied and updated from prior notes.  Mr. Cooper is a 75 year old male who presents with new diagnosis of HCC and colon cancer.      He has a hx of diverticulitis s/p descending colostomy (2004), CHF, CAD (s/p CABGx3, multiple PCIs), and atrial fibrillation on anticoagulation, DM2, started developing pain in abdomen and diarrhea around end of October and went to ED at an OSH on 10/27/2019 and CT showed a liver mass and colon thickening in the hepatic flexure.  Colonoscopy on 10/30 showed with biopsy concerning for colorectal malignancy. Liver biopsy was done on 11/5 due to concern for two primary malignancies.    Interval hx:  This is a video visit through [facility].   He was admitted to the hospital for pneumonia and hypoglycemia.  He was treated with antibiotics and Lantus dose was decreased.  Now he is feeling better.  He still has off and on pain and he takes oxycodone on average once daily and that helps. Ostomy is working well. Is done with Abx. Breathing is at his baseline. No fevers. No confusion anymore. Has occasional nausea but has not required medications for it. No bleeding. Has chronic stable mild neuropathy. Feels tired.     ECOG 1-2    ROS:  Rest of the comprehensive review of the system was essentially unremarkable.    I reviewed other history in epic as below.      Past Medical/Surgical History:  No past medical history on file.  Past Surgical History:   Procedure Laterality Date     CARDIAC SURGERY      multiple stents and pacer/defibrillator     COLONOSCOPY N/A 10/30/2020    Procedure: Colonoscopy;  Surgeon: Carmine Blackmon MD;  Location: [facility]     CV CORONARY  STENT WITH DISTAL PROTECTION DEVICE N/A 3/12/2019    Procedure: Coronary Stent w Distal Protection Device;  Surgeon: Eliseo Field MD;  Location:  HEART CARDIAC CATH LAB     CV HEART CATHETERIZATION WITH POSSIBLE INTERVENTION N/A 1/24/2019    Procedure: PLANNED PCI;  Surgeon: Bill Marquez MD;  Location:  HEART CARDIAC CATH LAB     CV HEART CATHETERIZATION WITH POSSIBLE INTERVENTION N/A 3/12/2019    Procedure: BILL BROWN PLANNED PCI WITH CSI;  Surgeon: Eliseo Field MD;  Location: [facility] CARDIAC CATH LAB     CV PCI ATHERECTOMY ORBITAL N/A 3/12/2019    Procedure: Atherectomy;  Surgeon: Eliseo Field MD;  Location:  HEART CARDIAC CATH LAB     GI SURGERY  2009     IR LIVER BIOPSY PERCUTANEOUS  11/5/2020     ORTHOPEDIC SURGERY      back surgery     VASCULAR SURGERY  1999    CABG X4   CHF  CAD  Afib  DM  CKD  Diverticulitis s/p colostomy in 2004    Cancer History:   As above    Allergies:  Allergies as of 12/03/2020     (No Known Allergies)     Current Medications:  Current Outpatient Medications   Medication Sig Dispense Refill     apixaban ANTICOAGULANT (ELIQUIS) 5 MG tablet Take 1 tablet (5 mg) by mouth 2 times daily 60 tablet 0     atorvastatin (LIPITOR) 80 MG tablet Take 80 mg by mouth daily       carvedilol (COREG) 12.5 MG tablet Take 0.5 tablets (6.25 mg) by mouth 2 times daily (with meals) 90 tablet 1     clopidogrel (PLAVIX) 75 MG tablet Take 75 mg by mouth daily       famotidine (PEPCID) 20 MG tablet Take 20 mg by mouth 2 times daily        ferrous sulfate (FEROSUL) 325 (65 Fe) MG tablet Take 325 mg by mouth daily       finasteride (PROSCAR) 5 MG tablet Take 5 mg by mouth daily       furosemide (LASIX) 20 MG tablet Take 20 mg by mouth daily       gabapentin (NEURONTIN) 300 MG capsule Take 300 mg by mouth 3 times daily        glipiZIDE (GLUCOTROL) 10 MG tablet Take 10 mg by mouth 2 times daily (before meals)        insulin detemir (LEVEMIR FLEXTOUCH) 100 UNIT/ML pen  Inject 25 Units Subcutaneous At Bedtime        isosorbide mononitrate (IMDUR) 30 MG 24 hr tablet Take 0.5 tablets (15 mg) by mouth 2 times daily       nitroglycerin (NITROSTAT) 0.4 MG sublingual tablet Place 0.4 mg under the tongue every 5 minutes as needed for chest pain For chest pain place 1 tablet under the tongue every 5 minutes for 3 doses. If symptoms persist 5 minutes after 1st dose call 911.       oxyCODONE (ROXICODONE) 5 MG tablet Take 1 tablet (5 mg) by mouth every 6 hours 15 tablet 0     potassium chloride (KLOR-CON) 20 MEQ packet Take 20 mEq by mouth 2 times daily       sotalol (BETAPACE) 120 MG tablet Take 1 tablet (120 mg) by mouth 2 times daily 180 tablet 3     azithromycin (ZITHROMAX) 500 MG tablet        cefuroxime (CEFTIN) 500 MG tablet         Family History:  No family history on file.   Dad had prostate cancer    Brother had lung cancer    He has 9 children and they are all healthy.      Social History:  Social History     Socioeconomic History     Marital status:      Spouse name: Not on file     Number of children: Not on file     Years of education: Not on file     Highest education level: Not on file   Occupational History     Not on file   Social Needs     Financial resource strain: Not on file     Food insecurity     Worry: Not on file     Inability: Not on file     Transportation needs     Medical: Not on file     Non-medical: Not on file   Tobacco Use     Smoking status: Former Smoker     Smokeless tobacco: Never Used     Tobacco comment: quit 2010   Substance and Sexual Activity     Alcohol use: No     Drug use: No     Sexual activity: Not on file   Lifestyle     Physical activity     Days per week: Not on file     Minutes per session: Not on file     Stress: Not on file   Relationships     Social connections     Talks on phone: Not on file     Gets together: Not on file     Attends Oriental orthodox service: Not on file     Active member of club or organization: Not on file     Attends  meetings of clubs or organizations: Not on file     Relationship status: Not on file     Intimate partner violence     Fear of current or ex partner: Not on file     Emotionally abused: Not on file     Physically abused: Not on file     Forced sexual activity: Not on file   Other Topics Concern     Parent/sibling w/ CABG, MI or angioplasty before 65F 55M? Yes   Social History Narrative     Not on file     Ex smoker- quit in 2011. Rare etoh. Lives with wife. Youngest son also lives with him.    Physical Exam:  There were no vitals taken for this visit.     Wt Readings from Last 4 Encounters:   11/07/20 65.8 kg (145 lb 1.6 oz)   09/09/20 67.8 kg (149 lb 8 oz)   11/25/19 71.2 kg (157 lb)   08/19/19 76.3 kg (168 lb 3.2 oz)         Constitutional.  Looks well and in no apparent distress.   Eyes.  Without eye redness or apparent jaundice.   Respiratory.  Non labored breathing. Speaking in full sentences.    Skin.  No concerning skin rashes on the skin visualized.   Neurological.  Is alert and oriented.  Psychiatric.  Mood and affect seem appropriate.      The rest of a comprehensive physical examination is deferred due to Public Health Emergency video visit restrictions.      Laboratory/Imaging Studies    Reviewed    ASSESSMENT/PLAN:    Colon cancer.  He has a large hepatic flexure mass and the biopsy of which showed at least intramucosal carcinoma and I believe this is actually invasive adenocarcinoma.  MSI intact.  There is no evidence of metastatic disease for this.  CEA is not elevated.      Hepatocellular cancer.    There is a 6.3 x 5.7 x 4.4 cm segment 2/3 and 4 liver lesion which has imaging characteristics of hepatocellular carcinoma and even the biopsy is consistent with moderately differentiated hepatocellular carcinoma.  Baseline alpha-fetoprotein 2727.  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  There is no evidence of metastatic disease although  the bone scan shows a very tiny uptake at L1 and L4 which could be degenerative changes.  There is no definite evidence of metastatic disease.      We discussed the whole situation in detail.  I have also discussed this case with Dr Arevalo, Dr Diamond and Dr Pierre.    The plan is to treat the liver cancer first with Y 90 procedure and in the meantime we will try to control the colon cancer by giving him chemotherapy and once he recovers from the treatment for the liver cancer, he will be assessed for definite surgery for colon cancer.    He has already met with Dr. Pierre and there is plan to proceed with Y 90 procedure in the next couple of weeks also.  I would recommend to keep a close watch on the L1 and L4 uptake which was seen on bone scan.  Potentially bone scan could be repeated in the next couple of months.  As mentioned above this is likely degenerative changes although malignancy cannot be completely excluded.    For the colon cancer, I recommend starting Xeloda 1500 mg twice a day for 14 days and then 1 week off.  The rationale is to try to keep the cancer in check while he is getting treatment for liver cancer and during the time that he cannot get colon cancer surgery.  Move fully in the next 2 to 3 months we will repeat his scans and at that time consider definite surgery for colon cancer.  He should see colorectal surgery around that time.    We also discussed that since he lives far away, it would be reasonable to establish care with a local oncologist who takes over the chemotherapy part while he gets Y 90 procedure and potential surgery here at [facility].    We discussed the rational schedule and potential side effects of Xeloda in detail.  I would like him to start this as soon as possible.    Pain control.  He is taking oxycodone once a day.  Continue that and he can potentially take more if need be.    Pneumonia.  He has recovered well from it and is now off  antibiotics.    Diabetes.  He was hypoglycemic when he was admitted to the hospital.  Lantus dose was decreased.  Keep a close follow-up with primary care physician.    He should continue to follow closely with his cardiologist because of complicated cardiac history.    I answered all of his questions to his satisfaction.  He is agreeable and comfortable with the plan.    Bonifacio Hernandez MD

## 2020-12-03 NOTE — PATIENT INSTRUCTIONS
Proceed with Y90 procedure as planned    Start Xeloda 1500 mg twice daily for 14 days and then 1 week off    Try to establish care with a local oncologist who should take over the chemotherapy going forward    See me as needed

## 2020-12-03 NOTE — PROGRESS NOTES
"Wesley Cooper is a 75 year old male who is being evaluated via a billable video visit.      The patient has been notified of following:     \"This video visit will be conducted via a call between you and your physician/provider. We have found that certain health care needs can be provided without the need for an in-person physical exam.  This service lets us provide the care you need with a video conversation.  If a prescription is necessary we can send it directly to your pharmacy.  If lab work is needed we can place an order for that and you can then stop by our lab to have the test done at a later time.    Video visits are billed at different rates depending on your insurance coverage.  Please reach out to your insurance provider with any questions.    If during the course of the call the physician/provider feels a video visit is not appropriate, you will not be charged for this service.\"    Patient has given verbal consent for Video visit? Yes  How would you like to obtain your AVS? MyChart  If you are dropped from the video visit, the video invite should be resent to: Text to cell phone: [phone]  Will anyone else be joining your video visit? No      Video-Visit Details    Type of service:  Video Visit    Video Start Time: 1:44 PM  Video End Time: 2:12 PM    Originating Location (pt. Location): Home    Distant Location (provider location):  [facility]     Platform used for Video Visit: Elio Hernandez MD        "

## 2020-12-03 NOTE — LETTER
"    12/3/2020         RE: Wesley Cooper  [address]  [place] [address]        Dear Colleague,    Thank you for referring your patient, Wesley Cooper, to the [facility]. Please see a copy of my visit note below.    Wesley Cooper is a 75 year old male who is being evaluated via a billable video visit.      The patient has been notified of following:     \"This video visit will be conducted via a call between you and your physician/provider. We have found that certain health care needs can be provided without the need for an in-person physical exam.  This service lets us provide the care you need with a video conversation.  If a prescription is necessary we can send it directly to your pharmacy.  If lab work is needed we can place an order for that and you can then stop by our lab to have the test done at a later time.    Video visits are billed at different rates depending on your insurance coverage.  Please reach out to your insurance provider with any questions.    If during the course of the call the physician/provider feels a video visit is not appropriate, you will not be charged for this service.\"    Patient has given verbal consent for Video visit? Yes  How would you like to obtain your AVS? MyChart  If you are dropped from the video visit, the video invite should be resent to: Text to cell phone: [phone]  Will anyone else be joining your video visit? No      Video-Visit Details    Type of service:  Video Visit    Video Start Time: 1:44 PM  Video End Time: 2:12 PM    Originating Location (pt. Location): Home    Distant Location (provider location):  [facility]     Platform used for Video Visit: Elio Hernandez MD          Oncology follow up visit:  Date on this visit: 12/3/2020     Wesley Cooper  is referred by Dr.Vanessa Jane Leonard for an oncology consultation. He requires evaluation for new diagnosis of colon cancer and liver " cancer.    Primary Physician: Jarod De La Rosa     History Of Present Illness:    See my previous note for details.  I have copied and updated from prior notes.  Mr. Cooper is a 75 year old male who presents with new diagnosis of HCC and colon cancer.      He has a hx of diverticulitis s/p descending colostomy (2004), CHF, CAD (s/p CABGx3, multiple PCIs), and atrial fibrillation on anticoagulation, DM2, started developing pain in abdomen and diarrhea around end of October and went to ED at an OSH on 10/27/2019 and CT showed a liver mass and colon thickening in the hepatic flexure.  Colonoscopy on 10/30 showed with biopsy concerning for colorectal malignancy. Liver biopsy was done on 11/5 due to concern for two primary malignancies.    Interval hx:  This is a video visit through [facility].   He was admitted to the hospital for pneumonia and hypoglycemia.  He was treated with antibiotics and Lantus dose was decreased.  Now he is feeling better.  He still has off and on pain and he takes oxycodone on average once daily and that helps. Ostomy is working well. Is done with Abx. Breathing is at his baseline. No fevers. No confusion anymore. Has occasional nausea but has not required medications for it. No bleeding. Has chronic stable mild neuropathy. Feels tired.     ECOG 1-2    ROS:  Rest of the comprehensive review of the system was essentially unremarkable.    I reviewed other history in epic as below.      Past Medical/Surgical History:  No past medical history on file.  Past Surgical History:   Procedure Laterality Date     CARDIAC SURGERY      multiple stents and pacer/defibrillator     COLONOSCOPY N/A 10/30/2020    Procedure: Colonoscopy;  Surgeon: Carmine Blackmon MD;  Location:  GI     CV CORONARY STENT WITH DISTAL PROTECTION DEVICE N/A 3/12/2019    Procedure: Coronary Stent w Distal Protection Device;  Surgeon: Eliseo Field MD;  Location:  HEART CARDIAC CATH LAB     CV HEART CATHETERIZATION  WITH POSSIBLE INTERVENTION N/A 1/24/2019    Procedure: PLANNED PCI;  Surgeon: Bill Marquez MD;  Location:  HEART CARDIAC CATH LAB     CV HEART CATHETERIZATION WITH POSSIBLE INTERVENTION N/A 3/12/2019    Procedure: BILL MUST PLANNED PCI WITH CSI;  Surgeon: Eliseo Field MD;  Location: U HEART CARDIAC CATH LAB     CV PCI ATHERECTOMY ORBITAL N/A 3/12/2019    Procedure: Atherectomy;  Surgeon: Eliseo Field MD;  Location:  HEART CARDIAC CATH LAB     GI SURGERY  2009     IR LIVER BIOPSY PERCUTANEOUS  11/5/2020     ORTHOPEDIC SURGERY      back surgery     VASCULAR SURGERY  1999    CABG X4   CHF  CAD  Afib  DM  CKD  Diverticulitis s/p colostomy in 2004    Cancer History:   As above    Allergies:  Allergies as of 12/03/2020     (No Known Allergies)     Current Medications:  Current Outpatient Medications   Medication Sig Dispense Refill     apixaban ANTICOAGULANT (ELIQUIS) 5 MG tablet Take 1 tablet (5 mg) by mouth 2 times daily 60 tablet 0     atorvastatin (LIPITOR) 80 MG tablet Take 80 mg by mouth daily       carvedilol (COREG) 12.5 MG tablet Take 0.5 tablets (6.25 mg) by mouth 2 times daily (with meals) 90 tablet 1     clopidogrel (PLAVIX) 75 MG tablet Take 75 mg by mouth daily       famotidine (PEPCID) 20 MG tablet Take 20 mg by mouth 2 times daily        ferrous sulfate (FEROSUL) 325 (65 Fe) MG tablet Take 325 mg by mouth daily       finasteride (PROSCAR) 5 MG tablet Take 5 mg by mouth daily       furosemide (LASIX) 20 MG tablet Take 20 mg by mouth daily       gabapentin (NEURONTIN) 300 MG capsule Take 300 mg by mouth 3 times daily        glipiZIDE (GLUCOTROL) 10 MG tablet Take 10 mg by mouth 2 times daily (before meals)        insulin detemir (LEVEMIR FLEXTOUCH) 100 UNIT/ML pen Inject 25 Units Subcutaneous At Bedtime        isosorbide mononitrate (IMDUR) 30 MG 24 hr tablet Take 0.5 tablets (15 mg) by mouth 2 times daily       nitroglycerin (NITROSTAT) 0.4 MG sublingual tablet Place  0.4 mg under the tongue every 5 minutes as needed for chest pain For chest pain place 1 tablet under the tongue every 5 minutes for 3 doses. If symptoms persist 5 minutes after 1st dose call 911.       oxyCODONE (ROXICODONE) 5 MG tablet Take 1 tablet (5 mg) by mouth every 6 hours 15 tablet 0     potassium chloride (KLOR-CON) 20 MEQ packet Take 20 mEq by mouth 2 times daily       sotalol (BETAPACE) 120 MG tablet Take 1 tablet (120 mg) by mouth 2 times daily 180 tablet 3     azithromycin (ZITHROMAX) 500 MG tablet        cefuroxime (CEFTIN) 500 MG tablet         Family History:  No family history on file.   Dad had prostate cancer    Brother had lung cancer    He has 9 children and they are all healthy.      Social History:  Social History     Socioeconomic History     Marital status:      Spouse name: Not on file     Number of children: Not on file     Years of education: Not on file     Highest education level: Not on file   Occupational History     Not on file   Social Needs     Financial resource strain: Not on file     Food insecurity     Worry: Not on file     Inability: Not on file     Transportation needs     Medical: Not on file     Non-medical: Not on file   Tobacco Use     Smoking status: Former Smoker     Smokeless tobacco: Never Used     Tobacco comment: quit 2010   Substance and Sexual Activity     Alcohol use: No     Drug use: No     Sexual activity: Not on file   Lifestyle     Physical activity     Days per week: Not on file     Minutes per session: Not on file     Stress: Not on file   Relationships     Social connections     Talks on phone: Not on file     Gets together: Not on file     Attends Baptism service: Not on file     Active member of club or organization: Not on file     Attends meetings of clubs or organizations: Not on file     Relationship status: Not on file     Intimate partner violence     Fear of current or ex partner: Not on file     Emotionally abused: Not on file      Physically abused: Not on file     Forced sexual activity: Not on file   Other Topics Concern     Parent/sibling w/ CABG, MI or angioplasty before 65F 55M? Yes   Social History Narrative     Not on file     Ex smoker- quit in 2011. Rare etoh. Lives with wife. Youngest son also lives with him.    Physical Exam:  There were no vitals taken for this visit.     Wt Readings from Last 4 Encounters:   11/07/20 65.8 kg (145 lb 1.6 oz)   09/09/20 67.8 kg (149 lb 8 oz)   11/25/19 71.2 kg (157 lb)   08/19/19 76.3 kg (168 lb 3.2 oz)         Constitutional.  Looks well and in no apparent distress.   Eyes.  Without eye redness or apparent jaundice.   Respiratory.  Non labored breathing. Speaking in full sentences.    Skin.  No concerning skin rashes on the skin visualized.   Neurological.  Is alert and oriented.  Psychiatric.  Mood and affect seem appropriate.      The rest of a comprehensive physical examination is deferred due to Public Health Emergency video visit restrictions.      Laboratory/Imaging Studies    Reviewed    ASSESSMENT/PLAN:    Colon cancer.  He has a large hepatic flexure mass and the biopsy of which showed at least intramucosal carcinoma and I believe this is actually invasive adenocarcinoma.  MSI intact.  There is no evidence of metastatic disease for this.  CEA is not elevated.      Hepatocellular cancer.    There is a 6.3 x 5.7 x 4.4 cm segment 2/3 and 4 liver lesion which has imaging characteristics of hepatocellular carcinoma and even the biopsy is consistent with moderately differentiated hepatocellular carcinoma.  Baseline alpha-fetoprotein 2727.  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  There is no evidence of metastatic disease although the bone scan shows a very tiny uptake at L1 and L4 which could be degenerative changes.  There is no definite evidence of metastatic disease.      We discussed the whole situation in detail.  I have also  discussed this case with Dr Arevalo, Dr Diamond and Dr Pierre.    The plan is to treat the liver cancer first with Y 90 procedure and in the meantime we will try to control the colon cancer by giving him chemotherapy and once he recovers from the treatment for the liver cancer, he will be assessed for definite surgery for colon cancer.    He has already met with Dr. Pierre and there is plan to proceed with Y 90 procedure in the next couple of weeks also.  I would recommend to keep a close watch on the L1 and L4 uptake which was seen on bone scan.  Potentially bone scan could be repeated in the next couple of months.  As mentioned above this is likely degenerative changes although malignancy cannot be completely excluded.    For the colon cancer, I recommend starting Xeloda 1500 mg twice a day for 14 days and then 1 week off.  The rationale is to try to keep the cancer in check while he is getting treatment for liver cancer and during the time that he cannot get colon cancer surgery.  Move fully in the next 2 to 3 months we will repeat his scans and at that time consider definite surgery for colon cancer.  He should see colorectal surgery around that time.    We also discussed that since he lives far away, it would be reasonable to establish care with a local oncologist who takes over the chemotherapy part while he gets Y 90 procedure and potential surgery here at [facility].    We discussed the rational schedule and potential side effects of Xeloda in detail.  I would like him to start this as soon as possible.    Pain control.  He is taking oxycodone once a day.  Continue that and he can potentially take more if need be.    Pneumonia.  He has recovered well from it and is now off antibiotics.    Diabetes.  He was hypoglycemic when he was admitted to the hospital.  Lantus dose was decreased.  Keep a close follow-up with primary care physician.    He should continue to follow closely with his cardiologist  because of complicated cardiac history.    I answered all of his questions to his satisfaction.  He is agreeable and comfortable with the plan.    Bonifacio Hernandez MD

## 2020-12-07 NOTE — PROGRESS NOTES
RN CARE COORDINATION NOTE      Outgoing:  Spoke to Al and his wife, Bianca, to discuss finding a local Oncologist and which area they would like to be seen. Bianca states she spoke to an Oncology clinic in [place] today, they are supposed to be contacting [facility] to obtain records. Once they have the records they will schedule an appointment.   Provided them with the phone nu,archie to Medical Records to expedite the delivery of records.     They have already spoken with the Oral Pharmacy team that is working on the Xeloda. Discussed that patient will start the chemo while still under Dr Hernandez's care and then the new Oncologist will take over prescribing.     They agree to call writer with an update once tan appointment has been made in [place].     Araceli Fenton MSN, RN, OCN  RN Care Coordinator  [facility]  [phone]

## 2020-12-07 NOTE — TELEPHONE ENCOUNTER
Called pt and wife in regards to informing them that they have been approved for Y90 treatment.     I've left a msg for pt's wife Bianca to return my call so that we can go over this information in details.   Left direct line.     Angelina NEWBY RN, BSN  Interventional Radiology/Vascular  Nurse Coordinator   Phone: [phone]  Fax: [phone]

## 2020-12-07 NOTE — TELEPHONE ENCOUNTER
This has been approved today when I tried to call for an expedited review.          approved with auth number:     case #[id number].         dates: 12/2/2020-1/31/2021- 60 days         call reference: Bj     CDR: [id number].              *we can get the auth from [employer] per Bj.     whatever that means.          Thanks Angelina HERMOSILLO RN, BSN    Interventional Radiology Nurse Coordinator    [facility]     Interventional Radiology Department    [address], [address]    [place] [address]    ph: [phone] Fax: [phone]    pager: [phone] Appointments: [phone]                        From: Daysi Davis <[email or web address]>  Sent: Friday, December 4, 2020 12:59 PM  To: Angelina Morin <[email or web address]>; [employer] <[email or web address]>  Cc: [email or web address]>  Subject: RE: BTG - [employer]  [employer]here  Prior Auth / Pre-D  Alexis Pierre MD  [place] - [id number]         Angel Alfaro,         As stated in the update from 12/2/20, only the physician or patient can request an expedited pre-determination. The physician can call P# [phone] and refer to case #[id number].         [email or web address]    Daysi Davis    [facility]    [address] [place] [address]    Phone: [phone]    Direct: [phone]    Fax: [phone]    [email or web address]         From: Angelina Morin <[email or web address]>  Sent: Friday, December 04, 2020 1:57 PM  To: Daysi Davis <[email or web address]>; [employer]raSphere <[email or web address]>  Cc: [email or web address]  Subject: Re: BTG - [employer]here  [employer]  Prior Auth / Pre-D  Alexis Pierre MD  [place] - [id number]         This message was sent securely using [employer],          Can we expedite this?    We have pt scheduled for 12/11 for mapping.          Thanks Angelina           Angelina HERMOSILLO RN, BSN    Interventional Radiology Nurse Coordinator    [facility]     Interventional Radiology Department    [address], [address]    [place] [address]    ph: [phone] Fax: [phone]    pager: [phone] Appointments: [phone]                        From: Daysi Davis <[email or web address]>  Sent: Friday, December 4, 2020 12:39 PM  To: Angelina Morin <[email or web address]>; TheraSphere <[email or web address]>; Angelina Morin <[email or web address]>  Cc: Mila Guthrie <[email or web address]>  Subject: BTG - Therasphere  [employer]ra[employer]  Prior Auth / Pre-D  Alexis Pierre MD  [place] - [id number]          Facility:  [facility]    Contact Information:  Alexis Pierre MD  [address] [place] [address]   [email or web address]   [phone]         Insurance Plan: [employer]  Plan Type: Medicare Advantage Plan  Employer: N/A  Secondary Insurance:  Secondary Plan Type:   CPT Code:   ICD-10: C22.0  Place of Service: 22-Outpatient Hosp  Condition:       Case Status:  Pending    Primary Insurance Status:    Secondary Insurance Status:    Case Level:  Pre-Determination    Initial Call Date:  11/30/2020    Next Step:  James will follow-up with [facility] 12/8    Follow-Up:  12/4/2020 - Kline at [facility] showed the pre-determination request for AL is under review. James will continue to follow-up. Reference #Nichol [id number]    Case Notes:  12/2/2020 - Anant garcia [facility] showed the pre-determination request for AL is under review. The turn-around time is 14 business days. Only the physician or patient can request an expedited review. The physician can call P# [phone] and refer to case #[id number]. Reference #Anant [id number]    [facility] approved the radiation therapy CPT 21003 (with ancillary codes 13566, 75641, 49482, 08766, 99492). The approval  number is #[id number]. This is valid from 20-21 for bilobar treatment. The approval letter was forwarded to the office contacts.    2020 - James contacted Kurt regarding patient AL : 1945. [place] in provider services showed the patient has a Medicare PPO policy effective 2018. If considered medically necessary, the procedure will be covered at 100% of the allowed amount after the $250 copayment has been satisfied. The copayment applies to the out of pocket of $5,900 ($2,484.67 met). [place] checked all CPT codes showing 13077, 91357, 73836, 06935, 38212 and 46014 require authorization through [employer]. All other codes do not require prior authorization. A pre-determination can be submitted and was set up over the phone. The case was sent to review under #[id number]. Reference #[place] [id number]  Please note, this plan recognizes CPT  as the brachytherapy code not     The radiation therapy CPT 28987 (with ancillary codes 98154, 93877, 50471, 94804, 66393) was sent to review through [employer]. The tracking number is #[id number].    2020 - Prior Auth: James has received a Pre-Cert request for patient AL. As soon as our investigation is complete, All parties will be notified of our findings.

## 2020-12-07 NOTE — TELEPHONE ENCOUNTER
Called and spoke to Wife Bianca    Informed her that pt has been approved for procedure.     Informed her that we have him scheduled    Mapping. Fri 12/11, check in at 830am  Delivery Weds 12/16, check in at 830am    All information given to her as well as will send letter and send information via [employer]. To include appt location details.     covid testing for 12/11 will be done locally -wife will call and see if she can get him in for testing today or tomorrow as this is 4 days prior to.     covid testing for 12/16, will be done in the [facility] as they will be local. covid testing number given to her to schedule at the closest location.     Reiterated that if covid testing outside of [facility] then must be done with nasal swab and to be faxed directly to us.   They will need to check and see how long it will take their facility to get testing results completed.       We talked about holding glipizide the morning of    Plavix: he may continue to take    Levemir: take only 80% of this= 20 units the night before.     Eliquis: takes for a.fib, will consult with Dr. Hernandez to see if o.k for pt to hold 48 hours prior to procedure due to IR protocol.       Wife verbalized understanding and will call me with any other questions.     Angelina NEWBY RN, BSN  Interventional Radiology/Vascular  Nurse Coordinator   Phone: [phone]  Fax: [phone]

## 2020-12-08 NOTE — TELEPHONE ENCOUNTER
Simon Landry CPhT  [facility]  Oncology Pharmacy Liaison   [email or web address]  [phone] (phone  [phone] (fax

## 2020-12-09 NOTE — TELEPHONE ENCOUNTER
The following information was communicated by phone to pt's wife, Bianca.     When you receive your medication from [facility], do not start taking it until you have spoken with one of our cancer clinic pharmacists.  They can be reached by calling [phone].       Simon Landry CPhT  [facility]  Oncology Pharmacy Liajennifer [email or web address]  [phone] (phone  [phone] (fax

## 2020-12-09 NOTE — TELEPHONE ENCOUNTER
"Oral Chemotherapy Monitoring Program    Lab Monitoring Plan  CBC and CMP q3 weeks  Labs drawn outside of [place]: Unsure of which local clinic they will use, will follow-up at initial assessment.  Subjective/Objective:  Wesley Cooper is a 75 year old male contacted by phone for an initial visit for oral chemotherapy education.  Spoke with Wesley and his wife, Bianca.     ORAL CHEMOTHERAPY 12/4/2020 12/9/2020   Assessment Type Other New Teach   Diagnosis Code Colon Cancer Colon Cancer   Providers Dr. David Hernandez   [facility]   Drug Name Xeloda (Capecitabine) Xeloda (Capecitabine)   Dose 1,500 mg 1,500 mg   Current Schedule BID BID   Cycle Details 2 weeks on, 1 week off 2 weeks on, 1 week off   Any new drug interactions? Yes -   Pharmacist Intervention? Yes -   Intervention(s) (No Data) -   Is the dose as ordered appropriate for the patient? No -   Pharmacist intervention for dose adjustment? Yes -   Intervention(s) Other -       Last PHQ-2 Score on record:   PHQ-2 ( 1999 [facility]) 4/10/2020 8/19/2019   Q1: Little interest or pleasure in doing things 0 0   Q2: Feeling down, depressed or hopeless 0 0   PHQ-2 Score 0 0       Vitals:  BP:   BP Readings from Last 1 Encounters:   11/08/20 97/54     Wt Readings from Last 1 Encounters:   11/07/20 65.8 kg (145 lb 1.6 oz)     Estimated body surface area is 1.74 meters squared as calculated from the following:    Height as of 10/27/20: 1.651 m (5' 5\").    Weight as of 11/7/20: 65.8 kg (145 lb 1.6 oz).    Labs:  No results found for NA within last 30 days.     No results found for K within last 30 days.     No results found for CA within last 30 days.     No results found for Mag within last 30 days.     No results found for Phos within last 30 days.     No results found for ALBUMIN within last 30 days.     No results found for BUN within last 30 days.     No results found for CR within last 30 days.     No results found for AST within last 30 " days.     No results found for ALT within last 30 days.     No results found for BILITOTAL within last 30 days.     No results found for WBC within last 30 days.     No results found for HGB within last 30 days.     No results found for PLT within last 30 days.     No results found for ANC within last 30 days.       Assessment:  Patient is appropriate to start therapy.    DDIs:  Xeloda + sotalol = C; Xeloda may enhance the QTc-prolonging effects of sotalol. We discussed the sotalol DDI, pt to follow-up with C[facility] next week.     Plan:  Basic chemotherapy teaching was reviewed with the patient and his wife including indication, start date of therapy, dose, administration, adverse effects, missed doses, food and drug interactions, monitoring, side effect management, office contact information, and safe handling. Written materials were provided and all questions answered.    Wesley will start Xeloda as soon as he receives it from [facility].     Follow-Up:  12/18: initial assessment     Radha Wilson, Debbie  Hematology/Oncology Clinical Pharmacist  [facility]  [facility]  [phone]

## 2020-12-10 NOTE — TELEPHONE ENCOUNTER
Prescription verified by Rupert Iglesias, PharmD.  Oral Chemotherapy Monitoring Program  [phone]

## 2020-12-10 NOTE — TELEPHONE ENCOUNTER
Lety Beckett followed up with the patient via phone on 10-Dec-2020 regarding their interest to participate in the study. The patient's questions regarding the study were answered, and the patient stated they would not like to participate in the study. Research coordinator will follow up with MARK Pierre MD regarding the decision.

## 2020-12-11 NOTE — PROGRESS NOTES
Patient Name: Wesley Cooper  Medical Record Number: [id number]  Today's Date: 12/11/2020    Procedure: Therasphere mapping for liver lesion.  Proceduralist: MD Ignacio., MD alfonso    Procedure Start: 1024  Procedure end: 1125  Sedation medications administered: Fentanyl: 75 mcg  Versed: 1.5 mg    Report given to: 2A, RN  : n/a    Other Notes: Pt arrived to IR room 4 from . Consent reviewed. Pt denies any questions or concerns regarding procedure. Pt positioned supine and monitored per protocol. Mynx  closure device deployed @ 1125, 3 hour bedrest.  BP's went down to 60's/30's, 500ml bolus given. BP's back to baseline.  Pt transferred back to .

## 2020-12-11 NOTE — IP AVS SNAPSHOT
MRN:[id number]                      After Visit Summary   12/11/2020    Wesley Cooper    MRN: [id number]           Visit Information        Department      12/11/2020  8:16 AM [facility] [address] [place]          Review of your medicines      UNREVIEWED medicines. Ask your doctor about these medicines       Dose / Directions   atorvastatin 80 MG tablet  Commonly known as: LIPITOR  Used for: Chronic systolic heart failure (H)      Dose: 80 mg  Take 80 mg by mouth daily  Refills: 0     azithromycin 500 MG tablet  Commonly known as: ZITHROMAX      Refills: 0     carvedilol 12.5 MG tablet  Commonly known as: COREG  Used for: Chronic systolic heart failure (H)      Dose: 6.25 mg  Take 0.5 tablets (6.25 mg) by mouth 2 times daily (with meals)  Quantity: 90 tablet  Refills: 1     cefuroxime 500 MG tablet  Commonly known as: CEFTIN      Refills: 0     famotidine 20 MG tablet  Commonly known as: PEPCID      Dose: 20 mg  Take 20 mg by mouth 2 times daily  Refills: 0     ferrous sulfate 325 (65 Fe) MG tablet  Commonly known as: FEROSUL      Dose: 325 mg  Take 325 mg by mouth daily  Refills: 0     finasteride 5 MG tablet  Commonly known as: PROSCAR      Dose: 5 mg  Take 5 mg by mouth daily  Refills: 0     furosemide 20 MG tablet  Commonly known as: LASIX      Dose: 20 mg  Take 20 mg by mouth daily  Refills: 0     gabapentin 300 MG capsule  Commonly known as: NEURONTIN      Dose: 300 mg  Take 300 mg by mouth 3 times daily  Refills: 0     glipiZIDE 10 MG tablet  Commonly known as: GLUCOTROL      Dose: 10 mg  Take 10 mg by mouth 2 times daily (before meals)  Refills: 0     isosorbide mononitrate 30 MG 24 hr tablet  Commonly known as: IMDUR      Dose: 15 mg  Take 0.5 tablets (15 mg) by mouth 2 times daily  Quantity:    Refills: 0     Levemir FlexTouch 100 UNIT/ML pen  Generic drug: insulin detemir      Dose: 25 Units  Inject 25 Units Subcutaneous At Bedtime  Refills: 0     nitroGLYcerin 0.4 MG  sublingual tablet  Commonly known as: NITROSTAT  Used for: Chronic systolic heart failure (H)      Dose: 0.4 mg  Place 0.4 mg under the tongue every 5 minutes as needed for chest pain For chest pain place 1 tablet under the tongue every 5 minutes for 3 doses. If symptoms persist 5 minutes after 1st dose call 911.  Refills: 0     oxyCODONE 5 MG tablet  Commonly known as: ROXICODONE  Used for: Pain      Dose: 5 mg  Take 1 tablet (5 mg) by mouth every 6 hours  Quantity: 15 tablet  Refills: 0     Plavix 75 MG tablet  Used for: Chronic systolic heart failure (H)  Generic drug: clopidogrel      Dose: 75 mg  Take 75 mg by mouth daily  Refills: 0     potassium chloride 20 MEQ packet  Commonly known as: KLOR-CON      Dose: 20 mEq  Take 20 mEq by mouth 2 times daily  Refills: 0     sotalol 120 MG tablet  Commonly known as: BETAPACE  Used for: VT (ventricular tachycardia) (H)      Dose: 120 mg  Take 1 tablet (120 mg) by mouth 2 times daily  Quantity: 180 tablet  Refills: 3              Protect others around you: Learn how to safely use, store and throw away your medicines at www.disposemymeds.org.       Follow-ups after your visit       Your next 10 appointments already scheduled    Dec 12, 2020  2:30 PM  Pre-procedure Covid with [facility] [address] [place] [address] ([facility]) [address] [place] [address] [phone]   Note:  Testing is INSIDE:   Is the patient able to walk up 4 stairs?      If patient states they are not able, instruct patient to wait in their car by East entrance, and notate on appt notes CAR TESTING required    You have been scheduled for a COVID-19 testing at [appointment time]. At your scheduled [place] [address]. Once you arrive follow the gold/yellow curbside testing signs, park in the north lot (between clinic and shopping center).  Stay in your  vehicle until you appointment time, then enter the clinic via the East door (not main entrance), there will be 4 steps to walk up.    Please note this appointment time is approximate, thank you in advance for your patience while you wait your turn to be tested. Please wear a mask or face covering to the testing site. Have your ID out and ready to show staff when you arrive.      Dec 14, 2020  1:00 PM  Lab with  LAB  [facility]) [address] [place] [address] [phone] [phone]      Dec 14, 2020  1:30 PM  Echo Complete with [email or web address]  [facility]) [address] [place] [id number] [phone]   1. Please bring or wear a comfortable two-piece outfit.  2. You may eat, drink and take your normal medicines.  3. Please do not apply perfumes or lotions on the day of your exam.   4. For any questions that cannot be answered, please contact the ordering physician     Dec 14, 2020  2:30 PM  (Arrive by 2:15 PM)  CARDIAC DEVICE CHECK - IN CLINIC with  CV DEVICE 1  [facility] ([facility]) [address] [place] [address] [phone]      Dec 14, 2020  3:00 PM  (Arrive by 2:45 PM)  RETURN HEART FAILURE with Claudy Loco MD  [facility]) [address] [place] [address] [phone] [phone]         Care Instructions       Further instructions from your care team       [facility]   Interventional Radiology  Discharge Instructions Post Angiography for Y-90 Mapping    AFTER YOU GO HOME:        Relax and take it easy for 24 hours.       Drink plenty of fluids.       Resume your regular diet, unless otherwise instructed by your Primary Physician.       DO NOT smoke for at least 24 hours,  if you were given any sedation.       DO NOT drink alcoholic beverages the day of your procedure.       Do not drive or operate machinery at home or at work for 24 hours.          DO NOT do any strenuous exercise or lifting for at least 2 days following your procedure.       DO NOT take a bath or shower for at least 12 hours following your procedure.       DO NOT make any important or legal decisions for 24 hours following your procedure.    CALL THE PHYSICIAN IF:       - You start bleeding from the procedure site. lie down flat and hold pressure on the site. A small lump or bruise is common at the puncture site. Your physician will tell you if you need to return to the hospital.      - You develop numbness, coolness or a change in color of the leg that was punctured.      - You experience increased pain or redness at the puncture site.      - You develop hives or a rash or unexplained itching.      - You develop a temperature of 101 degrees F or greater.    Additional Information:            Support the puncture site for coughing, sneezing, or moving your bowels for the first 48 hours  No tub bath, hot tubs, or swimming for 5 days  No lotion or powder to the puncture site for 3 day    Closure Device: Mynx Closure Device            [facility] INTERVENTIONAL RADIOLOGY DEPARTMENT         Procedure Physician:  Dr. Pierre          Date of Procedure:December 11, 2020       Telephone Numbers:   [phone]     Monday-Friday 8:00 to 4:30 pm                                        [phone]     After 4:30 pm Monday-Friday, Weekend and Holidays. Ask for the Interventional Radiologist on call. Someone is on call 24 hrs/day.              Additional Information About Your Visit       [employer] Information    [email or web address] gives you secure access to your electronic health record. If you see a primary care provider, you can also send messages to your care team and make appointments. If you have questions, please call your primary care  clinic.  If you do not have a primary care provider, please call [phone] and they will assist you.       Care EveryWhere ID    This is your Care EveryWhere ID. This could be used by other organizations to access your [facility] medical records  [id number]       Your Vitals Were  Most recent update: 12/11/2020 12:51 PM    Blood Pressure   93/62          Pulse   60          Temperature   97.4  F (36.3  C) (Oral)          Respirations   16          Weight   64 kg (141 lb)             Pulse Oximetry   100%    BMI (Body Mass Index)   23.46 kg/m           Primary Care Provider Office Phone # Fax #    Jarod De La Rosa [phone]      Equal Access to Services    NEIL SHCWARTZ : Hadhaley Lord, waoswald pichardo, korina kaalmada memo, maryjane olsen. So [facility] [phone].    ATENCIÓN: Si habla español, tiene a valdes disposición servicios gratuitos de asistencia lingüística. Llame al [phone].    We comply with applicable federal and state civil rights laws, including the [place] Human Rights Act. We do not discriminate on the basis of race, color, creed, Druze, national origin, marital status, age, disability, sex, sexual orientation, or gender identity.       Thank you!    Thank you for choosing [facility] for your care. Our goal is always to provide you with excellent care. Hearing back from our patients is one way we can continue to improve our services. Please take a few minutes to complete the written survey that you may receive in the mail after you visit with us. Thank you!            Medication List      ASK your doctor about these medications          Morning Afternoon Evening Bedtime As Needed    atorvastatin 80 MG tablet  Also known as: LIPITOR  INSTRUCTIONS: Take 80 mg by mouth daily                     azithromycin 500 MG tablet  Also known as: ZITHROMAX                     carvedilol 12.5 MG tablet  Also known as: COREG  INSTRUCTIONS: Take 0.5  tablets (6.25 mg) by mouth 2 times daily (with meals)  Doctor's comments: Dosage change. Pt to call for refills as needed.                     cefuroxime 500 MG tablet  Also known as: CEFTIN                     famotidine 20 MG tablet  Also known as: PEPCID  INSTRUCTIONS: Take 20 mg by mouth 2 times daily                     ferrous sulfate 325 (65 Fe) MG tablet  Also known as: FEROSUL  INSTRUCTIONS: Take 325 mg by mouth daily                     finasteride 5 MG tablet  Also known as: PROSCAR  INSTRUCTIONS: Take 5 mg by mouth daily                     furosemide 20 MG tablet  Also known as: LASIX  INSTRUCTIONS: Take 20 mg by mouth daily                     gabapentin 300 MG capsule  Also known as: NEURONTIN  INSTRUCTIONS: Take 300 mg by mouth 3 times daily                     glipiZIDE 10 MG tablet  Also known as: GLUCOTROL  INSTRUCTIONS: Take 10 mg by mouth 2 times daily (before meals)                     isosorbide mononitrate 30 MG 24 hr tablet  Also known as: IMDUR  INSTRUCTIONS: Take 0.5 tablets (15 mg) by mouth 2 times daily                     Levemir FlexTouch 100 UNIT/ML pen  INSTRUCTIONS: Inject 25 Units Subcutaneous At Bedtime  Generic drug: insulin detemir                     nitroGLYcerin 0.4 MG sublingual tablet  Also known as: NITROSTAT  INSTRUCTIONS: Place 0.4 mg under the tongue every 5 minutes as needed for chest pain For chest pain place 1 tablet under the tongue every 5 minutes for 3 doses. If symptoms persist 5 minutes after 1st dose call 911.                     oxyCODONE 5 MG tablet  Also known as: ROXICODONE  INSTRUCTIONS: Take 1 tablet (5 mg) by mouth every 6 hours                     Plavix 75 MG tablet  INSTRUCTIONS: Take 75 mg by mouth daily  Generic drug: clopidogrel                     potassium chloride 20 MEQ packet  Also known as: KLOR-CON  INSTRUCTIONS: Take 20 mEq by mouth 2 times daily                     sotalol 120 MG tablet  Also known as: BETAPACE  INSTRUCTIONS: Take 1 tablet  (120 mg) by mouth 2 times daily

## 2020-12-11 NOTE — PROGRESS NOTES
Pt tolerated oral intake. Nuclear med study completed. Pt tolerated ambulation. Voided. Discharge instructions reviewed, copy given to pt. SALVATORE green'melody. Pt discharged home accompanied by wife.

## 2020-12-11 NOTE — DISCHARGE INSTRUCTIONS
[facility]   Interventional Radiology  Discharge Instructions Post Angiography for Y-90 Mapping    AFTER YOU GO HOME:        Relax and take it easy for 24 hours.       Drink plenty of fluids.       Resume your regular diet, unless otherwise instructed by your Primary Physician.       DO NOT smoke for at least 24 hours, if you were given any sedation.       DO NOT drink alcoholic beverages the day of your procedure.       Do not drive or operate machinery at home or at work for 24 hours.          DO NOT do any strenuous exercise or lifting for at least 2 days following your procedure.       DO NOT take a bath or shower for at least 12 hours following your procedure.       DO NOT make any important or legal decisions for 24 hours following your procedure.    CALL THE PHYSICIAN IF:       - You start bleeding from the procedure site. lie down flat and hold pressure on the site. A small lump or bruise is common at the puncture site. Your physician will tell you if you need to return to the hospital.      - You develop numbness, coolness or a change in color of the leg that was punctured.      - You experience increased pain or redness at the puncture site.      - You develop hives or a rash or unexplained itching.      - You develop a temperature of 101 degrees F or greater.    Additional Information:            Support the puncture site for coughing, sneezing, or moving your bowels for the first 48 hours  No tub bath, hot tubs, or swimming for 5 days  No lotion or powder to the puncture site for 3 day    Closure Device: Mynx Closure Device            [facility] INTERVENTIONAL RADIOLOGY DEPARTMENT         Procedure Physician:  Dr. Pierre          Date of Procedure:December 11, 2020       Telephone Numbers:   [phone]     Monday-Friday 8:00 to 4:30 pm                                        [phone]     After 4:30 pm Monday-Friday, Weekend and Holidays. Ask for the Interventional Radiologist on call.  Someone is on call 24 hrs/day.

## 2020-12-11 NOTE — PROGRESS NOTES
VSS. Right Groin site intact. - hematoma. CMS intact. +1 pedal pulses. Pt ambulated in lopez with walker. Right PIV removed. Tolerating po intake. Reviewed discharge paperwork. Pt signed papers. Myjules Brochure given to pt

## 2020-12-11 NOTE — PROGRESS NOTES
Pt on 2A post procedure per litter; pt awake and alert; site right groin is flat, dry and intact. Report to Radha Lopez RN.

## 2020-12-11 NOTE — PROCEDURES
[facility]     Procedure: IR Procedure Note    Date/Time: 12/11/2020 11:32 AM  Performed by: Debbie Hill MD  Authorized by: Debbie Hill MD   IR Fellow Physician: JODI Hill MD.   Other(s) attending procedure: ROBLES Pierre MD.     UNIVERSAL PROTOCOL   Site Marked: NA  Prior Images Obtained and Reviewed:  Yes  Required items: Required blood products, implants, devices and special equipment available    Patient identity confirmed:  Verbally with patient, arm band, provided demographic data and hospital-assigned identification number  Patient was reevaluated immediately before administering moderate or deep sedation or anesthesia  Confirmation Checklist:  Patient's identity using two indicators, relevant allergies, procedure was appropriate and matched the consent or emergent situation and correct equipment/implants were available  Time out: Immediately prior to the procedure a time out was called    Universal Protocol: the Joint Commission Universal Protocol was followed    Preparation: Patient was prepped and draped in usual sterile fashion           ANESTHESIA    Anesthesia: Local infiltration  Local Anesthetic:  Lidocaine 1% without epinephrine      SEDATION    Patient Sedated: Yes    Sedation Type:  Moderate (conscious) sedation  Vital signs: Vital signs monitored during sedation    See dictated procedure note for full details.  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     Specimens: none    Complications: None    Condition: Stable    Plan: -Bedrest x 3 hour.   -Y-90 treatment next week.     PROCEDURE   Patient Tolerance:  Patient tolerated the procedure well with no immediate complications    Length of time physician/provider present for 1:1 monitoring during sedation: 45

## 2020-12-11 NOTE — IP AVS SNAPSHOT
[facility] [address] [id number]                                    After Visit Summary   12/11/2020    Wesley Cooper    MRN: [id number]           After Visit Summary Signature Page    I have received my discharge instructions, and my questions have been answered. I have discussed any challenges I see with this plan with the nurse or doctor.    ..........................................................................................................................................  Patient/Patient Representative Signature      ..........................................................................................................................................  Patient Representative Print Name and Relationship to Patient    ..................................................               ................................................  Date                                   Time    ..........................................................................................................................................  Reviewed by Signature/Title    ...................................................              ..............................................  Date                                               Time          22EPIC Rev 08/18

## 2020-12-11 NOTE — PRE-PROCEDURE
GENERAL PRE-PROCEDURE:   Procedure:  Y-90 Mapping  Date/Time:  12/11/2020 9:20 AM    Risks and benefits: Risks, benefits and alternatives were discussed    Consent given by:  Parent  Patient states understanding of procedure being performed: Yes    Patient's understanding of procedure matches consent: Yes    Procedure consent matches procedure scheduled: Yes    Expected level of sedation:  Moderate  Appropriately NPO:  Yes  ASA Class:  Class 2- mild systemic disease, no acute problems, no functional limitations  Mallampati  :  Grade 2- soft palate, base of uvula, tonsillar pillars, and portion of posterior pharyngeal wall visible  Lungs:  Lungs clear with good breath sounds bilaterally  Heart:  Normal heart sounds and rate  History & Physical reviewed:  History and physical reviewed and no updates needed  Statement of review:  I have reviewed the lab findings, diagnostic data, medications, and the plan for sedation

## 2020-12-14 NOTE — PROGRESS NOTES
Service Date: 2020     Jarod De La Rosa MD   [place] [id number]      RE: Wesley Cooper    MRN: [id number]   : 1945      Dear Dr. De La Rosa:       We had the pleasure of seeing Mr. Wesley Cooper for followup in our Heart Failure Clinic at the [facility].  As you know, he is a 75-year-old gentleman with a past medical history significant for:     1.  Severe 3 vessel epicardial coronary artery disease, status post LIMA to LAD, occluded SVG to RCA and occluded SVG to left circumflex.  Status post complex PCI of the left circumflex in 2019.   2.  Ischemic cardiomyopathy with an estimated ejection fraction of 40%-45%.   3.  Status post ileostomy.      Mr. Cooper returns for followup.  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  He was admitted with severe sepsis secondary to intra-abdominal infection from a erlin-Colonoscopy.  He is currently scheduled to undergo chemotherapy as well as tumor embolization of his hepatocellular carcinoma.    He has been doing okay from a cardiovascular perspective.  He has not had any worsening lower extremity swelling or abdominal distention.  His weight has been stable.  He has not noticed any worsening exertional shortness of breath.  He has no exertional chest pain or chest pressure concerning for worsening angina.  He has not had any palpitations.  He has not had any exertional presyncope or syncope.       REVIEW OF SYSTEMS:  A detailed 10 point review of systems was obtained as described in the History of Present Illness.  All other systems were reviewed and are negative.       MEDICATIONS:    Current Outpatient Medications   Medication Sig     atorvastatin (LIPITOR) 80 MG tablet Take 80 mg by mouth daily      "carvedilol (COREG) 12.5 MG tablet Take 0.5 tablets (6.25 mg) by mouth 2 times daily (with meals)     clopidogrel (PLAVIX) 75 MG tablet Take 75 mg by mouth daily     famotidine (PEPCID) 20 MG tablet Take 20 mg by mouth 2 times daily      ferrous sulfate (FEROSUL) 325 (65 Fe) MG tablet Take 325 mg by mouth daily     finasteride (PROSCAR) 5 MG tablet Take 5 mg by mouth daily     furosemide (LASIX) 20 MG tablet Take 20 mg by mouth daily     gabapentin (NEURONTIN) 300 MG capsule Take 300 mg by mouth 3 times daily      glipiZIDE (GLUCOTROL) 10 MG tablet Take 10 mg by mouth 2 times daily (before meals)      insulin detemir (LEVEMIR FLEXTOUCH) 100 UNIT/ML pen Inject 25 Units Subcutaneous At Bedtime      isosorbide mononitrate (IMDUR) 30 MG 24 hr tablet Take 0.5 tablets (15 mg) by mouth 2 times daily     nitroglycerin (NITROSTAT) 0.4 MG sublingual tablet Place 0.4 mg under the tongue every 5 minutes as needed for chest pain For chest pain place 1 tablet under the tongue every 5 minutes for 3 doses. If symptoms persist 5 minutes after 1st dose call 911.     oxyCODONE (ROXICODONE) 5 MG tablet Take 1 tablet (5 mg) by mouth every 6 hours     potassium chloride (KLOR-CON) 20 MEQ packet Take 20 mEq by mouth 2 times daily     sotalol (BETAPACE) 120 MG tablet Take 1 tablet (120 mg) by mouth 2 times daily     No current facility-administered medications for this visit.      Facility-Administered Medications Ordered in Other Visits   Medication     sodium chloride (PF) 0.9% PF flush 10 mL       PHYSICAL EXAMINATION:    /72 (BP Location: Right arm, Patient Position: Chair, Cuff Size: Adult Small)   Pulse 60   Ht 1.626 m (5' 4\")   Wt 65.3 kg (144 lb)   SpO2 100%   BMI 24.72 kg/m    He was comfortable.  He was awake, alert, oriented x3.  He had no pallor, cyanosis or jaundice.  His neck exam revealed no jugular venous distention.  His carotids were 1+ bilaterally.  His pulse was regular in rhythm.  Cardiac auscultation " revealed normal S1 and S2 with no murmur rub or gallop.  Auscultation of his lungs revealed equal air entry on both sides with no added sound.  His abdomen was soft with no was no tenderness no rigidity no guarding.  His colostomy site was okay.  He had no focal neurological deficit.  His extremities showed no edema.      Echocardiogram (12/2020):  LVEF 33% based on biplane 2D tracing.  Inferior wall akinesis is present.  Inferolateral (posterior) wall akinesis is present.  Right ventricular function, chamber size, wall motion, and thickness are  normal.  Both atria appear normal.  No pericardial effusion is present.  The inferior vena cava cannot be assessed.  No significant changes noted.    CBC RESULTS:   Recent Labs   Lab Test 12/14/20  1316   WBC 7.6   RBC 4.92   HGB 14.4   HCT 45.2   MCV 92   MCH 29.3   MCHC 31.9   RDW 14.2          Recent Labs   Lab Test 12/14/20  1316 12/11/20  0915    141   POTASSIUM 4.6 3.9   CHLORIDE 107 108   CO2 26 23   ANIONGAP 7 10   * 149*   BUN 22 24   CR 2.69* 2.77*   KEIRA 9.3 9.0     Liver Function Studies -   Recent Labs   Lab Test 12/14/20  1316   PROTTOTAL 8.4   ALBUMIN 3.4   BILITOTAL 1.2   ALKPHOS 229*   AST 60*   ALT 27     Lab Results   Component Value Date    NTBNPI 585 11/06/2018     Lab Results   Component Value Date    NTBNP 1,410 (H) 12/14/2020       ASSESSMENT AND PLAN:      Mr. Wesley Cooper is a very pleasant, 75-year-old male with severe 3 vessel coronary artery disease, ischemic cardiomyopathy and moderate LV systolic dysfunction who returns today for followup.     HFrEF - Appears to be doing ok with no evidence of fluid overload. FC II. He is only on Carvedilol 6.25 mg bid. He couldn't tolerate low dose Cozaar and Aldactone due to symptomatic hypotension. He has CRT-D.  He appears to be dry with elevated BUN and creatinine.  Thus I took the liberty and stopped his Lasix.    CAD - Stable. Anginal controlled on Coreg and Imdur 15 mg twice a day.  He is on plavix, Eliquis, and atorvastatin.  He is on a lower dose of Imdur now after his cancer diagnosis.  He is currently not having any angina.  We will continue him on the lower dose of Imdur.    Atrial Fibrillation - In sinus rhythm.  He is currently on sotalol.  There is a known drug to drug interaction between sotalol and Xeloda which he needs for his cancer.  Since he has not had atrial fibrillation in the last year we will stop his sotalol so that he can get Xeloda for his colon cancer.  We will increase his carvedilol to 12.5 mg twice a day.  He is on Eliquis for his long-term anticoagulation.    Hepatocellular carcinoma/colorectal carcinoma -he is currently scheduled to undergo tumor embolization of esophageal carcinoma as well as chemotherapy for his colorectal cancer.  He is being followed by Dr. Pierre at the [place].    RTC in 3 months with CORE and with me in 6 months with labs and echocardiogram.  He will call us in the interim if you have any further worsening symptoms.    He states that his insurance changes and it would be difficult for him to follow-up with us at the [place].  We will have our  look into this so that he can have good continuity of care.      Sincerely,   Claudy Loco MD   [facility]  Heart Failure, Transplant, and Mechanical Circulatory Support Cardiology   Cardiovascular Division  [facility]   [phone]

## 2020-12-14 NOTE — NURSING NOTE
Diet: Patient instructed regarding a heart failure healthy diet, including discussion of reduced fat and 2000 mg daily sodium restriction, daily weights, medication purpose and compliance, fluid restrictions and resources for patient and family to access for assistance with heart failure management.       Labs: Patient was given results of the laboratory testing obtained today and patient was instructed about when to return for the next laboratory testing.     Med Reconcile: Reviewed and verified all current medications with the patient. The updated medication list was printed and given to the patient. STOP sotalol. STOP lasix. Increase coreg.   *WIll check with Valeta to determine patient current dose of coreg and determine new dose.    Return Appointment: Patient given instructions regarding scheduling next clinic visit. RTC in 3 months for follow up.    Patient stated he understood all health information given and agreed to call with further questions or concerns.     Calli Aguayo RN

## 2020-12-14 NOTE — LETTER
2020      RE: Wesley Cooper  [address]  [facility] [address]       Dear Colleague,    Thank you for the opportunity to participate in the care of your patient, Wesley Cooper, at the [facility] [place] at [facility]. Please see a copy of my visit note below.    Service Date: 2020     Jarod De La Rosa MD   [address] [address]      RE: Wesley Cooper    MRN: [id number]   : 1945      Dear Dr. De La Rosa:       We had the pleasure of seeing Mr. Wesley Cooper for followup in our Heart Failure Clinic at the [facility].  As you know, he is a 75-year-old gentleman with a past medical history significant for:     1.  Severe 3 vessel epicardial coronary artery disease, status post LIMA to LAD, occluded SVG to RCA and occluded SVG to left circumflex.  Status post complex PCI of the left circumflex in 2019.   2.  Ischemic cardiomyopathy with an estimated ejection fraction of 40%-45%.   3.  Status post ileostomy.      Mr. Cooper returns for followup.  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  He was admitted with severe sepsis secondary to intra-abdominal infection from a erlin-Colonoscopy.  He is currently scheduled to undergo chemotherapy as well as tumor embolization of his hepatocellular carcinoma.    He has been doing okay from a cardiovascular perspective.  He has not had any worsening lower extremity swelling or abdominal distention.  His weight has been stable.  He has not noticed any worsening exertional shortness of breath.  He has no exertional chest pain or chest pressure concerning for worsening angina.  He has not had any palpitations.  He has not had any exertional presyncope or  syncope.       REVIEW OF SYSTEMS:  A detailed 10 point review of systems was obtained as described in the History of Present Illness.  All other systems were reviewed and are negative.       MEDICATIONS:    Current Outpatient Medications   Medication Sig     atorvastatin (LIPITOR) 80 MG tablet Take 80 mg by mouth daily     carvedilol (COREG) 12.5 MG tablet Take 0.5 tablets (6.25 mg) by mouth 2 times daily (with meals)     clopidogrel (PLAVIX) 75 MG tablet Take 75 mg by mouth daily     famotidine (PEPCID) 20 MG tablet Take 20 mg by mouth 2 times daily      ferrous sulfate (FEROSUL) 325 (65 Fe) MG tablet Take 325 mg by mouth daily     finasteride (PROSCAR) 5 MG tablet Take 5 mg by mouth daily     furosemide (LASIX) 20 MG tablet Take 20 mg by mouth daily     gabapentin (NEURONTIN) 300 MG capsule Take 300 mg by mouth 3 times daily      glipiZIDE (GLUCOTROL) 10 MG tablet Take 10 mg by mouth 2 times daily (before meals)      insulin detemir (LEVEMIR FLEXTOUCH) 100 UNIT/ML pen Inject 25 Units Subcutaneous At Bedtime      isosorbide mononitrate (IMDUR) 30 MG 24 hr tablet Take 0.5 tablets (15 mg) by mouth 2 times daily     nitroglycerin (NITROSTAT) 0.4 MG sublingual tablet Place 0.4 mg under the tongue every 5 minutes as needed for chest pain For chest pain place 1 tablet under the tongue every 5 minutes for 3 doses. If symptoms persist 5 minutes after 1st dose call 911.     oxyCODONE (ROXICODONE) 5 MG tablet Take 1 tablet (5 mg) by mouth every 6 hours     potassium chloride (KLOR-CON) 20 MEQ packet Take 20 mEq by mouth 2 times daily     sotalol (BETAPACE) 120 MG tablet Take 1 tablet (120 mg) by mouth 2 times daily     No current facility-administered medications for this visit.      Facility-Administered Medications Ordered in Other Visits   Medication     sodium chloride (PF) 0.9% PF flush 10 mL       PHYSICAL EXAMINATION:    /72 (BP Location: Right arm, Patient Position: Chair, Cuff Size: Adult Small)   Pulse 60    "Ht 1.626 m (5' 4\")   Wt 65.3 kg (144 lb)   SpO2 100%   BMI 24.72 kg/m    He was comfortable.  He was awake, alert, oriented x3.  He had no pallor, cyanosis or jaundice.  His neck exam revealed no jugular venous distention.  His carotids were 1+ bilaterally.  His pulse was regular in rhythm.  Cardiac auscultation revealed normal S1 and S2 with no murmur rub or gallop.  Auscultation of his lungs revealed equal air entry on both sides with no added sound.  His abdomen was soft with no was no tenderness no rigidity no guarding.  His colostomy site was okay.  He had no focal neurological deficit.  His extremities showed no edema.      Echocardiogram (12/2020):  LVEF 33% based on biplane 2D tracing.  Inferior wall akinesis is present.  Inferolateral (posterior) wall akinesis is present.  Right ventricular function, chamber size, wall motion, and thickness are  normal.  Both atria appear normal.  No pericardial effusion is present.  The inferior vena cava cannot be assessed.  No significant changes noted.    CBC RESULTS:   Recent Labs   Lab Test 12/14/20  1316   WBC 7.6   RBC 4.92   HGB 14.4   HCT 45.2   MCV 92   MCH 29.3   MCHC 31.9   RDW 14.2          Recent Labs   Lab Test 12/14/20  1316 12/11/20  0915    141   POTASSIUM 4.6 3.9   CHLORIDE 107 108   CO2 26 23   ANIONGAP 7 10   * 149*   BUN 22 24   CR 2.69* 2.77*   KEIRA 9.3 9.0     Liver Function Studies -   Recent Labs   Lab Test 12/14/20  1316   PROTTOTAL 8.4   ALBUMIN 3.4   BILITOTAL 1.2   ALKPHOS 229*   AST 60*   ALT 27     Lab Results   Component Value Date    NTBNPI 585 11/06/2018     Lab Results   Component Value Date    NTBNP 1,410 (H) 12/14/2020       ASSESSMENT AND PLAN:      Mr. Wesley Cooper is a very pleasant, 75-year-old male with severe 3 vessel coronary artery disease, ischemic cardiomyopathy and moderate LV systolic dysfunction who returns today for followup.     HFrEF - Appears to be doing ok with no evidence of fluid overload. FC " II. He is only on Carvedilol 6.25 mg bid. He couldn't tolerate low dose Cozaar and Aldactone due to symptomatic hypotension. He has CRT-D.  He appears to be dry with elevated BUN and creatinine.  Thus I took the liberty and stopped his Lasix.    CAD - Stable. Anginal controlled on Coreg and Imdur 15 mg twice a day. He is on plavix, Eliquis, and atorvastatin.  He is on a lower dose of Imdur now after his cancer diagnosis.  He is currently not having any angina.  We will continue him on the lower dose of Imdur.    Atrial Fibrillation - In sinus rhythm.  He is currently on sotalol.  There is a known drug to drug interaction between sotalol and Xeloda which he needs for his cancer.  Since he has not had atrial fibrillation in the last year we will stop his sotalol so that he can get Xeloda for his colon cancer.  We will increase his carvedilol to 12.5 mg twice a day.  He is on Eliquis for his long-term anticoagulation.    Hepatocellular carcinoma/colorectal carcinoma -he is currently scheduled to undergo tumor embolization of esophageal carcinoma as well as chemotherapy for his colorectal cancer.  He is being followed by Dr. Pierre at the [place].    RTC in 3 months with CORE and with me in 6 months with labs and echocardiogram.  He will call us in the interim if you have any further worsening symptoms.    He states that his insurance changes and it would be difficult for him to follow-up with us at the [place].  We will have our  look into this so that he can have good continuity of care.      Sincerely,   Claudy Loco MD   [facility]  Heart Failure, Transplant, and Mechanical Circulatory Support Cardiology   Cardiovascular Division  [facility]   [phone]      Please do not hesitate to contact me if you have any questions/concerns.     Sincerely,     Claudy Loco MD

## 2020-12-14 NOTE — NURSING NOTE
Chief Complaint   Patient presents with     Follow Up     75 year old male presents with chronic systolic heart failure, refractory angina, ICM for follow up with labs prior     Vitals were taken and medications were reconciled.     Charlene Olson RMA  3:04 PM

## 2020-12-14 NOTE — PATIENT INSTRUCTIONS
It was a pleasure to see you in clinic today.  Please do not hesitate to call with any questions or concerns.  You are scheduled for a remote transmission on 3/23/20.  We look forward to seeing you in clinic at your next device check in 6 months.    Jarod Tillman, RN  Electrophysiology Nurse Clinician  [facility]    During Business Hours Please Call:  [phone]  After Hours Please Call:  [phone] - select option #[phone] and ask for job code [phone]

## 2020-12-14 NOTE — LETTER
2020      RE: Wesley Cooper  [address] [facility] [address]       Service Date: 2020     Jarod De La Rosa MD   [place] [id number]      RE: Wesley Cooepr    MRN: [id number]   : 1945      Dear Dr. De La Rosa:       We had the pleasure of seeing Mr. Wesley Cooper for followup in our Heart Failure Clinic at the [facility].  As you know, he is a 75-year-old gentleman with a past medical history significant for:     1.  Severe 3 vessel epicardial coronary artery disease, status post LIMA to LAD, occluded SVG to RCA and occluded SVG to left circumflex.  Status post complex PCI of the left circumflex in 2019.   2.  Ischemic cardiomyopathy with an estimated ejection fraction of 40%-45%.   3.  Status post ileostomy.      Mr. Cooper returns for followup.  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  He was admitted with severe sepsis secondary to intra-abdominal infection from a erlin-Colonoscopy.  He is currently scheduled to undergo chemotherapy as well as tumor embolization of his hepatocellular carcinoma.    He has been doing okay from a cardiovascular perspective.  He has not had any worsening lower extremity swelling or abdominal distention.  His weight has been stable.  He has not noticed any worsening exertional shortness of breath.  He has no exertional chest pain or chest pressure concerning for worsening angina.  He has not had any palpitations.  He has not had any exertional presyncope or syncope.       REVIEW OF SYSTEMS:  A detailed 10 point review of systems was obtained as described in the History of Present Illness.  All other systems were reviewed and are negative.       MEDICATIONS:    Current Outpatient Medications   Medication  "Sig     atorvastatin (LIPITOR) 80 MG tablet Take 80 mg by mouth daily     carvedilol (COREG) 12.5 MG tablet Take 0.5 tablets (6.25 mg) by mouth 2 times daily (with meals)     clopidogrel (PLAVIX) 75 MG tablet Take 75 mg by mouth daily     famotidine (PEPCID) 20 MG tablet Take 20 mg by mouth 2 times daily      ferrous sulfate (FEROSUL) 325 (65 Fe) MG tablet Take 325 mg by mouth daily     finasteride (PROSCAR) 5 MG tablet Take 5 mg by mouth daily     furosemide (LASIX) 20 MG tablet Take 20 mg by mouth daily     gabapentin (NEURONTIN) 300 MG capsule Take 300 mg by mouth 3 times daily      glipiZIDE (GLUCOTROL) 10 MG tablet Take 10 mg by mouth 2 times daily (before meals)      insulin detemir (LEVEMIR FLEXTOUCH) 100 UNIT/ML pen Inject 25 Units Subcutaneous At Bedtime      isosorbide mononitrate (IMDUR) 30 MG 24 hr tablet Take 0.5 tablets (15 mg) by mouth 2 times daily     nitroglycerin (NITROSTAT) 0.4 MG sublingual tablet Place 0.4 mg under the tongue every 5 minutes as needed for chest pain For chest pain place 1 tablet under the tongue every 5 minutes for 3 doses. If symptoms persist 5 minutes after 1st dose call 911.     oxyCODONE (ROXICODONE) 5 MG tablet Take 1 tablet (5 mg) by mouth every 6 hours     potassium chloride (KLOR-CON) 20 MEQ packet Take 20 mEq by mouth 2 times daily     sotalol (BETAPACE) 120 MG tablet Take 1 tablet (120 mg) by mouth 2 times daily     No current facility-administered medications for this visit.      Facility-Administered Medications Ordered in Other Visits   Medication     sodium chloride (PF) 0.9% PF flush 10 mL       PHYSICAL EXAMINATION:    /72 (BP Location: Right arm, Patient Position: Chair, Cuff Size: Adult Small)   Pulse 60   Ht 1.626 m (5' 4\")   Wt 65.3 kg (144 lb)   SpO2 100%   BMI 24.72 kg/m    He was comfortable.  He was awake, alert, oriented x3.  He had no pallor, cyanosis or jaundice.  His neck exam revealed no jugular venous distention.  His carotids were 1+ " bilaterally.  His pulse was regular in rhythm.  Cardiac auscultation revealed normal S1 and S2 with no murmur rub or gallop.  Auscultation of his lungs revealed equal air entry on both sides with no added sound.  His abdomen was soft with no was no tenderness no rigidity no guarding.  His colostomy site was okay.  He had no focal neurological deficit.  His extremities showed no edema.      Echocardiogram (12/2020):  LVEF 33% based on biplane 2D tracing.  Inferior wall akinesis is present.  Inferolateral (posterior) wall akinesis is present.  Right ventricular function, chamber size, wall motion, and thickness are  normal.  Both atria appear normal.  No pericardial effusion is present.  The inferior vena cava cannot be assessed.  No significant changes noted.    CBC RESULTS:   Recent Labs   Lab Test 12/14/20  1316   WBC 7.6   RBC 4.92   HGB 14.4   HCT 45.2   MCV 92   MCH 29.3   MCHC 31.9   RDW 14.2          Recent Labs   Lab Test 12/14/20  1316 12/11/20  0915    141   POTASSIUM 4.6 3.9   CHLORIDE 107 108   CO2 26 23   ANIONGAP 7 10   * 149*   BUN 22 24   CR 2.69* 2.77*   KEIRA 9.3 9.0     Liver Function Studies -   Recent Labs   Lab Test 12/14/20  1316   PROTTOTAL 8.4   ALBUMIN 3.4   BILITOTAL 1.2   ALKPHOS 229*   AST 60*   ALT 27     Lab Results   Component Value Date    NTBNPI 585 11/06/2018     Lab Results   Component Value Date    NTBNP 1,410 (H) 12/14/2020       ASSESSMENT AND PLAN:      Mr. Wesley Cooper is a very pleasant, 75-year-old male with severe 3 vessel coronary artery disease, ischemic cardiomyopathy and moderate LV systolic dysfunction who returns today for followup.     HFrEF - Appears to be doing ok with no evidence of fluid overload. FC II. He is only on Carvedilol 6.25 mg bid. He couldn't tolerate low dose Cozaar and Aldactone due to symptomatic hypotension. He has CRT-D.  He appears to be dry with elevated BUN and creatinine.  Thus I took the liberty and stopped his Lasix.    CAD  - Stable. Anginal controlled on Coreg and Imdur 15 mg twice a day. He is on plavix, Eliquis, and atorvastatin.  He is on a lower dose of Imdur now after his cancer diagnosis.  He is currently not having any angina.  We will continue him on the lower dose of Imdur.    Atrial Fibrillation - In sinus rhythm.  He is currently on sotalol.  There is a known drug to drug interaction between sotalol and Xeloda which he needs for his cancer.  Since he has not had atrial fibrillation in the last year we will stop his sotalol so that he can get Xeloda for his colon cancer.  We will increase his carvedilol to 12.5 mg twice a day.  He is on Eliquis for his long-term anticoagulation.    Hepatocellular carcinoma/colorectal carcinoma -he is currently scheduled to undergo tumor embolization of esophageal carcinoma as well as chemotherapy for his colorectal cancer.  He is being followed by Dr. Pierre at the [facility].    RTC in 3 months with CORE and with me in 6 months with labs and echocardiogram.  He will call us in the interim if you have any further worsening symptoms.    He states that his insurance changes and it would be difficult for him to follow-up with us at the [facility].  We will have our  look into this so that he can have good continuity of care.      Sincerely,   Claudy Loco MD   [facility]  Heart Failure, Transplant, and Mechanical Circulatory Support Cardiology   Cardiovascular Division  [facility]   [phone]            Claudy Loco MD

## 2020-12-15 NOTE — NURSING NOTE
Called Bianca to clarify current coreg dosing. She reports he has been taking 3.125mg BID.   Discussed that we want to increase to 6.25mg bid coreg. She states understanding. They have both 3.125mg tablets and 12.5mg tablets so they have supply for increased dose at this time.  Confirmed all med changes with Bianca.   No further questions at this time.

## 2020-12-15 NOTE — TELEPHONE ENCOUNTER
Returned call to Bianca. She reports that Wesley is also taking eliquis 5mg BID and losartan 25mg once per day and that these are not reflected on his medication list.  Will route to provider and update med list as appropriate.

## 2020-12-15 NOTE — TELEPHONE ENCOUNTER
[facility] Call Center    Phone Message    May a detailed message be left on voicemail: yes     Reason for Call: Other: Bianca calling to report that pt had appointment yesterday, and he didn't give them all his medications. Please call back to discuss his medications.     Action Taken: Message routed to:  [facility] ([facility] cardio    Travel Screening: Not Applicable

## 2020-12-15 NOTE — PROGRESS NOTES
Social Work Intervention  [facility]    Data/Intervention:    Patient Name:  Wesley Cooper  /Age:  1945 (75 year old)    Visit Type: telephone  Referral Source: Cardiology clinic  Reason for Referral:  Lodging and insurance concerns    Collaborated With:    -Patient's wife, Bianca    Patient Concerns/Issues:   Patient has been recently diagnosed with cancer and is having to stay in a hotel locally for numerous appointments. Patient is having financial strain due to this. Additionally, Patient has [facility] Medicare Advantage for insurance which, starting 2021, will not cover care at [facility].  was asked to contact Patient's spouse, Bianca to discuss options.    Intervention/Education/Resources Provided:  Bianca reported that they are only staying locally for one more day then they will be returning home until new appointments are scheduled. They are also possibly transferring oncology care to a provider closer to home, likely in [place].      explained that, typically [facility] would be an option for them anytime they are travelling to [place] for cancer-related appointments, but they are currently closed due to COVID. There may also be oncology funds that could be utilized to pay for lodging if they should need it before [facility] opens back up.     Bianca also reported that they contacted [facility] some time ago to address concern that [facility] was not going to be in network. They were told by [facility] that visits would continue to be covered, so they didn't pursue changing insurances. This is not 's understanding, so  encouraged Bianca to reach back out to [facility] to double check coverage.    Assessment/Plan:  Bianca will contact [facility] to check on coverage. Bianca will contact  when any cancer-related appointments are scheduled to see about lodging assistance.    Provided patient/family with  contact information and availability.    ANANDA Pastor  Outpatient Specialty Clinics  Direct Phone: [phone]

## 2020-12-16 NOTE — DISCHARGE INSTRUCTIONS
[facility]   Interventional Radiology  Discharge Instructions Post Angiography for Insertion of   Radioactive Microspheres to the Liver    AFTER YOU GO HOME:        Relax and take it easy for 24 hours.       Drink plenty of fluids.       Resume your regular diet, unless otherwise instructed by your Primary Physician.       DO NOT smoke for at least 24 hours, if you were given any sedation.       DO NOT drink alcoholic beverages the day of your procedure.       Do not drive or operate machinery at home or at work for 24 hours.          DO NOT do any strenuous exercise or lifting for at least 2 days following your procedure.       DO NOT take a bath or shower for at least 12 hours following your procedure.       DO NOT make any important or legal decisions for 24 hours following your procedure.    CALL THE PHYSICIAN IF:       - You start bleeding from the procedure site. lie down flat and hold pressure on the site. A small lump or bruise is common at the puncture site.       Your physician will tell you if you need to return to the hospital.      - You develop numbness, coolness or a change in color of the leg that was punctured.      - You experience increased pain or redness at the puncture site.      - You develop hives or a rash or unexplained itching.      - You develop a temperature of 101 degrees F or greater.    Additional Information:           Support the puncture site for coughing, sneezing, or moving your bowels for the first 48 hours  No tub bath, hot tubs, or swimming for 5 days  No lotion or powder to the puncture site for 3 day    Follow the Discharge Instructions for the Liver Brachytherapy; Contrast Instructions and Instructions for the [employer].     Closure Device: Mynx Closure Device          [facility] INTERVENTIONAL RADIOLOGY DEPARTMENT         Procedure Physician:  Dr. Pierre          Date of Procedure:December 16, 2020       Telephone Numbers:   [phone]      Monday-Friday 8:00 to 4:30 pm                                        [phone]     After 4:30 pm Monday-Friday, Weekend and Holidays. Ask for the Interventional Radiologist on      call. Someone is on call 24 hrs/day.

## 2020-12-16 NOTE — PROCEDURES
[facility]     Procedure: IR Procedure Note    Date/Time: 12/16/2020 11:29 AM  Performed by: Alexis Pierre MD  Authorized by: Alexis Pierre MD     UNIVERSAL PROTOCOL   Site Marked: NA  Prior Images Obtained and Reviewed:  Yes  Required items: Required blood products, implants, devices and special equipment available    Patient identity confirmed:  Verbally with patient, arm band, provided demographic data and hospital-assigned identification number  Patient was reevaluated immediately before administering moderate or deep sedation or anesthesia  Confirmation Checklist:  Patient's identity using two indicators, relevant allergies, procedure was appropriate and matched the consent or emergent situation and correct equipment/implants were available  Time out: Immediately prior to the procedure a time out was called    Universal Protocol: the Joint Commission Universal Protocol was followed    Preparation: Patient was prepped and draped in usual sterile fashion           ANESTHESIA    Anesthesia: Local infiltration  Local Anesthetic:  Lidocaine 1% without epinephrine      SEDATION    Patient Sedated: Yes    Sedation Type:  Moderate (conscious) sedation  Vital signs: Vital signs monitored during sedation    Fluoroscopy Time: 10 minute(s)  See dictated procedure note for full details.  Findings: -Successful y90 radioembolization of the left lobe lesion.     Specimens: none    Complications: None    Condition: Stable    PROCEDURE   Patient Tolerance:  Patient tolerated the procedure well with no immediate complications    Length of time physician/provider present for 1:1 monitoring during sedation: 25

## 2020-12-16 NOTE — IP AVS SNAPSHOT
[facility] [address] [address]                                    After Visit Summary   12/16/2020    Wesley Cooper    MRN: [id number]           After Visit Summary Signature Page    I have received my discharge instructions, and my questions have been answered. I have discussed any challenges I see with this plan with the nurse or doctor.    ..........................................................................................................................................  Patient/Patient Representative Signature      ..........................................................................................................................................  Patient Representative Print Name and Relationship to Patient    ..................................................               ................................................  Date                                   Time    ..........................................................................................................................................  Reviewed by Signature/Title    ...................................................              ..............................................  Date                                               Time          22EPIC Rev 08/18

## 2020-12-16 NOTE — PROGRESS NOTES
Pt tolerated oral intake. Nuclear med study competed. Discharge instructions reviewed with pt and pt's wife, along with new medications and when to begin them. Pt's wife also informed that pt's blood sugars will be elevated due to steroids. Rx will be picked up at discharge pharmacy prior to discharge. Report given to Janet BRYANT RN.

## 2020-12-16 NOTE — PRE-PROCEDURE
GENERAL PRE-PROCEDURE:   Procedure:  Y90 delivery    Written consent obtained?: Yes    Risks and benefits: Risks, benefits and alternatives were discussed    Consent given by:  Patient  Patient states understanding of procedure being performed: Yes    Patient's understanding of procedure matches consent: Yes    Procedure consent matches procedure scheduled: Yes    Expected level of sedation:  Moderate  Appropriately NPO:  Yes  ASA Class:  Class 2- mild systemic disease, no acute problems, no functional limitations  Mallampati  :  Grade 2- soft palate, base of uvula, tonsillar pillars, and portion of posterior pharyngeal wall visible  Lungs:  Lungs clear with good breath sounds bilaterally  Heart:  Normal heart sounds and rate  History & Physical reviewed:  History and physical reviewed and no updates needed  Statement of review:  I have reviewed the lab findings, diagnostic data, medications, and the plan for sedation

## 2020-12-16 NOTE — PROGRESS NOTES
Bianca, pt's wife, left a voicemail message stating they have found an Oncologist in [place] , Dr Cuba Waddell.   States she did not need a return call.

## 2020-12-16 NOTE — PROGRESS NOTES
Patient tolerated recovery stage well. VSS, right groin site clean/dry/intact, no hematoma, and denies pain. Patient tolerated PO food and fluids. Teaching was done and discharge instructions were given. Wife was called & instructions reviewed with her as well.  Patient ambulated, voided, and PIV was removed. Patient discharged from the hospital via wheel chair to home with his wife.

## 2020-12-16 NOTE — IP AVS SNAPSHOT
MRN:[id number]                      After Visit Summary   12/16/2020    Wesley Cooper    MRN: [id number]           Visit Information        Department      12/16/2020  8:16 AM [facility] [address] [place]          Review of your medicines      UNREVIEWED medicines. Ask your doctor about these medicines       Dose / Directions   apixaban ANTICOAGULANT 5 MG tablet  Commonly known as: ELIQUIS      Dose: 5 mg  Take 5 mg by mouth 2 times daily  Refills: 0     atorvastatin 80 MG tablet  Commonly known as: LIPITOR  Used for: Chronic systolic heart failure (H)      Dose: 80 mg  Take 80 mg by mouth daily  Refills: 0     carvedilol 12.5 MG tablet  Commonly known as: COREG  Used for: Chronic systolic heart failure (H)      Dose: 6.25 mg  Take 0.5 tablets (6.25 mg) by mouth 2 times daily (with meals)  Quantity: 90 tablet  Refills: 1     famotidine 20 MG tablet  Commonly known as: PEPCID      Dose: 20 mg  Take 20 mg by mouth 2 times daily  Refills: 0     ferrous sulfate 325 (65 Fe) MG tablet  Commonly known as: FEROSUL      Dose: 325 mg  Take 325 mg by mouth daily  Refills: 0     finasteride 5 MG tablet  Commonly known as: PROSCAR      Dose: 5 mg  Take 5 mg by mouth daily  Refills: 0     gabapentin 300 MG capsule  Commonly known as: NEURONTIN      Dose: 300 mg  Take 300 mg by mouth 3 times daily  Refills: 0     glipiZIDE 10 MG tablet  Commonly known as: GLUCOTROL      Dose: 10 mg  Take 10 mg by mouth 2 times daily (before meals)  Refills: 0     isosorbide mononitrate 30 MG 24 hr tablet  Commonly known as: IMDUR      Dose: 15 mg  Take 0.5 tablets (15 mg) by mouth 2 times daily  Quantity:    Refills: 0     Levemir FlexTouch 100 UNIT/ML pen  Generic drug: insulin detemir      Dose: 25 Units  Inject 25 Units Subcutaneous At Bedtime  Refills: 0     nitroGLYcerin 0.4 MG sublingual tablet  Commonly known as: NITROSTAT  Used for: Chronic systolic heart failure (H)      Dose: 0.4 mg  Place 0.4 mg under the  tongue every 5 minutes as needed for chest pain For chest pain place 1 tablet under the tongue every 5 minutes for 3 doses. If symptoms persist 5 minutes after 1st dose call 911.  Refills: 0     * oxyCODONE 5 MG tablet  Commonly known as: ROXICODONE  Used for: Pain  Ask about: Which instructions should I use?      Dose: 5 mg  Take 1 tablet (5 mg) by mouth every 6 hours  Quantity: 15 tablet  Refills: 0     * oxyCODONE 5 MG tablet  Commonly known as: ROXICODONE  Used for: HCC (hepatocellular carcinoma) (H)  Ask about: Which instructions should I use?      Dose: 5-10 mg  Take 1-2 tablets (5-10 mg) by mouth every 6 hours as needed for moderate to severe pain  Quantity: 10 tablet  Refills: 0     Plavix 75 MG tablet  Used for: Chronic systolic heart failure (H)  Generic drug: clopidogrel      Dose: 75 mg  Take 75 mg by mouth daily  Refills: 0         * This list has 2 medication(s) that are the same as other medications prescribed for you. Read the directions carefully, and ask your doctor or other care provider to review them with you.            START taking       Dose / Directions   ciprofloxacin 500 MG tablet  Commonly known as: Cipro  Used for: HCC (hepatocellular carcinoma) (H)      Dose: 500 mg  Take 1 tablet (500 mg) by mouth daily for 14 days Take at least 2 hrs before or 4 hrs after aluminum, calcium, iron, zinc or magnesium containing products.  Quantity: 14 tablet  Refills: 0     methylPREDNISolone 4 MG tablet therapy pack  Commonly known as: MEDROL DOSEPAK  Used for: HCC (hepatocellular carcinoma) (H)      Dose: 8 mg  Start taking on: December 17, 2020  Take 2 tablets (8 mg) by mouth 2 times daily Take as directed on package.  Quantity: 21 tablet  Refills: 0     ondansetron 4 MG tablet  Commonly known as: ZOFRAN  Used for: HCC (hepatocellular carcinoma) (H)      Dose: 4-8 mg  Take 1-2 tablets (4-8 mg) by mouth every 6 hours as needed for nausea (vomiting)  Quantity: 40 tablet  Refills: 0     pantoprazole 40  MG EC tablet  Commonly known as: PROTONIX  Used for: HCC (hepatocellular carcinoma) (H)      Dose: 40 mg  Take 1 tablet (40 mg) by mouth daily  Quantity: 30 tablet  Refills: 0           Where to get your medicines      Some of these will need a paper prescription and others can be bought over the counter. Ask your nurse if you have questions.    Bring a paper prescription for each of these medications  ciprofloxacin 500 MG tablet  methylPREDNISolone 4 MG tablet therapy pack  ondansetron 4 MG tablet  oxyCODONE 5 MG tablet  pantoprazole 40 MG EC tablet           Prescriptions were sent or printed at these locations (5 Prescriptions)            Other Prescriptions                Printed at Department/Unit printer (5 of 5)         ciprofloxacin (CIPRO) 500 MG tablet               methylPREDNISolone (MEDROL DOSEPAK) 4 MG tablet therapy pack               ondansetron (ZOFRAN) 4 MG tablet               oxyCODONE (ROXICODONE) 5 MG tablet               pantoprazole (PROTONIX) 40 MG EC tablet                Protect others around you: Learn how to safely use, store and throw away your medicines at www.disposemymeds.org.       Follow-ups after your visit       Your next 10 appointments already scheduled    Dec 16, 2020  1:00 PM  [place] THERASPHERE THERAPY with [facility] ([facility]) [address] [facility] [address] [phone]   How do I prepare for my exam? (Food and drink instructions)  No Food and Drink Restrictions, however please follow the instructions for your IR (Interventional Radiology) exam associated with this exam.    What should I wear: You should wear comfortable clothes. Leave your valuables at home.  You will be changed into procedural attire when you arrive for exam.    How long does the exam take:  Plan on being here for 5-6 hours.    What should I bring: Please bring a list of your medicines to the exam. (Include  vitamins, minerals and over-the-counter drugs.) Please bring related prior results and films.    Do I need a :  Follow the instructions given to you for the Interventional Radiology Exam    What do I need to tell my doctor?  Tell your doctor if you are breastfeeding or if there is any chance you may be pregnant.  If you have had a barium test within the past few days. Barium may change the results of certain exams.  If you think you may need sedation (medicine to help you relax).    What should I do after the exam: No restrictions, you may resume normal activities. The radioactive fluid will leave your body when you urinate (use the toilet). If you drink extra fluids, it will leave your body faster.    What is this test: The Interventional Radiologist will inject a radioactive activity material into your liver during the Interventional Radiology Exam to help treat metastatic liver cancer.   The procedure allows a concentrated dose of radiation to be delivered directly to the liver cancer, limiting damage to other tissue. Images will be taken in the Nuclear Medicine department after your Interventional radiology injection.    Who should I call with questions: Please call your Imaging Department at your exam site with any questions. Directions, parking instructions, and other information are available on our website, [email or web address].     Jan 20, 2021 11:00 AM  (Arrive by 10:45 AM)  Video Visit with Nicolas Modi MD  [facility] ([facility]) [address] [place] [address] [phone]   [facility]  Note: this is not an onsite visit; there is no need to come to the facility.  Please have a list of all current medications available for appointment.      Mar 16, 2021 11:00 AM  LAB with [facility]  [facility] [place] [facility]) [address] [place] [address]  [id number] [phone]   Please do not eat 10-12 hours before your appointment if you are coming in fasting for labs on lipids, cholesterol, or glucose (sugar). Does not apply to pregnant women. Water, tea and black coffee (with nothing added) is okay. Do not drink other fluids, diet soda or gum. If you have concerns about taking your medications, please send a message by clicking on Secure Messaging, Message Your Care Team.     Mar 16, 2021 11:30 AM  (Arrive by 11:15 AM)  RETURN HEART FAILURE with Claudy Loco MD  [facility] ([facility]) [address] [place] [id number] [phone]      Mar 23, 2021 12:00 AM  CARDIAC DEVICE CHECK - REMOTE with  ICD REMOTE  [facility] ([facility]) [address] [place] [address]  [address]  [facility] [address] [phone]         Care Instructions       After Care Instructions     Discharge Instructions / education      *Review the medication instructions.  *Review patient education materials with the patient.   *Review Post-Y90 discharge instructions with patient and family.  *Review Contrast Discharge Instruction sheet with patient.  *If patient diabetic, discharge instruction sheet to be given to patient.         May discharge when      Discharge patient  when bed rest is completed IF:  *Vital signs are within the patient's normal limits.  *Patient is able to void and transfer with assistance.  *Patient is alert and tolerates oral fluids.  *Patient must be discharged with a  and have someone to stay to assist them with bathroom privileges at home.           Further instructions from your care team       [facility]   Interventional Radiology  Discharge Instructions Post Angiography for Insertion of   Radioactive Microspheres to the Liver    AFTER YOU GO HOME:        Relax and take it easy for 24 hours.       Drink plenty of  fluids.       Resume your regular diet, unless otherwise instructed by your Primary Physician.       DO NOT smoke for at least 24 hours, if you were given any sedation.       DO NOT drink alcoholic beverages the day of your procedure.       Do not drive or operate machinery at home or at work for 24 hours.          DO NOT do any strenuous exercise or lifting for at least 2 days following your procedure.       DO NOT take a bath or shower for at least 12 hours following your procedure.       DO NOT make any important or legal decisions for 24 hours following your procedure.    CALL THE PHYSICIAN IF:       - You start bleeding from the procedure site. lie down flat and hold pressure on the site. A small lump or bruise is common at the puncture site.       Your physician will tell you if you need to return to the hospital.      - You develop numbness, coolness or a change in color of the leg that was punctured.      - You experience increased pain or redness at the puncture site.      - You develop hives or a rash or unexplained itching.      - You develop a temperature of 101 degrees F or greater.    Additional Information:           Support the puncture site for coughing, sneezing, or moving your bowels for the first 48 hours  No tub bath, hot tubs, or swimming for 5 days  No lotion or powder to the puncture site for 3 day    Follow the Discharge Instructions for the Liver Brachytherapy; Contrast Instructions and Instructions for the [employer].     Closure Device: Mynx Closure Device          [facility] INTERVENTIONAL RADIOLOGY DEPARTMENT         Procedure Physician:  Dr. Pierre          Date of Procedure:December 16, 2020       Telephone Numbers:   [phone]     Monday-Friday 8:00 to 4:30 pm                                        [phone]     After 4:30 pm Monday-Friday, Weekend and Holidays. Ask for the Interventional Radiologist on      call. Someone is on call 24 hrs/day.              Information about  OPIOIDS    PRESCRIPTION OPIOIDS: WHAT YOU NEED TO KNOW   We gave you an opioid (narcotic) pain medicine. It is important to manage your pain, but opioids are not always the best choice. You should first try all the other options your care team gave you. Take this medicine for as short a time (and as few doses) as possible.    Some activities can increase your pain, such as bandage changes or therapy sessions. It may help to take your pain medicine 30 to 60 minutes before these activities. Reduce your stress by getting enough sleep, working on hobbies you enjoy and practicing relaxation or meditation. Talk to your care team about ways to manage your pain beyond prescription opioids.    These medicines have risks:    DO NOT drive when on new or higher doses of pain medicine. These medicines can affect your alertness and reaction times, and you could be arrested for driving under the influence (DUI). If you need to use opioids long-term, talk to your care team about driving.    DO NOT operate heavy machinery    DO NOT do any other dangerous activities while taking these medicines.    DO NOT drink any alcohol while taking these medicines.     If the opioid prescribed includes acetaminophen, DO NOT take with any other medicines that contain acetaminophen. Read all labels carefully. Look for the word  acetaminophen  or  Tylenol.  Ask your pharmacist if you have questions or are unsure.    You can get addicted to pain medicines, especially if you have a history of addiction (chemical, alcohol or substance dependence). Talk to your care team about ways to reduce this risk.    All opioids tend to cause constipation. Drink plenty of water and eat foods that have a lot of fiber, such as fruits, vegetables, prune juice, apple juice and high-fiber cereal. Take a laxative (Miralax, milk of magnesia, Colace, Senna) if you don t move your bowels at least every other day. Other side effects include upset stomach, sleepiness,  dizziness, throwing up, tolerance (needing more of the medicine to have the same effect), physical dependence and slowed breathing.    Store your pills in a secure place, locked if possible. We will not replace any lost or stolen medicine. If you don t finish your medicine, please throw away (dispose) as directed by your pharmacist. Medication waste collection kiosks are conveniently located at all [facility] retail pharmacy locations.  The [place] Pollution Control Agency has more information about safe disposal: [email or web address]://[email or web address]/[email or web address]-unwanted-medications       Additional Information About Your Visit       MyChart Information    [employer] gives you secure access to your electronic health record. If you see a primary care provider, you can also send messages to your care team and make appointments. If you have questions, please call your primary care clinic.  If you do not have a primary care provider, please call [phone] and they will assist you.       Care EveryWhere ID    This is your Care EveryWhere ID. This could be used by other organizations to access your [facility]  [id number]       Your Vitals Were  Most recent update: 12/16/2020 12:39 PM    Blood Pressure   141/77      Pulse   64    Temperature   97.4  F (36.3  C) (Oral)    Respirations   18    Pulse Oximetry   99%          Primary Care Provider Office Phone # Fax #    Jarod De La Rosa [phone]      Equal Access to Services    NEIL SCHWARTZ : Hadii oliver ku hadasho Soomaali, waaxda luqadaha, qaybta kaalmada adeegyada, maryjane olsen. So [facility] [phone].    ATENCIÓN: Si habla español, tiene a valdes disposición servicios gratuitos de asistencia lingüística. Maria Del Carmen al [phone].    We comply with applicable federal and state civil rights laws, including the [place] Human Rights Act. We do not discriminate on the basis of race, color, creed, Bahai,  national origin, marital status, age, disability, sex, sexual orientation, or gender identity.       Thank you!    Thank you for choosing [facility] for your care. Our goal is always to provide you with excellent care. Hearing back from our patients is one way we can continue to improve our services. Please take a few minutes to complete the written survey that you may receive in the mail after you visit with us. Thank you!            Medication List      Medications          Morning Afternoon Evening Bedtime As Needed    ciprofloxacin 500 MG tablet  Also known as: Cipro  INSTRUCTIONS: Take 1 tablet (500 mg) by mouth daily for 14 days Take at least 2 hrs before or 4 hrs after aluminum, calcium, iron, zinc or magnesium containing products.                     methylPREDNISolone 4 MG tablet therapy pack  Also known as: MEDROL DOSEPAK  Start taking on: December 17, 2020  INSTRUCTIONS: Take 2 tablets (8 mg) by mouth 2 times daily Take as directed on package.  Doctor's comments: 1 dose pack please                     ondansetron 4 MG tablet  Also known as: ZOFRAN  INSTRUCTIONS: Take 1-2 tablets (4-8 mg) by mouth every 6 hours as needed for nausea (vomiting)                     pantoprazole 40 MG EC tablet  Also known as: PROTONIX  INSTRUCTIONS: Take 1 tablet (40 mg) by mouth daily  LAST TAKEN: Ask your nurse or doctor                       ASK your doctor about these medications          Morning Afternoon Evening Bedtime As Needed    apixaban ANTICOAGULANT 5 MG tablet  Also known as: ELIQUIS  INSTRUCTIONS: Take 5 mg by mouth 2 times daily                     atorvastatin 80 MG tablet  Also known as: LIPITOR  INSTRUCTIONS: Take 80 mg by mouth daily                     carvedilol 12.5 MG tablet  Also known as: COREG  INSTRUCTIONS: Take 0.5 tablets (6.25 mg) by mouth 2 times daily (with meals)  Doctor's comments: Dosage change. Pt to call for refills as needed.                     famotidine 20 MG tablet  Also known as:  PEPCID  INSTRUCTIONS: Take 20 mg by mouth 2 times daily                     ferrous sulfate 325 (65 Fe) MG tablet  Also known as: FEROSUL  INSTRUCTIONS: Take 325 mg by mouth daily                     finasteride 5 MG tablet  Also known as: PROSCAR  INSTRUCTIONS: Take 5 mg by mouth daily                     gabapentin 300 MG capsule  Also known as: NEURONTIN  INSTRUCTIONS: Take 300 mg by mouth 3 times daily                     glipiZIDE 10 MG tablet  Also known as: GLUCOTROL  INSTRUCTIONS: Take 10 mg by mouth 2 times daily (before meals)                     isosorbide mononitrate 30 MG 24 hr tablet  Also known as: IMDUR  INSTRUCTIONS: Take 0.5 tablets (15 mg) by mouth 2 times daily                     Levemir FlexTouch 100 UNIT/ML pen  INSTRUCTIONS: Inject 25 Units Subcutaneous At Bedtime  Generic drug: insulin detemir                     nitroGLYcerin 0.4 MG sublingual tablet  Also known as: NITROSTAT  INSTRUCTIONS: Place 0.4 mg under the tongue every 5 minutes as needed for chest pain For chest pain place 1 tablet under the tongue every 5 minutes for 3 doses. If symptoms persist 5 minutes after 1st dose call 911.                     * oxyCODONE 5 MG tablet  Also known as: ROXICODONE  INSTRUCTIONS: Take 1 tablet (5 mg) by mouth every 6 hours  Ask about: Which instructions should I use?                     * oxyCODONE 5 MG tablet  Also known as: ROXICODONE  INSTRUCTIONS: Take 1-2 tablets (5-10 mg) by mouth every 6 hours as needed for moderate to severe pain  Ask about: Which instructions should I use?                     Plavix 75 MG tablet  INSTRUCTIONS: Take 75 mg by mouth daily  Generic drug: clopidogrel                        * This list has 2 medication(s) that are the same as other medications prescribed for you. Read the directions carefully, and ask your doctor or other care provider to review them with you.

## 2020-12-16 NOTE — PROGRESS NOTES
Patient Name: Wesley Cooper  Medical Record Number: [id number]  Today's Date: 12/16/2020    Procedure: Y90 delivery.   Proceduralist: MD Ignacio.    Procedure Start: 1038  Procedure end: 1125  Sedation medications administered: Fentanyl:75 mcg  Versed: 1mg    Report given to: 2A, RN  : n/a    Other Notes: Pt arrived to IR room 4 from . Consent reviewed. Pt denies any questions or concerns regarding procedure. Pt positioned supine and monitored per protocol. Mynx closure device deployed @  1115. 4 hour bedrest. Pt tolerated procedure without any noted complications. Pt transferred back to .    Mary Huang, RN

## 2020-12-17 NOTE — PROGRESS NOTES
RN CARE COORDINATION NOTE      Incoming:  Spoke to Kelsey from [facility] in [place].   Patient has a consult with their office tomorrow and they have not received medical records yet.  Provided the phone and fax number to medical records. Writer will have recent notes and pathology faxed to Kelsey at [phone].    Provided Dr Hernandez's direct number for Dr Waddell to call with any questions.           Araceli Fenton MSN, RN, OCN  RN Care Coordinator  [facility]  [phone]

## 2020-12-17 NOTE — PROGRESS NOTES
Writer placed outgoing fax to Kelsey at [facility] for documentation per the request of SHEREEN Avalos. Screen shot of fax confirmation below.

## 2020-12-18 NOTE — ORAL ONC MGMT
"Oral Chemotherapy Monitoring Program    Subjective/Objective:  Wesley Cooper is a 75 year old male contacted by phone for a follow-up visit for oral chemotherapy. He is doing well with therapy and reports no side effects. He said his appetite is reduced but feels fine.     ORAL CHEMOTHERAPY 12/4/2020 12/9/2020 12/18/2020   Assessment Type Other New Teach Initial Follow up   Diagnosis Code Colon Cancer Colon Cancer Colon Cancer   Providers Dr. David Hernandez   [facility]   Drug Name Xeloda (Capecitabine) Xeloda (Capecitabine) Xeloda (Capecitabine)   Dose 1,500 mg 1,500 mg 1,500 mg   Current Schedule BID BID BID   Cycle Details 2 weeks on, 1 week off 2 weeks on, 1 week off 2 weeks on, 1 week off   Start Date of Last Cycle - - 12/15/2020   Planned next cycle start date - - 1/5/2020   Doses missed in last 2 weeks - - 0   Adherence Assessment - - Adherent   Adverse Effects - - No AE identified during assessment   Any new drug interactions? Yes - -   Pharmacist Intervention? Yes - -   Intervention(s) (No Data) - -   Is the dose as ordered appropriate for the patient? No - -   Pharmacist intervention for dose adjustment? Yes - -   Intervention(s) Other - -       Last PHQ-2 Score on record:   PHQ-2 ( 1999 [employer]) 4/10/2020 8/19/2019   Q1: Little interest or pleasure in doing things 0 0   Q2: Feeling down, depressed or hopeless 0 0   PHQ-2 Score 0 0       Vitals:  BP:   BP Readings from Last 1 Encounters:   12/16/20 (!) 148/81     Wt Readings from Last 1 Encounters:   12/14/20 65.3 kg (144 lb)     Estimated body surface area is 1.72 meters squared as calculated from the following:    Height as of 12/14/20: 1.626 m (5' 4\").    Weight as of 12/14/20: 65.3 kg (144 lb).    Labs:  _  Result Component Current Result Ref Range   Sodium 140 (12/14/2020) 133 - 144 mmol/L     _  Result Component Current Result Ref Range   Potassium 4.6 (12/14/2020) 3.4 - 5.3 mmol/L     _  Result Component " Current Result Ref Range   Calcium 9.3 (12/14/2020) 8.5 - 10.1 mg/dL     No results found for Mag within last 30 days.     No results found for Phos within last 30 days.     _  Result Component Current Result Ref Range   Albumin 3.4 (12/14/2020) 3.4 - 5.0 g/dL     _  Result Component Current Result Ref Range   Urea Nitrogen 22 (12/14/2020) 7 - 30 mg/dL     _  Result Component Current Result Ref Range   Creatinine 2.69 (H) (12/14/2020) 0.66 - 1.25 mg/dL     _  Result Component Current Result Ref Range   AST 60 (H) (12/14/2020) 0 - 45 U/L     _  Result Component Current Result Ref Range   ALT 27 (12/14/2020) 0 - 70 U/L     _  Result Component Current Result Ref Range   Bilirubin Total 1.2 (12/14/2020) 0.2 - 1.3 mg/dL     _  Result Component Current Result Ref Range   WBC 7.6 (12/14/2020) 4.0 - 11.0 10e9/L     _  Result Component Current Result Ref Range   Hemoglobin 14.4 (12/14/2020) 13.3 - 17.7 g/dL     _  Result Component Current Result Ref Range   Platelet Count 157 (12/14/2020) 150 - 450 10e9/L     _  Result Component Current Result Ref Range   Absolute Neutrophil 4.5 (12/14/2020) 1.6 - 8.3 10e9/L         Assessment/Plan:  Wesley is doing good with Xeloda therapy so far. Sotalol has been D/C per cardiology appt on 12/14. This removes the DDI between Xeloda. For labs he will be getting them at [facility] in [place], [place]. He should continue therapy as directed.      Follow-Up:  Review labs on 12/29    Refill Due:  1/5/21    Taz Alarcon  Pharmacy Intern  Oral Chemotherapy Monitoring Program  [facility]  [phone]

## 2020-12-28 NOTE — TELEPHONE ENCOUNTER
Facility:  [facility]    Contact Information:  Alexis Pierre MD  [address] [place] [id number]   [email or web address]   [phone]        Insurance Plan: [employer]  Plan Type: Medicare Advantage Plan  Employer: N/A  Secondary Insurance:  Secondary Plan Type:   CPT Code:   ICD-10: C22.0  Place of Service: [facility]  Condition:       Case Status:  Approved    Primary Insurance Status:    Secondary Insurance Status:    Case Level:  Pre-Determination    Initial Call Date:  2020    Next Step:  Case closed - Approved    Follow-Up:  2020 - The office emailed James stating they received approval from [employer] for the pre-determination on AL. The approval number is #[id number]. This is valid from 20-21 for bilobar treatment. This includes CPT codes: 35719, 52226, , 23900, 46487, 27754, 39300, 52720, 27725, 45086, 08681, .    Please note - [employer] approved the radiation therapy CPT 63708 (with ancillary codes 98474, 59994, 87970, 21222, 24375). The approval number is #[id number]. This is valid from 20-21 for bilobar treatment.    Case Notes:  2020 - Kline at [facility] showed the pre-determination request for AL is under review. James will continue to follow-up. Reference #Nichol [id number]    2020 - Anant at [facility] showed the pre-determination request for AL is under review. The turn-around time is 14 business days. Only the physician or patient can request an expedited review. The physician can call P# [phone] and refer to case #[id number]. Reference #Anant [id number]    [employer] approved the radiation therapy CPT 71111 (with ancillary codes 10612, 36642, 72468, 31413, 86301). The approval number is #[id number]. This is valid from 20-21 for bilobar treatment. The approval letter was forwarded to the office contacts.    2020 - James contacted [employer] regarding patient AL :  1945. [place] in provider services showed the patient has a Medicare PPO policy effective 1/1/2018. If considered medically necessary, the procedure will be covered at 100% of the allowed amount after the $250 copayment has been satisfied. The copayment applies to the out of pocket of $5,900 ($2,484.67 met). [place] checked all CPT codes showing 48869, 91017, 73610, 30184, 20769 and 40199 require authorization through [employer]. All other codes do not require prior authorization. A pre-determination can be submitted and was set up over the phone. The case was sent to review under #[id number]. Reference #[place] [id number]  Please note, this plan recognizes CPT  as the brachytherapy code not     The radiation therapy CPT 90666 (with ancillary codes 22358, 50503, 82528, 55776, 28355) was sent to review through [employer]. The tracking number is #[id number].    11/30/2020 - Prior Auth: James has received a Pre-Cert request for patient AL. As soon as our investigation is complete, All parties will be notified of our findings.

## 2020-12-28 NOTE — TELEPHONE ENCOUNTER
Called pt and wife to fup on him sp Y90     Inquired as to how pt is doing.     Wife state that he's did well with some fatigue post Y90, but then had diarrhea and ended up in the ER last night.     She states that there was some confusion as well as BS being off.     She states that he didn't eat much as well as had nausea and wasn't feeling well so she called 911.     She states that he's at [facility] in [place] currently as his BS are off and they are currently monitoring him.       We talked about followup but pt's insurance will be out of network. She states that their Oncologist Dr. Hernandez told them to see someone closer.       They did see Oncologist Dr Cuba Christensen.   Imaging fax  Fax: [phone]    I informed wife that I'll reach out to Michael Christensen to see if they have imaging and if not then we will have to send them to what Wife suggested:     [facility]  Address: [address] [place] [address]  Phone: [phone]      She agrees to plan      *called Dr Cuba Waddell's office to inquire  They do have MRI imaging as well as labs  Explained insurance information.     Will send them orders so that they can have imaging and labs completed there and then send imaging back to us for followup    *Dr. Pierre notified.       Angelina NEWBY RN, BSN  Interventional Radiology/Vascular  Nurse Coordinator   Phone: [phone]  Fax: [phone]

## 2020-12-30 NOTE — PROGRESS NOTES
Patient has had 2 ATP therapies in the last 2 weeks for tachyarrhythmias with a heart rate of 1 60-1 70.  It is unclear whether these are in the proper.  Therapies for A. fib with RVR versus VT.  Patient has been off of sotalol for the last 2 weeks as it interacts with his chemotherapy Xeloda.    We increased his Coreg from 3.125 mg to 6.2 from now twice a day.  We will increase it further to 12.5 mg twice a day.  If he continues to have more ATPs we will maximize his Coreg and consider starting him on amiodarone.  We will check with the pharmacy regarding interaction of amiodarone and Xeloda as needed.    Sincerely,  Claudy Loco MD   [facility]  Heart Failure, Transplant, and Mechanical Circulatory Support Cardiology   Cardiovascular Division  [facility]   [phone]

## 2020-12-30 NOTE — PROGRESS NOTES
Called [facility] to confirm coreg increase to 12.5mg BID. They state understanding. Had been cutting 12.5mg tablets, so confirmed they can just take whole tablet twice per day. Will update pharmacy as well with new rx.

## 2021-01-01 ENCOUNTER — ANCILLARY PROCEDURE (OUTPATIENT)
Dept: CARDIOLOGY | Facility: CLINIC | Age: 76
End: 2021-01-01
Attending: INTERNAL MEDICINE
Payer: COMMERCIAL

## 2021-01-01 ENCOUNTER — TELEPHONE (OUTPATIENT)
Dept: CARDIOLOGY | Facility: CLINIC | Age: 76
End: 2021-01-01

## 2021-01-01 ENCOUNTER — TELEPHONE (OUTPATIENT)
Dept: VASCULAR SURGERY | Facility: CLINIC | Age: 76
End: 2021-01-01

## 2021-01-01 ENCOUNTER — TEAM CONFERENCE (OUTPATIENT)
Dept: VASCULAR SURGERY | Facility: CLINIC | Age: 76
End: 2021-01-01

## 2021-01-01 ENCOUNTER — PATIENT OUTREACH (OUTPATIENT)
Dept: CARDIOLOGY | Facility: CLINIC | Age: 76
End: 2021-01-01

## 2021-01-01 ENCOUNTER — TELEPHONE (OUTPATIENT)
Dept: ONCOLOGY | Facility: CLINIC | Age: 76
End: 2021-01-01

## 2021-01-01 ENCOUNTER — HEALTH MAINTENANCE LETTER (OUTPATIENT)
Age: 76
End: 2021-01-01

## 2021-01-01 DIAGNOSIS — Z95.810 AUTOMATIC IMPLANTABLE CARDIOVERTER-DEFIBRILLATOR IN SITU: ICD-10-CM

## 2021-01-01 DIAGNOSIS — I50.22 CHRONIC SYSTOLIC HEART FAILURE (H): Primary | Chronic | ICD-10-CM

## 2021-01-01 DIAGNOSIS — I47.20 VT (VENTRICULAR TACHYCARDIA) (H): ICD-10-CM

## 2021-01-01 DIAGNOSIS — Z95.810 AUTOMATIC IMPLANTABLE CARDIOVERTER-DEFIBRILLATOR IN SITU: Primary | ICD-10-CM

## 2021-01-01 LAB
MDC_IDC_EPISODE_DTM: NORMAL
MDC_IDC_EPISODE_DURATION: 16 S
MDC_IDC_EPISODE_DURATION: 18 S
MDC_IDC_EPISODE_DURATION: 18 S
MDC_IDC_EPISODE_DURATION: 20 S
MDC_IDC_EPISODE_DURATION: 8 S
MDC_IDC_EPISODE_ID: NORMAL
MDC_IDC_EPISODE_THERAPY_RESULT: NORMAL
MDC_IDC_EPISODE_TYPE: NORMAL
MDC_IDC_EPISODE_VENDOR_TYPE: NORMAL
MDC_IDC_LEAD_IMPLANT_DT: NORMAL
MDC_IDC_LEAD_LOCATION: NORMAL
MDC_IDC_LEAD_LOCATION_DETAIL_1: NORMAL
MDC_IDC_LEAD_MFG: NORMAL
MDC_IDC_LEAD_MODEL: NORMAL
MDC_IDC_LEAD_POLARITY_TYPE: NORMAL
MDC_IDC_LEAD_SERIAL: NORMAL
MDC_IDC_LEAD_SPECIAL_FUNCTION: NORMAL
MDC_IDC_MSMT_BATTERY_DTM: NORMAL
MDC_IDC_MSMT_BATTERY_REMAINING_LONGEVITY: 31 MO
MDC_IDC_MSMT_BATTERY_REMAINING_LONGEVITY: 34 MO
MDC_IDC_MSMT_BATTERY_REMAINING_LONGEVITY: 34 MO
MDC_IDC_MSMT_BATTERY_REMAINING_LONGEVITY: 36 MO
MDC_IDC_MSMT_BATTERY_REMAINING_PERCENTAGE: 40 %
MDC_IDC_MSMT_BATTERY_REMAINING_PERCENTAGE: 45 %
MDC_IDC_MSMT_BATTERY_RRT_TRIGGER: NORMAL
MDC_IDC_MSMT_BATTERY_STATUS: NORMAL
MDC_IDC_MSMT_BATTERY_VOLTAGE: 2.92 V
MDC_IDC_MSMT_CAP_CHARGE_DTM: NORMAL
MDC_IDC_MSMT_CAP_CHARGE_ENERGY: 40 J
MDC_IDC_MSMT_CAP_CHARGE_TIME: 9.3 S
MDC_IDC_MSMT_CAP_CHARGE_TYPE: NORMAL
MDC_IDC_MSMT_LEADCHNL_LV_IMPEDANCE_VALUE: 700 OHM
MDC_IDC_MSMT_LEADCHNL_LV_IMPEDANCE_VALUE: 750 OHM
MDC_IDC_MSMT_LEADCHNL_LV_LEAD_CHANNEL_STATUS: NORMAL
MDC_IDC_MSMT_LEADCHNL_LV_PACING_THRESHOLD_AMPLITUDE: 1.75 V
MDC_IDC_MSMT_LEADCHNL_LV_PACING_THRESHOLD_AMPLITUDE: 2 V
MDC_IDC_MSMT_LEADCHNL_LV_PACING_THRESHOLD_PULSEWIDTH: 0.5 MS
MDC_IDC_MSMT_LEADCHNL_RA_IMPEDANCE_VALUE: 360 OHM
MDC_IDC_MSMT_LEADCHNL_RA_IMPEDANCE_VALUE: 380 OHM
MDC_IDC_MSMT_LEADCHNL_RA_IMPEDANCE_VALUE: 380 OHM
MDC_IDC_MSMT_LEADCHNL_RA_IMPEDANCE_VALUE: 390 OHM
MDC_IDC_MSMT_LEADCHNL_RA_LEAD_CHANNEL_STATUS: NORMAL
MDC_IDC_MSMT_LEADCHNL_RA_PACING_THRESHOLD_AMPLITUDE: 0.62 V
MDC_IDC_MSMT_LEADCHNL_RA_PACING_THRESHOLD_AMPLITUDE: 0.75 V
MDC_IDC_MSMT_LEADCHNL_RA_PACING_THRESHOLD_PULSEWIDTH: 0.5 MS
MDC_IDC_MSMT_LEADCHNL_RA_SENSING_INTR_AMPL: 4.5 MV
MDC_IDC_MSMT_LEADCHNL_RA_SENSING_INTR_AMPL: 4.8 MV
MDC_IDC_MSMT_LEADCHNL_RA_SENSING_INTR_AMPL: 5 MV
MDC_IDC_MSMT_LEADCHNL_RA_SENSING_INTR_AMPL: 5 MV
MDC_IDC_MSMT_LEADCHNL_RV_IMPEDANCE_VALUE: 430 OHM
MDC_IDC_MSMT_LEADCHNL_RV_IMPEDANCE_VALUE: 460 OHM
MDC_IDC_MSMT_LEADCHNL_RV_IMPEDANCE_VALUE: 530 OHM
MDC_IDC_MSMT_LEADCHNL_RV_IMPEDANCE_VALUE: 530 OHM
MDC_IDC_MSMT_LEADCHNL_RV_LEAD_CHANNEL_STATUS: NORMAL
MDC_IDC_MSMT_LEADCHNL_RV_PACING_THRESHOLD_AMPLITUDE: 0.5 V
MDC_IDC_MSMT_LEADCHNL_RV_PACING_THRESHOLD_AMPLITUDE: 0.62 V
MDC_IDC_MSMT_LEADCHNL_RV_PACING_THRESHOLD_AMPLITUDE: 0.62 V
MDC_IDC_MSMT_LEADCHNL_RV_PACING_THRESHOLD_AMPLITUDE: 0.75 V
MDC_IDC_MSMT_LEADCHNL_RV_PACING_THRESHOLD_PULSEWIDTH: 0.5 MS
MDC_IDC_MSMT_LEADCHNL_RV_SENSING_INTR_AMPL: 12 MV
MDC_IDC_MSMT_LEADCHNL_RV_SENSING_INTR_AMPL: 2.9 MV
MDC_IDC_MSMT_LEADCHNL_RV_SENSING_INTR_AMPL: 9.9 MV
MDC_IDC_MSMT_LEADCHNL_RV_SENSING_INTR_AMPL: 9.9 MV
MDC_IDC_PG_IMPLANT_DTM: NORMAL
MDC_IDC_PG_MFG: NORMAL
MDC_IDC_PG_MODEL: NORMAL
MDC_IDC_PG_SERIAL: NORMAL
MDC_IDC_PG_TYPE: NORMAL
MDC_IDC_SESS_CLINIC_NAME: NORMAL
MDC_IDC_SESS_DTM: NORMAL
MDC_IDC_SESS_REPROGRAMMED: NO
MDC_IDC_SESS_TYPE: NORMAL
MDC_IDC_SET_BRADY_AT_MODE_SWITCH_MODE: NORMAL
MDC_IDC_SET_BRADY_AT_MODE_SWITCH_RATE: 180 {BEATS}/MIN
MDC_IDC_SET_BRADY_LOWRATE: 60 {BEATS}/MIN
MDC_IDC_SET_BRADY_MAX_SENSOR_RATE: 110 {BEATS}/MIN
MDC_IDC_SET_BRADY_MAX_TRACKING_RATE: 110 {BEATS}/MIN
MDC_IDC_SET_BRADY_MODE: NORMAL
MDC_IDC_SET_BRADY_PAV_DELAY_LOW: 150 MS
MDC_IDC_SET_BRADY_SAV_DELAY_LOW: 100 MS
MDC_IDC_SET_CRT_LVRV_DELAY: 20 MS
MDC_IDC_SET_CRT_PACED_CHAMBERS: NORMAL
MDC_IDC_SET_LEADCHNL_LV_PACING_AMPLITUDE: 2.75 V
MDC_IDC_SET_LEADCHNL_LV_PACING_ANODE_ELECTRODE_1: NORMAL
MDC_IDC_SET_LEADCHNL_LV_PACING_ANODE_LOCATION_1: NORMAL
MDC_IDC_SET_LEADCHNL_LV_PACING_CAPTURE_MODE: NORMAL
MDC_IDC_SET_LEADCHNL_LV_PACING_CATHODE_ELECTRODE_1: NORMAL
MDC_IDC_SET_LEADCHNL_LV_PACING_CATHODE_LOCATION_1: NORMAL
MDC_IDC_SET_LEADCHNL_LV_PACING_POLARITY: NORMAL
MDC_IDC_SET_LEADCHNL_LV_PACING_PULSEWIDTH: 0.5 MS
MDC_IDC_SET_LEADCHNL_RA_PACING_AMPLITUDE: 1.5 V
MDC_IDC_SET_LEADCHNL_RA_PACING_ANODE_ELECTRODE_1: NORMAL
MDC_IDC_SET_LEADCHNL_RA_PACING_ANODE_LOCATION_1: NORMAL
MDC_IDC_SET_LEADCHNL_RA_PACING_CAPTURE_MODE: NORMAL
MDC_IDC_SET_LEADCHNL_RA_PACING_CATHODE_ELECTRODE_1: NORMAL
MDC_IDC_SET_LEADCHNL_RA_PACING_CATHODE_LOCATION_1: NORMAL
MDC_IDC_SET_LEADCHNL_RA_PACING_POLARITY: NORMAL
MDC_IDC_SET_LEADCHNL_RA_PACING_PULSEWIDTH: 0.5 MS
MDC_IDC_SET_LEADCHNL_RA_SENSING_ADAPTATION_MODE: NORMAL
MDC_IDC_SET_LEADCHNL_RA_SENSING_ANODE_ELECTRODE_1: NORMAL
MDC_IDC_SET_LEADCHNL_RA_SENSING_ANODE_LOCATION_1: NORMAL
MDC_IDC_SET_LEADCHNL_RA_SENSING_CATHODE_ELECTRODE_1: NORMAL
MDC_IDC_SET_LEADCHNL_RA_SENSING_CATHODE_LOCATION_1: NORMAL
MDC_IDC_SET_LEADCHNL_RA_SENSING_POLARITY: NORMAL
MDC_IDC_SET_LEADCHNL_RA_SENSING_SENSITIVITY: 0.3 MV
MDC_IDC_SET_LEADCHNL_RV_PACING_AMPLITUDE: 2 V
MDC_IDC_SET_LEADCHNL_RV_PACING_ANODE_ELECTRODE_1: NORMAL
MDC_IDC_SET_LEADCHNL_RV_PACING_ANODE_LOCATION_1: NORMAL
MDC_IDC_SET_LEADCHNL_RV_PACING_CAPTURE_MODE: NORMAL
MDC_IDC_SET_LEADCHNL_RV_PACING_CATHODE_ELECTRODE_1: NORMAL
MDC_IDC_SET_LEADCHNL_RV_PACING_CATHODE_LOCATION_1: NORMAL
MDC_IDC_SET_LEADCHNL_RV_PACING_POLARITY: NORMAL
MDC_IDC_SET_LEADCHNL_RV_PACING_PULSEWIDTH: 0.5 MS
MDC_IDC_SET_LEADCHNL_RV_SENSING_ADAPTATION_MODE: NORMAL
MDC_IDC_SET_LEADCHNL_RV_SENSING_ANODE_ELECTRODE_1: NORMAL
MDC_IDC_SET_LEADCHNL_RV_SENSING_ANODE_LOCATION_1: NORMAL
MDC_IDC_SET_LEADCHNL_RV_SENSING_CATHODE_ELECTRODE_1: NORMAL
MDC_IDC_SET_LEADCHNL_RV_SENSING_CATHODE_LOCATION_1: NORMAL
MDC_IDC_SET_LEADCHNL_RV_SENSING_POLARITY: NORMAL
MDC_IDC_SET_LEADCHNL_RV_SENSING_SENSITIVITY: 2 MV
MDC_IDC_SET_ZONE_DETECTION_INTERVAL: 320 MS
MDC_IDC_SET_ZONE_DETECTION_INTERVAL: 360 MS
MDC_IDC_SET_ZONE_DETECTION_INTERVAL: 460 MS
MDC_IDC_SET_ZONE_TYPE: NORMAL
MDC_IDC_SET_ZONE_VENDOR_TYPE: NORMAL
MDC_IDC_STAT_AT_BURDEN_PERCENT: 0 %
MDC_IDC_STAT_AT_DTM_END: NORMAL
MDC_IDC_STAT_AT_DTM_START: NORMAL
MDC_IDC_STAT_AT_MODE_SW_COUNT: 0
MDC_IDC_STAT_AT_MODE_SW_COUNT: 1
MDC_IDC_STAT_AT_MODE_SW_COUNT: 2
MDC_IDC_STAT_AT_MODE_SW_COUNT: 2
MDC_IDC_STAT_AT_MODE_SW_COUNT_PER_DAY: 0
MDC_IDC_STAT_AT_MODE_SW_MAX_DURATION: 10 S
MDC_IDC_STAT_AT_MODE_SW_PERCENT_TIME: 0 %
MDC_IDC_STAT_AT_MODE_SW_PERCENT_TIME: 1 %
MDC_IDC_STAT_BRADY_AP_VP_PERCENT: 12 %
MDC_IDC_STAT_BRADY_AP_VP_PERCENT: 5.1 %
MDC_IDC_STAT_BRADY_AP_VP_PERCENT: 5.8 %
MDC_IDC_STAT_BRADY_AP_VP_PERCENT: 8.3 %
MDC_IDC_STAT_BRADY_AP_VS_PERCENT: 1 %
MDC_IDC_STAT_BRADY_AS_VP_PERCENT: 88 %
MDC_IDC_STAT_BRADY_AS_VP_PERCENT: 91 %
MDC_IDC_STAT_BRADY_AS_VP_PERCENT: 93 %
MDC_IDC_STAT_BRADY_AS_VP_PERCENT: 94 %
MDC_IDC_STAT_BRADY_AS_VS_PERCENT: 1 %
MDC_IDC_STAT_BRADY_DTM_END: NORMAL
MDC_IDC_STAT_BRADY_DTM_START: NORMAL
MDC_IDC_STAT_BRADY_RA_PERCENT_PACED: 12 %
MDC_IDC_STAT_BRADY_RA_PERCENT_PACED: 4.5 %
MDC_IDC_STAT_BRADY_RA_PERCENT_PACED: 5.3 %
MDC_IDC_STAT_BRADY_RA_PERCENT_PACED: 7.6 %
MDC_IDC_STAT_CRT_DTM_END: NORMAL
MDC_IDC_STAT_CRT_DTM_START: NORMAL
MDC_IDC_STAT_CRT_PERCENT_PACED: 99 %
MDC_IDC_STAT_EPISODE_RECENT_COUNT: 0
MDC_IDC_STAT_EPISODE_RECENT_COUNT: 2
MDC_IDC_STAT_EPISODE_RECENT_COUNT: 2
MDC_IDC_STAT_EPISODE_RECENT_COUNT: 3
MDC_IDC_STAT_EPISODE_RECENT_COUNT: 5
MDC_IDC_STAT_EPISODE_RECENT_COUNT: 6
MDC_IDC_STAT_EPISODE_RECENT_COUNT_DTM_END: NORMAL
MDC_IDC_STAT_EPISODE_RECENT_COUNT_DTM_START: NORMAL
MDC_IDC_STAT_EPISODE_TYPE: NORMAL
MDC_IDC_STAT_EPISODE_VENDOR_TYPE: NORMAL
MDC_IDC_STAT_HEART_RATE_ATRIAL_MAX: 160 {BEATS}/MIN
MDC_IDC_STAT_HEART_RATE_ATRIAL_MAX: 250 {BEATS}/MIN
MDC_IDC_STAT_HEART_RATE_ATRIAL_MAX: 330 {BEATS}/MIN
MDC_IDC_STAT_HEART_RATE_ATRIAL_MAX: 330 {BEATS}/MIN
MDC_IDC_STAT_HEART_RATE_ATRIAL_MEAN: 69 {BEATS}/MIN
MDC_IDC_STAT_HEART_RATE_ATRIAL_MEAN: 77 {BEATS}/MIN
MDC_IDC_STAT_HEART_RATE_ATRIAL_MEAN: 79 {BEATS}/MIN
MDC_IDC_STAT_HEART_RATE_ATRIAL_MEAN: 79 {BEATS}/MIN
MDC_IDC_STAT_HEART_RATE_ATRIAL_MIN: 40 {BEATS}/MIN
MDC_IDC_STAT_HEART_RATE_ATRIAL_MIN: 50 {BEATS}/MIN
MDC_IDC_STAT_HEART_RATE_DTM_END: NORMAL
MDC_IDC_STAT_HEART_RATE_DTM_START: NORMAL
MDC_IDC_STAT_HEART_RATE_VENTRICULAR_MAX: 110 {BEATS}/MIN
MDC_IDC_STAT_HEART_RATE_VENTRICULAR_MAX: 180 {BEATS}/MIN
MDC_IDC_STAT_HEART_RATE_VENTRICULAR_MAX: 330 {BEATS}/MIN
MDC_IDC_STAT_HEART_RATE_VENTRICULAR_MAX: 330 {BEATS}/MIN
MDC_IDC_STAT_HEART_RATE_VENTRICULAR_MEAN: 69 {BEATS}/MIN
MDC_IDC_STAT_HEART_RATE_VENTRICULAR_MEAN: 76 {BEATS}/MIN
MDC_IDC_STAT_HEART_RATE_VENTRICULAR_MEAN: 79 {BEATS}/MIN
MDC_IDC_STAT_HEART_RATE_VENTRICULAR_MEAN: 79 {BEATS}/MIN
MDC_IDC_STAT_HEART_RATE_VENTRICULAR_MIN: 50 {BEATS}/MIN
MDC_IDC_STAT_TACHYTHERAPY_ATP_DELIVERED_RECENT: 0
MDC_IDC_STAT_TACHYTHERAPY_ATP_DELIVERED_RECENT: 2
MDC_IDC_STAT_TACHYTHERAPY_ATP_DELIVERED_RECENT: 5
MDC_IDC_STAT_TACHYTHERAPY_ATP_DELIVERED_RECENT: 6
MDC_IDC_STAT_TACHYTHERAPY_RECENT_DTM_END: NORMAL
MDC_IDC_STAT_TACHYTHERAPY_RECENT_DTM_START: NORMAL
MDC_IDC_STAT_TACHYTHERAPY_SHOCKS_ABORTED_RECENT: 0
MDC_IDC_STAT_TACHYTHERAPY_SHOCKS_DELIVERED_RECENT: 0

## 2021-01-01 PROCEDURE — 99207 CARDIAC DEVICE CHECK - REMOTE: CPT | Performed by: INTERNAL MEDICINE

## 2021-01-01 PROCEDURE — 93296 REM INTERROG EVL PM/IDS: CPT

## 2021-01-01 RX ORDER — LOSARTAN POTASSIUM 25 MG/1
TABLET ORAL
COMMUNITY
Start: 2020-01-01

## 2021-01-01 RX ORDER — AMIODARONE HYDROCHLORIDE 200 MG/1
TABLET ORAL
Qty: 90 TABLET | Refills: 3 | Status: SHIPPED | OUTPATIENT
Start: 2021-01-01 | End: 2021-01-01

## 2021-01-01 RX ORDER — AMIODARONE HYDROCHLORIDE 200 MG/1
200 TABLET ORAL DAILY
Qty: 90 TABLET | Refills: 1 | Status: SHIPPED | OUTPATIENT
Start: 2021-01-01

## 2021-01-01 RX ORDER — MEXILETINE HYDROCHLORIDE 200 MG/1
200 CAPSULE ORAL 2 TIMES DAILY
Qty: 60 CAPSULE | Refills: 11 | Status: SHIPPED | OUTPATIENT
Start: 2021-01-01 | End: 2021-01-01

## 2021-01-04 NOTE — PROGRESS NOTES
Date: 1/4/2021    Time of Call: 1:21 PM     Diagnosis:  VT, heart failure     [ VORB ] Ordering provider: Claudy Villagomez MD    Order: bmp, magnesium, phosphorus     Order received by: Calli Aguayo RN     Follow-up/additional notes: orders placed. Will fax to local lab.     Addendum: on chart review, patient currently in ER at local hospital. Will update dr villagomez.

## 2021-01-09 NOTE — ORAL ONC MGMT
[facility] Oral Chemotherapy Monitoring Program    Thank you for the opportunity to be a part in the care of this patient's oral chemotherapy. The oncology pharmacy will no longer be following this patient for oral chemotherapy. If there are any questions or the plan changes, feel free to contact us.    ORAL CHEMOTHERAPY 12/4/2020 12/9/2020 12/18/2020 1/9/2021   Assessment Type Other New Teach Initial Follow up Discontinuation   Stop Date - - - 1/3/2021   Reason for Discontinuation - - - Patient seeking care elsewhere   Diagnosis Code Colon Cancer Colon Cancer Colon Cancer Colon Cancer   Providers Dr. David Hernandez   Clinic Name/Location [facility]   Drug Name Xeloda (Capecitabine) Xeloda (Capecitabine) Xeloda (Capecitabine) Xeloda (Capecitabine)   Dose 1,500 mg 1,500 mg 1,500 mg 1,500 mg   Current Schedule BID BID BID BID   Cycle Details 2 weeks on, 1 week off 2 weeks on, 1 week off 2 weeks on, 1 week off 2 weeks on, 1 week off   Start Date of Last Cycle - - 12/15/2020 -   Planned next cycle start date - - 1/5/2020 -   Doses missed in last 2 weeks - - 0 -   Adherence Assessment - - Adherent -   Adverse Effects - - No AE identified during assessment -   Any new drug interactions? Yes - - -   Pharmacist Intervention? Yes - - -   Intervention(s) (No Data) - - -   Is the dose as ordered appropriate for the patient? No - - -   Pharmacist intervention for dose adjustment? Yes - - -   Intervention(s) Other - - -       Sonali Kaplan  Pharmacy Intern  [facility]  [phone]

## 2021-01-11 NOTE — TELEPHONE ENCOUNTER
Received call from wife stating that pt is currently inpt. At [facility].    He is currently confused as well as not eating and so she wanted to update us     Will make note and let Dr. Pierre know.     Angelina NEWBY RN, BSN  Interventional Radiology/Vascular  Nurse Coordinator   Phone: [phone]  Fax: [phone]

## 2021-01-27 NOTE — TELEPHONE ENCOUNTER
Called wife and left a msg    Inquired on how pt is doing as I do see the he's still inpatient at his local hospital.     I wanted to see how he is doing.     Asked that she return my call to [facility].    Angelina NEWBY RN, BSN  Interventional Radiology/Vascular  Nurse Coordinator   Phone: [phone]  Fax: [phone]

## 2021-02-03 NOTE — LETTER
"2/3/2021      RE: Wesley Cooper  [address]  [place] [address]       Dear Colleague,    Thank you for the opportunity to participate in the care of your patient, Wesley Cooper, at the [facility] [place] [facility]. Please see a copy of my visit note below.    Wesley is a 75 year old who is being evaluated via a billable video visit.      How would you like to obtain your AVS? MyChart  If the video visit is dropped, the invitation should be resent by: Other e-mail: [email or web address]  Will anyone else be joining your video visit? No            Electrophysiology Clinic Video Virtual Visit    Patient is a 75 year old male who is being evaluated via a billable video visit.      The patient has been notified of following:     \"This video visit will be conducted via a call between you and your physician/provider. We have found that certain health care needs can be provided without the need for an in-person physical exam.  This service lets us provide the care you need with a video conversation.  If a prescription is necessary we can send it directly to your pharmacy.  If lab work is needed we can place an order for that and you can then stop by our lab to have the test done at a later time.    If during the course of the call the physician/provider feels a video visit is not appropriate, you will not be charged for this service.\"     Physician/provider has received verbal consent for a Video Visit from the patient? Yes    HPI:   Mr. Cooper is a 75 year old male who has a past medical history significant for CAD s/p CABGX3 (LIMA-LAD, SVG-RCA, SVG- LCx) 1998, NSTEMI s/p balloon angioplasty to ISR mLAD 2016, refractory angina occluded SVG-RCA and SVG-LCx s/p complex PCI LCx 3/2019, ICM LVEF 35-40%, s/p CRTD 2011 with gen change 2016, DM2, AFL (CHADSVASC 5 on Warfarin), s/p ileostomy.     He has a longstanding cardiac history of CAD and subsequent ICM. He had CABGX3 " in 1998 and has had NSTEMI and was found to have some ISR of mLAD stent and underwent balloon angioplasty. He continued to have refractory angina and was later found to have occluded SVG-RCA and SVG-LCx and underwent a complex PCI to LCx in 3/2019. He has no viability of his inferior and lateral wall. He had known ICM most recent LVEF 35-40%. He had CRTD implanted in 2011 and a generator change in 2011. He has had appropriate ICD shocks last in 2017. He has also had AFL and has been on Sotalol and chronically anticoagulated. He had previously followed with [facility] and then established here in 2017 with Dr. Forrest.     EP Visit 9/9/20: He presents today to establish EP care. He reports feeling at baseline.  He has some chronic angina issues but under adequate control now. He is having episodes of dizziness with near syncope, most recently this morning while putting on his T-Shirt. He denies any chest pain/pressures, worsening shortness of breath, leg/ankle swelling, PND, orthopnea, or palpitations. A recent device interrogation showed 6 Noise reversion episodes and 4 NSVT episodes recorded. All of the EGMs show VENICE with pacing inhibition up to 7 seconds. EGMs were reviewed with [employer] technical services. They were able to increase the EGM size to determine true VENICE.  He was brought into clinic for further evaluation. He reports he has used some power tools which may have been cause of VENICE. Device interrogation today shows his intrinsic rhythm is Sinus 54 with CHB-  @ 30 bpm. No further episodes have been recorded. Isometrics and pocket manipulation preformed without reproducing any noise or pacing inhibition. AP= 22% and BVP= 100%. Lead trends appear stable. Battery estimates 3.9 years to TAYLA. Current cardiac medications include: Lipitor, Losartan, Coreg, Plavix, Lasix, Imdur, Sotalol, and Warfarin.     EP Visit 2/3/2021 (Today): Unfortunately since patient's last visit he was diagnosed with colon cancer. He  saw Dr. Feliz who took him off his sotalol as the chemotherapy was reported to interact with it. The chemotherapy gave him a substantial DANITA causing his oncology team to stop the therapy. His oncology team is still developing a plan on what to do next. After his sotalol was stopped he started developing short runs of VT, several of which were terminated by ATP. The rest resolved spontaneously. He didn't feel these runs--did not have any symptoms of light-headedness, syncope, or presyncope. No chest pain, no shortness of breath.      PAST MEDICAL& SURGICAL HISTORY:  CAD s/p CABGX3 (LIMA-LAD, SVG-RCA, SVG- LCx) 1998  NSTEMI s/p balloon angioplasty to ISR mLAD 2016  refractory angina occluded SVG-RCA and SVG-LCx s/p complex PCI LCx 3/2019  ICM LVEF 35-40%  s/p CRTD 2011 with gen change 2016  DM2  AFL (CHADSVASC 5 on Warfarin)  s/p ileostomy    CURRENT MEDICATIONS:  Current Outpatient Medications   Medication Sig Dispense Refill     apixaban ANTICOAGULANT (ELIQUIS) 5 MG tablet Take 5 mg by mouth 2 times daily       atorvastatin (LIPITOR) 80 MG tablet Take 80 mg by mouth daily       carvedilol (COREG) 12.5 MG tablet Take 1 tablet (12.5 mg) by mouth 2 times daily (with meals) 180 tablet 1     clopidogrel (PLAVIX) 75 MG tablet Take 75 mg by mouth daily       famotidine (PEPCID) 20 MG tablet Take 20 mg by mouth 2 times daily        ferrous sulfate (FEROSUL) 325 (65 Fe) MG tablet Take 325 mg by mouth daily       finasteride (PROSCAR) 5 MG tablet Take 5 mg by mouth daily       gabapentin (NEURONTIN) 300 MG capsule Take 300 mg by mouth 3 times daily        glipiZIDE (GLUCOTROL) 10 MG tablet Take 10 mg by mouth 2 times daily (before meals)        insulin detemir (LEVEMIR FLEXTOUCH) 100 UNIT/ML pen Inject 25 Units Subcutaneous At Bedtime        losartan (COZAAR) 25 MG tablet        methylPREDNISolone (MEDROL DOSEPAK) 4 MG tablet therapy pack Take 2 tablets (8 mg) by mouth 2 times daily Take as directed on package. 21 tablet  0     mexiletine (MEXITIL) 200 MG capsule Take 1 capsule (200 mg) by mouth 2 times daily 60 capsule 11     nitroglycerin (NITROSTAT) 0.4 MG sublingual tablet Place 0.4 mg under the tongue every 5 minutes as needed for chest pain For chest pain place 1 tablet under the tongue every 5 minutes for 3 doses. If symptoms persist 5 minutes after 1st dose call 911.       ondansetron (ZOFRAN) 4 MG tablet Take 1-2 tablets (4-8 mg) by mouth every 6 hours as needed for nausea (vomiting) 40 tablet 0     oxyCODONE (ROXICODONE) 5 MG tablet Take 1-2 tablets (5-10 mg) by mouth every 6 hours as needed for moderate to severe pain 10 tablet 0     oxyCODONE (ROXICODONE) 5 MG tablet Take 1 tablet (5 mg) by mouth every 6 hours 15 tablet 0     isosorbide mononitrate (IMDUR) 30 MG 24 hr tablet Take 0.5 tablets (15 mg) by mouth 2 times daily       pantoprazole (PROTONIX) 40 MG EC tablet Take 1 tablet (40 mg) by mouth daily (Patient not taking: Reported on 2/3/2021) 30 tablet 0       PAST SURGICAL HISTORY:  Past Surgical History:   Procedure Laterality Date     CARDIAC SURGERY      multiple stents and pacer/defibrillator     COLONOSCOPY N/A 10/30/2020    Procedure: Colonoscopy;  Surgeon: Carmine Blackmon MD;  Location:  GI     CV CORONARY STENT WITH DISTAL PROTECTION DEVICE N/A 3/12/2019    Procedure: Coronary Stent w Distal Protection Device;  Surgeon: Eliseo Field MD;  Location: [facility] CARDIAC CATH LAB     CV HEART CATHETERIZATION WITH POSSIBLE INTERVENTION N/A 1/24/2019    Procedure: PLANNED PCI;  Surgeon: Bill Marquez MD;  Location: [facility] CARDIAC CATH LAB     CV HEART CATHETERIZATION WITH POSSIBLE INTERVENTION N/A 3/12/2019    Procedure: BILL BROWN PLANNED PCI WITH CSI;  Surgeon: Eliseo Field MD;  Location: [facility] CARDIAC CATH LAB     CV PCI ATHERECTOMY ORBITAL N/A 3/12/2019    Procedure: Atherectomy;  Surgeon: Eliseo Field MD;  Location: [facility] CARDIAC CATH LAB     GI SURGERY   2009     IR LIVER BIOPSY PERCUTANEOUS  11/5/2020     IR SIRT (SELECTIVE INTERNAL RADIO THERAPY)  12/11/2020     IR VISCERAL ANGIOGRAM  12/11/2020     IR VISCERAL EMBOLIZATION  12/16/2020     ORTHOPEDIC SURGERY      back surgery     VASCULAR SURGERY  1999    CABG X4       ALLERGIES:   No Known Allergies    FAMILY HISTORY:  No family history on file.    SOCIAL HISTORY:  Social History     Tobacco Use     Smoking status: Former Smoker     Smokeless tobacco: Never Used     Tobacco comment: quit 2010   Substance Use Topics     Alcohol use: No     Drug use: No       ROS:   A comprehensive 10 point review of systems negative other than as mentioned in HPI.  Exam:  There were no vitals taken for this visit. Due to coronavirus pandemic.    GENERAL APPEARANCE: healthy, alert and no distress  CARDIAC: No visible JVD  RESPIRATORY: breathing comfortably, no use of accessory muscles, no retractions, respirations are unlabored, normal respiratory rate  ABDOMEN: soft, non tender, without hepatosplenomegaly, no masses palpable, bowel sounds normal, aorta not enlarged by palpation, no abdominal bruits  EXTREMITIES: no visible no edema  NEURO: alert and oriented to person/place/time, normal speech and affect  SKIN: no visible ecchymoses, no rashes    Labs:  Reviewed.     Testing/Procedures:  11/2019 ECHOCARDIOGRAM:   Interpretation Summary  Moderately (EF 35-40%, traced 36%) reduced left ventricular function is  present. There is mild diffuse hypokinesis with superimposed severe  hypokinesis involving the [id number]-yj-[id number] inferior, pgdro-fn-jxz inferoseptal,  and fxuud-jd-vvd inferolateral myocardial segments.  Global right ventricular function is mildly reduced.  Rheumatic mitral valve without significant mitral stenosis and mild mitral  regurgitation.  This study was compared with the study from 11/15/18: Comparison is limited  due to lack of contrast on the prior study, however LVEF and regional wall  motion abnormalities are probably  unchanged.      Assessment and Plan:   Mr. Cooper is a 75 year old male who has a past medical history significant for CAD s/p CABGX3 (LIMA-LAD, SVG-RCA, SVG- LCx) 1998, NSTEMI s/p balloon angioplasty to ISR mLAD 2016, refractory angina occluded SVG-RCA and SVG-LCx s/p complex PCI LCx 3/2019, ICM LVEF 35-40%, s/p CRTD 2011 with gen change 2016, DM2, AFL (CHADSVASC 5 on Warfarin), s/p ileostomy. He has a new diagnosis of colon cancer--the plans for treatment are not finalized as he failed his initial chemotherapy. In the meantime, he was taken off his sotalol; this appears to have led to development of short runs of VT.    Ventricular Tachycardia. Patient has underlying ischemic cardiomyopathy with a reduced ejection fraction. Review of electrograms suggests his VT's are all of similar morphology.   - Ideally we would place him back on his sotalol to control the VT, but he just had a large DANITA from chemotherapy. While his creatinine has improved it is not quite back to his baseline.  - For now, we will start him on mexilitine 200 mg twice daily with close follow up in 4 weeks. Hopefully by this time, the treatment plan for his cancer will be finalized. We can review if the mexilitine is effective at this point. If not, other options include amiodarone and sotalol. Both have relative contraindications due to previous organ dysfunction. The sotalol from his most recent DANITA (might have improved by then back to baseline), and the amiodarone which was attributed in the past to increase in LFTs. However, the correlation between amiodarone and his LFTs is relatively weak, so either of these might be reasonable options.    ICM LVEF 35-40%, NYHA II (Managed by Dr. Feliz):  1. ACEi/ARB: Continue Losartan.  2. BB: Continue Coreg   3. Aldosterone antagonist: not currently on.  4. SCD prophylaxis: s/p CRTD 2011 last gen change 2016  5. Fluid status: Continue Lasix.     Complete Heart Block with Recent Noise on Device  "Inhibiting Pacin. Noise appears to VENICE which lead to pacing inhibition up to 7 seconds.   2. VENICE occurred on a couple different days. He is unclear of specifics of those days. He does use power tools, so perhaps could be one of those.   3. Pacing sensitivity was changed from \"same as D-Fib\" to 2 mV. Also the stored EGM was changed from V Bipolar to V Sense Amp. This will allow [employer] have better diagnostics with any future episodes.   4. Continue follow up with device clinic per routine.     Paroxsymal Atrial Fibrillation/Flutter:   1. Stroke Prophylaxis:  CHADSVASC=++age, +HF, +CAD, +DM  5, corresponding to a 6.7% annual stroke / systemic emolism event rate. indicating need for long term oral anticoagulation.  He is appropriately on Eliquis  2. Rate Control: Has CHB intrinsically well rate controlled; Dr. Feliz has him on Coreg.   3. Rhythm Control: Was on sotalol, which is now contraindicated due to recent DANITA.  4. Risk Factor Management: Statin, BP control, and MARIPOSA evaluation.    We will have him get updated labs today.     Follow up in 1 month.    Patient was seen and discussed with the attending physician, Dr. Nicolas Modi, who agrees with my assessment and plan.    Video-Visit Details    Type of service:  Video Visit    Video Visit Duration: 30 minutes.     Originating Location (pt. Location): Home    Distant Location (provider location):  [facility]     Mode of Communication:  Video Conference via [employer]    EP STAFF NOTE  Patient seen and examined and management plan personally reviewed with the patient. I agree with the note above by the CV/EP fellow.  Furthermore, we discussed the case with Dr Loco subsequently. Apparently, the patient met with surgical team soon after and they were worried about surgical risk but they are planning to perform procedure around . Despite the fact that he has elevated liver enzymes with chronic amio use, we would use amio a couple of weeks " before then after the surgery to minimize his risk of VT then stop it and switch to mexiletine.  Nicolas Modi MD [facility]  Cardiology - Electrophysiology      Please do not hesitate to contact me if you have any questions/concerns.     Sincerely,     Nicolas Modi MD

## 2021-02-03 NOTE — PROGRESS NOTES
"Electrophysiology Clinic Video Virtual Visit    Patient is a 75 year old male who is being evaluated via a billable video visit.      The patient has been notified of following:     \"This video visit will be conducted via a call between you and your physician/provider. We have found that certain health care needs can be provided without the need for an in-person physical exam.  This service lets us provide the care you need with a video conversation.  If a prescription is necessary we can send it directly to your pharmacy.  If lab work is needed we can place an order for that and you can then stop by our lab to have the test done at a later time.    If during the course of the call the physician/provider feels a video visit is not appropriate, you will not be charged for this service.\"     Physician/provider has received verbal consent for a Video Visit from the patient? Yes    HPI:   Mr. Cooper is a 75 year old male who has a past medical history significant for CAD s/p CABGX3 (LIMA-LAD, SVG-RCA, SVG- LCx) 1998, NSTEMI s/p balloon angioplasty to ISR mLAD 2016, refractory angina occluded SVG-RCA and SVG-LCx s/p complex PCI LCx 3/2019, ICM LVEF 35-40%, s/p CRTD 2011 with gen change 2016, DM2, AFL (CHADSVASC 5 on Warfarin), s/p ileostomy.     He has a longstanding cardiac history of CAD and subsequent ICM. He had CABGX3 in 1998 and has had NSTEMI and was found to have some ISR of mLAD stent and underwent balloon angioplasty. He continued to have refractory angina and was later found to have occluded SVG-RCA and SVG-LCx and underwent a complex PCI to LCx in 3/2019. He has no viability of his inferior and lateral wall. He had known ICM most recent LVEF 35-40%. He had CRTD implanted in 2011 and a generator change in 2011. He has had appropriate ICD shocks last in 2017. He has also had AFL and has been on Sotalol and chronically anticoagulated. He had previously followed with [facility] and then established here in 2017 " with Dr. Forrest.     EP Visit 9/9/20: He presents today to establish EP care. He reports feeling at baseline.  He has some chronic angina issues but under adequate control now. He is having episodes of dizziness with near syncope, most recently this morning while putting on his T-Shirt. He denies any chest pain/pressures, worsening shortness of breath, leg/ankle swelling, PND, orthopnea, or palpitations. A recent device interrogation showed 6 Noise reversion episodes and 4 NSVT episodes recorded. All of the EGMs show VENICE with pacing inhibition up to 7 seconds. EGMs were reviewed with [employer] technical services. They were able to increase the EGM size to determine true VENICE.  He was brought into clinic for further evaluation. He reports he has used some power tools which may have been cause of VENICE. Device interrogation today shows his intrinsic rhythm is Sinus 54 with CHB-  @ 30 bpm. No further episodes have been recorded. Isometrics and pocket manipulation preformed without reproducing any noise or pacing inhibition. AP= 22% and BVP= 100%. Lead trends appear stable. Battery estimates 3.9 years to TAYLA. Current cardiac medications include: Lipitor, Losartan, Coreg, Plavix, Lasix, Imdur, Sotalol, and Warfarin.     EP Visit 2/3/2021 (Today): Unfortunately since patient's last visit he was diagnosed with colon cancer. He saw Dr. Feliz who took him off his sotalol as the chemotherapy was reported to interact with it. The chemotherapy gave him a substantial DANITA causing his oncology team to stop the therapy. His oncology team is still developing a plan on what to do next. After his sotalol was stopped he started developing short runs of VT, several of which were terminated by ATP. The rest resolved spontaneously. He didn't feel these runs--did not have any symptoms of light-headedness, syncope, or presyncope. No chest pain, no shortness of breath.      PAST MEDICAL& SURGICAL HISTORY:  CAD s/p CABGX3 (LIMA-LAD,  SVG-RCA, SVG- LCx) 1998  NSTEMI s/p balloon angioplasty to ISR mLAD 2016  refractory angina occluded SVG-RCA and SVG-LCx s/p complex PCI LCx 3/2019  ICM LVEF 35-40%  s/p CRTD 2011 with gen change 2016  DM2  AFL (CHADSVASC 5 on Warfarin)  s/p ileostomy    CURRENT MEDICATIONS:  Current Outpatient Medications   Medication Sig Dispense Refill     apixaban ANTICOAGULANT (ELIQUIS) 5 MG tablet Take 5 mg by mouth 2 times daily       atorvastatin (LIPITOR) 80 MG tablet Take 80 mg by mouth daily       carvedilol (COREG) 12.5 MG tablet Take 1 tablet (12.5 mg) by mouth 2 times daily (with meals) 180 tablet 1     clopidogrel (PLAVIX) 75 MG tablet Take 75 mg by mouth daily       famotidine (PEPCID) 20 MG tablet Take 20 mg by mouth 2 times daily        ferrous sulfate (FEROSUL) 325 (65 Fe) MG tablet Take 325 mg by mouth daily       finasteride (PROSCAR) 5 MG tablet Take 5 mg by mouth daily       gabapentin (NEURONTIN) 300 MG capsule Take 300 mg by mouth 3 times daily        glipiZIDE (GLUCOTROL) 10 MG tablet Take 10 mg by mouth 2 times daily (before meals)        insulin detemir (LEVEMIR FLEXTOUCH) 100 UNIT/ML pen Inject 25 Units Subcutaneous At Bedtime        losartan (COZAAR) 25 MG tablet        methylPREDNISolone (MEDROL DOSEPAK) 4 MG tablet therapy pack Take 2 tablets (8 mg) by mouth 2 times daily Take as directed on package. 21 tablet 0     mexiletine (MEXITIL) 200 MG capsule Take 1 capsule (200 mg) by mouth 2 times daily 60 capsule 11     nitroglycerin (NITROSTAT) 0.4 MG sublingual tablet Place 0.4 mg under the tongue every 5 minutes as needed for chest pain For chest pain place 1 tablet under the tongue every 5 minutes for 3 doses. If symptoms persist 5 minutes after 1st dose call 911.       ondansetron (ZOFRAN) 4 MG tablet Take 1-2 tablets (4-8 mg) by mouth every 6 hours as needed for nausea (vomiting) 40 tablet 0     oxyCODONE (ROXICODONE) 5 MG tablet Take 1-2 tablets (5-10 mg) by mouth every 6 hours as needed for  moderate to severe pain 10 tablet 0     oxyCODONE (ROXICODONE) 5 MG tablet Take 1 tablet (5 mg) by mouth every 6 hours 15 tablet 0     isosorbide mononitrate (IMDUR) 30 MG 24 hr tablet Take 0.5 tablets (15 mg) by mouth 2 times daily       pantoprazole (PROTONIX) 40 MG EC tablet Take 1 tablet (40 mg) by mouth daily (Patient not taking: Reported on 2/3/2021) 30 tablet 0       PAST SURGICAL HISTORY:  Past Surgical History:   Procedure Laterality Date     CARDIAC SURGERY      multiple stents and pacer/defibrillator     COLONOSCOPY N/A 10/30/2020    Procedure: Colonoscopy;  Surgeon: Carmine Blackmon MD;  Location:  GI     CV CORONARY STENT WITH DISTAL PROTECTION DEVICE N/A 3/12/2019    Procedure: Coronary Stent w Distal Protection Device;  Surgeon: Eliseo Field MD;  Location:  HEART CARDIAC CATH LAB     CV HEART CATHETERIZATION WITH POSSIBLE INTERVENTION N/A 1/24/2019    Procedure: PLANNED PCI;  Surgeon: Bill Marquez MD;  Location:  HEART CARDIAC CATH LAB     CV HEART CATHETERIZATION WITH POSSIBLE INTERVENTION N/A 3/12/2019    Procedure: BILL BROWN PLANNED PCI WITH CSI;  Surgeon: Eliseo Field MD;  Location:  HEART CARDIAC CATH LAB     CV PCI ATHERECTOMY ORBITAL N/A 3/12/2019    Procedure: Atherectomy;  Surgeon: Eliseo Field MD;  Location:  HEART CARDIAC CATH LAB     GI SURGERY  2009     IR LIVER BIOPSY PERCUTANEOUS  11/5/2020     IR SIRT (SELECTIVE INTERNAL RADIO THERAPY)  12/11/2020     IR VISCERAL ANGIOGRAM  12/11/2020     IR VISCERAL EMBOLIZATION  12/16/2020     ORTHOPEDIC SURGERY      back surgery     VASCULAR SURGERY  1999    CABG X4       ALLERGIES:   No Known Allergies    FAMILY HISTORY:  No family history on file.    SOCIAL HISTORY:  Social History     Tobacco Use     Smoking status: Former Smoker     Smokeless tobacco: Never Used     Tobacco comment: quit 2010   Substance Use Topics     Alcohol use: No     Drug use: No       ROS:   A comprehensive 10  point review of systems negative other than as mentioned in HPI.  Exam:  There were no vitals taken for this visit. Due to coronavirus pandemic.    GENERAL APPEARANCE: healthy, alert and no distress  CARDIAC: No visible JVD  RESPIRATORY: breathing comfortably, no use of accessory muscles, no retractions, respirations are unlabored, normal respiratory rate  ABDOMEN: soft, non tender, without hepatosplenomegaly, no masses palpable, bowel sounds normal, aorta not enlarged by palpation, no abdominal bruits  EXTREMITIES: no visible no edema  NEURO: alert and oriented to person/place/time, normal speech and affect  SKIN: no visible ecchymoses, no rashes    Labs:  Reviewed.     Testing/Procedures:  11/2019 ECHOCARDIOGRAM:   Interpretation Summary  Moderately (EF 35-40%, traced 36%) reduced left ventricular function is  present. There is mild diffuse hypokinesis with superimposed severe  hypokinesis involving the zsziq-jy-[id number] inferior, fhljy-kc-qbe inferoseptal,  and nlofy-na-xrj inferolateral myocardial segments.  Global right ventricular function is mildly reduced.  Rheumatic mitral valve without significant mitral stenosis and mild mitral  regurgitation.  This study was compared with the study from 11/15/18: Comparison is limited  due to lack of contrast on the prior study, however LVEF and regional wall  motion abnormalities are probably unchanged.      Assessment and Plan:   Mr. Cooper is a 75 year old male who has a past medical history significant for CAD s/p CABGX3 (LIMA-LAD, SVG-RCA, SVG- LCx) 1998, NSTEMI s/p balloon angioplasty to ISR mLAD 2016, refractory angina occluded SVG-RCA and SVG-LCx s/p complex PCI LCx 3/2019, ICM LVEF 35-40%, s/p CRTD 2011 with gen change 2016, DM2, AFL (CHADSVASC 5 on Warfarin), s/p ileostomy. He has a new diagnosis of colon cancer--the plans for treatment are not finalized as he failed his initial chemotherapy. In the meantime, he was taken off his sotalol; this appears to have led to  "development of short runs of VT.    Ventricular Tachycardia. Patient has underlying ischemic cardiomyopathy with a reduced ejection fraction. Review of electrograms suggests his VT's are all of similar morphology.   - Ideally we would place him back on his sotalol to control the VT, but he just had a large DANITA from chemotherapy. While his creatinine has improved it is not quite back to his baseline.  - For now, we will start him on mexilitine 200 mg twice daily with close follow up in 4 weeks. Hopefully by this time, the treatment plan for his cancer will be finalized. We can review if the mexilitine is effective at this point. If not, other options include amiodarone and sotalol. Both have relative contraindications due to previous organ dysfunction. The sotalol from his most recent DANITA (might have improved by then back to baseline), and the amiodarone which was attributed in the past to increase in LFTs. However, the correlation between amiodarone and his LFTs is relatively weak, so either of these might be reasonable options.    ICM LVEF 35-40%, NYHA II (Managed by Dr. Feliz):  1. ACEi/ARB: Continue Losartan.  2. BB: Continue Coreg   3. Aldosterone antagonist: not currently on.  4. SCD prophylaxis: s/p CRTD  last gen change   5. Fluid status: Continue Lasix.     Complete Heart Block with Recent Noise on Device Inhibiting Pacin. Noise appears to VENICE which lead to pacing inhibition up to 7 seconds.   2. VENICE occurred on a couple different days. He is unclear of specifics of those days. He does use power tools, so perhaps could be one of those.   3. Pacing sensitivity was changed from \"same as D-Fib\" to 2 mV. Also the stored EGM was changed from V Bipolar to V Sense Amp. This will allow [employer] have better diagnostics with any future episodes.   4. Continue follow up with device clinic per routine.     Paroxsymal Atrial Fibrillation/Flutter:   1. Stroke Prophylaxis:  CHADSVASC=++age, +HF, +CAD, +DM  " 5, corresponding to a 6.7% annual stroke / systemic emolism event rate. indicating need for long term oral anticoagulation.  He is appropriately on Eliquis  2. Rate Control: Has CHB intrinsically well rate controlled; Dr. Feliz has him on Coreg.   3. Rhythm Control: Was on sotalol, which is now contraindicated due to recent DANITA.  4. Risk Factor Management: Statin, BP control, and MARIPOSA evaluation.    We will have him get updated labs today.     Follow up in 1 month.    Patient was seen and discussed with the attending physician, Dr. Nicolas Modi, who agrees with my assessment and plan.    Video-Visit Details    Type of service:  Video Visit    Video Visit Duration: 30 minutes.     Originating Location (pt. Location): Home    Distant Location (provider location):  [facility]     Mode of Communication:  Video Conference via Rebel Monkey    EP STAFF NOTE  Patient seen and examined and management plan personally reviewed with the patient. I agree with the note above by the CV/EP fellow.  Furthermore, we discussed the case with Dr Loco subsequently. Apparently, the patient met with surgical team soon after and they were worried about surgical risk but they are planning to perform procedure around march 3rd. Despite the fact that he has elevated liver enzymes with chronic amio use, we would use amio a couple of weeks before then after the surgery to minimize his risk of VT then stop it and switch to mexiletine.  Nicolas Modi MD [facility]  Cardiology - Electrophysiology

## 2021-02-03 NOTE — PATIENT INSTRUCTIONS
You were seen virtually in Electrophysiology today by: Dr Modi    Plan:     Medication Changes:     Start mexiletine 200mg twice a day    Follow up visit: 4 weeks virtually with remote device prior      Your Care Team:  EP Cardiology   Telephone Number     Aisha Alicea RN [phone]     For scheduling appts or procedures:    Neha Corcoran   [phone]   For the Device Clinic (Pacemakers, ICDs, Loop Recorders)    During business hours: [phone]  After business hours:   [phone]- select option 4 and ask for job code [phone].       Cardiovascular Clinic:   [address] [place] [address]. [place] [address]      As always, Thank you for trusting us with your health care needs!

## 2021-02-03 NOTE — PROGRESS NOTES
Wesley is a 75 year old who is being evaluated via a billable video visit.      How would you like to obtain your AVS? MyChart  If the video visit is dropped, the invitation should be resent by: Other e-mail: [email or web address]  Will anyone else be joining your video visit? No

## 2021-02-05 NOTE — TELEPHONE ENCOUNTER
Called and spoke to patient and patient's wife. Dc'd mexiletine (he hasn't started). Will start amio per below. Follow-up on 4/28/21, spots held, CC notified. Surgery on 3/3/21.      Message  Received: Today  Message Contents   Jasmyne Izaguirre APRN CNP Kelling, Maria, SHANELL             Apparently pt saw his surgeon yesterday and he will be getting surgery soonish.   Rian and Claudy talked with them and they have decided to have him go on a short course of amiodarone to help stabilize rhythm through surgery. They would like him to start amiodarone next Monday 400 mg BID X2 weeks, then 200 mg daily thereafter until EP follow up. He can stop mexiletine when he starts amiodarone, but should save the mexiletine because they will likely switch back to this as a mono-agent when he is further past his surgery. Rian would like to see him about 8 weeks after his surgery.   Can you please call patient to update him with plan and arrange medication scripts etc?     Thanks,  LVW

## 2021-02-10 NOTE — TELEPHONE ENCOUNTER
Called and spoke to Digna. Reviewed Dr Modi's plan of switching patient to amiodarone prior to surgery to stabilize his VT and to see him 8 wks post surgery. She can see his note on CE. She does not require further documentation or information at this time.

## 2021-02-10 NOTE — TELEPHONE ENCOUNTER
[facility] Call Center    Phone Message    May a detailed message be left on voicemail: yes     Reason for Call: Other: giovanna, from [facility], with 's office stated pt has surgery scheduled on 03/03/2021 and needs a follow up 3 weeks before to check on his heart , giovanna would like a call back to discuss that at , thank you     Action Taken: Message routed to:  [facility] heart    Travel Screening: Not Applicable

## 2021-02-11 NOTE — TELEPHONE ENCOUNTER
[facility] Call Center    Phone Message    May a detailed message be left on voicemail: yes     Reason for Call: Other: Pt wife would like a call back as the pt is dizzy and having a hard time walking and she believes its from his medication Amiodarone , Please reach out to discuss     Action Taken: Message routed to:  [facility] Cardio    Travel Screening: Not Applicable

## 2021-02-11 NOTE — TELEPHONE ENCOUNTER
I notified Tatum MEDEROS RN for EP regarding this message and I will route this to the Ep pool for further review.    Segundo SOLIZ

## 2021-02-11 NOTE — CONFIDENTIAL NOTE
"Called and spoke to patient's wife, Bianca. Pt has upcoming surgery early March re: colon cx. Pt's hx significant for NSVT requiring ATP and/or shocks. Plan was for patient to load with Amio 200mg BID x2 weeks, followed by 200mg daily thereafter until EP follow-up post surgery to try to control VT for the procedure. Likely he will start mexiletine after procedure.    Bianca says Wesley started the Amiodarone on Monday 2/8. Since starting, Wesley has had an increase in dizziness. He is having trouble with his balance. He feels unsteady. He is using a walker. He fell into the wall yesterday, 2/10. No injuries. No syncope. Shannanbethany concerned the Amio dose is too high. They do not check his BP/HR at home, but a home health nurse came out yesterday, and his BP was 110/\"maybe 60-80\". Per Bianca, the home health RN said his pulse was \"fine.\" Per Bianca, he has been on Amio in the past but does not remember it causing dizziness like this.     Routing to Dr. Modi's team to advise.     Tatum Romo, BSN, RN, PHN  Electrophysiology Nurse Coordinator      "

## 2021-02-12 NOTE — TELEPHONE ENCOUNTER
Called and spoke to patient's wife. Given below recommendation. She will let us know if there are any ongoing issues in 1-2 weeks.     Jasmyne Izaguirre APRN CNP  You; Tatum Romo RN 1 minute ago (11:12 AM)     He can decrease to 200 mg daily and let us know how his symptoms resolve.   Thanks,  LVW

## 2021-02-25 NOTE — TELEPHONE ENCOUNTER
Returned call to Wesley. He reports the following blood pressures:  Friday 144/116 (Same day he stopped imdur)  Sunday 138/80  Today (Monday) 100/67    He reports he is feeling 'like the medicine hasn't worn off yet.' He reports dizziness is improved and he 'isn't dizzy like he was,' but he still reports feeling off balance and 'wobbly' when walking around. Reports he is still holding onto the wall when he walks. He has not fallen. Will route to provider.    Partial Purse String (Simple) Text: Given the location of the defect and the characteristics of the surrounding skin a simple purse string closure was deemed most appropriate.  Undermining was performed circumfirentially around the surgical defect.  A purse string suture was then placed and tightened. Wound tension only allowed a partial closure of the circular defect.

## 2021-03-23 NOTE — TELEPHONE ENCOUNTER
[facility] Call Center    Phone Message    May a detailed message be left on voicemail: no     Reason for Call: Other: Bianca called to report Pt Wesley had surgery on 3/3/2021 and spent 1 week in the hospital. He was sent to rehab where he stayed for 1 1/2 days. At that point, he was transported back to [facility] in [place], [place] where he is currently. iBanca is concerned because he seems to be declining and she feels he is not receving proper care. She stated he is experiencing hand tremors, elevated blood sugar and low blood pressure. She is also concerned about him remaining on RX amiodarone (PACERONE) 200 MG tablet. Please reach out to Bianca at [phone].     Action Taken: Message routed to:  [facility] Cardiology    Travel Screening: Not Applicable

## 2021-03-24 NOTE — TELEPHONE ENCOUNTER
Spoke to Bianca, patient's wife. He is admitted at [facility] for postop complications including elevated blood sugars, low blood pressures requiring what sound like inotrope support & blood transfusion, aspiration pneumonia, etc. He is now out of the ICU. Bianca reports feeling discouraged with the care he is receiving and that they are not listening to her. She calls to inquire if amiodarone can affect BP. I told her that it can, but if he required a transfer to the ICU for BP support, the low BP was likely multifactorial and not solely related to the amiodarone. Amiodarone is important for him right now given our attempt to stabilize his rhythm surrounding this procedure and what sounds like the stress of multiple setbacks. Encouraged her to continue to bring up her concerns to the patient's local team. Patient scheduled to see Dr Modi 4/28/21.    She was grateful for the call.

## 2021-05-12 NOTE — PROGRESS NOTES
"[facility] - Tachy therapies disabled    Spoke with nurse, Carie, at [facility]. Per Carie, patient is \"actively dying.\" Patient and family requested that tachy therapies be disabled. Per Carie, they have documentation of MD order to disable tachy therapies.     Jarod LANDERS RN    Jarod Tillman, RN  Electrophysiology Nurse Clinician  [facility]    During Business Hours Please Call:  [phone]  After Hours Please Call:  [phone] - select option #4 and ask for job code [phone]            "

## 2024-10-22 NOTE — Clinical Note
"Subjective   Patient ID: hSani Watson is a 67 y.o. female who presents for will be moving and has edema .  Patient presents today stating that she will be relocating to live with her brother in [facility].  She states she will be moving this coming weekend.    She states she will be covered by her payer in that region.  She will be establishing with PCP and Cardiology.    She states that she has been having more \"puffiness\" of her ankles which she believes is due to being on her feet most of the day due to packing.  She further states that she had tests recently by Cardio and received a call recommending she begin Lasix therapy.  I reviewed the testing which does confirm that she does have excessive free water.  She states that she would like to wait to begin as she doesn't want to be running to the bathroom frequently while trying to pack.  She does state that with more rest, she has had a reduction in edema as well as reducing the intake of processed foods.        Review of Systems   Constitutional: Negative.    HENT: Negative.     Eyes: Negative.    Respiratory: Negative.     Cardiovascular: Negative.    Gastrointestinal: Negative.    Endocrine: Negative.    Genitourinary: Negative.    Musculoskeletal: Negative.    Skin: Negative.    Allergic/Immunologic: Negative.    Neurological: Negative.    Hematological: Negative.    Psychiatric/Behavioral: Negative.         Objective     Blood pressure 110/76, pulse 88, temperature 36.2 °C (97.2 °F), temperature source Temporal, weight 64.3 kg (141 lb 12.8 oz), SpO2 96%.   Physical Exam  Vitals reviewed.   Constitutional:       Appearance: Normal appearance.   Neck:      Vascular: No carotid bruit.   Cardiovascular:      Rate and Rhythm: Normal rate and regular rhythm.      Pulses: Normal pulses.      Heart sounds: Normal heart sounds. No murmur heard.  Pulmonary:      Effort: Pulmonary effort is normal.      Breath sounds: Normal breath sounds. No wheezing or " Guide inserted over wire.   rales.   Abdominal:      General: Abdomen is flat. There is no distension.      Palpations: Abdomen is soft.      Tenderness: There is no abdominal tenderness.   Musculoskeletal:         General: Normal range of motion.      Cervical back: Normal range of motion and neck supple.      Right lower leg: Edema present.      Left lower leg: Edema present.      Comments: Trace pitting of the lower legs bilaterally.   Skin:     General: Skin is warm and dry.   Neurological:      General: No focal deficit present.      Mental Status: She is alert and oriented to person, place, and time.   Psychiatric:         Mood and Affect: Mood normal.         Behavior: Behavior normal.         Assessment/Plan   Problem List Items Addressed This Visit       Chronic bilateral low back pain with bilateral sciatica - Primary     Other Visit Diagnoses       Need for vaccination against Streptococcus pneumoniae        Relevant Orders    Pneumococcal conjugate vaccine, 20-valent (PREVNAR 20)    Need for immunization against influenza        Relevant Orders    Flu vaccine, trivalent, preservative free, HIGH-DOSE, age 65y+ (Fluzone)    Dependent edema              Sciatica overall well compensated on current therapy.  Will continue present therapy and follow.   Edema is minimal and she is reassured that she doesn't need to take the extra diuretic at this time.      Follow up open as she is relocating.

## (undated) DEVICE — SYR ANGIOGRAPHY MULTIUSE KIT ACIST 014612

## (undated) DEVICE — CATH GUIDING 7FR 100CML X SUPP LT X

## (undated) DEVICE — SHEATH PNCL 25CM 8FR NO WR

## (undated) DEVICE — CATH GUIDELINER 6FR 5571

## (undated) DEVICE — Device

## (undated) DEVICE — PACK HEART LEFT CUSTOM

## (undated) DEVICE — INTRO SHEATH 7FRX10CM PINNACLE RSS702

## (undated) DEVICE — INTRO SHEATH MICRO PLATINUM TIP 4FRX40CM 7274

## (undated) DEVICE — KIT ACCESSORY INTRO INFLATION SYS 20/30 PRIORITY 1000186-115

## (undated) DEVICE — FASTENER CATH BALLOON CLAMPX2 STATLOCK 0684-00-493

## (undated) DEVICE — VALVE HEMOSTASIS .115" DUOSTAT ADAPTER 23245

## (undated) DEVICE — KIT HAND CONTROL ACIST 014644 AR-P54

## (undated) DEVICE — GUIDEWIRE VASC 325CM .014IN RTWR FLPY H80228240022

## (undated) DEVICE — CATH BALLOON NC EMERGE 4.00X12MM H7493926712400

## (undated) DEVICE — CATH TURNPIKE LP 150CM

## (undated) DEVICE — INTRO SHEATH 6FRX25CM PINNACLE RSS606

## (undated) DEVICE — CATH GUIDING MICRO STAINLESS PTFE 1.8FRX0.45MM 35-1450

## (undated) DEVICE — MANIFOLD KIT ANGIO AUTOMATED 014613

## (undated) DEVICE — CATH GUIDING 7FR XB 3.5 VSTBRT STRL

## (undated) DEVICE — INTR SHEATH BRITE TIP 7FRX45CM 401-745M

## (undated) DEVICE — GUIDEWIRE HI-TORQUE  WHISPER ES JTIP 0.014"X190CM 1011834HJ

## (undated) DEVICE — WIRE GUIDE 0.035"X145CM AMPLATZ XSTIFF J THSCF-35-145-3-AES

## (undated) DEVICE — CATH BALLOON EMERGE 3.0X15MM H7493918915300

## (undated) DEVICE — WIRE GUIDE HI-TRQ PILOT 50 JTIP 0.014"X190CM 1010480-HJ

## (undated) DEVICE — CATH BALLOON EMERGE 2.0X30MM H7493918930200

## (undated) DEVICE — VALVE HEMOSTASIS .096" COPILOT MECH 1003331

## (undated) DEVICE — RAD CLOSURE ANGIOSEAL 8FR  610131

## (undated) DEVICE — GUIDEWIRE VASC 325CM .014IN RTWR X H802232390012

## (undated) DEVICE — CATH BALLOON EMERGE 2.5X12MM H7493918912250

## (undated) DEVICE — GUIDEWIRE ASAHI SION BLUE 0.014"X180CM J-TIP AHW14R004J

## (undated) DEVICE — CATH BALLOON NC EMERGE 3.50X12MM H7493926712350

## (undated) DEVICE — GUIDEWIRE VASC 0.014IN DIA 325CML SS

## (undated) DEVICE — INTRO GLIDESHEATH SLENDER 6FR 10X45CM 60-1060

## (undated) DEVICE — CATH BALLOON EMERGE PUSH 1.5X12MM H7493919012150

## (undated) DEVICE — WIRE GUIDE HI-TRQ ALL STAR 3CM JTIP 0.014"X190CM 1001740J

## (undated) RX ORDER — LIDOCAINE HYDROCHLORIDE 10 MG/ML
INJECTION, SOLUTION EPIDURAL; INFILTRATION; INTRACAUDAL; PERINEURAL
Status: DISPENSED
Start: 2018-11-06

## (undated) RX ORDER — FENTANYL CITRATE 50 UG/ML
INJECTION, SOLUTION INTRAMUSCULAR; INTRAVENOUS
Status: DISPENSED
Start: 2020-01-01

## (undated) RX ORDER — LIDOCAINE HYDROCHLORIDE 10 MG/ML
INJECTION, SOLUTION EPIDURAL; INFILTRATION; INTRACAUDAL; PERINEURAL
Status: DISPENSED
Start: 2020-01-01

## (undated) RX ORDER — PANTOPRAZOLE SODIUM 40 MG/10ML
INJECTION, POWDER, LYOPHILIZED, FOR SOLUTION INTRAVENOUS
Status: DISPENSED
Start: 2020-01-01

## (undated) RX ORDER — FENTANYL CITRATE 50 UG/ML
INJECTION, SOLUTION INTRAMUSCULAR; INTRAVENOUS
Status: DISPENSED
Start: 2019-01-24

## (undated) RX ORDER — HEPARIN SODIUM 1000 [USP'U]/ML
INJECTION, SOLUTION INTRAVENOUS; SUBCUTANEOUS
Status: DISPENSED
Start: 2019-01-24

## (undated) RX ORDER — AMPICILLIN AND SULBACTAM 2; 1 G/1; G/1
INJECTION, POWDER, FOR SOLUTION INTRAMUSCULAR; INTRAVENOUS
Status: DISPENSED
Start: 2020-01-01

## (undated) RX ORDER — FENTANYL CITRATE 50 UG/ML
INJECTION, SOLUTION INTRAMUSCULAR; INTRAVENOUS
Status: DISPENSED
Start: 2018-11-06

## (undated) RX ORDER — SODIUM CHLORIDE 9 MG/ML
INJECTION, SOLUTION INTRAVENOUS
Status: DISPENSED
Start: 2020-01-01

## (undated) RX ORDER — SODIUM CHLORIDE 9 MG/ML
INJECTION, SOLUTION INTRAVENOUS
Status: DISPENSED
Start: 2019-01-24

## (undated) RX ORDER — NITROGLYCERIN 5 MG/ML
VIAL (ML) INTRAVENOUS
Status: DISPENSED
Start: 2018-11-06

## (undated) RX ORDER — NITROGLYCERIN 5 MG/ML
VIAL (ML) INTRAVENOUS
Status: DISPENSED
Start: 2019-01-24

## (undated) RX ORDER — ASPIRIN 325 MG
TABLET ORAL
Status: DISPENSED
Start: 2019-03-12

## (undated) RX ORDER — CLOPIDOGREL BISULFATE 75 MG/1
TABLET ORAL
Status: DISPENSED
Start: 2019-03-12

## (undated) RX ORDER — SODIUM CHLORIDE 9 MG/ML
INJECTION, SOLUTION INTRAVENOUS
Status: DISPENSED
Start: 2019-03-12

## (undated) RX ORDER — FENTANYL CITRATE 50 UG/ML
INJECTION, SOLUTION INTRAMUSCULAR; INTRAVENOUS
Status: DISPENSED
Start: 2019-03-12

## (undated) RX ORDER — NITROGLYCERIN 5 MG/ML
INJECTION, SOLUTION INTRAVENOUS
Status: DISPENSED
Start: 2019-01-24

## (undated) RX ORDER — HEPARIN SODIUM 1000 [USP'U]/ML
INJECTION, SOLUTION INTRAVENOUS; SUBCUTANEOUS
Status: DISPENSED
Start: 2018-11-06

## (undated) RX ORDER — NICARDIPINE HYDROCHLORIDE 2.5 MG/ML
INJECTION INTRAVENOUS
Status: DISPENSED
Start: 2018-11-06